# Patient Record
Sex: FEMALE | Race: WHITE | HISPANIC OR LATINO
[De-identification: names, ages, dates, MRNs, and addresses within clinical notes are randomized per-mention and may not be internally consistent; named-entity substitution may affect disease eponyms.]

---

## 2021-01-06 PROBLEM — Z00.00 ENCOUNTER FOR PREVENTIVE HEALTH EXAMINATION: Status: ACTIVE | Noted: 2021-01-06

## 2021-01-29 ENCOUNTER — APPOINTMENT (OUTPATIENT)
Dept: CARDIOLOGY | Facility: CLINIC | Age: 76
End: 2021-01-29
Payer: SELF-PAY

## 2021-01-29 VITALS
SYSTOLIC BLOOD PRESSURE: 169 MMHG | HEIGHT: 59 IN | DIASTOLIC BLOOD PRESSURE: 90 MMHG | HEART RATE: 73 BPM | WEIGHT: 140 LBS | OXYGEN SATURATION: 98 % | BODY MASS INDEX: 28.22 KG/M2

## 2021-01-29 DIAGNOSIS — Z86.39 PERSONAL HISTORY OF OTHER ENDOCRINE, NUTRITIONAL AND METABOLIC DISEASE: ICD-10-CM

## 2021-01-29 DIAGNOSIS — Z86.79 PERSONAL HISTORY OF OTHER DISEASES OF THE CIRCULATORY SYSTEM: ICD-10-CM

## 2021-01-29 DIAGNOSIS — Z87.891 PERSONAL HISTORY OF NICOTINE DEPENDENCE: ICD-10-CM

## 2021-01-29 DIAGNOSIS — R09.89 OTHER SPECIFIED SYMPTOMS AND SIGNS INVOLVING THE CIRCULATORY AND RESPIRATORY SYSTEMS: ICD-10-CM

## 2021-01-29 DIAGNOSIS — Z82.3 FAMILY HISTORY OF STROKE: ICD-10-CM

## 2021-01-29 PROCEDURE — 99204 OFFICE O/P NEW MOD 45 MIN: CPT

## 2021-01-29 RX ORDER — ASPIRIN ENTERIC COATED TABLETS 81 MG 81 MG/1
81 TABLET, DELAYED RELEASE ORAL
Refills: 0 | Status: ACTIVE | COMMUNITY
Start: 2021-01-29

## 2021-01-29 RX ORDER — ATORVASTATIN CALCIUM 40 MG/1
40 TABLET, FILM COATED ORAL
Qty: 90 | Refills: 3 | Status: ACTIVE | COMMUNITY
Start: 2021-01-29

## 2021-01-29 RX ORDER — LEVOTHYROXINE SODIUM 88 UG/1
88 CAPSULE ORAL
Refills: 0 | Status: ACTIVE | COMMUNITY
Start: 2021-01-29

## 2021-01-29 RX ORDER — RAMIPRIL 2.5 MG/1
2.5 CAPSULE ORAL DAILY
Refills: 0 | Status: ACTIVE | COMMUNITY
Start: 2021-01-29

## 2021-01-29 RX ORDER — TRIAMTERENE AND HYDROCHLOROTHIAZIDE 37.5; 25 MG/1; MG/1
37.5-25 CAPSULE ORAL DAILY
Refills: 0 | Status: ACTIVE | COMMUNITY
Start: 2021-01-29

## 2021-01-29 RX ORDER — CARVEDILOL 3.12 MG/1
3.12 TABLET, FILM COATED ORAL TWICE DAILY
Qty: 180 | Refills: 3 | Status: ACTIVE | COMMUNITY
Start: 2021-01-29

## 2021-01-29 NOTE — HISTORY OF PRESENT ILLNESS
[FreeTextEntry1] : Ms. Cameron is a 74 yo F with PMH of CAD s/p 2v CABG in 1990s, HTN, dyslipidemia, hypothyroidism who presented for second opinion of carotid artery stenosis.\par Patient lives in [place] normally, here visiting children for the holidays. Follows with Dr. Feliciano Carroll regularly in [place], had routine carotid dopplers and TTE done 9/2020. Carotid dopplers showed 70% stenosis of proximal right internal carotid artery, 50-60% stenosis of left carotid bulb and proximal left internal carotid artery. TTE with normal LV function and size, aortic valve 1.2cm2 and mild regurgitation. Patient denies history of presyncope, syncope or stroke. Does not right arm numbness not correlating with activity, denies pain or swelling.\par Patient otherwise compliant with cardiac medications.

## 2021-01-29 NOTE — ASSESSMENT
[FreeTextEntry1] : 76 yo F with PMH of CAD s/p 2v CABG in 1990s, HTN, dyslipidemia, hypothyroidism who presented for second opinion of carotid artery stenosis. Patient with asymptomatic carotid stenosis bilaterally.\par - No indication for intervention on asymptomatic carotid stenosis < 80%, no history of stroke\par - Recommend repeat carotid dopplers for surveillance every 6-9 months, ordered today\par - If not covered by insurance, would repeat in [place]\par - continue statin and aspirin therapy

## 2021-01-29 NOTE — PHYSICAL EXAM
[General Appearance - Well Developed] : well developed [General Appearance - Well Nourished] : well nourished [General Appearance - In No Acute Distress] : no acute distress [Normal Conjunctiva] : the conjunctiva exhibited no abnormalities [Normal Oral Mucosa] : normal oral mucosa [Heart Rate And Rhythm] : heart rate and rhythm were normal [Heart Sounds] : normal S1 and S2 [Arterial Pulses Normal] : the arterial pulses were normal [Edema] : no peripheral edema present [] : no respiratory distress [Auscultation Breath Sounds / Voice Sounds] : lungs were clear to auscultation bilaterally [Abdomen Soft] : soft [Abnormal Walk] : normal gait [Nail Clubbing] : no clubbing of the fingernails [Cyanosis, Localized] : no localized cyanosis [Oriented To Time, Place, And Person] : oriented to person, place, and time [Affect] : the affect was normal [Mood] : the mood was normal [FreeTextEntry1] : Maculopapular rash on arms bilaterally

## 2022-10-25 ENCOUNTER — INPATIENT (INPATIENT)
Facility: HOSPITAL | Age: 77
LOS: 2 days | Discharge: HOME | DRG: 281 | End: 2022-10-28
Attending: HOSPITALIST | Admitting: INTERNAL MEDICINE
Payer: MEDICARE

## 2022-10-25 VITALS
WEIGHT: 149.03 LBS | RESPIRATION RATE: 18 BRPM | HEIGHT: 58 IN | TEMPERATURE: 98 F | DIASTOLIC BLOOD PRESSURE: 68 MMHG | OXYGEN SATURATION: 97 % | SYSTOLIC BLOOD PRESSURE: 138 MMHG | HEART RATE: 71 BPM

## 2022-10-25 DIAGNOSIS — Z95.1 PRESENCE OF AORTOCORONARY BYPASS GRAFT: Chronic | ICD-10-CM

## 2022-10-25 LAB
ALBUMIN SERPL ELPH-MCNC: 4.2 G/DL — SIGNIFICANT CHANGE UP (ref 3.3–5.2)
ALP SERPL-CCNC: 64 U/L — SIGNIFICANT CHANGE UP (ref 40–120)
ALT FLD-CCNC: 31 U/L — SIGNIFICANT CHANGE UP
ANION GAP SERPL CALC-SCNC: 11 MMOL/L — SIGNIFICANT CHANGE UP (ref 5–17)
APTT BLD: 28.7 SEC — SIGNIFICANT CHANGE UP (ref 27.5–35.5)
AST SERPL-CCNC: 49 U/L — HIGH
BASOPHILS # BLD AUTO: 0.04 K/UL — SIGNIFICANT CHANGE UP (ref 0–0.2)
BASOPHILS NFR BLD AUTO: 0.5 % — SIGNIFICANT CHANGE UP (ref 0–2)
BILIRUB SERPL-MCNC: 0.2 MG/DL — LOW (ref 0.4–2)
BUN SERPL-MCNC: 46.3 MG/DL — HIGH (ref 8–20)
CALCIUM SERPL-MCNC: 10.2 MG/DL — SIGNIFICANT CHANGE UP (ref 8.4–10.5)
CHLORIDE SERPL-SCNC: 101 MMOL/L — SIGNIFICANT CHANGE UP (ref 96–108)
CO2 SERPL-SCNC: 28 MMOL/L — SIGNIFICANT CHANGE UP (ref 22–29)
CREAT SERPL-MCNC: 1.64 MG/DL — HIGH (ref 0.5–1.3)
EGFR: 32 ML/MIN/1.73M2 — LOW
EOSINOPHIL # BLD AUTO: 0.52 K/UL — HIGH (ref 0–0.5)
EOSINOPHIL NFR BLD AUTO: 6.4 % — HIGH (ref 0–6)
GLUCOSE SERPL-MCNC: 97 MG/DL — SIGNIFICANT CHANGE UP (ref 70–99)
HCT VFR BLD CALC: 32.8 % — LOW (ref 34.5–45)
HGB BLD-MCNC: 10.6 G/DL — LOW (ref 11.5–15.5)
IMM GRANULOCYTES NFR BLD AUTO: 0.4 % — SIGNIFICANT CHANGE UP (ref 0–0.9)
INR BLD: 0.96 RATIO — SIGNIFICANT CHANGE UP (ref 0.88–1.16)
LYMPHOCYTES # BLD AUTO: 1.77 K/UL — SIGNIFICANT CHANGE UP (ref 1–3.3)
LYMPHOCYTES # BLD AUTO: 21.7 % — SIGNIFICANT CHANGE UP (ref 13–44)
MCHC RBC-ENTMCNC: 30.2 PG — SIGNIFICANT CHANGE UP (ref 27–34)
MCHC RBC-ENTMCNC: 32.3 GM/DL — SIGNIFICANT CHANGE UP (ref 32–36)
MCV RBC AUTO: 93.4 FL — SIGNIFICANT CHANGE UP (ref 80–100)
MONOCYTES # BLD AUTO: 0.68 K/UL — SIGNIFICANT CHANGE UP (ref 0–0.9)
MONOCYTES NFR BLD AUTO: 8.3 % — SIGNIFICANT CHANGE UP (ref 2–14)
NEUTROPHILS # BLD AUTO: 5.13 K/UL — SIGNIFICANT CHANGE UP (ref 1.8–7.4)
NEUTROPHILS NFR BLD AUTO: 62.7 % — SIGNIFICANT CHANGE UP (ref 43–77)
NT-PROBNP SERPL-SCNC: 517 PG/ML — HIGH (ref 0–300)
PLATELET # BLD AUTO: 279 K/UL — SIGNIFICANT CHANGE UP (ref 150–400)
POTASSIUM SERPL-MCNC: 4.4 MMOL/L — SIGNIFICANT CHANGE UP (ref 3.5–5.3)
POTASSIUM SERPL-SCNC: 4.4 MMOL/L — SIGNIFICANT CHANGE UP (ref 3.5–5.3)
PROT SERPL-MCNC: 7.3 G/DL — SIGNIFICANT CHANGE UP (ref 6.6–8.7)
PROTHROM AB SERPL-ACNC: 11.1 SEC — SIGNIFICANT CHANGE UP (ref 10.5–13.4)
RAPID RVP RESULT: SIGNIFICANT CHANGE UP
RBC # BLD: 3.51 M/UL — LOW (ref 3.8–5.2)
RBC # FLD: 12.8 % — SIGNIFICANT CHANGE UP (ref 10.3–14.5)
SARS-COV-2 RNA SPEC QL NAA+PROBE: SIGNIFICANT CHANGE UP
SODIUM SERPL-SCNC: 140 MMOL/L — SIGNIFICANT CHANGE UP (ref 135–145)
TROPONIN T SERPL-MCNC: 0.1 NG/ML — HIGH (ref 0–0.06)
WBC # BLD: 8.17 K/UL — SIGNIFICANT CHANGE UP (ref 3.8–10.5)
WBC # FLD AUTO: 8.17 K/UL — SIGNIFICANT CHANGE UP (ref 3.8–10.5)

## 2022-10-25 PROCEDURE — 99291 CRITICAL CARE FIRST HOUR: CPT

## 2022-10-25 PROCEDURE — 74174 CTA ABD&PLVS W/CONTRAST: CPT | Mod: 26,MA

## 2022-10-25 PROCEDURE — 71275 CT ANGIOGRAPHY CHEST: CPT | Mod: 26,MA

## 2022-10-25 PROCEDURE — 93010 ELECTROCARDIOGRAM REPORT: CPT

## 2022-10-25 PROCEDURE — 71045 X-RAY EXAM CHEST 1 VIEW: CPT | Mod: 26

## 2022-10-25 NOTE — ED PROVIDER NOTE - PHYSICAL EXAMINATION
General: well appearing, NAD  Head: NC/AT  Cardiac: RRR, no m/r/g  Respiratory: CTABL, equal chest wall expansions, no conversational dyspnea  Abdomen: soft, ND, NT  Neuro: AAOx3, coordinated movement of all 4 extremities  Psych: calm, cooperative, normal affect  Skin: warm and dry

## 2022-10-25 NOTE — ED PROVIDER NOTE - CLINICAL SUMMARY MEDICAL DECISION MAKING FREE TEXT BOX
76yo F w/pmh HTN, HLD, DM, CAD s/p CABG x2 presents for back pain radiating to chest x2d. Priority CTA ch/abd/pelvis ordered. Labs pending.

## 2022-10-25 NOTE — ED PROVIDER NOTE - PROGRESS NOTE DETAILS
Resident Katia Monteiro: Given the significant and immediate threats to this patient based on initial presentation, the benefits of emergency contrast-enhanced CT imaging without obtaining GFR/creatinine serum level results greatly outweigh the potential risk of harm due to contrast-induced nephropathy.

## 2022-10-25 NOTE — ED ADULT NURSE NOTE - OBJECTIVE STATEMENT
pt is a 76 yo/female w/pmh HTN, HLD, DM, CAD s/p CABG presents c/o mid back pain radiating through to chest x 5 days. pt states it started in the lower back and then traveled up to the upper back. now radiating through to the chest. Denies f/chills, n/v, sob, abdominal pain, dizziness, lightheadedness. pt made priorty CT scan.

## 2022-10-25 NOTE — ED PROVIDER NOTE - OBJECTIVE STATEMENT
76yo F w/pmh HTN, HLD, DM, CAD s/p CABG x2 presents for back pain radiating to chest x2d. Reports started in lower back, went up to upper back, now shooting through to her chest. Denies f/ch, SOB, n/v, abdominal pain. Cardiologist is Dr. Linda. 76 yo F w/pmh HTN, HLD, DM, CAD s/p CABG x2 presents for back pain radiating to chest x2d. Reports started in lower back, went up to upper back, now shooting through to her chest. Denies f/ch, SOB, n/v, abdominal pain. Cardiologist is Dr. Linda.

## 2022-10-25 NOTE — ED PROVIDER NOTE - CARE PLAN
Principal Discharge DX:	Chest pain   1 Principal Discharge DX:	NSTEMI (non-ST elevated myocardial infarction)

## 2022-10-25 NOTE — ED ADULT NURSE NOTE - NS ED NURSE REPORT GIVEN TO FT
plan of care handed over to Tony PALENCIA. Rn in understanding of plan of care. no further questions

## 2022-10-25 NOTE — ED PROVIDER NOTE - NS ED ROS FT
Constitutional: no fever, no sweats, and no chills.  CV: +chest pain, no edema.  Resp: no cough, no dyspnea  GI: no abdominal pain, no nausea and no vomiting.  MSK: +back pain, no extremity pain, no weakness  Skin: no lesions, and no rashes.  Neuro: no LOC, no headache, no dizziness  ROS otherwise negative except as noted in HPI. Constitutional: no fever, no sweats, and no chills.  CV: +chest pain, no edema.  Resp: no cough, no dyspnea  GI: no abdominal pain, no nausea and no vomiting.  MSK: +back pain, no extremity pain, no weakness  Skin: no lesions, and no rashes.  Neuro: no LOC, no headache, no dizziness.  ROS otherwise negative except as noted in HPI.

## 2022-10-26 DIAGNOSIS — Z98.891 HISTORY OF UTERINE SCAR FROM PREVIOUS SURGERY: Chronic | ICD-10-CM

## 2022-10-26 DIAGNOSIS — R07.9 CHEST PAIN, UNSPECIFIED: ICD-10-CM

## 2022-10-26 DIAGNOSIS — I21.4 NON-ST ELEVATION (NSTEMI) MYOCARDIAL INFARCTION: ICD-10-CM

## 2022-10-26 LAB
ANION GAP SERPL CALC-SCNC: 11 MMOL/L — SIGNIFICANT CHANGE UP (ref 5–17)
APTT BLD: 128.5 SEC — CRITICAL HIGH (ref 27.5–35.5)
BASOPHILS # BLD AUTO: 0.03 K/UL — SIGNIFICANT CHANGE UP (ref 0–0.2)
BASOPHILS NFR BLD AUTO: 0.4 % — SIGNIFICANT CHANGE UP (ref 0–2)
BLD GP AB SCN SERPL QL: SIGNIFICANT CHANGE UP
BUN SERPL-MCNC: 42.4 MG/DL — HIGH (ref 8–20)
CALCIUM SERPL-MCNC: 9.9 MG/DL — SIGNIFICANT CHANGE UP (ref 8.4–10.5)
CHLORIDE SERPL-SCNC: 99 MMOL/L — SIGNIFICANT CHANGE UP (ref 96–108)
CK MB CFR SERPL CALC: 19.1 NG/ML — HIGH (ref 0–6.7)
CK SERPL-CCNC: 385 U/L — HIGH (ref 25–170)
CO2 SERPL-SCNC: 29 MMOL/L — SIGNIFICANT CHANGE UP (ref 22–29)
CREAT SERPL-MCNC: 1.71 MG/DL — HIGH (ref 0.5–1.3)
EGFR: 31 ML/MIN/1.73M2 — LOW
EOSINOPHIL # BLD AUTO: 0.47 K/UL — SIGNIFICANT CHANGE UP (ref 0–0.5)
EOSINOPHIL NFR BLD AUTO: 5.7 % — SIGNIFICANT CHANGE UP (ref 0–6)
GLUCOSE BLDC GLUCOMTR-MCNC: 106 MG/DL — HIGH (ref 70–99)
GLUCOSE BLDC GLUCOMTR-MCNC: 159 MG/DL — HIGH (ref 70–99)
GLUCOSE SERPL-MCNC: 113 MG/DL — HIGH (ref 70–99)
HCT VFR BLD CALC: 30.9 % — LOW (ref 34.5–45)
HCT VFR BLD CALC: 31.5 % — LOW (ref 34.5–45)
HGB BLD-MCNC: 10.4 G/DL — LOW (ref 11.5–15.5)
HGB BLD-MCNC: 9.9 G/DL — LOW (ref 11.5–15.5)
IMM GRANULOCYTES NFR BLD AUTO: 0.4 % — SIGNIFICANT CHANGE UP (ref 0–0.9)
LYMPHOCYTES # BLD AUTO: 2.41 K/UL — SIGNIFICANT CHANGE UP (ref 1–3.3)
LYMPHOCYTES # BLD AUTO: 29.3 % — SIGNIFICANT CHANGE UP (ref 13–44)
MAGNESIUM SERPL-MCNC: 1.8 MG/DL — SIGNIFICANT CHANGE UP (ref 1.6–2.6)
MCHC RBC-ENTMCNC: 30.1 PG — SIGNIFICANT CHANGE UP (ref 27–34)
MCHC RBC-ENTMCNC: 30.6 PG — SIGNIFICANT CHANGE UP (ref 27–34)
MCHC RBC-ENTMCNC: 32 GM/DL — SIGNIFICANT CHANGE UP (ref 32–36)
MCHC RBC-ENTMCNC: 33 GM/DL — SIGNIFICANT CHANGE UP (ref 32–36)
MCV RBC AUTO: 92.6 FL — SIGNIFICANT CHANGE UP (ref 80–100)
MCV RBC AUTO: 93.9 FL — SIGNIFICANT CHANGE UP (ref 80–100)
MONOCYTES # BLD AUTO: 0.71 K/UL — SIGNIFICANT CHANGE UP (ref 0–0.9)
MONOCYTES NFR BLD AUTO: 8.6 % — SIGNIFICANT CHANGE UP (ref 2–14)
NEUTROPHILS # BLD AUTO: 4.57 K/UL — SIGNIFICANT CHANGE UP (ref 1.8–7.4)
NEUTROPHILS NFR BLD AUTO: 55.6 % — SIGNIFICANT CHANGE UP (ref 43–77)
PHOSPHATE SERPL-MCNC: 2.7 MG/DL — SIGNIFICANT CHANGE UP (ref 2.4–4.7)
PLATELET # BLD AUTO: 271 K/UL — SIGNIFICANT CHANGE UP (ref 150–400)
PLATELET # BLD AUTO: 277 K/UL — SIGNIFICANT CHANGE UP (ref 150–400)
POTASSIUM SERPL-MCNC: 4.1 MMOL/L — SIGNIFICANT CHANGE UP (ref 3.5–5.3)
POTASSIUM SERPL-SCNC: 4.1 MMOL/L — SIGNIFICANT CHANGE UP (ref 3.5–5.3)
RBC # BLD: 3.29 M/UL — LOW (ref 3.8–5.2)
RBC # BLD: 3.4 M/UL — LOW (ref 3.8–5.2)
RBC # FLD: 12.7 % — SIGNIFICANT CHANGE UP (ref 10.3–14.5)
RBC # FLD: 12.8 % — SIGNIFICANT CHANGE UP (ref 10.3–14.5)
SODIUM SERPL-SCNC: 139 MMOL/L — SIGNIFICANT CHANGE UP (ref 135–145)
TROPONIN T SERPL-MCNC: 0.09 NG/ML — HIGH (ref 0–0.06)
TROPONIN T SERPL-MCNC: 0.11 NG/ML — HIGH (ref 0–0.06)
WBC # BLD: 6.38 K/UL — SIGNIFICANT CHANGE UP (ref 3.8–10.5)
WBC # BLD: 8.22 K/UL — SIGNIFICANT CHANGE UP (ref 3.8–10.5)
WBC # FLD AUTO: 6.38 K/UL — SIGNIFICANT CHANGE UP (ref 3.8–10.5)
WBC # FLD AUTO: 8.22 K/UL — SIGNIFICANT CHANGE UP (ref 3.8–10.5)

## 2022-10-26 PROCEDURE — 99223 1ST HOSP IP/OBS HIGH 75: CPT

## 2022-10-26 PROCEDURE — 72128 CT CHEST SPINE W/O DYE: CPT | Mod: 26,MD

## 2022-10-26 PROCEDURE — 12345: CPT | Mod: NC

## 2022-10-26 PROCEDURE — 93010 ELECTROCARDIOGRAM REPORT: CPT | Mod: 76

## 2022-10-26 PROCEDURE — 99152 MOD SED SAME PHYS/QHP 5/>YRS: CPT

## 2022-10-26 PROCEDURE — 99220: CPT

## 2022-10-26 PROCEDURE — 93459 L HRT ART/GRFT ANGIO: CPT | Mod: 26

## 2022-10-26 RX ORDER — ONDANSETRON 8 MG/1
4 TABLET, FILM COATED ORAL EVERY 8 HOURS
Refills: 0 | Status: DISCONTINUED | OUTPATIENT
Start: 2022-10-26 | End: 2022-10-28

## 2022-10-26 RX ORDER — HEPARIN SODIUM 5000 [USP'U]/ML
3800 INJECTION INTRAVENOUS; SUBCUTANEOUS EVERY 6 HOURS
Refills: 0 | Status: DISCONTINUED | OUTPATIENT
Start: 2022-10-26 | End: 2022-10-26

## 2022-10-26 RX ORDER — SODIUM CHLORIDE 9 MG/ML
1000 INJECTION, SOLUTION INTRAVENOUS
Refills: 0 | Status: DISCONTINUED | OUTPATIENT
Start: 2022-10-26 | End: 2022-10-28

## 2022-10-26 RX ORDER — DEXTROSE 50 % IN WATER 50 %
25 SYRINGE (ML) INTRAVENOUS ONCE
Refills: 0 | Status: DISCONTINUED | OUTPATIENT
Start: 2022-10-26 | End: 2022-10-28

## 2022-10-26 RX ORDER — HEPARIN SODIUM 5000 [USP'U]/ML
4100 INJECTION INTRAVENOUS; SUBCUTANEOUS EVERY 6 HOURS
Refills: 0 | Status: DISCONTINUED | OUTPATIENT
Start: 2022-10-26 | End: 2022-10-26

## 2022-10-26 RX ORDER — MORPHINE SULFATE 50 MG/1
4 CAPSULE, EXTENDED RELEASE ORAL EVERY 4 HOURS
Refills: 0 | Status: DISCONTINUED | OUTPATIENT
Start: 2022-10-26 | End: 2022-10-28

## 2022-10-26 RX ORDER — HEPARIN SODIUM 5000 [USP'U]/ML
INJECTION INTRAVENOUS; SUBCUTANEOUS
Qty: 25000 | Refills: 0 | Status: DISCONTINUED | OUTPATIENT
Start: 2022-10-26 | End: 2022-10-26

## 2022-10-26 RX ORDER — LIDOCAINE 4 G/100G
1 CREAM TOPICAL DAILY
Refills: 0 | Status: DISCONTINUED | OUTPATIENT
Start: 2022-10-26 | End: 2022-10-28

## 2022-10-26 RX ORDER — DEXTROSE 50 % IN WATER 50 %
15 SYRINGE (ML) INTRAVENOUS ONCE
Refills: 0 | Status: DISCONTINUED | OUTPATIENT
Start: 2022-10-26 | End: 2022-10-28

## 2022-10-26 RX ORDER — CLOPIDOGREL BISULFATE 75 MG/1
75 TABLET, FILM COATED ORAL DAILY
Refills: 0 | Status: DISCONTINUED | OUTPATIENT
Start: 2022-10-27 | End: 2022-10-28

## 2022-10-26 RX ORDER — ACETAMINOPHEN 500 MG
650 TABLET ORAL EVERY 6 HOURS
Refills: 0 | Status: DISCONTINUED | OUTPATIENT
Start: 2022-10-26 | End: 2022-10-28

## 2022-10-26 RX ORDER — IBUPROFEN 200 MG
600 TABLET ORAL EVERY 8 HOURS
Refills: 0 | Status: DISCONTINUED | OUTPATIENT
Start: 2022-10-26 | End: 2022-10-26

## 2022-10-26 RX ORDER — ASPIRIN/CALCIUM CARB/MAGNESIUM 324 MG
81 TABLET ORAL DAILY
Refills: 0 | Status: DISCONTINUED | OUTPATIENT
Start: 2022-10-26 | End: 2022-10-28

## 2022-10-26 RX ORDER — MORPHINE SULFATE 50 MG/1
2 CAPSULE, EXTENDED RELEASE ORAL EVERY 4 HOURS
Refills: 0 | Status: DISCONTINUED | OUTPATIENT
Start: 2022-10-26 | End: 2022-10-28

## 2022-10-26 RX ORDER — PANTOPRAZOLE SODIUM 20 MG/1
40 TABLET, DELAYED RELEASE ORAL
Refills: 0 | Status: DISCONTINUED | OUTPATIENT
Start: 2022-10-26 | End: 2022-10-28

## 2022-10-26 RX ORDER — GLUCAGON INJECTION, SOLUTION 0.5 MG/.1ML
1 INJECTION, SOLUTION SUBCUTANEOUS ONCE
Refills: 0 | Status: DISCONTINUED | OUTPATIENT
Start: 2022-10-26 | End: 2022-10-28

## 2022-10-26 RX ORDER — LEVOTHYROXINE SODIUM 125 MCG
100 TABLET ORAL DAILY
Refills: 0 | Status: DISCONTINUED | OUTPATIENT
Start: 2022-10-26 | End: 2022-10-28

## 2022-10-26 RX ORDER — ASPIRIN/CALCIUM CARB/MAGNESIUM 324 MG
324 TABLET ORAL ONCE
Refills: 0 | Status: COMPLETED | OUTPATIENT
Start: 2022-10-26 | End: 2022-10-26

## 2022-10-26 RX ORDER — SODIUM CHLORIDE 9 MG/ML
250 INJECTION INTRAMUSCULAR; INTRAVENOUS; SUBCUTANEOUS ONCE
Refills: 0 | Status: COMPLETED | OUTPATIENT
Start: 2022-10-26 | End: 2022-10-26

## 2022-10-26 RX ORDER — INSULIN LISPRO 100/ML
VIAL (ML) SUBCUTANEOUS
Refills: 0 | Status: DISCONTINUED | OUTPATIENT
Start: 2022-10-26 | End: 2022-10-28

## 2022-10-26 RX ORDER — ATORVASTATIN CALCIUM 80 MG/1
80 TABLET, FILM COATED ORAL AT BEDTIME
Refills: 0 | Status: DISCONTINUED | OUTPATIENT
Start: 2022-10-26 | End: 2022-10-28

## 2022-10-26 RX ORDER — HEPARIN SODIUM 5000 [USP'U]/ML
550 INJECTION INTRAVENOUS; SUBCUTANEOUS
Qty: 25000 | Refills: 0 | Status: DISCONTINUED | OUTPATIENT
Start: 2022-10-26 | End: 2022-10-26

## 2022-10-26 RX ORDER — DEXTROSE 50 % IN WATER 50 %
12.5 SYRINGE (ML) INTRAVENOUS ONCE
Refills: 0 | Status: DISCONTINUED | OUTPATIENT
Start: 2022-10-26 | End: 2022-10-28

## 2022-10-26 RX ORDER — INSULIN LISPRO 100/ML
VIAL (ML) SUBCUTANEOUS AT BEDTIME
Refills: 0 | Status: DISCONTINUED | OUTPATIENT
Start: 2022-10-26 | End: 2022-10-28

## 2022-10-26 RX ORDER — CARVEDILOL PHOSPHATE 80 MG/1
3.12 CAPSULE, EXTENDED RELEASE ORAL EVERY 12 HOURS
Refills: 0 | Status: DISCONTINUED | OUTPATIENT
Start: 2022-10-26 | End: 2022-10-28

## 2022-10-26 RX ORDER — CLOPIDOGREL BISULFATE 75 MG/1
600 TABLET, FILM COATED ORAL ONCE
Refills: 0 | Status: COMPLETED | OUTPATIENT
Start: 2022-10-26 | End: 2022-10-26

## 2022-10-26 RX ORDER — LANOLIN ALCOHOL/MO/W.PET/CERES
3 CREAM (GRAM) TOPICAL AT BEDTIME
Refills: 0 | Status: DISCONTINUED | OUTPATIENT
Start: 2022-10-26 | End: 2022-10-28

## 2022-10-26 RX ORDER — HEPARIN SODIUM 5000 [USP'U]/ML
3800 INJECTION INTRAVENOUS; SUBCUTANEOUS ONCE
Refills: 0 | Status: COMPLETED | OUTPATIENT
Start: 2022-10-26 | End: 2022-10-26

## 2022-10-26 RX ORDER — HEPARIN SODIUM 5000 [USP'U]/ML
4100 INJECTION INTRAVENOUS; SUBCUTANEOUS ONCE
Refills: 0 | Status: DISCONTINUED | OUTPATIENT
Start: 2022-10-26 | End: 2022-10-26

## 2022-10-26 RX ORDER — NALOXONE HYDROCHLORIDE 4 MG/.1ML
0.4 SPRAY NASAL ONCE
Refills: 0 | Status: DISCONTINUED | OUTPATIENT
Start: 2022-10-26 | End: 2022-10-28

## 2022-10-26 RX ADMIN — CARVEDILOL PHOSPHATE 3.12 MILLIGRAM(S): 80 CAPSULE, EXTENDED RELEASE ORAL at 05:55

## 2022-10-26 RX ADMIN — SODIUM CHLORIDE 250 MILLILITER(S): 9 INJECTION INTRAMUSCULAR; INTRAVENOUS; SUBCUTANEOUS at 15:29

## 2022-10-26 RX ADMIN — HEPARIN SODIUM 750 UNIT(S)/HR: 5000 INJECTION INTRAVENOUS; SUBCUTANEOUS at 02:38

## 2022-10-26 RX ADMIN — SODIUM CHLORIDE 250 MILLILITER(S): 9 INJECTION INTRAMUSCULAR; INTRAVENOUS; SUBCUTANEOUS at 17:10

## 2022-10-26 RX ADMIN — LIDOCAINE 1 PATCH: 4 CREAM TOPICAL at 22:08

## 2022-10-26 RX ADMIN — CARVEDILOL PHOSPHATE 3.12 MILLIGRAM(S): 80 CAPSULE, EXTENDED RELEASE ORAL at 18:36

## 2022-10-26 RX ADMIN — Medication 81 MILLIGRAM(S): at 12:55

## 2022-10-26 RX ADMIN — Medication 324 MILLIGRAM(S): at 01:55

## 2022-10-26 RX ADMIN — Medication 650 MILLIGRAM(S): at 23:07

## 2022-10-26 RX ADMIN — ATORVASTATIN CALCIUM 80 MILLIGRAM(S): 80 TABLET, FILM COATED ORAL at 22:07

## 2022-10-26 RX ADMIN — Medication 100 MICROGRAM(S): at 05:55

## 2022-10-26 RX ADMIN — HEPARIN SODIUM 3800 UNIT(S): 5000 INJECTION INTRAVENOUS; SUBCUTANEOUS at 02:37

## 2022-10-26 RX ADMIN — PANTOPRAZOLE SODIUM 40 MILLIGRAM(S): 20 TABLET, DELAYED RELEASE ORAL at 05:55

## 2022-10-26 RX ADMIN — HEPARIN SODIUM 0 UNIT(S)/HR: 5000 INJECTION INTRAVENOUS; SUBCUTANEOUS at 10:25

## 2022-10-26 RX ADMIN — Medication 600 MILLIGRAM(S): at 01:03

## 2022-10-26 RX ADMIN — HEPARIN SODIUM 750 UNIT(S)/HR: 5000 INJECTION INTRAVENOUS; SUBCUTANEOUS at 07:25

## 2022-10-26 RX ADMIN — Medication 650 MILLIGRAM(S): at 22:07

## 2022-10-26 RX ADMIN — CLOPIDOGREL BISULFATE 600 MILLIGRAM(S): 75 TABLET, FILM COATED ORAL at 10:22

## 2022-10-26 RX ADMIN — HEPARIN SODIUM 550 UNIT(S)/HR: 5000 INJECTION INTRAVENOUS; SUBCUTANEOUS at 11:29

## 2022-10-26 RX ADMIN — Medication 600 MILLIGRAM(S): at 02:08

## 2022-10-26 RX ADMIN — Medication 3 MILLIGRAM(S): at 22:08

## 2022-10-26 NOTE — H&P ADULT - HISTORY OF PRESENT ILLNESS
77F with PMHX CAD s/p CABG/PCI, HTN, HLD, Hypothyroidism presents to [facility] ER c/o lower/middle back pain radiating to her chest which began 5 days ago. Denies SOB/KEENE. Similar to prior episode of MI for which she had CABG/PCI in 1999. Labs significant for elevated troponin. EKG SR +septal q waves. Follows with [facility] Dr Linda. No other complants. Seen by Cardiology in ED.    ROS negative unless mentioned.

## 2022-10-26 NOTE — H&P ADULT - NSHPPHYSICALEXAM_GEN_ALL_CORE
Vital Signs Last 24 Hrs  T(C): 36.6 (25 Oct 2022 23:27), Max: 36.7 (25 Oct 2022 18:22)  T(F): 97.8 (25 Oct 2022 23:27), Max: 98 (25 Oct 2022 18:22)  HR: 68 (25 Oct 2022 23:27) (68 - 71)  BP: 145/65 (25 Oct 2022 23:27) (138/68 - 183/72)  BP(mean): --  RR: 18 (25 Oct 2022 23:27) (18 - 18)  SpO2: 97% (25 Oct 2022 23:27) (97% - 97%)    Parameters below as of 25 Oct 2022 23:27  Patient On (Oxygen Delivery Method): room air    Constitutional: Well-appearing, NAD, VSS  Head: NC/AT  Eyes: PERRL, EOMI, anicteric sclera, conjunctiva WNL  ENT: Normal Pharynx, MMM, No tonsillar exudate/erythema  Neck: Supple, Non-tender  Chest: Non-tender, no rashes  Cardio: RRR, s1/s2, +HEIDE  Resp: BS CTA bilaterally, no wheezing/rhonchi/rales  Abd: Soft, Non-tender, Non-distended, no rebound/guarding/rigidity  : not examined  Rectal: not examined  MSK: moving all extremities, no motor weakness, full ROM x4  Ext: palpable distal pulses, good capillary refill, no clubbing/cyanosis/edema  Psych: appropriate, cooperative  Neuro: CN II-XII grossly intact, no focal deficits  Skin: Warm/Dry. No rashes.

## 2022-10-26 NOTE — PATIENT PROFILE ADULT - NSFALLSECTIONLABEL_GEN_A_CORE
normal appearance , without tenderness upon palpation , no deformities , trachea midline , Thyroid normal size , no thyroid nodules , no masses , no JVD , thyroid nontender . Danny BANSAL

## 2022-10-26 NOTE — PROGRESS NOTE ADULT - SUBJECTIVE AND OBJECTIVE BOX
Department of Cardiology                                                                  [facility]/[place] [address] [address] [facility]                                                            Telephone: [phone]. Fax:[phone]                                                                             Pre- Cardiac Procedure H + P/Progress Note      HPI:  77F with PMHX HTN, HLD, CAD with prior MI, PCI ~1999 with subsequent MI treated with 2V CABG (?JENN/LIMA), Hypothyroidism presents to [facility] ER c/o lower/middle back pain radiating to her chest which began 5 days ago. denies SOB/KEENE. Similar to prior episode of MI for which she had CABG/PCI in 1999, ECG with flattened/STD I and AVL, QW V1-V2, +tnl peak 0.11, TTE with preserved LVEF 60-65%, basal infe akinesis, mod-sev AS with NAVA 0.82,       Symptoms:        Angina (Class):        Ischemic Symptoms:     Heart Failure:        Systolic/Diastolic/Combined:        NYHA Class (within 2 weeks):     Assessment of LVEF:       EF:        Assessed by:        Date:     Prior Cardiac Interventions:         Noninvasive Testing:   Stress Test: Date:        Protocol:        Duration of Exercise:        Symptoms:        EKG Changes:        DTS:        Myocardial Imaging:        Risk Assessment (Low, Medium, High):     Echo:       Risk Assessments:  ASA:  Mallampati:  Bleeding Risk:  Creatinine:  GFR:    Associated Risk Factors:        Frailty Screening: (N/A, mild, mod, severe)       Cerebrovascular Disease: N/A       Chronic Lung Disease: N/A       Peripheral Arterial Disease: N/A       Chronic Kidney Disease (if yes, what is GFR): N/A       Uncontrolled Diabetes (if yes, what is HgbA1C or FBS): N/A       Poorly Controlled Hypertension (if yes, what is SBP): N/A       Morbid Obesity (if yes, what is BMI): N/A       History of Recent Ventricular Arrhythmia: N/A       Inability to Ambulate Safely: N/A       Need for Therapeutic Anticoagulation: N/A       Antiplatelet or Contrast Allergy: N/A    Antianginal Therapies:        Beta Blockers:         Calcium Channel Blockers:        Long Acting Nitrates:        Ranexa:     	  MEDICATIONS:  carvedilol 3.125 milliGRAM(s) Oral every 12 hours        acetaminophen     Tablet .. 650 milliGRAM(s) Oral every 6 hours PRN  melatonin 3 milliGRAM(s) Oral at bedtime PRN  morphine  - Injectable 2 milliGRAM(s) IV Push every 4 hours PRN  morphine  - Injectable 4 milliGRAM(s) IV Push every 4 hours PRN  ondansetron Injectable 4 milliGRAM(s) IV Push every 8 hours PRN    aluminum hydroxide/magnesium hydroxide/simethicone Suspension 30 milliLiter(s) Oral every 4 hours PRN  pantoprazole    Tablet 40 milliGRAM(s) Oral before breakfast    atorvastatin 80 milliGRAM(s) Oral at bedtime  dextrose 50% Injectable 25 Gram(s) IV Push once  dextrose 50% Injectable 12.5 Gram(s) IV Push once  dextrose 50% Injectable 25 Gram(s) IV Push once  dextrose Oral Gel 15 Gram(s) Oral once PRN  glucagon  Injectable 1 milliGRAM(s) IntraMuscular once  insulin lispro (ADMELOG) corrective regimen sliding scale   SubCutaneous three times a day before meals  insulin lispro (ADMELOG) corrective regimen sliding scale   SubCutaneous at bedtime  levothyroxine 100 MICROGram(s) Oral daily    aspirin  chewable 81 milliGRAM(s) Oral daily  dextrose 5%. 1000 milliLiter(s) IV Continuous <Continuous>  dextrose 5%. 1000 milliLiter(s) IV Continuous <Continuous>  heparin   Injectable 3800 Unit(s) IV Push every 6 hours PRN  heparin  Infusion. 550 Unit(s)/Hr IV Continuous <Continuous>  lidocaine   4% Patch 1 Patch Transdermal daily        ROS:     PHYSICAL EXAM:      T(C): 36.8 (10-26-22 @ 14:13), Max: 36.8 (10-26-22 @ 14:13)  HR: 58 (10-26-22 @ 14:13) (58 - 71)  BP: 107/58 (10-26-22 @ 14:13) (107/58 - 183/72)  RR: 18 (10-26-22 @ 14:13) (18 - 18)  SpO2: 98% (10-26-22 @ 14:13) (94% - 98%)  Wt(kg): --      I&O's Summary    26 Oct 2022 07:01  -  26 Oct 2022 14:41  --------------------------------------------------------  IN: 42.5 mL / OUT: 0 mL / NET: 42.5 mL        Daily Height in cm: 147.32 (26 Oct 2022 06:11)    Daily     TELEMETRY: 	      ECG:  	    LABS:	 	                                    10.4   6.38  )-----------( 271      ( 26 Oct 2022 08:48 )             31.5     10-26    139  |  99  |  42.4<H>  ----------------------------<  113<H>  4.1   |  29.0  |  1.71<H>    Ca    9.9      26 Oct 2022 03:54  Phos  2.7     10-26  Mg     1.8     10-26    TPro  7.3  /  Alb  4.2  /  TBili  0.2<L>  /  DBili  x   /  AST  49<H>  /  ALT  31  /  AlkPhos  64  10-25      Tnl:    Lipid Profile:   TC  TG  LDL  HDL    HgA1c:     proBNP: Serum Pro-Brain Natriuretic Peptide: 517 pg/mL (10-25 @ 19:57)      TSH:     Impression/Plan:      ****-IVH with NS 250mL bolus pre-cath                                                                             Department of Cardiology                                                                  [facility]/[place] [address]  [place]                                                            Telephone: [phone]. Fax:[phone]                                                                             Pre- Cardiac Procedure H + P/Progress Note      HPI:  77F with PMHX HTN, HLD, CAD with prior MI, PCI ~1999 with subsequent MI treated with 2V CABG (?JENN/LIMA), Hypothyroidism presents to [facility] ER c/o lower/middle back pain radiating to her chest which began 5 days ago. denies SOB/KEENE. Similar to prior episode of MI for which she had CABG/PCI in 1999, ECG with flattened/STD lead I and AVL, QW V1-V2, +tnl peak 0.11, TTE with preserved LVEF 60-65%, basal infe akinesis, mod-sev AS with NAVA 0.82, P/MG 35/22mmHg, PV 2.9m/s, DI 0.32, , CTA R/O dissection, elevated Cr 1.7 and GFR 31, recent labs 9/16/22 outside office with Cr 1.23/eGFR 46, now plan for [facility] to eval CAD progression/ISR/graft occlusion.    Of note, due to elevated Cr will perform diagnostic cath only with plan to stage any indicated PCI. IVH with NS 250mL bolus given in the cath lab prior to contrast introduction.        Symptoms:        Angina (Class): IV       Ischemic Symptoms: resting chest pain, + NSTEMI    Heart Failure:        Systolic/Diastolic/Combined: HFpEF, grade I DD       NYHA Class (within 2 weeks): 1    Assessment of LVEF:       EF: 60-65%       Assessed by: TTE        Date: 10/26    Prior Cardiac Interventions:  2V CABG 1999 [facility] (likely JENN/LIMA grafting)  PCI prior to CABG         Noninvasive Testing: no report    Echo 10/26/22:  LV Wall Scoring:  The basal inferior segment is akinetic.    Right Ventricle: The right ventricular size is normal. RV systolic   function is normal.  Left Atrium: Normal left atrial size.  Right Atrium: Normal right atrial size.  Pericardium: There is no evidence of pericardial effusion.  Mitral Valve: The mitral valve is normal in structure. Moderate   thickening and calcification of the anterior and posterior mitral valve   leaflets. There is moderate mitral annular calcification. Trace mitral   valve regurgitation is seen.  Tricuspid Valve: The tricuspid valve is normal in structure. Mild   tricuspid regurgitation is visualized.  Aortic Valve: Moderate to severe aortic stenosis is present. Mild to   moderate aortic valve regurgitation is seen. The Dimesionless Index value   is 0.32.  Pulmonic Valve: Structurally normal pulmonic valve, with normal leaflet   excursion. Trace pulmonic valve regurgitation.  Aorta: The aortic root is normal in size and structure.  Pulmonary Artery: The main pulmonary artery is normal in size.  Venous: The inferior vena cava was normal sized, with respiratory size   variation greater than 50%.    Summary:   1. Left ventricular ejection fraction, by visual estimation, is 60 to   65%.   2. Normal global left ventricular systolic function.   3. Basal inferior segment is abnormal as described above.   4. Spectral Doppler shows impaired relaxation pattern of left   ventricular myocardial filling (Grade I diastolic dysfunction).   5. Normal left atrial size.   6. Normal right atrial size.   7. Moderate mitral annular calcification.   8. Moderate thickening and calcification of the anterior and posterior   mitral valve leaflets.   9. Trace mitral valve regurgitation.  10. Mild tricuspid regurgitation.  11. Mild to moderate aortic regurgitation.  12. Moderate to severe aortic valve stenosis.  13. The Dimesionless Index value is 0.32.       Risk Assessments:  ASA: 3  Mallampati: 2  Bleeding Risk: 8.4%  Creatinine: 1.7  GFR: 31    Associated Risk Factors:        Frailty Screening: (N/A, mild, mod, severe): mod       Cerebrovascular Disease: N/A       Chronic Lung Disease: N/A       Peripheral Arterial Disease: N/A       Chronic Kidney Disease (if yes, what is GFR): Cr 1.7/GFR 31       Uncontrolled Diabetes (if yes, what is HgbA1C or FBS): N/A       Poorly Controlled Hypertension (if yes, what is SBP): N/A       Morbid Obesity (if yes, what is BMI): N/A       History of Recent Ventricular Arrhythmia: N/A       Inability to Ambulate Safely: N/A       Need for Therapeutic Anticoagulation: N/A       Antiplatelet or Contrast Allergy: N/A    Antianginal Therapies:        Beta Blockers:  Coreg       Calcium Channel Blockers:        Long Acting Nitrates:        Ranexa:     	  MEDICATIONS:  carvedilol 3.125 milliGRAM(s) Oral every 12 hours  acetaminophen     Tablet .. 650 milliGRAM(s) Oral every 6 hours PRN  melatonin 3 milliGRAM(s) Oral at bedtime PRN  morphine  - Injectable 2 milliGRAM(s) IV Push every 4 hours PRN  morphine  - Injectable 4 milliGRAM(s) IV Push every 4 hours PRN  ondansetron Injectable 4 milliGRAM(s) IV Push every 8 hours PRN  aluminum hydroxide/magnesium hydroxide/simethicone Suspension 30 milliLiter(s) Oral every 4 hours PRN  pantoprazole    Tablet 40 milliGRAM(s) Oral before breakfast  atorvastatin 80 milliGRAM(s) Oral at bedtime  dextrose 50% Injectable 25 Gram(s) IV Push once  dextrose 50% Injectable 12.5 Gram(s) IV Push once  dextrose 50% Injectable 25 Gram(s) IV Push once  dextrose Oral Gel 15 Gram(s) Oral once PRN  glucagon  Injectable 1 milliGRAM(s) IntraMuscular once  insulin lispro (ADMELOG) corrective regimen sliding scale   SubCutaneous three times a day before meals  insulin lispro (ADMELOG) corrective regimen sliding scale   SubCutaneous at bedtime  levothyroxine 100 MICROGram(s) Oral daily  aspirin  chewable 81 milliGRAM(s) Oral daily  dextrose 5%. 1000 milliLiter(s) IV Continuous <Continuous>  dextrose 5%. 1000 milliLiter(s) IV Continuous <Continuous>  heparin   Injectable 3800 Unit(s) IV Push every 6 hours PRN  heparin  Infusion. 550 Unit(s)/Hr IV Continuous <Continuous>  lidocaine   4% Patch 1 Patch Transdermal daily        ROS:     PHYSICAL EXAM:      T(C): 36.8 (10-26-22 @ 14:13), Max: 36.8 (10-26-22 @ 14:13)  HR: 58 (10-26-22 @ 14:13) (58 - 71)  BP: 107/58 (10-26-22 @ 14:13) (107/58 - 183/72)  RR: 18 (10-26-22 @ 14:13) (18 - 18)  SpO2: 98% (10-26-22 @ 14:13) (94% - 98%)  Wt 136lbs      I&O's Summary    26 Oct 2022 07:01  -  26 Oct 2022 14:41  --------------------------------------------------------  IN: 42.5 mL / OUT: 0 mL / NET: 42.5 mL        Daily Height in cm: 147.32 (26 Oct 2022 06:11)      ECG:  SR with flattened/STD lead I and AVL, QW V1-V2	    LABS:	                             10.4   6.38  )-----------( 271      ( 26 Oct 2022 08:48 )             31.5     10-26    139  |  99  |  42.4<H>  ----------------------------<  113<H>  4.1   |  29.0  |  1.71<H>    Ca    9.9      26 Oct 2022 03:54  Phos  2.7     10-26  Mg     1.8     10-26    TPro  7.3  /  Alb  4.2  /  TBili  0.2<L>  /  DBili  x   /  AST  49<H>  /  ALT  31  /  AlkPhos  64  10-25      Tnl: peak 0.11        proBNP: Serum Pro-Brain Natriuretic Peptide: 517 pg/mL (10-25 @ 19:57)        Impression/Plan: 78yo female presenting with NSTEMI, now plan for [facility] to eval CAD progression/ISR/graft occlusion to be performed by Dr Davis.     -plan for LHC via FA  -patient seen and examined  -confirmed appropriate NPO duration  -ECG and Labs reviewed  -Aspirin 81mg po pre-cath  -Stop heparin gtt on call to procedure room  -NS 250mL IV bolus pre-cath  -procedure discussed with patient; risks and benefits explained, questions answered  -consent obtained by attending IC                                                                           Department of Cardiology                                                                  [facility]/[place] [address]  [place]                                                            Telephone: [phone]. Fax:[phone]                                                                             Pre- Cardiac Procedure H + P/Progress Note      HPI:  77F with PMHX HTN, HLD, CAD with prior MI, PCI ~1999 with subsequent MI treated with 2V CABG (?JENN/LIMA), Hypothyroidism presents to [facility] ER c/o lower/middle back pain radiating to her chest which began 5 days ago. denies SOB/KEENE. Similar to prior episode of MI for which she had CABG/PCI in 1999, ECG with flattened/STD lead I and AVL, QW V1-V2, +tnl peak 0.11, TTE with preserved LVEF 60-65%, basal infe akinesis, mod-sev AS with NAVA 0.82, P/MG 35/22mmHg, PV 2.9m/s, DI 0.32, , CTA R/O dissection, elevated Cr 1.7 and GFR 31, recent labs 9/16/22 outside office with Cr 1.23/eGFR 46, now plan for [facility] to eval CAD progression/ISR/graft occlusion.    Of note, due to elevated Cr will perform diagnostic cath only with plan to stage any indicated PCI. IVH with NS 250mL bolus given in the cath lab prior to contrast introduction.    PMD lab work June and Sept 2022 with Cr 1.2 with GFR 46       Symptoms:        Angina (Class): IV       Ischemic Symptoms: resting chest pain, + NSTEMI    Heart Failure:        Systolic/Diastolic/Combined: HFpEF, grade I DD       NYHA Class (within 2 weeks): 1    Assessment of LVEF:       EF: 60-65%       Assessed by: TTE        Date: 10/26    Prior Cardiac Interventions:  2V CABG 1999 [facility] (likely JENN/LIMA grafting)  PCI prior to CABG         Noninvasive Testing: no report    Echo 10/26/22:  LV Wall Scoring:  The basal inferior segment is akinetic.    Right Ventricle: The right ventricular size is normal. RV systolic   function is normal.  Left Atrium: Normal left atrial size.  Right Atrium: Normal right atrial size.  Pericardium: There is no evidence of pericardial effusion.  Mitral Valve: The mitral valve is normal in structure. Moderate   thickening and calcification of the anterior and posterior mitral valve   leaflets. There is moderate mitral annular calcification. Trace mitral   valve regurgitation is seen.  Tricuspid Valve: The tricuspid valve is normal in structure. Mild   tricuspid regurgitation is visualized.  Aortic Valve: Moderate to severe aortic stenosis is present. Mild to   moderate aortic valve regurgitation is seen. The Dimesionless Index value   is 0.32.  Pulmonic Valve: Structurally normal pulmonic valve, with normal leaflet   excursion. Trace pulmonic valve regurgitation.  Aorta: The aortic root is normal in size and structure.  Pulmonary Artery: The main pulmonary artery is normal in size.  Venous: The inferior vena cava was normal sized, with respiratory size   variation greater than 50%.    Summary:   1. Left ventricular ejection fraction, by visual estimation, is 60 to   65%.   2. Normal global left ventricular systolic function.   3. Basal inferior segment is abnormal as described above.   4. Spectral Doppler shows impaired relaxation pattern of left   ventricular myocardial filling (Grade I diastolic dysfunction).   5. Normal left atrial size.   6. Normal right atrial size.   7. Moderate mitral annular calcification.   8. Moderate thickening and calcification of the anterior and posterior   mitral valve leaflets.   9. Trace mitral valve regurgitation.  10. Mild tricuspid regurgitation.  11. Mild to moderate aortic regurgitation.  12. Moderate to severe aortic valve stenosis.  13. The Dimesionless Index value is 0.32.       Risk Assessments:  ASA: 3  Mallampati: 2  Bleeding Risk: 8.4%  Creatinine: 1.7  GFR: 31    Associated Risk Factors:        Frailty Screening: (N/A, mild, mod, severe): mod       Cerebrovascular Disease: N/A       Chronic Lung Disease: N/A       Peripheral Arterial Disease: N/A       Chronic Kidney Disease (if yes, what is GFR): Cr 1.7/GFR 31       Uncontrolled Diabetes (if yes, what is HgbA1C or FBS): N/A       Poorly Controlled Hypertension (if yes, what is SBP): N/A       Morbid Obesity (if yes, what is BMI): N/A       History of Recent Ventricular Arrhythmia: N/A       Inability to Ambulate Safely: N/A       Need for Therapeutic Anticoagulation: N/A       Antiplatelet or Contrast Allergy: N/A    Antianginal Therapies:        Beta Blockers:  Coreg       Calcium Channel Blockers:        Long Acting Nitrates:        Ranexa:     	  MEDICATIONS:  carvedilol 3.125 milliGRAM(s) Oral every 12 hours  acetaminophen     Tablet .. 650 milliGRAM(s) Oral every 6 hours PRN  melatonin 3 milliGRAM(s) Oral at bedtime PRN  morphine  - Injectable 2 milliGRAM(s) IV Push every 4 hours PRN  morphine  - Injectable 4 milliGRAM(s) IV Push every 4 hours PRN  ondansetron Injectable 4 milliGRAM(s) IV Push every 8 hours PRN  aluminum hydroxide/magnesium hydroxide/simethicone Suspension 30 milliLiter(s) Oral every 4 hours PRN  pantoprazole    Tablet 40 milliGRAM(s) Oral before breakfast  atorvastatin 80 milliGRAM(s) Oral at bedtime  dextrose 50% Injectable 25 Gram(s) IV Push once  dextrose 50% Injectable 12.5 Gram(s) IV Push once  dextrose 50% Injectable 25 Gram(s) IV Push once  dextrose Oral Gel 15 Gram(s) Oral once PRN  glucagon  Injectable 1 milliGRAM(s) IntraMuscular once  insulin lispro (ADMELOG) corrective regimen sliding scale   SubCutaneous three times a day before meals  insulin lispro (ADMELOG) corrective regimen sliding scale   SubCutaneous at bedtime  levothyroxine 100 MICROGram(s) Oral daily  aspirin  chewable 81 milliGRAM(s) Oral daily  dextrose 5%. 1000 milliLiter(s) IV Continuous <Continuous>  dextrose 5%. 1000 milliLiter(s) IV Continuous <Continuous>  heparin   Injectable 3800 Unit(s) IV Push every 6 hours PRN  heparin  Infusion. 550 Unit(s)/Hr IV Continuous <Continuous>  lidocaine   4% Patch 1 Patch Transdermal daily        ROS:     PHYSICAL EXAM:      T(C): 36.8 (10-26-22 @ 14:13), Max: 36.8 (10-26-22 @ 14:13)  HR: 58 (10-26-22 @ 14:13) (58 - 71)  BP: 107/58 (10-26-22 @ 14:13) (107/58 - 183/72)  RR: 18 (10-26-22 @ 14:13) (18 - 18)  SpO2: 98% (10-26-22 @ 14:13) (94% - 98%)  Wt 136lbs      I&O's Summary    26 Oct 2022 07:01  -  26 Oct 2022 14:41  --------------------------------------------------------  IN: 42.5 mL / OUT: 0 mL / NET: 42.5 mL        Daily Height in cm: 147.32 (26 Oct 2022 06:11)      ECG:  SR with flattened/STD lead I and AVL, QW V1-V2	    LABS:	                             10.4   6.38  )-----------( 271      ( 26 Oct 2022 08:48 )             31.5     10-26    139  |  99  |  42.4<H>  ----------------------------<  113<H>  4.1   |  29.0  |  1.71<H>    Ca    9.9      26 Oct 2022 03:54  Phos  2.7     10-26  Mg     1.8     10-26    TPro  7.3  /  Alb  4.2  /  TBili  0.2<L>  /  DBili  x   /  AST  49<H>  /  ALT  31  /  AlkPhos  64  10-25      Tnl: peak 0.11        proBNP: Serum Pro-Brain Natriuretic Peptide: 517 pg/mL (10-25 @ 19:57)        Impression/Plan: 78yo female presenting with NSTEMI, now plan for LHC to eval CAD progression/ISR/graft occlusion to be performed by Dr Davis.     -plan for LHC via FA  -patient seen and examined  -confirmed appropriate NPO duration  -ECG and Labs reviewed  -Aspirin 81mg po pre-cath  -Stop heparin gtt on call to procedure room  -NS 250mL IV bolus pre-cath  -procedure discussed with patient; risks and benefits explained, questions answered  -consent obtained by attending IC                                                                           Department of Cardiology                                                                  [facility]/[place] [address]  [place]                                                            Telephone: [phone]. Fax:[phone]                                                                             Pre- Cardiac Procedure H + P/Progress Note      HPI:  77F with PMHX HTN, HLD, CAD with prior MI, PCI ~1999 with subsequent MI treated with 2V CABG (?JENN/LIMA), Hypothyroidism presents to [facility] ER c/o lower/middle back pain radiating to her chest which began 5 days ago. denies SOB/KEENE. Similar to prior episode of MI for which she had CABG/PCI in 1999, ECG with flattened/STD lead I and AVL, QW V1-V2, +tnl peak 0.11, TTE with preserved LVEF 60-65%, basal infe akinesis, mod-sev AS with NAVA 0.82, P/MG 35/22mmHg, PV 2.9m/s, DI 0.32, , CTA R/O dissection, elevated Cr 1.7 and GFR 31, recent labs 9/16/22 outside office with Cr 1.23/eGFR 46, now plan for [facility] to eval CAD progression/ISR/graft occlusion.    Of note, due to elevated Cr will perform diagnostic cath only with plan to stage any indicated PCI. IVH with NS 250mL bolus given in the cath lab prior to contrast introduction.        Symptoms:        Angina (Class): IV       Ischemic Symptoms: resting chest pain, + NSTEMI    Heart Failure:        Systolic/Diastolic/Combined: HFpEF, grade I DD       NYHA Class (within 2 weeks): 1    Assessment of LVEF:       EF: 60-65%       Assessed by: TTE        Date: 10/26    Prior Cardiac Interventions:  2V CABG 1999 [facility] (likely JENN/LIMA grafting)  PCI prior to CABG         Noninvasive Testing: no report    Echo 10/26/22:  LV Wall Scoring:  The basal inferior segment is akinetic.    Right Ventricle: The right ventricular size is normal. RV systolic   function is normal.  Left Atrium: Normal left atrial size.  Right Atrium: Normal right atrial size.  Pericardium: There is no evidence of pericardial effusion.  Mitral Valve: The mitral valve is normal in structure. Moderate   thickening and calcification of the anterior and posterior mitral valve   leaflets. There is moderate mitral annular calcification. Trace mitral   valve regurgitation is seen.  Tricuspid Valve: The tricuspid valve is normal in structure. Mild   tricuspid regurgitation is visualized.  Aortic Valve: Moderate to severe aortic stenosis is present. Mild to   moderate aortic valve regurgitation is seen. The Dimesionless Index value   is 0.32.  Pulmonic Valve: Structurally normal pulmonic valve, with normal leaflet   excursion. Trace pulmonic valve regurgitation.  Aorta: The aortic root is normal in size and structure.  Pulmonary Artery: The main pulmonary artery is normal in size.  Venous: The inferior vena cava was normal sized, with respiratory size   variation greater than 50%.    Summary:   1. Left ventricular ejection fraction, by visual estimation, is 60 to   65%.   2. Normal global left ventricular systolic function.   3. Basal inferior segment is abnormal as described above.   4. Spectral Doppler shows impaired relaxation pattern of left   ventricular myocardial filling (Grade I diastolic dysfunction).   5. Normal left atrial size.   6. Normal right atrial size.   7. Moderate mitral annular calcification.   8. Moderate thickening and calcification of the anterior and posterior   mitral valve leaflets.   9. Trace mitral valve regurgitation.  10. Mild tricuspid regurgitation.  11. Mild to moderate aortic regurgitation.  12. Moderate to severe aortic valve stenosis.  13. The Dimesionless Index value is 0.32.       Risk Assessments:  ASA: 3  Mallampati: 2  Bleeding Risk: 8.4%  Creatinine: 1.7  GFR: 31    Associated Risk Factors:        Frailty Screening: (N/A, mild, mod, severe): mod       Cerebrovascular Disease: N/A       Chronic Lung Disease: N/A       Peripheral Arterial Disease: N/A       Chronic Kidney Disease (if yes, what is GFR): Cr 1.7/GFR 31       Uncontrolled Diabetes (if yes, what is HgbA1C or FBS): N/A       Poorly Controlled Hypertension (if yes, what is SBP): N/A       Morbid Obesity (if yes, what is BMI): N/A       History of Recent Ventricular Arrhythmia: N/A       Inability to Ambulate Safely: N/A       Need for Therapeutic Anticoagulation: N/A       Antiplatelet or Contrast Allergy: N/A    Antianginal Therapies:        Beta Blockers:  Coreg       Calcium Channel Blockers:        Long Acting Nitrates:        Ranexa:     	  MEDICATIONS:  carvedilol 3.125 milliGRAM(s) Oral every 12 hours  acetaminophen     Tablet .. 650 milliGRAM(s) Oral every 6 hours PRN  melatonin 3 milliGRAM(s) Oral at bedtime PRN  morphine  - Injectable 2 milliGRAM(s) IV Push every 4 hours PRN  morphine  - Injectable 4 milliGRAM(s) IV Push every 4 hours PRN  ondansetron Injectable 4 milliGRAM(s) IV Push every 8 hours PRN  aluminum hydroxide/magnesium hydroxide/simethicone Suspension 30 milliLiter(s) Oral every 4 hours PRN  pantoprazole    Tablet 40 milliGRAM(s) Oral before breakfast  atorvastatin 80 milliGRAM(s) Oral at bedtime  dextrose 50% Injectable 25 Gram(s) IV Push once  dextrose 50% Injectable 12.5 Gram(s) IV Push once  dextrose 50% Injectable 25 Gram(s) IV Push once  dextrose Oral Gel 15 Gram(s) Oral once PRN  glucagon  Injectable 1 milliGRAM(s) IntraMuscular once  insulin lispro (ADMELOG) corrective regimen sliding scale   SubCutaneous three times a day before meals  insulin lispro (ADMELOG) corrective regimen sliding scale   SubCutaneous at bedtime  levothyroxine 100 MICROGram(s) Oral daily  aspirin  chewable 81 milliGRAM(s) Oral daily  dextrose 5%. 1000 milliLiter(s) IV Continuous <Continuous>  dextrose 5%. 1000 milliLiter(s) IV Continuous <Continuous>  heparin   Injectable 3800 Unit(s) IV Push every 6 hours PRN  heparin  Infusion. 550 Unit(s)/Hr IV Continuous <Continuous>  lidocaine   4% Patch 1 Patch Transdermal daily        ROS:     PHYSICAL EXAM:      T(C): 36.8 (10-26-22 @ 14:13), Max: 36.8 (10-26-22 @ 14:13)  HR: 58 (10-26-22 @ 14:13) (58 - 71)  BP: 107/58 (10-26-22 @ 14:13) (107/58 - 183/72)  RR: 18 (10-26-22 @ 14:13) (18 - 18)  SpO2: 98% (10-26-22 @ 14:13) (94% - 98%)  Wt 136lbs      I&O's Summary    26 Oct 2022 07:01  -  26 Oct 2022 14:41  --------------------------------------------------------  IN: 42.5 mL / OUT: 0 mL / NET: 42.5 mL        Daily Height in cm: 147.32 (26 Oct 2022 06:11)      ECG:  SR with flattened/STD lead I and AVL, QW V1-V2	    LABS:	                             10.4   6.38  )-----------( 271      ( 26 Oct 2022 08:48 )             31.5     10-26    139  |  99  |  42.4<H>  ----------------------------<  113<H>  4.1   |  29.0  |  1.71<H>    Ca    9.9      26 Oct 2022 03:54  Phos  2.7     10-26  Mg     1.8     10-26    TPro  7.3  /  Alb  4.2  /  TBili  0.2<L>  /  DBili  x   /  AST  49<H>  /  ALT  31  /  AlkPhos  64  10-25      Tnl: peak 0.11        proBNP: Serum Pro-Brain Natriuretic Peptide: 517 pg/mL (10-25 @ 19:57)        Impression/Plan: 76yo female presenting with NSTEMI, now plan for LHC to eval CAD progression/ISR/graft occlusion to be performed by Dr Davis.     -plan for LHC via FA  -patient seen and examined  -confirmed appropriate NPO duration  -ECG and Labs reviewed  -Aspirin 81mg po pre-cath  -Stop heparin gtt on call to procedure room  -NS 250mL IV bolus pre-cath  -procedure discussed with patient; risks and benefits explained, questions answered  -consent obtained by attending IC

## 2022-10-26 NOTE — ED ADULT NURSE REASSESSMENT NOTE - NSFALLRSKASSESASSIST_ED_ALL_ED
You received Versed and Fentanyl for sedation. Dr Tovar performed was your radiologist. Call [phone] with any questions or problems.    Ok to resume all of your home medications      Recovery After Procedural Sedation (Adult)  You have been given medicine by vein to make you sleep during your surgery. This may have included both a pain medicine and sleeping medicine. Most of the effects have worn off. But you may still have some drowsiness for the next 6 to 8 hours.   Home care  Follow these guidelines when you get home:   · For the next 8 hours, you should be watched by a responsible adult. This person should make sure your condition is not getting worse.  · Don't drink any alcohol for the next 24 hours.  · Don't drive, operate dangerous machinery, or make important business or personal decisions during the next 24 hours.  Note: Your healthcare provider may tell you not to take any medicine by mouth for pain or sleep in the next 4 hours. These medicines may react with the medicines you were given in the hospital. This could cause a much stronger response than usual.   Follow-up care  Follow up with your healthcare provider if you are not alert and back to your usual level of activity within 12 hours.   When to seek medical advice  Call your healthcare provider right away if any of these occur:   · Drowsiness gets worse  · Weakness or dizziness gets worse  · Repeated vomiting  · You can't be awakened  · Fever  · New rash    Step-by-Step  Washing Your Hands        Symptoms of a Stroke     A sudden feeling of weakness on one side of your body may be a sign that you are having a stroke.     During a stroke, blood stops flowing to part of the brain. This can damage areas in the brain that control the rest of the body. A stroke can happen to anyone at any age. Call 911 and get help right away if any of these symptoms come on suddenly, even if the symptoms don’t last.  Know the symptoms of a stroke  · Weakness.  You may feel a sudden weakness, tingling, or a loss of feeling on one side of your face or body including your arm or leg.   · Vision problems. You may have sudden double vision or trouble seeing in one or both eyes.  · Speech problems. You may have sudden trouble talking, slurred speech, or problems understanding others.  · Headache. You may have a sudden, severe headache.  · Movement problems. You may have sudden trouble walking, dizziness, a feeling of spinning, a loss of balance, a feeling of falling, or blackouts.  · Seizure. You may also have a seizure as the first symptom of a stroke.     When to call 911  Remember: If you have any of these symptoms, or if someone you are with has these symptoms, call 911 as soon as possible.  Never drive yourself or the victim. The ambulance can alert the hospital and start treatment.   F.A.S.T. is an easy way to remember the signs of a stroke. When you see these signs, you will know that you need to call 911 fast.   F.A.S.T. stands for:  · F is for face drooping. One side of the face is drooping or numb. When the person smiles, the smile is uneven.  · A is for arm weakness. One arm is weak or numb. When the person lifts both arms at the same time, one arm may drift downward.  · S is for speech difficulty. You may notice slurred speech or difficulty speaking. The person can't repeat a simple sentence correctly when asked.  T is for time to dial 911. If someone shows any of these symptoms, even if they go away, call 911 right   .         no

## 2022-10-26 NOTE — ED CDU PROVIDER DISPOSITION NOTE - CLINICAL COURSE
78 yo female initially placed in observation for cardiac monitoring and cards consults after exhibiting back pain radiating to chest. trop down trending at this time. cards to to cath

## 2022-10-26 NOTE — CONSULT NOTE ADULT - SUBJECTIVE AND OBJECTIVE BOX
[facility]                                              CARDIOLOGY AT [address], [place] [address]                                             Telephone: [phone]. Fax:[phone]                                                       CARDIOLOGY CONSULTATION NOTE                                                                                             History obtained by: Patient and medical record  Community Cardiologist: Dr. Linda   obtained: Yes [  ] No [ x ]  Reason for Consultation: chest pain  Available out pt records reviewed: Yes [  ] No [ x ]    Chief complaint:  "My back hurts"    HPI:  This is 76 y/o female with hx of CAD s/p stents and CABG (Praveena Skinner, 1999), HTN, HLD, hypothyroid who presents with back pain radiating to the chest. Pt states that pain started 5 days ago and she feels it most in the upper back between her shoulder blades. Pt states that she also felt mostly back pain when she had an MI. Chest CTA negative for dissection. Troponin 0.10 and 0.09 with elevated CKMB, proBNP 517. EKG shows SR with septal Q-waves and no acute ST/T changes, no previous EKG available. Systolic murmur appreciated on exam, pt states she was told that she has a valve problem and her heart is "not pumping properly". Pt follows with Dr. Linda from [place] and states she had an echocardiogram and NST within the last year.     CARDIAC TESTING   ECHO: n/a    STRESS: n/a    CATH: n/a    ELECTROPHYSIOLOGY: n/a    PAST MEDICAL HISTORY  Hypertension    Diabetes mellitus    CAD    PAST SURGICAL HISTORY  S/P CABG (coronary artery bypass graft)        SOCIAL HISTORY:  lives with   CIGARETTES:   former smoker, quit in 1998  ALCOHOL: denies  DRUGS: denies    FAMILY HISTORY:  No pertinent family history in first degree relatives      Family History of Cardiovascular Disease:  Yes [  ] No [  ]  Coronary Artery Disease in first degree relative: Yes [  ] No [  ]  Sudden Cardiac Death in First degree relative: Yes [  ] No [  ]    HOME MEDICATIONS:  Levothyroxine  Coreg  ASA 81 mg  Triamterene 37.5 mg  Lipitor 80 mg  Omeprazole 20 mg        CURRENT OTHER MEDICATIONS:  acetaminophen     Tablet .. 650 milliGRAM(s) Oral every 6 hours PRN Moderate Pain (4 - 6)  ibuprofen  Tablet. 600 milliGRAM(s) Oral every 8 hours PRN Mild Pain (1 - 3)  lidocaine   4% Patch 1 Patch Transdermal daily      ALLERGIES:   No Known Allergies      REVIEW OF SYMPTOMS:   CONSTITUTIONAL: No fever, no chills, no weight loss, no weight gain, no fatigue   ENMT:  No vertigo; No sinus or throat pain  NECK: No pain or stiffness  CARDIOVASCULAR: as per HPI  RESPIRATORY: no Shortness of breath, no cough, no wheezing  : No dysuria, no hematuria   GI: No dark color stool, no nausea, no diarrhea, no constipation, no abdominal pain   NEURO: No headache, no slurred speech   MUSCULOSKELETAL: No joint pain or swelling; No muscle, back, or extremity pain  PSYCH: No agitation, no anxiety.    ALL OTHER REVIEW OF SYSTEMS ARE NEGATIVE.    VITAL SIGNS:  T(C): 36.6 (10-25-22 @ 23:27), Max: 36.7 (10-25-22 @ 18:22)  T(F): 97.8 (10-25-22 @ 23:27), Max: 98 (10-25-22 @ 18:22)  HR: 68 (10-25-22 @ 23:27) (68 - 71)  BP: 145/65 (10-25-22 @ 23:27) (138/68 - 183/72)  RR: 18 (10-25-22 @ 23:27) (18 - 18)  SpO2: 97% (10-25-22 @ 23:27) (97% - 97%)    INTAKE AND OUTPUT:       PHYSICAL EXAM:  Constitutional: Comfortable . No acute distress.   HEENT: Atraumatic and normocephalic , neck is supple . no JVD. No carotid bruit.  CNS: A&Ox3. No focal deficits.   Respiratory: CTAB, unlabored   Cardiovascular: RRR normal s1 s2, 3/6 systolic murmur  Gastrointestinal: Soft, non-tender. +Bowel sounds.   Extremities: 2+ Peripheral Pulses, No clubbing, cyanosis, or edema  Psychiatric: Calm . no agitation.   Skin: Warm and dry, no ulcers on extremities     LABS:  ( 25 Oct 2022 23:13 )  Troponin T  0.09<H>,  CPK  434<H>, CKMB  X    , BNP X        , ( 25 Oct 2022 19:57 )  Troponin T  0.10<H>,  CPK  X    , CKMB  X    , <H>                              10.6   8.17  )-----------( 279      ( 25 Oct 2022 19:57 )             32.8     10-25    140  |  101  |  46.3<H>  ----------------------------<  97  4.4   |  28.0  |  1.64<H>    Ca    10.2      25 Oct 2022 19:57    TPro  7.3  /  Alb  4.2  /  TBili  0.2<L>  /  DBili  x   /  AST  49<H>  /  ALT  31  /  AlkPhos  64  10-25    PT/INR - ( 25 Oct 2022 20:10 )   PT: 11.1 sec;   INR: 0.96 ratio         PTT - ( 25 Oct 2022 20:10 )  PTT:28.7 sec        INTERPRETATION OF TELEMETRY: SR 60's, no events    ECG: SR 70 bpm, septal Q-waves, no acute ST/T changes  Prior ECG: Yes [  ] No [ x ]    RADIOLOGY & ADDITIONAL STUDIES:    X-ray:    CT scan:   < from: CT Angio Abdomen and Pelvis w/ IV Cont (10.25.22 @ 20:43) >  IMPRESSION:  *  No thoracoabdominal aortic dissection.  VERTEBRAL BODY ANALYSIS: No Vertebral fracture or low bone density   identified.      --- End of Report ---      CAITLIN TANNER MD; Attending Radiologist  This document has been electronically signed. Oct 25 2022  9:13PM    < end of copied text >    MRI:   US:

## 2022-10-26 NOTE — ED CDU PROVIDER DISPOSITION NOTE - ATTENDING CONTRIBUTION TO CARE
I agree with the PA's note and was available for any issues/concerns. I was directly involved in patient care. My brief overall assessment is as follows:    cardiology recommend admission

## 2022-10-26 NOTE — CONSULT NOTE ADULT - ASSESSMENT
This is 76 y/o female with hx of CAD s/p stents and CABG (Praveena Skinner, 1999), HTN, HLD, hypothyroid who presents with back pain radiating to the chest. Pt states that pain started 5 days ago and she feels it most in the upper back between her shoulder blades. Pt states that she also felt mostly back pain when she had an MI. Chest CTA negative for dissection. Troponin 0.10 and 0.09 with elevated CKMB, proBNP 517. EKG shows SR with septal Q-waves and no acute ST/T changes, no previous EKG available. Systolic murmur appreciated on exam, pt states she was told that she has a valve problem and her heart is "not pumping properly". Pt follows with Dr. Linda from [place] and states she had an echocardiogram and NST within the last year.

## 2022-10-26 NOTE — CONSULT NOTE ADULT - PROBLEM SELECTOR RECOMMENDATION 9
-telemetry monitoring  -trend cardiac markers  - mg  -start Heparin drip with ACS protocol  -TTE in am to assess EF, valves and WMA  -keep NPO for cardiac cath  -continue ASA, Coreg and Lipitor

## 2022-10-26 NOTE — ED CDU PROVIDER INITIAL DAY NOTE - OBJECTIVE STATEMENT
76 yo F w/pmh HTN, HLD, DM, CAD s/p CABG x2 presents for back pain radiating to chest x2d. Reports started in lower back, went up to upper back, now shooting through to her chest. Denies f/ch, SOB, n/v, abdominal pain. reports hx of sciatica in the past. denies accidents or injuries or falls. rpeorts pain to the back was/is worse with movement  Cardiologist is Dr. Linda. last visit recently with Ekg and tte reported   unsure any home medications

## 2022-10-26 NOTE — ED CDU PROVIDER INITIAL DAY NOTE - PHYSICAL EXAMINATION
General: well appearing, NAD  Head: NC/AT  Cardiac: RRR, no m/r/g  Respiratory: CTABL, equal chest wall expansions, no conversational dyspnea  Abdomen: soft, ND, NT  SPINE + thoracic midline tenderness no pt vertebral + paraspinal muscle tenderness . FROM upper and lower extremities   Neuro: AAOx3, coordinated movement of all 4 extremities  Psych: calm, cooperative, normal affect  Skin: warm and dry

## 2022-10-26 NOTE — H&P ADULT - NSICDXPASTMEDICALHX_GEN_ALL_CORE_FT
PAST MEDICAL HISTORY:  Diabetes mellitus     HLD (hyperlipidemia)     Hypertension     Hypothyroidism

## 2022-10-26 NOTE — PROGRESS NOTE ADULT - SUBJECTIVE AND OBJECTIVE BOX
Patient is a 77y old  Female who presents with a chief complaint of Chest Pain (26 Oct 2022 14:40)    Patient seen and examined at bedside.      ALLERGIES:  No Known Allergies    MEDICATIONS  (STANDING):  aspirin  chewable 81 milliGRAM(s) Oral daily  atorvastatin 80 milliGRAM(s) Oral at bedtime  carvedilol 3.125 milliGRAM(s) Oral every 12 hours  dextrose 5%. 1000 milliLiter(s) (50 mL/Hr) IV Continuous <Continuous>  dextrose 5%. 1000 milliLiter(s) (100 mL/Hr) IV Continuous <Continuous>  dextrose 50% Injectable 25 Gram(s) IV Push once  dextrose 50% Injectable 12.5 Gram(s) IV Push once  dextrose 50% Injectable 25 Gram(s) IV Push once  glucagon  Injectable 1 milliGRAM(s) IntraMuscular once  insulin lispro (ADMELOG) corrective regimen sliding scale   SubCutaneous three times a day before meals  insulin lispro (ADMELOG) corrective regimen sliding scale   SubCutaneous at bedtime  lactated ringers. 1000 milliLiter(s) (75 mL/Hr) IV Continuous <Continuous>  levothyroxine 100 MICROGram(s) Oral daily  lidocaine   4% Patch 1 Patch Transdermal daily  naloxone Injectable 0.4 milliGRAM(s) IV Push once  pantoprazole    Tablet 40 milliGRAM(s) Oral before breakfast    MEDICATIONS  (PRN):  acetaminophen     Tablet .. 650 milliGRAM(s) Oral every 6 hours PRN Moderate Pain (4 - 6)  aluminum hydroxide/magnesium hydroxide/simethicone Suspension 30 milliLiter(s) Oral every 4 hours PRN Dyspepsia  dextrose Oral Gel 15 Gram(s) Oral once PRN Blood Glucose LESS THAN 70 milliGRAM(s)/deciliter  melatonin 3 milliGRAM(s) Oral at bedtime PRN Insomnia  morphine  - Injectable 2 milliGRAM(s) IV Push every 4 hours PRN Moderate Pain (4 - 6)  morphine  - Injectable 4 milliGRAM(s) IV Push every 4 hours PRN Severe Pain (7 - 10)  ondansetron Injectable 4 milliGRAM(s) IV Push every 8 hours PRN Nausea and/or Vomiting    Vital Signs Last 24 Hrs  T(F): 98.3 (26 Oct 2022 14:13), Max: 98.3 (26 Oct 2022 14:13)  HR: 58 (26 Oct 2022 14:13) (58 - 71)  BP: 150/56 (26 Oct 2022 15:54) (107/58 - 183/72)  RR: 17 (26 Oct 2022 15:54) (17 - 18)  SpO2: 98% (26 Oct 2022 15:54) (94% - 98%)  I&O's Summary    26 Oct 2022 07:01  -  26 Oct 2022 16:54  --------------------------------------------------------  IN: 42.5 mL / OUT: 0 mL / NET: 42.5 mL      PHYSICAL EXAM:  General: NAD, A/O x 3  ENT: MMM, no thrush  Neck: Supple, No JVD  Lungs: Clear to auscultation bilaterally, good air entry, non-labored breathing  Cardio: +s1/s2  Abdomen: Soft, Nontender, Nondistended; Bowel sounds present  Extremities: No calf tenderness, No pitting edema    LABS:                        10.4   6.38  )-----------( 271      ( 26 Oct 2022 08:48 )             31.5     10-26    139  |  99  |  42.4  ----------------------------<  113  4.1   |  29.0  |  1.71    Ca    9.9      26 Oct 2022 03:54  Phos  2.7     10-26  Mg     1.8     10-26    TPro  7.3  /  Alb  4.2  /  TBili  0.2  /  DBili  x   /  AST  49  /  ALT  31  /  AlkPhos  64  10-25    PT/INR - ( 25 Oct 2022 20:10 )   PT: 11.1 sec;   INR: 0.96 ratio    PTT - ( 26 Oct 2022 08:48 )  PTT:128.5 sec    CARDIAC MARKERS ( 26 Oct 2022 03:54 )  x     / x     / 385 U/L / x     / 19.1 ng/mL  CARDIAC MARKERS ( 25 Oct 2022 23:13 )  x     / x     / 434 U/L / x     / 23.3 ng/mL    Glucose  POCT Blood Glucose.: 87 mg/dL (25 Oct 2022 22:01)    RADIOLOGY & ADDITIONAL TESTS:  - no new tests    Care Discussed with Consultants/Other Providers:   Cardiology

## 2022-10-26 NOTE — PROGRESS NOTE ADULT - ASSESSMENT
Now s/p LHC via RFA with patent LIMA to LAD, 2nd graft not identified, Diag with 70% ISR, LCx without obstructive lesion, mod disease RCA, PDA patent, retrograde collaterals from LAD possibly feeding an OM/high lateral or 2nd diag, procedure performed by Dr. Davis, received IV sedation intraprocedurally, arrived to recovery in NAD and HDS, RFA sheath removed with manual compression and achievement of hemostasis, site stable, no bleed/hematoma, distal pulse +, transfer back to in-pt unit after recovery and continue aggressive management for NSTEMI/CAD.     Plan:  -Formal cath report pending  -Post procedure management/monitoring per protocol  -Access site precautions  -Bedrest x 4 hours post procedure  -routine Labs in am, monitor Cr  -consider nephrology consult if further increase in Cr   -Repeat ECG if any clinical indication or change on tele  -NS 250mL bolus post cath   -Continue GDMT for NSTEMI  -Dual anti platelet therapy with aspirin/plavix   -Cont BB with Coreg 3.25mg po q12hr  -Cont high dose statin therapy with Lipitor 80mg po qHS   -D/C heparin gtt, consider restarting if recurrent anginal symptoms  -Educated regarding strict adherence with DAPT   -Educated regarding post procedure management and care  -Discussed the importance of RF modification  -Cardiac rehab info provided/referral and communication to cardiac rehab completed  -F/U outpt in 1 week with Cardiologist Dr. Simmons  -DISPO: Plan for possible D/C in am if remains HDS, ECG and labs in am stable and without complications       Now s/p LHC via RFA with patent LIMA to LAD, 2nd graft not identified, Diag with 70% ISR, LCx without obstructive lesion, mod disease RCA, PDA patent, retrograde collaterals from LAD possibly feeding an OM/high lateral or 2nd diag, LV/AO gradient 7mmHg, EDP 21mmHg, procedure performed by Dr. Davis, received IV sedation intraprocedurally, arrived to recovery in NAD and HDS, RFA sheath removed with manual compression and achievement of hemostasis, site stable, no bleed/hematoma, distal pulse +, transfer back to in-pt unit after recovery and continue aggressive management for NSTEMI/CAD.     Plan:  -Formal cath report pending  -Post procedure management/monitoring per protocol  -Access site precautions  -Bedrest x 4 hours post procedure  -routine Labs in am, monitor Cr  -consider nephrology consult if further increase in Cr   -Repeat ECG if any clinical indication or change on tele  -NS 250mL bolus post cath   -Continue GDMT for NSTEMI  -Dual anti platelet therapy with aspirin/plavix   -Cont BB with Coreg 3.25mg po q12hr  -Cont high dose statin therapy with Lipitor 80mg po qHS   -D/C heparin gtt, consider restarting if recurrent anginal symptoms  -Educated regarding strict adherence with DAPT   -Educated regarding post procedure management and care  -Discussed the importance of RF modification  -Cardiac rehab info provided/referral and communication to cardiac rehab completed  -F/U outpt in 1 week with Cardiologist Dr. Simmons  -DISPO: Plan for possible D/C in am if remains HDS, ECG and labs in am stable and without complications

## 2022-10-26 NOTE — ED CDU PROVIDER INITIAL DAY NOTE - ATTENDING APP SHARED VISIT CONTRIBUTION OF CARE
I agree with the PA's note and was available for any issues/concerns. I was directly involved in patient care. My brief overall assessment is as follows:    77 year old female PMHx CAD c/o atypical chest pain; initial work up reviewed; admit to obs for cardiac work up

## 2022-10-26 NOTE — ED CDU PROVIDER INITIAL DAY NOTE - NS ED ATTENDING STATEMENT MOD
This was a shared visit with the SKYLA. I reviewed and verified the documentation and independently performed the documented:

## 2022-10-26 NOTE — ED CDU PROVIDER INITIAL DAY NOTE - MEDICAL DECISION MAKING DETAILS
78 yo female extensive cardiac hx presenting with 2 days of upperback pain radiating chest negative discection study, down trending trops, no active chest pain sob. + vertebral tenderness on examination.   [][facility] cardiology   []pain control   []tele   [] trop trending

## 2022-10-26 NOTE — CONSULT NOTE ADULT - NS ATTEND AMEND GEN_ALL_CORE FT
Patient with CABG ~20 years prior. Possible LIMA/JENN (performed at [facility])  Murmur- AS most likely.   Plan for TTE.   Renal function of concern- patient received contrast but still with some back pain symptoms.   Needs angiogram today. Based on disease- may need to stage intervention.   Aspirin, plavix, statin.

## 2022-10-26 NOTE — ED CDU PROVIDER INITIAL DAY NOTE - NS ED ROS FT
Constitutional: no fever, no sweats, and no chills.  CV: +chest pain, no edema.  Resp: no cough, no dyspnea  GI: no abdominal pain, no nausea and no vomiting.  MSK: +back pain, no extremity pain, no weakness  Skin: no lesions, and no rashes.  Neuro: no LOC, no headache, no dizziness.  ROS otherwise negative except as noted in HPI.

## 2022-10-26 NOTE — ED ADULT NURSE REASSESSMENT NOTE - NS ED NURSE REASSESS COMMENT FT1
SINDI Wilson of cardiology made aware patient was weighed with a scale (previous weight entered was stated) and >3kg difference. measured weight entered. awaiting updated orders if needed.
patient awaiting update from MD's. Mds aware, will update patient and daughter
pt received from Javi RN at 0115. pt aox4, nad. pt denies chest, but c/o back pain that feels like "bruising" as per pt. pt stated their back pain is partially relieved due to prior medications administer by prior RN, see EMR. nsr at 88 on cardiac monitor and  96% o2 on room air. pt aware on change in plan of care to be admitted to [facility].

## 2022-10-26 NOTE — H&P ADULT - ASSESSMENT
ASSESSMENT:  77F with PMHX CAD s/p CABG/PCI, HTN, HLD, Hypothyroidism presents to [facility] ER c/o lower/middle back pain radiating to her chest admitted for chest pain and NSTEMI r/o ACS.    PLAN:  Chest Pain, NSTEMI r/o ACS  -Admit to Tele  -CT Angio negative for dissection  -EKG SR +Septal Q Waves no acute changes no prior for comparison  -Check TTE  -Trend Cardiac Enzymes  -ASA 325mg PO x1  -Continue ASA 81mg q24  -Heparin bolus/gtt  -VTE PPX  -NPO  -Cardiology Consulted ([place])  -Plan for [facility] in AM per Cardio    Low Back Pain  -CT Angio negative for dissection  -CT LSPINE Reformat negative for fracture  -Lidocaine Patch  -Add Morphine IV PRN if necessary    HTN, HLD  -Atorvastatin 80mg q24  -Coreg 3.125mg BID  -Hold Triamterene/HCTZ 37.5/25mg q24    Hypothyroidism  -Levothyroxine 100mcg q24    GERD  -Omeprazole 20mg q24

## 2022-10-26 NOTE — PROGRESS NOTE ADULT - ASSESSMENT
77F with PMHX CAD s/p CABG/PCI, HTN, HLD, Hypothyroidism presents to [facility] ER c/o lower/middle back pain radiating to her chest admitted for chest pain and NSTEMI r/o ACS.    #Chest Pain  - 2/2 nstemi  - s/p cath- no intervention however medical mgmt  - cardio cosnult appreciated  - aspirin coreg plavix  - s/p heparin drip  - echo 10/2022 lvef 60% aortic stenosis    #HTN-Essential  - monitor blood pressure  - coreg     #HLD  - statin    #Hypothyroidism  - Levothyroxine    #GERD  - Omeprazole     plan of care d/w patient daughter bedside

## 2022-10-27 DIAGNOSIS — E78.5 HYPERLIPIDEMIA, UNSPECIFIED: ICD-10-CM

## 2022-10-27 DIAGNOSIS — I10 ESSENTIAL (PRIMARY) HYPERTENSION: ICD-10-CM

## 2022-10-27 DIAGNOSIS — N28.9 DISORDER OF KIDNEY AND URETER, UNSPECIFIED: ICD-10-CM

## 2022-10-27 DIAGNOSIS — I35.0 NONRHEUMATIC AORTIC (VALVE) STENOSIS: ICD-10-CM

## 2022-10-27 LAB
A1C WITH ESTIMATED AVERAGE GLUCOSE RESULT: 6.6 % — HIGH (ref 4–5.6)
ALBUMIN SERPL ELPH-MCNC: 3.6 G/DL — SIGNIFICANT CHANGE UP (ref 3.3–5.2)
ALP SERPL-CCNC: 65 U/L — SIGNIFICANT CHANGE UP (ref 40–120)
ALT FLD-CCNC: 23 U/L — SIGNIFICANT CHANGE UP
ANION GAP SERPL CALC-SCNC: 10 MMOL/L — SIGNIFICANT CHANGE UP (ref 5–17)
AST SERPL-CCNC: 33 U/L — HIGH
BILIRUB SERPL-MCNC: 0.3 MG/DL — LOW (ref 0.4–2)
BUN SERPL-MCNC: 32.1 MG/DL — HIGH (ref 8–20)
CALCIUM SERPL-MCNC: 10 MG/DL — SIGNIFICANT CHANGE UP (ref 8.4–10.5)
CHLORIDE SERPL-SCNC: 103 MMOL/L — SIGNIFICANT CHANGE UP (ref 96–108)
CHOLEST SERPL-MCNC: 155 MG/DL — SIGNIFICANT CHANGE UP
CO2 SERPL-SCNC: 28 MMOL/L — SIGNIFICANT CHANGE UP (ref 22–29)
CREAT SERPL-MCNC: 1.49 MG/DL — HIGH (ref 0.5–1.3)
EGFR: 36 ML/MIN/1.73M2 — LOW
ESTIMATED AVERAGE GLUCOSE: 143 MG/DL — HIGH (ref 68–114)
GLUCOSE BLDC GLUCOMTR-MCNC: 112 MG/DL — HIGH (ref 70–99)
GLUCOSE BLDC GLUCOMTR-MCNC: 113 MG/DL — HIGH (ref 70–99)
GLUCOSE BLDC GLUCOMTR-MCNC: 124 MG/DL — HIGH (ref 70–99)
GLUCOSE BLDC GLUCOMTR-MCNC: 90 MG/DL — SIGNIFICANT CHANGE UP (ref 70–99)
GLUCOSE SERPL-MCNC: 108 MG/DL — HIGH (ref 70–99)
HCT VFR BLD CALC: 32.4 % — LOW (ref 34.5–45)
HCV AB S/CO SERPL IA: 0.14 S/CO — SIGNIFICANT CHANGE UP (ref 0–0.99)
HCV AB SERPL-IMP: SIGNIFICANT CHANGE UP
HDLC SERPL-MCNC: 43 MG/DL — LOW
HGB BLD-MCNC: 10.4 G/DL — LOW (ref 11.5–15.5)
LIPID PNL WITH DIRECT LDL SERPL: 72 MG/DL — SIGNIFICANT CHANGE UP
MAGNESIUM SERPL-MCNC: 1.8 MG/DL — SIGNIFICANT CHANGE UP (ref 1.8–2.6)
MCHC RBC-ENTMCNC: 30.1 PG — SIGNIFICANT CHANGE UP (ref 27–34)
MCHC RBC-ENTMCNC: 32.1 GM/DL — SIGNIFICANT CHANGE UP (ref 32–36)
MCV RBC AUTO: 93.6 FL — SIGNIFICANT CHANGE UP (ref 80–100)
NON HDL CHOLESTEROL: 112 MG/DL — SIGNIFICANT CHANGE UP
PLATELET # BLD AUTO: 264 K/UL — SIGNIFICANT CHANGE UP (ref 150–400)
POTASSIUM SERPL-MCNC: 4.4 MMOL/L — SIGNIFICANT CHANGE UP (ref 3.5–5.3)
POTASSIUM SERPL-SCNC: 4.4 MMOL/L — SIGNIFICANT CHANGE UP (ref 3.5–5.3)
PROT SERPL-MCNC: 6.4 G/DL — LOW (ref 6.6–8.7)
RBC # BLD: 3.46 M/UL — LOW (ref 3.8–5.2)
RBC # FLD: 12.9 % — SIGNIFICANT CHANGE UP (ref 10.3–14.5)
SODIUM SERPL-SCNC: 141 MMOL/L — SIGNIFICANT CHANGE UP (ref 135–145)
TRIGL SERPL-MCNC: 202 MG/DL — HIGH
WBC # BLD: 5.94 K/UL — SIGNIFICANT CHANGE UP (ref 3.8–10.5)
WBC # FLD AUTO: 5.94 K/UL — SIGNIFICANT CHANGE UP (ref 3.8–10.5)

## 2022-10-27 PROCEDURE — 99233 SBSQ HOSP IP/OBS HIGH 50: CPT

## 2022-10-27 RX ADMIN — Medication 100 MICROGRAM(S): at 06:01

## 2022-10-27 RX ADMIN — CARVEDILOL PHOSPHATE 3.12 MILLIGRAM(S): 80 CAPSULE, EXTENDED RELEASE ORAL at 06:01

## 2022-10-27 RX ADMIN — LIDOCAINE 1 PATCH: 4 CREAM TOPICAL at 11:40

## 2022-10-27 RX ADMIN — Medication 81 MILLIGRAM(S): at 12:04

## 2022-10-27 RX ADMIN — PANTOPRAZOLE SODIUM 40 MILLIGRAM(S): 20 TABLET, DELAYED RELEASE ORAL at 06:01

## 2022-10-27 RX ADMIN — ATORVASTATIN CALCIUM 80 MILLIGRAM(S): 80 TABLET, FILM COATED ORAL at 21:08

## 2022-10-27 RX ADMIN — CLOPIDOGREL BISULFATE 75 MILLIGRAM(S): 75 TABLET, FILM COATED ORAL at 12:04

## 2022-10-27 RX ADMIN — CARVEDILOL PHOSPHATE 3.12 MILLIGRAM(S): 80 CAPSULE, EXTENDED RELEASE ORAL at 17:32

## 2022-10-27 NOTE — PROGRESS NOTE ADULT - ASSESSMENT
76 y/o female with hx of CAD s/p stents and CABG (Praveena Skinner, 1999), HTN, HLD, hypothyroid who presents with back pain radiating to the chest. Pt states that pain started 5 days ago and she feels it most in the upper back between her shoulder blades. Pt states that she also felt mostly back pain when she had an MI. Chest CTA negative for dissection. Troponin 0.10 and 0.09 with elevated CKMB, proBNP 517. EKG shows SR with septal Q-waves and no acute ST/T changes, no previous EKG available. Systolic murmur appreciated on exam, pt states she was told that she has a valve problem and her heart is "not pumping properly". Pt follows with Dr. Linda from [place] and states she had an echocardiogram and NST within the last year.

## 2022-10-27 NOTE — PROGRESS NOTE ADULT - ASSESSMENT
77F with PMHX CAD s/p CABG/PCI, HTN, HLD, Hypothyroidism presents to [facility] ER c/o lower/middle back pain radiating to her chest admitted for chest pain and NSTEMI r/o ACS.    #Chest Pain  - 2/2 nstemi  - s/p cath- no intervention however medical mgmt  - cardio consult appreciated  - aspirin coreg plavix  - s/p heparin drip  - echo 10/2022 lvef 60% aortic stenosis    #HTN-Essential  - monitor blood pressure  - coreg     #HLD  - statin    #Hypothyroidism  - Levothyroxine    #GERD  - Omeprazole     plan of care d/w patient daughter's bedside

## 2022-10-27 NOTE — PROGRESS NOTE ADULT - SUBJECTIVE AND OBJECTIVE BOX
[facility]                                                         CARDIOLOGY AT [facility]                                                                  [address], [place] [address]                                                         Telephone: [phone]. Fax:[phone]                                                                             PROGRESS NOTE    Reason for follow up: NSTEMI  CATH SITE: Right groin benign s/p cath.  No bleeding, no ecchymosis, no hematoma. Extremity Warm to touch, with palpable distal pulses, and brisk capillary refill.      Review of symptoms:   Cardiac:  No chest pain. No dyspnea. No palpitations.  Respiratory: no cough. No dyspnea  Gastrointestinal: No diarrhea. No abdominal pain. No bleeding.   Neuro: No focal neuro complaints.    Vitals:  T(C): 36.7 (10-27-22 @ 05:58), Max: 36.8 (10-26-22 @ 14:13)  HR: 76 (10-27-22 @ 05:58) (55 - 76)  BP: 114/63 (10-27-22 @ 05:58) (107/58 - 183/72)  RR: 16 (10-27-22 @ 05:58) (16 - 18)  SpO2: 92% (10-27-22 @ 05:58) (92% - 99%)  Wt(kg): --  I&O's Summary    26 Oct 2022 07:01  -  27 Oct 2022 07:00  --------------------------------------------------------  IN: 42.5 mL / OUT: 0 mL / NET: 42.5 mL      Weight (kg): 62.1 (10-26 @ 06:11)    PHYSICAL EXAM:  Appearance: Comfortable. No acute distress  HEENT:  Atraumatic. Normocephalic.  Normal oral mucosa  Neurologic: A & O x 3, no gross focal deficits.  Cardiovascular: RRR S1 S2, No murmur, no rubs/gallops. No JVD  Respiratory: Lungs clear to auscultation, unlabored   Gastrointestinal:  Soft, Non-tender, + BS  Lower Extremities: 2+ Peripheral Pulses, No clubbing, cyanosis, or edema  Psychiatry: Patient is calm. No agitation.   Skin: warm and dry.    CURRENT CARDIAC MEDICATIONS:  carvedilol 3.125 milliGRAM(s) Oral every 12 hours      CURRENT OTHER MEDICATIONS:  acetaminophen     Tablet .. 650 milliGRAM(s) Oral every 6 hours PRN Moderate Pain (4 - 6)  melatonin 3 milliGRAM(s) Oral at bedtime PRN Insomnia  morphine  - Injectable 2 milliGRAM(s) IV Push every 4 hours PRN Moderate Pain (4 - 6)  morphine  - Injectable 4 milliGRAM(s) IV Push every 4 hours PRN Severe Pain (7 - 10)  ondansetron Injectable 4 milliGRAM(s) IV Push every 8 hours PRN Nausea and/or Vomiting  aluminum hydroxide/magnesium hydroxide/simethicone Suspension 30 milliLiter(s) Oral every 4 hours PRN Dyspepsia  pantoprazole    Tablet 40 milliGRAM(s) Oral before breakfast  atorvastatin 80 milliGRAM(s) Oral at bedtime  dextrose 50% Injectable 25 Gram(s) IV Push once, Stop order after: 1 Doses  dextrose 50% Injectable 12.5 Gram(s) IV Push once, Stop order after: 1 Doses  dextrose 50% Injectable 25 Gram(s) IV Push once, Stop order after: 1 Doses  dextrose Oral Gel 15 Gram(s) Oral once, Stop order after: 1 Doses PRN Blood Glucose LESS THAN 70 milliGRAM(s)/deciliter  glucagon  Injectable 1 milliGRAM(s) IntraMuscular once, Stop order after: 1 Doses  insulin lispro (ADMELOG) corrective regimen sliding scale   SubCutaneous three times a day before meals  insulin lispro (ADMELOG) corrective regimen sliding scale   SubCutaneous at bedtime  levothyroxine 100 MICROGram(s) Oral daily  aspirin  chewable 81 milliGRAM(s) Oral daily  clopidogrel Tablet 75 milliGRAM(s) Oral daily  dextrose 5%. 1000 milliLiter(s) (50 mL/Hr) IV Continuous <Continuous>  dextrose 5%. 1000 milliLiter(s) (100 mL/Hr) IV Continuous <Continuous>  lactated ringers. 1000 milliLiter(s) (75 mL/Hr) IV Continuous <Continuous>  lidocaine   4% Patch 1 Patch Transdermal daily      LABS:	 	  ( 26 Oct 2022 03:54 )  Troponin T  0.11<H>,  CPK  385<H>, CKMB  X    , BNP X        , ( 25 Oct 2022 23:13 )  Troponin T  0.09<H>,  CPK  434<H>, CKMB  X    , BNP X        , ( 25 Oct 2022 19:57 )  Troponin T  0.10<H>,  CPK  X    , CKMB  X    , <H>                              10.4   5.94  )-----------( 264      ( 27 Oct 2022 05:23 )             32.4     10-27    141  |  103  |  32.1<H>  ----------------------------<  108<H>  4.4   |  28.0  |  1.49<H>    Ca    10.0      27 Oct 2022 05:23  Phos  2.7     10-26  Mg     1.8     10-27    TPro  6.4<L>  /  Alb  3.6  /  TBili  0.3<L>  /  DBili  x   /  AST  33<H>  /  ALT  23  /  AlkPhos  65  10-27    PT/INR/PTT ( 26 Oct 2022 08:48 )                       :                       :      X            :       128.5                 .        .                   .              .           .       X           .                                       Lipid Profile: Date: 10-27 @ 05:23  Total cholesterol 155; Direct LDL: --; HDL: 43; Triglycerides:202    HgA1c:   TSH:     TELEMETRY: NSR  ECG:    DIAGNOSTIC TESTING:  [ ] Echocardiogram:   < from: TTE Echo Complete w/ Contrast w/ Doppler (10.26.22 @ 09:24) >    PHYSICIAN INTERPRETATION:  Left Ventricle: The left ventricular internal cavity size is normal.  Global LV systolic function was normal. Left ventricular ejection   fraction, by visual estimation, is 60 to 65%. Spectral Doppler shows   impaired relaxation pattern of left ventricular myocardial filling (Grade   I diastolic dysfunction).      LV Wall Scoring:  The basal inferior segment is akinetic.    Right Ventricle: The right ventricular size is normal. RV systolic   function is normal.  Left Atrium: Normal left atrial size.  Right Atrium: Normal right atrial size.  Pericardium: There is no evidence of pericardial effusion.  Mitral Valve: The mitral valve is normal in structure. Moderate   thickening and calcification of the anterior and posterior mitral valve   leaflets. There is moderate mitral annular calcification. Trace mitral   valve regurgitation is seen.  Tricuspid Valve: The tricuspid valve is normal in structure. Mild   tricuspid regurgitation is visualized.  Aortic Valve: Moderate to severe aortic stenosis is present. Mild to   moderate aortic valve regurgitation is seen. The Dimesionless Index value   is 0.32.  Pulmonic Valve: Structurally normal pulmonic valve, with normal leaflet   excursion. Trace pulmonic valve regurgitation.  Aorta: The aortic root is normal in size and structure.  Pulmonary Artery: The main pulmonary artery is normal in size.  Venous: The inferior vena cava was normal sized, with respiratory size   variation greater than 50%.      Summary:   1. Left ventricular ejection fraction, by visual estimation, is 60 to   65%.   2. Normal global left ventricular systolic function.   3. Basal inferior segment is abnormal as described above.   4. Spectral Doppler shows impaired relaxation pattern of left   ventricular myocardial filling (Grade I diastolic dysfunction).   5. Normal left atrial size.   6. Normal right atrial size.   7. Moderate mitral annular calcification.   8. Moderate thickening and calcification of the anterior and posterior   mitral valve leaflets.   9. Trace mitral valve regurgitation.  10. Mild tricuspid regurgitation.  11. Mild to moderate aortic regurgitation.  12. Moderate to severe aortic valve stenosis.  13. The Dimesionless Index value is 0.32.    MD Waldemar Electronically signed on 10/26/2022 at 10:16:15 AM    < end of copied text >  [ ]  Catheterization:  < from: Cardiac Catheterization (10.26.22 @ 14:51) >  Chronic total occlusion of mid LADwith patent LIMA supplying the  distal vessel.  No significant disease in RCA. Assume that gastroepiploic graft has  closed off, although not selectively engaged.  Patent graft to high lateral vessel seen via collaterals from distal  LAD.  No culprit lesion to account for NSTEMI.    Elevated left heart filling pressures.    Peak to peak gradient of 10 mmHg, not indicative of severe aortic  stenosis.  Recommendations:     Maximize medical management of NSTEMI and diastolic heart failure.   < end of copied text >  [ ] Stress Test:    OTHER:

## 2022-10-27 NOTE — PROGRESS NOTE ADULT - PROBLEM SELECTOR PLAN 1
.  - s/p LHC via RFA, No culprit lesion to account for NSTEMI.   - continue GDMT  - GDMT:        DAPT: ASA/Plavix       Statin: atovastatin 80mg po HS       BetaBlocker: coreg 3.125mg PO BID       Other Antianginals: n/a       Aggressive cardiovascular risk reduction and lifestyle modification.  - unclear cause of NSTEMI, no events on tele overnight  - if patient with continued anginal symptoms check EKG, Labs and start heparin GTT  - monitor patient on tele one more day  - cardiac rehab info provided/referral and communication to cardiac rehab completed .  - s/p LHC via RFA, No culprit lesion to account for NSTEMI.   - continue GDMT  - GDMT:        DAPT: ASA/Plavix       Statin: atovastatin 80mg po HS       BetaBlocker: coreg 3.125mg PO BID       Other Antianginals: n/a       Aggressive cardiovascular risk reduction and lifestyle modification.  - unclear cause of NSTEMI, no events on tele overnight  - if patient with continued anginal symptoms check EKG, Labs and start heparin GTT  - trend troponin  - monitor patient on tele one more day  - cardiac rehab info provided/referral and communication to cardiac rehab completed

## 2022-10-27 NOTE — PROGRESS NOTE ADULT - NS ATTEND AMEND GEN_ALL_CORE FT
no culprit for nstemi. patient feels well ambulated around unit without recurrence of symptoms. family at bedside. monitor overnight and potential discharge tomorrow. patient reports she will follow up with Dr. Simmons.

## 2022-10-27 NOTE — PROGRESS NOTE ADULT - SUBJECTIVE AND OBJECTIVE BOX
Patient is a 77y old  Female who presents with a chief complaint of Chest Pain (27 Oct 2022 09:28)    Patient seen and examined at bedside.     ALLERGIES:  No Known Allergies    MEDICATIONS  (STANDING):  aspirin  chewable 81 milliGRAM(s) Oral daily  atorvastatin 80 milliGRAM(s) Oral at bedtime  carvedilol 3.125 milliGRAM(s) Oral every 12 hours  clopidogrel Tablet 75 milliGRAM(s) Oral daily  dextrose 5%. 1000 milliLiter(s) (50 mL/Hr) IV Continuous <Continuous>  dextrose 5%. 1000 milliLiter(s) (100 mL/Hr) IV Continuous <Continuous>  dextrose 50% Injectable 25 Gram(s) IV Push once  dextrose 50% Injectable 12.5 Gram(s) IV Push once  dextrose 50% Injectable 25 Gram(s) IV Push once  glucagon  Injectable 1 milliGRAM(s) IntraMuscular once  insulin lispro (ADMELOG) corrective regimen sliding scale   SubCutaneous three times a day before meals  insulin lispro (ADMELOG) corrective regimen sliding scale   SubCutaneous at bedtime  lactated ringers. 1000 milliLiter(s) (75 mL/Hr) IV Continuous <Continuous>  levothyroxine 100 MICROGram(s) Oral daily  lidocaine   4% Patch 1 Patch Transdermal daily  naloxone Injectable 0.4 milliGRAM(s) IV Push once  pantoprazole    Tablet 40 milliGRAM(s) Oral before breakfast    MEDICATIONS  (PRN):  acetaminophen     Tablet .. 650 milliGRAM(s) Oral every 6 hours PRN Moderate Pain (4 - 6)  aluminum hydroxide/magnesium hydroxide/simethicone Suspension 30 milliLiter(s) Oral every 4 hours PRN Dyspepsia  dextrose Oral Gel 15 Gram(s) Oral once PRN Blood Glucose LESS THAN 70 milliGRAM(s)/deciliter  melatonin 3 milliGRAM(s) Oral at bedtime PRN Insomnia  morphine  - Injectable 2 milliGRAM(s) IV Push every 4 hours PRN Moderate Pain (4 - 6)  morphine  - Injectable 4 milliGRAM(s) IV Push every 4 hours PRN Severe Pain (7 - 10)  ondansetron Injectable 4 milliGRAM(s) IV Push every 8 hours PRN Nausea and/or Vomiting    Vital Signs Last 24 Hrs  T(F): 97.7 (27 Oct 2022 10:30), Max: 98.3 (26 Oct 2022 14:13)  HR: 64 (27 Oct 2022 10:30) (55 - 76)  BP: 122/64 (27 Oct 2022 10:30) (107/58 - 183/72)  RR: 18 (27 Oct 2022 10:30) (16 - 18)  SpO2: 94% (27 Oct 2022 10:30) (92% - 99%)  I&O's Summary    26 Oct 2022 07:01  -  27 Oct 2022 07:00  --------------------------------------------------------  IN: 42.5 mL / OUT: 0 mL / NET: 42.5 mL      PHYSICAL EXAM:  General: NAD, A/O x 3  ENT: MMM, no thrush  Neck: Supple, No JVD  Lungs: Clear to auscultation bilaterally, good air entry, non-labored breathing  Cardio: +s1/s2  Abdomen: Soft, Nontender, Nondistended; Bowel sounds present  Extremities: No calf tenderness, No pitting edema      LABS:                        10.4   5.94  )-----------( 264      ( 27 Oct 2022 05:23 )             32.4     10-27    141  |  103  |  32.1  ----------------------------<  108  4.4   |  28.0  |  1.49    Ca    10.0      27 Oct 2022 05:23  Phos  2.7     10-26  Mg     1.8     10-27    TPro  6.4  /  Alb  3.6  /  TBili  0.3  /  DBili  x   /  AST  33  /  ALT  23  /  AlkPhos  65  10-27    PT/INR - ( 25 Oct 2022 20:10 )   PT: 11.1 sec;   INR: 0.96 ratio    PTT - ( 26 Oct 2022 08:48 )  PTT:128.5 sec    CARDIAC MARKERS ( 26 Oct 2022 03:54 )  x     / x     / 385 U/L / x     / 19.1 ng/mL  CARDIAC MARKERS ( 25 Oct 2022 23:13 )  x     / x     / 434 U/L / x     / 23.3 ng/mL      10-27 Chol 155 mg/dL LDL -- HDL 43 mg/dL Trig 202 mg/dL    Glucose  POCT Blood Glucose.: 112 mg/dL (27 Oct 2022 08:00)  POCT Blood Glucose.: 159 mg/dL (26 Oct 2022 20:24)  POCT Blood Glucose.: 106 mg/dL (26 Oct 2022 17:33)    RADIOLOGY & ADDITIONAL TESTS:  - no new tests    Care Discussed with Consultants/Other Providers:   Cardiology

## 2022-10-28 ENCOUNTER — TRANSCRIPTION ENCOUNTER (OUTPATIENT)
Age: 77
End: 2022-10-28

## 2022-10-28 VITALS
DIASTOLIC BLOOD PRESSURE: 64 MMHG | SYSTOLIC BLOOD PRESSURE: 132 MMHG | HEART RATE: 66 BPM | OXYGEN SATURATION: 99 % | TEMPERATURE: 98 F | RESPIRATION RATE: 18 BRPM

## 2022-10-28 PROBLEM — Z00.00 ENCOUNTER FOR PREVENTIVE HEALTH EXAMINATION: Status: ACTIVE | Noted: 2022-10-28

## 2022-10-28 LAB
GLUCOSE BLDC GLUCOMTR-MCNC: 133 MG/DL — HIGH (ref 70–99)
HCT VFR BLD CALC: 31.5 % — LOW (ref 34.5–45)
HGB BLD-MCNC: 10.4 G/DL — LOW (ref 11.5–15.5)
MCHC RBC-ENTMCNC: 30.5 PG — SIGNIFICANT CHANGE UP (ref 27–34)
MCHC RBC-ENTMCNC: 33 GM/DL — SIGNIFICANT CHANGE UP (ref 32–36)
MCV RBC AUTO: 92.4 FL — SIGNIFICANT CHANGE UP (ref 80–100)
PLATELET # BLD AUTO: 256 K/UL — SIGNIFICANT CHANGE UP (ref 150–400)
RBC # BLD: 3.41 M/UL — LOW (ref 3.8–5.2)
RBC # FLD: 12.5 % — SIGNIFICANT CHANGE UP (ref 10.3–14.5)
WBC # BLD: 6.57 K/UL — SIGNIFICANT CHANGE UP (ref 3.8–10.5)
WBC # FLD AUTO: 6.57 K/UL — SIGNIFICANT CHANGE UP (ref 3.8–10.5)

## 2022-10-28 PROCEDURE — 86803 HEPATITIS C AB TEST: CPT

## 2022-10-28 PROCEDURE — 0225U NFCT DS DNA&RNA 21 SARSCOV2: CPT

## 2022-10-28 PROCEDURE — 99291 CRITICAL CARE FIRST HOUR: CPT

## 2022-10-28 PROCEDURE — 83880 ASSAY OF NATRIURETIC PEPTIDE: CPT

## 2022-10-28 PROCEDURE — 74174 CTA ABD&PLVS W/CONTRAST: CPT | Mod: MA

## 2022-10-28 PROCEDURE — 83036 HEMOGLOBIN GLYCOSYLATED A1C: CPT

## 2022-10-28 PROCEDURE — 82553 CREATINE MB FRACTION: CPT

## 2022-10-28 PROCEDURE — 80048 BASIC METABOLIC PNL TOTAL CA: CPT

## 2022-10-28 PROCEDURE — 36415 COLL VENOUS BLD VENIPUNCTURE: CPT

## 2022-10-28 PROCEDURE — 82550 ASSAY OF CK (CPK): CPT

## 2022-10-28 PROCEDURE — 83735 ASSAY OF MAGNESIUM: CPT

## 2022-10-28 PROCEDURE — 86850 RBC ANTIBODY SCREEN: CPT

## 2022-10-28 PROCEDURE — 71275 CT ANGIOGRAPHY CHEST: CPT | Mod: MA

## 2022-10-28 PROCEDURE — 80061 LIPID PANEL: CPT

## 2022-10-28 PROCEDURE — 84100 ASSAY OF PHOSPHORUS: CPT

## 2022-10-28 PROCEDURE — 85730 THROMBOPLASTIN TIME PARTIAL: CPT

## 2022-10-28 PROCEDURE — C8929: CPT

## 2022-10-28 PROCEDURE — 93459 L HRT ART/GRFT ANGIO: CPT

## 2022-10-28 PROCEDURE — 86901 BLOOD TYPING SEROLOGIC RH(D): CPT

## 2022-10-28 PROCEDURE — C1887: CPT

## 2022-10-28 PROCEDURE — 85025 COMPLETE CBC W/AUTO DIFF WBC: CPT

## 2022-10-28 PROCEDURE — 71045 X-RAY EXAM CHEST 1 VIEW: CPT

## 2022-10-28 PROCEDURE — 85027 COMPLETE CBC AUTOMATED: CPT

## 2022-10-28 PROCEDURE — C1769: CPT

## 2022-10-28 PROCEDURE — C1894: CPT

## 2022-10-28 PROCEDURE — 82962 GLUCOSE BLOOD TEST: CPT

## 2022-10-28 PROCEDURE — 93005 ELECTROCARDIOGRAM TRACING: CPT

## 2022-10-28 PROCEDURE — 99239 HOSP IP/OBS DSCHRG MGMT >30: CPT

## 2022-10-28 PROCEDURE — 86900 BLOOD TYPING SEROLOGIC ABO: CPT

## 2022-10-28 PROCEDURE — 99232 SBSQ HOSP IP/OBS MODERATE 35: CPT

## 2022-10-28 PROCEDURE — G0378: CPT

## 2022-10-28 PROCEDURE — 84484 ASSAY OF TROPONIN QUANT: CPT

## 2022-10-28 PROCEDURE — 80053 COMPREHEN METABOLIC PANEL: CPT

## 2022-10-28 PROCEDURE — 85610 PROTHROMBIN TIME: CPT

## 2022-10-28 RX ORDER — LIDOCAINE 4 G/100G
1 CREAM TOPICAL
Qty: 30 | Refills: 0
Start: 2022-10-28 | End: 2022-11-26

## 2022-10-28 RX ORDER — CLOPIDOGREL BISULFATE 75 MG/1
1 TABLET, FILM COATED ORAL
Qty: 30 | Refills: 0
Start: 2022-10-28 | End: 2022-11-26

## 2022-10-28 RX ADMIN — CLOPIDOGREL BISULFATE 75 MILLIGRAM(S): 75 TABLET, FILM COATED ORAL at 07:49

## 2022-10-28 RX ADMIN — Medication 100 MICROGRAM(S): at 05:38

## 2022-10-28 RX ADMIN — Medication 81 MILLIGRAM(S): at 07:49

## 2022-10-28 RX ADMIN — PANTOPRAZOLE SODIUM 40 MILLIGRAM(S): 20 TABLET, DELAYED RELEASE ORAL at 05:38

## 2022-10-28 RX ADMIN — CARVEDILOL PHOSPHATE 3.12 MILLIGRAM(S): 80 CAPSULE, EXTENDED RELEASE ORAL at 05:38

## 2022-10-28 NOTE — DISCHARGE NOTE NURSING/CASE MANAGEMENT/SOCIAL WORK - PATIENT PORTAL LINK FT
You can access the FollowMyHealth Patient Portal offered by [facility] by registering at the following website: [email or web address]. By joining [employer]’s FollowMyHealth portal, you will also be able to view your health information using other applications (apps) compatible with our system.

## 2022-10-28 NOTE — DISCHARGE NOTE PROVIDER - HOSPITAL COURSE
77F with PMHX CAD s/p CABG/PCI, HTN, HLD, Hypothyroidism presents to [facility] ER c/o lower/middle back pain radiating to her chest admitted for chest pain and NSTEMI r/o ACS. patient s/p cath with no intervention- medical mgmt. patient chest pain resolved. found to be diabetic type 2 - diet control. now being d/c in stable condition `

## 2022-10-28 NOTE — PROGRESS NOTE ADULT - ASSESSMENT
78 y/o female with hx of CAD s/p stents and CABG (Praveena Skinner, 1999), HTN, HLD, hypothyroid who presents with back pain radiating to the chest. Pt states that pain started 5 days ago and she feels it most in the upper back between her shoulder blades. Pt states that she also felt mostly back pain when she had an MI. Chest CTA negative for dissection. Troponin 0.10 and 0.09 with elevated CKMB, proBNP 517. EKG shows SR with septal Q-waves and no acute ST/T changes, no previous EKG available. Systolic murmur appreciated on exam, pt states she was told that she has a valve problem and her heart is "not pumping properly". Pt follows with Dr. Linda from [place] and states she had an echocardiogram and NST within the last year.

## 2022-10-28 NOTE — PROGRESS NOTE ADULT - PROBLEM SELECTOR PLAN 1
.  - s/p LHC via RFA, No culprit lesion to account for NSTEMI.   - continue GDMT  - GDMT:        DAPT: ASA/Plavix       Statin: Atorvastatin 80mg po HS       BetaBlocker: coreg 3.125mg PO BID       Other Antianginals: n/a       Aggressive cardiovascular risk reduction and lifestyle modification.  - unclear cause of NSTEMI, no events on tele overnight  - patient is chest pain free  - cardiac rehab info provided/referral and communication to cardiac rehab completed.

## 2022-10-28 NOTE — PROGRESS NOTE ADULT - PROBLEM SELECTOR PLAN 2
.  - AS not severe per [facility] report.
.  - AS not severe per [facility] report

## 2022-10-28 NOTE — DISCHARGE NOTE PROVIDER - CARE PROVIDER_API CALL
Eliazar Simmons)  Cardiovascular Disease; Interventional Cardiology  [address], [address] [place] [address]  Phone: [phone]  Fax: [phone]  Follow Up Time:

## 2022-10-28 NOTE — PROGRESS NOTE ADULT - SUBJECTIVE AND OBJECTIVE BOX
Patient is a 77y old  Female who presents with a chief complaint of Chest Pain (28 Oct 2022 09:31)    Patient seen and examined at bedside.      ALLERGIES:  No Known Allergies    MEDICATIONS  (STANDING):  aspirin  chewable 81 milliGRAM(s) Oral daily  atorvastatin 80 milliGRAM(s) Oral at bedtime  carvedilol 3.125 milliGRAM(s) Oral every 12 hours  clopidogrel Tablet 75 milliGRAM(s) Oral daily  dextrose 5%. 1000 milliLiter(s) (50 mL/Hr) IV Continuous <Continuous>  dextrose 5%. 1000 milliLiter(s) (100 mL/Hr) IV Continuous <Continuous>  dextrose 50% Injectable 25 Gram(s) IV Push once  dextrose 50% Injectable 12.5 Gram(s) IV Push once  dextrose 50% Injectable 25 Gram(s) IV Push once  glucagon  Injectable 1 milliGRAM(s) IntraMuscular once  insulin lispro (ADMELOG) corrective regimen sliding scale   SubCutaneous three times a day before meals  insulin lispro (ADMELOG) corrective regimen sliding scale   SubCutaneous at bedtime  lactated ringers. 1000 milliLiter(s) (75 mL/Hr) IV Continuous <Continuous>  levothyroxine 100 MICROGram(s) Oral daily  lidocaine   4% Patch 1 Patch Transdermal daily  naloxone Injectable 0.4 milliGRAM(s) IV Push once  pantoprazole    Tablet 40 milliGRAM(s) Oral before breakfast    MEDICATIONS  (PRN):  acetaminophen     Tablet .. 650 milliGRAM(s) Oral every 6 hours PRN Moderate Pain (4 - 6)  aluminum hydroxide/magnesium hydroxide/simethicone Suspension 30 milliLiter(s) Oral every 4 hours PRN Dyspepsia  dextrose Oral Gel 15 Gram(s) Oral once PRN Blood Glucose LESS THAN 70 milliGRAM(s)/deciliter  melatonin 3 milliGRAM(s) Oral at bedtime PRN Insomnia  morphine  - Injectable 2 milliGRAM(s) IV Push every 4 hours PRN Moderate Pain (4 - 6)  morphine  - Injectable 4 milliGRAM(s) IV Push every 4 hours PRN Severe Pain (7 - 10)  ondansetron Injectable 4 milliGRAM(s) IV Push every 8 hours PRN Nausea and/or Vomiting    Vital Signs Last 24 Hrs  T(F): 98 (28 Oct 2022 07:48), Max: 98.1 (27 Oct 2022 15:46)  HR: 66 (28 Oct 2022 07:48) (60 - 76)  BP: 117/66 (28 Oct 2022 07:48) (117/66 - 148/65)  RR: 18 (28 Oct 2022 07:48) (17 - 18)  SpO2: 98% (28 Oct 2022 07:48) (95% - 98%)  I&O's Summary    27 Oct 2022 07:01  -  28 Oct 2022 07:00  --------------------------------------------------------  IN: 300 mL / OUT: 100 mL / NET: 200 mL      PHYSICAL EXAM:  General: NAD, A/O x 3  ENT: MMM, no thrush  Neck: Supple, No JVD  Lungs: Clear to auscultation bilaterally, good air entry, non-labored breathing  Cardio: +s1/s2  Abdomen: Soft, Nontender, Nondistended; Bowel sounds present  Extremities: No calf tenderness, No pitting edema    LABS:                        10.4   6.57  )-----------( 256      ( 28 Oct 2022 05:03 )             31.5     10-27    141  |  103  |  32.1  ----------------------------<  108  4.4   |  28.0  |  1.49    Ca    10.0      27 Oct 2022 05:23  Phos  2.7     10-26  Mg     1.8     10-27    TPro  6.4  /  Alb  3.6  /  TBili  0.3  /  DBili  x   /  AST  33  /  ALT  23  /  AlkPhos  65  10-27    PT/INR - ( 25 Oct 2022 20:10 )   PT: 11.1 sec;   INR: 0.96 ratio    PTT - ( 26 Oct 2022 08:48 )  PTT:128.5 sec    CARDIAC MARKERS ( 26 Oct 2022 03:54 )  x     / x     / 385 U/L / x     / 19.1 ng/mL  CARDIAC MARKERS ( 25 Oct 2022 23:13 )  x     / x     / 434 U/L / x     / 23.3 ng/mL      10-27 Chol 155 mg/dL LDL -- HDL 43 mg/dL Trig 202 mg/dL    Glucose  POCT Blood Glucose.: 133 mg/dL (28 Oct 2022 08:09)  POCT Blood Glucose.: 113 mg/dL (27 Oct 2022 20:44)  POCT Blood Glucose.: 90 mg/dL (27 Oct 2022 17:23)  POCT Blood Glucose.: 124 mg/dL (27 Oct 2022 12:24)    RADIOLOGY & ADDITIONAL TESTS:  - no new tests    Care Discussed with Consultants/Other Providers:   Cardiology

## 2022-10-28 NOTE — PROGRESS NOTE ADULT - NS ATTEND AMEND GEN_ALL_CORE FT
Patient with no further symptoms. No further chest pain.   Ambulating.     Can be discharged. Plan for outpatient follow up.

## 2022-10-28 NOTE — PROGRESS NOTE ADULT - PROBLEM SELECTOR PLAN 5
.  - continue statin.        No further inpatient cardiac work up at this time.  F/U with Dr. Simmons as OP  Will sign off.  Please reconsult if needed.
.  - continue statin

## 2022-10-28 NOTE — PROGRESS NOTE ADULT - SUBJECTIVE AND OBJECTIVE BOX
[facility]                                                         CARDIOLOGY AT [facility]                                                                  [address], [place] [address]                                                         Telephone: [phone]. Fax:[phone]                                                                             PROGRESS NOTE    Reason for follow up: NSTEMI  Update: right groin benign      Review of symptoms:   Cardiac:  No chest pain. No dyspnea. No palpitations.  Respiratory: no cough. No dyspnea  Gastrointestinal: No diarrhea. No abdominal pain. No bleeding.   Neuro: No focal neuro complaints.    Vitals:  T(C): 36.7 (10-28-22 @ 07:48), Max: 36.7 (10-27-22 @ 15:46)  HR: 66 (10-28-22 @ 07:48) (60 - 76)  BP: 117/66 (10-28-22 @ 07:48) (117/66 - 148/65)  RR: 18 (10-28-22 @ 07:48) (17 - 18)  SpO2: 98% (10-28-22 @ 07:48) (94% - 98%)  Wt(kg): --  I&O's Summary    27 Oct 2022 07:01  -  28 Oct 2022 07:00  --------------------------------------------------------  IN: 300 mL / OUT: 100 mL / NET: 200 mL      Weight (kg): 62.1 (10-26 @ 06:11)    PHYSICAL EXAM:  Appearance: Comfortable. No acute distress  HEENT:  Atraumatic. Normocephalic.  Normal oral mucosa  Neurologic: A & O x 3, no gross focal deficits.  Cardiovascular: RRR S1 S2, No murmur, no rubs/gallops. No JVD  Respiratory: Lungs clear to auscultation, unlabored   Gastrointestinal:  Soft, Non-tender, + BS  Lower Extremities: 2+ Peripheral Pulses, No clubbing, cyanosis, or edema  Psychiatry: Patient is calm. No agitation.   Skin: warm and dry.    CURRENT CARDIAC MEDICATIONS:  carvedilol 3.125 milliGRAM(s) Oral every 12 hours      CURRENT OTHER MEDICATIONS:  acetaminophen     Tablet .. 650 milliGRAM(s) Oral every 6 hours PRN Moderate Pain (4 - 6)  melatonin 3 milliGRAM(s) Oral at bedtime PRN Insomnia  morphine  - Injectable 2 milliGRAM(s) IV Push every 4 hours PRN Moderate Pain (4 - 6)  morphine  - Injectable 4 milliGRAM(s) IV Push every 4 hours PRN Severe Pain (7 - 10)  ondansetron Injectable 4 milliGRAM(s) IV Push every 8 hours PRN Nausea and/or Vomiting  aluminum hydroxide/magnesium hydroxide/simethicone Suspension 30 milliLiter(s) Oral every 4 hours PRN Dyspepsia  pantoprazole    Tablet 40 milliGRAM(s) Oral before breakfast  atorvastatin 80 milliGRAM(s) Oral at bedtime  dextrose 50% Injectable 25 Gram(s) IV Push once, Stop order after: 1 Doses  dextrose 50% Injectable 12.5 Gram(s) IV Push once, Stop order after: 1 Doses  dextrose 50% Injectable 25 Gram(s) IV Push once, Stop order after: 1 Doses  dextrose Oral Gel 15 Gram(s) Oral once, Stop order after: 1 Doses PRN Blood Glucose LESS THAN 70 milliGRAM(s)/deciliter  glucagon  Injectable 1 milliGRAM(s) IntraMuscular once, Stop order after: 1 Doses  insulin lispro (ADMELOG) corrective regimen sliding scale   SubCutaneous three times a day before meals  insulin lispro (ADMELOG) corrective regimen sliding scale   SubCutaneous at bedtime  levothyroxine 100 MICROGram(s) Oral daily  aspirin  chewable 81 milliGRAM(s) Oral daily  clopidogrel Tablet 75 milliGRAM(s) Oral daily  dextrose 5%. 1000 milliLiter(s) (50 mL/Hr) IV Continuous <Continuous>  dextrose 5%. 1000 milliLiter(s) (100 mL/Hr) IV Continuous <Continuous>  lactated ringers. 1000 milliLiter(s) (75 mL/Hr) IV Continuous <Continuous>  lidocaine   4% Patch 1 Patch Transdermal daily      LABS:	 	  ( 26 Oct 2022 03:54 )  Troponin T  0.11<H>,  CPK  385<H>, CKMB  X    , BNP X        , ( 25 Oct 2022 23:13 )  Troponin T  0.09<H>,  CPK  434<H>, CKMB  X    , BNP X        , ( 25 Oct 2022 19:57 )  Troponin T  0.10<H>,  CPK  X    , CKMB  X    , <H>                              10.4   6.57  )-----------( 256      ( 28 Oct 2022 05:03 )             31.5     10-27    141  |  103  |  32.1<H>  ----------------------------<  108<H>  4.4   |  28.0  |  1.49<H>    Ca    10.0      27 Oct 2022 05:23  Mg     1.8     10-27    TPro  6.4<L>  /  Alb  3.6  /  TBili  0.3<L>  /  DBili  x   /  AST  33<H>  /  ALT  23  /  AlkPhos  65  10-27    PT/INR/PTT ( 26 Oct 2022 08:48 )                       :                       :      X            :       128.5                 .        .                   .              .           .       X           .                                       Lipid Profile: Date: 10-27 @ 05:23  Total cholesterol 155; Direct LDL: --; HDL: 43; Triglycerides:202    HgA1c:   TSH:     TELEMETRY: NSR no events  ECG:    [ ] Echocardiogram:   < from: TTE Echo Complete w/ Contrast w/ Doppler (10.26.22 @ 09:24) >    PHYSICIAN INTERPRETATION:  Left Ventricle: The left ventricular internal cavity size is normal.  Global LV systolic function was normal. Left ventricular ejection   fraction, by visual estimation, is 60 to 65%. Spectral Doppler shows   impaired relaxation pattern of left ventricular myocardial filling (Grade   I diastolic dysfunction).      LV Wall Scoring:  The basal inferior segment is akinetic.    Right Ventricle: The right ventricular size is normal. RV systolic   function is normal.  Left Atrium: Normal left atrial size.  Right Atrium: Normal right atrial size.  Pericardium: There is no evidence of pericardial effusion.  Mitral Valve: The mitral valve is normal in structure. Moderate   thickening and calcification of the anterior and posterior mitral valve   leaflets. There is moderate mitral annular calcification. Trace mitral   valve regurgitation is seen.  Tricuspid Valve: The tricuspid valve is normal in structure. Mild   tricuspid regurgitation is visualized.  Aortic Valve: Moderate to severe aortic stenosis is present. Mild to   moderate aortic valve regurgitation is seen. The Dimesionless Index value   is 0.32.  Pulmonic Valve: Structurally normal pulmonic valve, with normal leaflet   excursion. Trace pulmonic valve regurgitation.  Aorta: The aortic root is normal in size and structure.  Pulmonary Artery: The main pulmonary artery is normal in size.  Venous: The inferior vena cava was normal sized, with respiratory size   variation greater than 50%.      Summary:   1. Left ventricular ejection fraction, by visual estimation, is 60 to   65%.   2. Normal global left ventricular systolic function.   3. Basal inferior segment is abnormal as described above.   4. Spectral Doppler shows impaired relaxation pattern of left   ventricular myocardial filling (Grade I diastolic dysfunction).   5. Normal left atrial size.   6. Normal right atrial size.   7. Moderate mitral annular calcification.   8. Moderate thickening and calcification of the anterior and posterior   mitral valve leaflets.   9. Trace mitral valve regurgitation.  10. Mild tricuspid regurgitation.  11. Mild to moderate aortic regurgitation.  12. Moderate to severe aortic valve stenosis.  13. The Dimesionless Index value is 0.32.    MD Waldemar Electronically signed on 10/26/2022 at 10:16:15 AM    < end of copied text >  [ ]  Catheterization:  < from: Cardiac Catheterization (10.26.22 @ 14:51) >  Chronic total occlusion of mid LADwith patent LIMA supplying the  distal vessel.  No significant disease in RCA. Assume that gastroepiploic graft has  closed off, although not selectively engaged.  Patent graft to high lateral vessel seen via collaterals from distal  LAD.  No culprit lesion to account for NSTEMI.    Elevated left heart filling pressures.    Peak to peak gradient of 10 mmHg, not indicative of severe aortic  stenosis.  Recommendations:     Maximize medical management of NSTEMI and diastolic heart failure.   < end of copied text >  [ ] Stress Test:    OTHER:

## 2022-10-28 NOTE — PROGRESS NOTE ADULT - PROBLEM SELECTOR PLAN 3
.  - Cr downtrending today 1.71->1.49  - continue to trend  - consider nephrology consult if remain elevated
.  - Cr downtrending 1.71->1.49  - continue to trend  - consider nephrology consult if remain elevated.

## 2022-10-28 NOTE — DISCHARGE NOTE PROVIDER - NSDCMRMEDTOKEN_GEN_ALL_CORE_FT
aspirin 81 mg oral tablet, chewable: 1 tab(s) orally once a day  atorvastatin 80 mg oral tablet: 1 tab(s) orally once a day (at bedtime)  clopidogrel 75 mg oral tablet: 1 tab(s) orally once a day  Coreg 3.125 mg oral tablet: 1 tab(s) orally 2 times a day  levothyroxine 100 mcg (0.1 mg) oral tablet: 1 tab(s) orally once a day  lidocaine 4% topical film: Apply topically to affected area once a day, As Needed back pain  omeprazole 20 mg oral delayed release capsule: 1 cap(s) orally once a day

## 2022-10-28 NOTE — DISCHARGE NOTE PROVIDER - NSDCCPCAREPLAN_GEN_ALL_CORE_FT
PRINCIPAL DISCHARGE DIAGNOSIS  Diagnosis: Chest pain  Assessment and Plan of Treatment: - 2/2 nstemi  - chest pain now resolved  - take medications as rx  - follow up with cardiology   - low fat low cholesterol low sodium diet

## 2022-10-31 NOTE — PATIENT PROFILE ADULT - MONEY FOR FOOD
Patient called to request Miralax-gatorade prep instructions instead of Nulytely. She states that she was not able to tolerate the Nulytely and prefers the gatorade miralax prep that she did with Dr. Michelle. Per his notes the prep was good but not excellent. She is aware the Nulytely is Dr. An's preference and prefers to proceed with the gatorade miralax prep. Instructions sent via portal.   
no

## 2022-11-10 ENCOUNTER — NON-APPOINTMENT (OUTPATIENT)
Age: 77
End: 2022-11-10

## 2022-11-10 ENCOUNTER — APPOINTMENT (OUTPATIENT)
Dept: CARDIOLOGY | Facility: CLINIC | Age: 77
End: 2022-11-10

## 2022-11-10 VITALS
TEMPERATURE: 98.1 F | WEIGHT: 132.25 LBS | OXYGEN SATURATION: 96 % | BODY MASS INDEX: 27.76 KG/M2 | HEART RATE: 74 BPM | SYSTOLIC BLOOD PRESSURE: 112 MMHG | HEIGHT: 58 IN | DIASTOLIC BLOOD PRESSURE: 58 MMHG

## 2022-11-10 DIAGNOSIS — E03.9 HYPOTHYROIDISM, UNSPECIFIED: ICD-10-CM

## 2022-11-10 DIAGNOSIS — Z98.890 OTHER SPECIFIED POSTPROCEDURAL STATES: ICD-10-CM

## 2022-11-10 DIAGNOSIS — R07.9 CHEST PAIN, UNSPECIFIED: ICD-10-CM

## 2022-11-10 DIAGNOSIS — I21.4 NON-ST ELEVATION (NSTEMI) MYOCARDIAL INFARCTION: ICD-10-CM

## 2022-11-10 DIAGNOSIS — R09.89 OTHER SPECIFIED SYMPTOMS AND SIGNS INVOLVING THE CIRCULATORY AND RESPIRATORY SYSTEMS: ICD-10-CM

## 2022-11-10 PROCEDURE — 93000 ELECTROCARDIOGRAM COMPLETE: CPT

## 2022-11-10 PROCEDURE — 99215 OFFICE O/P EST HI 40 MIN: CPT

## 2022-11-10 RX ORDER — TRIAMTERENE AND HYDROCHLOROTHIAZIDE 25; 37.5 MG/1; MG/1
37.5-25 TABLET ORAL DAILY
Refills: 0 | Status: ACTIVE | COMMUNITY
Start: 2022-09-19

## 2022-11-10 RX ORDER — ATORVASTATIN CALCIUM 80 MG/1
80 TABLET, FILM COATED ORAL
Qty: 90 | Refills: 3 | Status: ACTIVE | COMMUNITY
Start: 2022-10-09

## 2022-11-10 RX ORDER — OMEPRAZOLE 20 MG/1
20 CAPSULE, DELAYED RELEASE ORAL DAILY
Refills: 0 | Status: ACTIVE | COMMUNITY
Start: 2022-09-19

## 2022-11-10 RX ORDER — ERGOCALCIFEROL 1.25 MG/1
1.25 MG CAPSULE, LIQUID FILLED ORAL
Qty: 90 | Refills: 3 | Status: ACTIVE | COMMUNITY
Start: 2022-09-19

## 2022-11-29 ENCOUNTER — APPOINTMENT (OUTPATIENT)
Dept: CARDIOLOGY | Facility: CLINIC | Age: 77
End: 2022-11-29

## 2022-12-01 ENCOUNTER — APPOINTMENT (OUTPATIENT)
Dept: CARDIOLOGY | Facility: CLINIC | Age: 77
End: 2022-12-01

## 2022-12-01 PROCEDURE — 93880 EXTRACRANIAL BILAT STUDY: CPT

## 2022-12-01 RX ORDER — CLOPIDOGREL BISULFATE 75 MG/1
75 TABLET, FILM COATED ORAL
Qty: 90 | Refills: 2 | Status: ACTIVE | COMMUNITY
Start: 2022-10-28 | End: 1900-01-01

## 2022-12-02 ENCOUNTER — NON-APPOINTMENT (OUTPATIENT)
Age: 77
End: 2022-12-02

## 2022-12-27 ENCOUNTER — APPOINTMENT (OUTPATIENT)
Dept: CARDIOLOGY | Facility: CLINIC | Age: 77
End: 2022-12-27

## 2022-12-27 ENCOUNTER — NON-APPOINTMENT (OUTPATIENT)
Age: 77
End: 2022-12-27

## 2022-12-27 VITALS
SYSTOLIC BLOOD PRESSURE: 140 MMHG | TEMPERATURE: 99 F | HEART RATE: 74 BPM | BODY MASS INDEX: 26.24 KG/M2 | DIASTOLIC BLOOD PRESSURE: 52 MMHG | OXYGEN SATURATION: 100 % | WEIGHT: 125 LBS | HEIGHT: 58 IN

## 2022-12-27 DIAGNOSIS — Z01.810 ENCOUNTER FOR PREPROCEDURAL CARDIOVASCULAR EXAMINATION: ICD-10-CM

## 2022-12-27 PROCEDURE — 93000 ELECTROCARDIOGRAM COMPLETE: CPT | Mod: NC

## 2022-12-27 PROCEDURE — 99214 OFFICE O/P EST MOD 30 MIN: CPT

## 2022-12-27 RX ORDER — CLOTRIMAZOLE AND BETAMETHASONE DIPROPIONATE 10; .5 MG/G; MG/G
1-0.05 CREAM TOPICAL
Qty: 30 | Refills: 0 | Status: DISCONTINUED | COMMUNITY
Start: 2022-09-16 | End: 2022-12-27

## 2023-03-09 ENCOUNTER — APPOINTMENT (OUTPATIENT)
Dept: CARDIOLOGY | Facility: CLINIC | Age: 78
End: 2023-03-09
Payer: MEDICARE

## 2023-03-09 ENCOUNTER — NON-APPOINTMENT (OUTPATIENT)
Age: 78
End: 2023-03-09

## 2023-03-09 VITALS
BODY MASS INDEX: 28.7 KG/M2 | HEIGHT: 55 IN | TEMPERATURE: 97.8 F | OXYGEN SATURATION: 97 % | WEIGHT: 124 LBS | SYSTOLIC BLOOD PRESSURE: 115 MMHG | DIASTOLIC BLOOD PRESSURE: 58 MMHG | HEART RATE: 64 BPM

## 2023-03-09 PROCEDURE — 99214 OFFICE O/P EST MOD 30 MIN: CPT

## 2023-03-09 RX ORDER — EMPAGLIFLOZIN 10 MG/1
10 TABLET, FILM COATED ORAL DAILY
Refills: 0 | Status: ACTIVE | COMMUNITY

## 2023-03-09 NOTE — DISCUSSION/SUMMARY
[FreeTextEntry1] : 1. CAD/NSTEM- hx CABG and PCI, [facility] last 10/26/2022 did not show culprit lesion to account for NSTEMI- medical management, normal LVEF on TTE, pt on DAPT, lipitor, and coreg, denies CP/SOB\par 2. HLD- LDL 72, continue lipitor 80 mg QD\par 3. AS- moderate to severe on TTE, gradient on cath 10 mmHg (not indicative of severe AS)\par 4. mild to moderate AI on TTE\par 5. Mildly elevated BP today - has toothache\par 6. Carotid atherosclerosis - on statin\par 7. Stable from cardiology point of view for upcoming Dental Extraction. Preferably, she needs to stay on both aspirin and Plavix during the procedure; however if necessary, may hold Plavix for 4-5 days prior the procedure and resume asap. No antibiotics needed for sole purpose of SBE prophylaxis\par 8. Follow up in 6-8 months. \par \par \par Tien Croft, NP

## 2023-03-09 NOTE — HISTORY OF PRESENT ILLNESS
[FreeTextEntry1] : 77 year old female with PMH CAD s/p CABG/PCI in 1999 ([facility] Skinner), HTN, HLD, former smoker (quit in 1998) and Hypothyroidism here for hospital follow up. She presented to [facility] ED 10/26/22 with c/o chest pain radiating to back. She was admitted with NSTEMI. She underwent LHC which showed  of mLAD with patent LIMA, no significant RCA dz, patent graft to high lateral vessel seen via collateral from dLAD, no culprit lesion to account for NSTEMI- no PCI. Also showed peak to peak AV gradient of 10 mmHg, no severe AS indicated. TTE showed normal LV function 60-65%, basal inferior akinesis, trace MR, mild to moderate AI, moderate to severe AS DI=0.32. She was also found to be diabetic type 2 - diet control. Hgb A1C 6.6. \par \par Today she state she is feeling good. Denies any further chest pain. No dyspnea, palpitations, edema, near syncope or syncope. She is active, walks daily for 30-60 mins without chest pain or SOB. Reports compliance with her medications. Denies bleeding episodes. \par \par 12/27/2022\par Now returns for preprocedural cardiac risk assessment for upcoming Dental Extraction on 1/2/2023 due to infection. Has swollen right side of the face. Just started on antibiotics. Denies chest pain, shortness of breath, palpitations, syncope or near syncope, orthopnea and PND. Compliant with medications.  No leg edema.\par \par 3/2023- Doing well. No chest pain. No shortness of breath.

## 2023-03-09 NOTE — CARDIOLOGY SUMMARY
[de-identified] : 12/27/2022: Sinus  Rhythm \par - Voltage criteria for LVH  \par - Nonspecific st/t abnormality

## 2023-03-09 NOTE — PHYSICAL EXAM
[Well Developed] : well developed [No Acute Distress] : no acute distress [Normal Conjunctiva] : normal conjunctiva [Carotid Bruit] : carotid bruit [Normal S1, S2] : normal S1, S2 [Murmur] : murmur [Clear Lung Fields] : clear lung fields [Good Air Entry] : good air entry [Soft] : abdomen soft [Normal Gait] : normal gait [No Edema] : no edema [No Rash] : no rash [Moves all extremities] : moves all extremities [No Focal Deficits] : no focal deficits [Normal] : alert and oriented, normal memory [Alert and Oriented] : alert and oriented [de-identified] : R [de-identified] : 3/6

## 2023-10-12 ENCOUNTER — APPOINTMENT (OUTPATIENT)
Dept: CARDIOLOGY | Facility: CLINIC | Age: 78
End: 2023-10-12
Payer: MEDICARE

## 2023-10-12 ENCOUNTER — NON-APPOINTMENT (OUTPATIENT)
Age: 78
End: 2023-10-12

## 2023-10-12 VITALS
OXYGEN SATURATION: 97 % | WEIGHT: 114 LBS | SYSTOLIC BLOOD PRESSURE: 118 MMHG | BODY MASS INDEX: 26.38 KG/M2 | HEART RATE: 67 BPM | TEMPERATURE: 97.7 F | HEIGHT: 55 IN | DIASTOLIC BLOOD PRESSURE: 76 MMHG

## 2023-10-12 PROCEDURE — 93000 ELECTROCARDIOGRAM COMPLETE: CPT

## 2023-10-12 PROCEDURE — 99214 OFFICE O/P EST MOD 30 MIN: CPT

## 2023-10-12 RX ORDER — ALLOPURINOL 100 MG/1
100 TABLET ORAL DAILY
Refills: 0 | Status: ACTIVE | COMMUNITY

## 2023-10-12 RX ORDER — EZETIMIBE 10 MG/1
10 TABLET ORAL DAILY
Refills: 0 | Status: ACTIVE | COMMUNITY

## 2023-10-24 ENCOUNTER — NON-APPOINTMENT (OUTPATIENT)
Age: 78
End: 2023-10-24

## 2023-11-15 ENCOUNTER — APPOINTMENT (OUTPATIENT)
Dept: CARDIOLOGY | Facility: CLINIC | Age: 78
End: 2023-11-15
Payer: MEDICARE

## 2023-11-15 PROCEDURE — 93306 TTE W/DOPPLER COMPLETE: CPT

## 2024-01-12 ENCOUNTER — INPATIENT (INPATIENT)
Facility: HOSPITAL | Age: 79
LOS: 6 days | Discharge: HOME | DRG: 287 | End: 2024-01-19
Attending: HOSPITALIST | Admitting: STUDENT IN AN ORGANIZED HEALTH CARE EDUCATION/TRAINING PROGRAM
Payer: MEDICARE

## 2024-01-12 VITALS
RESPIRATION RATE: 20 BRPM | TEMPERATURE: 98 F | DIASTOLIC BLOOD PRESSURE: 65 MMHG | WEIGHT: 119.71 LBS | SYSTOLIC BLOOD PRESSURE: 185 MMHG | HEIGHT: 58 IN | OXYGEN SATURATION: 98 % | HEART RATE: 55 BPM

## 2024-01-12 DIAGNOSIS — I20.89 OTHER FORMS OF ANGINA PECTORIS: ICD-10-CM

## 2024-01-12 DIAGNOSIS — I25.10 ATHEROSCLEROTIC HEART DISEASE OF NATIVE CORONARY ARTERY WITHOUT ANGINA PECTORIS: ICD-10-CM

## 2024-01-12 DIAGNOSIS — Z98.891 HISTORY OF UTERINE SCAR FROM PREVIOUS SURGERY: Chronic | ICD-10-CM

## 2024-01-12 DIAGNOSIS — I10 ESSENTIAL (PRIMARY) HYPERTENSION: ICD-10-CM

## 2024-01-12 DIAGNOSIS — E78.5 HYPERLIPIDEMIA, UNSPECIFIED: ICD-10-CM

## 2024-01-12 DIAGNOSIS — I24.9 ACUTE ISCHEMIC HEART DISEASE, UNSPECIFIED: ICD-10-CM

## 2024-01-12 DIAGNOSIS — Z95.1 PRESENCE OF AORTOCORONARY BYPASS GRAFT: Chronic | ICD-10-CM

## 2024-01-12 LAB
ALBUMIN SERPL ELPH-MCNC: 3.5 G/DL — SIGNIFICANT CHANGE UP (ref 3.3–5.2)
ALBUMIN SERPL ELPH-MCNC: 3.5 G/DL — SIGNIFICANT CHANGE UP (ref 3.3–5.2)
ALP SERPL-CCNC: 87 U/L — SIGNIFICANT CHANGE UP (ref 40–120)
ALP SERPL-CCNC: 87 U/L — SIGNIFICANT CHANGE UP (ref 40–120)
ALT FLD-CCNC: 20 U/L — SIGNIFICANT CHANGE UP
ALT FLD-CCNC: 20 U/L — SIGNIFICANT CHANGE UP
ANION GAP SERPL CALC-SCNC: 13 MMOL/L — SIGNIFICANT CHANGE UP (ref 5–17)
ANION GAP SERPL CALC-SCNC: 13 MMOL/L — SIGNIFICANT CHANGE UP (ref 5–17)
AST SERPL-CCNC: 27 U/L — SIGNIFICANT CHANGE UP
AST SERPL-CCNC: 27 U/L — SIGNIFICANT CHANGE UP
BASOPHILS # BLD AUTO: 0.04 K/UL — SIGNIFICANT CHANGE UP (ref 0–0.2)
BASOPHILS # BLD AUTO: 0.04 K/UL — SIGNIFICANT CHANGE UP (ref 0–0.2)
BASOPHILS NFR BLD AUTO: 0.6 % — SIGNIFICANT CHANGE UP (ref 0–2)
BASOPHILS NFR BLD AUTO: 0.6 % — SIGNIFICANT CHANGE UP (ref 0–2)
BILIRUB SERPL-MCNC: <0.2 MG/DL — LOW (ref 0.4–2)
BILIRUB SERPL-MCNC: <0.2 MG/DL — LOW (ref 0.4–2)
BUN SERPL-MCNC: 45.7 MG/DL — HIGH (ref 8–20)
BUN SERPL-MCNC: 45.7 MG/DL — HIGH (ref 8–20)
CALCIUM SERPL-MCNC: 9.3 MG/DL — SIGNIFICANT CHANGE UP (ref 8.4–10.5)
CALCIUM SERPL-MCNC: 9.3 MG/DL — SIGNIFICANT CHANGE UP (ref 8.4–10.5)
CHLORIDE SERPL-SCNC: 104 MMOL/L — SIGNIFICANT CHANGE UP (ref 96–108)
CHLORIDE SERPL-SCNC: 104 MMOL/L — SIGNIFICANT CHANGE UP (ref 96–108)
CO2 SERPL-SCNC: 23 MMOL/L — SIGNIFICANT CHANGE UP (ref 22–29)
CO2 SERPL-SCNC: 23 MMOL/L — SIGNIFICANT CHANGE UP (ref 22–29)
CREAT SERPL-MCNC: 1.84 MG/DL — HIGH (ref 0.5–1.3)
CREAT SERPL-MCNC: 1.84 MG/DL — HIGH (ref 0.5–1.3)
EGFR: 28 ML/MIN/1.73M2 — LOW
EGFR: 28 ML/MIN/1.73M2 — LOW
EOSINOPHIL # BLD AUTO: 0.72 K/UL — HIGH (ref 0–0.5)
EOSINOPHIL # BLD AUTO: 0.72 K/UL — HIGH (ref 0–0.5)
EOSINOPHIL NFR BLD AUTO: 10 % — HIGH (ref 0–6)
EOSINOPHIL NFR BLD AUTO: 10 % — HIGH (ref 0–6)
GLUCOSE SERPL-MCNC: 131 MG/DL — HIGH (ref 70–99)
GLUCOSE SERPL-MCNC: 131 MG/DL — HIGH (ref 70–99)
HCT VFR BLD CALC: 29.4 % — LOW (ref 34.5–45)
HCT VFR BLD CALC: 29.4 % — LOW (ref 34.5–45)
HGB BLD-MCNC: 9.6 G/DL — LOW (ref 11.5–15.5)
HGB BLD-MCNC: 9.6 G/DL — LOW (ref 11.5–15.5)
IMM GRANULOCYTES NFR BLD AUTO: 0.1 % — SIGNIFICANT CHANGE UP (ref 0–0.9)
IMM GRANULOCYTES NFR BLD AUTO: 0.1 % — SIGNIFICANT CHANGE UP (ref 0–0.9)
LYMPHOCYTES # BLD AUTO: 1.42 K/UL — SIGNIFICANT CHANGE UP (ref 1–3.3)
LYMPHOCYTES # BLD AUTO: 1.42 K/UL — SIGNIFICANT CHANGE UP (ref 1–3.3)
LYMPHOCYTES # BLD AUTO: 19.7 % — SIGNIFICANT CHANGE UP (ref 13–44)
LYMPHOCYTES # BLD AUTO: 19.7 % — SIGNIFICANT CHANGE UP (ref 13–44)
MAGNESIUM SERPL-MCNC: 1.9 MG/DL — SIGNIFICANT CHANGE UP (ref 1.6–2.6)
MAGNESIUM SERPL-MCNC: 1.9 MG/DL — SIGNIFICANT CHANGE UP (ref 1.6–2.6)
MCHC RBC-ENTMCNC: 30.5 PG — SIGNIFICANT CHANGE UP (ref 27–34)
MCHC RBC-ENTMCNC: 30.5 PG — SIGNIFICANT CHANGE UP (ref 27–34)
MCHC RBC-ENTMCNC: 32.7 GM/DL — SIGNIFICANT CHANGE UP (ref 32–36)
MCHC RBC-ENTMCNC: 32.7 GM/DL — SIGNIFICANT CHANGE UP (ref 32–36)
MCV RBC AUTO: 93.3 FL — SIGNIFICANT CHANGE UP (ref 80–100)
MCV RBC AUTO: 93.3 FL — SIGNIFICANT CHANGE UP (ref 80–100)
MONOCYTES # BLD AUTO: 0.55 K/UL — SIGNIFICANT CHANGE UP (ref 0–0.9)
MONOCYTES # BLD AUTO: 0.55 K/UL — SIGNIFICANT CHANGE UP (ref 0–0.9)
MONOCYTES NFR BLD AUTO: 7.6 % — SIGNIFICANT CHANGE UP (ref 2–14)
MONOCYTES NFR BLD AUTO: 7.6 % — SIGNIFICANT CHANGE UP (ref 2–14)
NEUTROPHILS # BLD AUTO: 4.46 K/UL — SIGNIFICANT CHANGE UP (ref 1.8–7.4)
NEUTROPHILS # BLD AUTO: 4.46 K/UL — SIGNIFICANT CHANGE UP (ref 1.8–7.4)
NEUTROPHILS NFR BLD AUTO: 62 % — SIGNIFICANT CHANGE UP (ref 43–77)
NEUTROPHILS NFR BLD AUTO: 62 % — SIGNIFICANT CHANGE UP (ref 43–77)
NT-PROBNP SERPL-SCNC: 776 PG/ML — HIGH (ref 0–300)
NT-PROBNP SERPL-SCNC: 776 PG/ML — HIGH (ref 0–300)
PLATELET # BLD AUTO: 236 K/UL — SIGNIFICANT CHANGE UP (ref 150–400)
PLATELET # BLD AUTO: 236 K/UL — SIGNIFICANT CHANGE UP (ref 150–400)
POTASSIUM SERPL-MCNC: 4.5 MMOL/L — SIGNIFICANT CHANGE UP (ref 3.5–5.3)
POTASSIUM SERPL-MCNC: 4.5 MMOL/L — SIGNIFICANT CHANGE UP (ref 3.5–5.3)
POTASSIUM SERPL-SCNC: 4.5 MMOL/L — SIGNIFICANT CHANGE UP (ref 3.5–5.3)
POTASSIUM SERPL-SCNC: 4.5 MMOL/L — SIGNIFICANT CHANGE UP (ref 3.5–5.3)
PROT SERPL-MCNC: 5.9 G/DL — LOW (ref 6.6–8.7)
PROT SERPL-MCNC: 5.9 G/DL — LOW (ref 6.6–8.7)
RBC # BLD: 3.15 M/UL — LOW (ref 3.8–5.2)
RBC # BLD: 3.15 M/UL — LOW (ref 3.8–5.2)
RBC # FLD: 14.1 % — SIGNIFICANT CHANGE UP (ref 10.3–14.5)
RBC # FLD: 14.1 % — SIGNIFICANT CHANGE UP (ref 10.3–14.5)
SODIUM SERPL-SCNC: 140 MMOL/L — SIGNIFICANT CHANGE UP (ref 135–145)
SODIUM SERPL-SCNC: 140 MMOL/L — SIGNIFICANT CHANGE UP (ref 135–145)
TROPONIN T, HIGH SENSITIVITY RESULT: 52 NG/L — HIGH (ref 0–51)
TROPONIN T, HIGH SENSITIVITY RESULT: 52 NG/L — HIGH (ref 0–51)
TROPONIN T, HIGH SENSITIVITY RESULT: 55 NG/L — HIGH (ref 0–51)
TROPONIN T, HIGH SENSITIVITY RESULT: 55 NG/L — HIGH (ref 0–51)
WBC # BLD: 7.2 K/UL — SIGNIFICANT CHANGE UP (ref 3.8–10.5)
WBC # BLD: 7.2 K/UL — SIGNIFICANT CHANGE UP (ref 3.8–10.5)
WBC # FLD AUTO: 7.2 K/UL — SIGNIFICANT CHANGE UP (ref 3.8–10.5)
WBC # FLD AUTO: 7.2 K/UL — SIGNIFICANT CHANGE UP (ref 3.8–10.5)

## 2024-01-12 PROCEDURE — 99285 EMERGENCY DEPT VISIT HI MDM: CPT

## 2024-01-12 PROCEDURE — 99497 ADVNCD CARE PLAN 30 MIN: CPT | Mod: 25

## 2024-01-12 PROCEDURE — 99223 1ST HOSP IP/OBS HIGH 75: CPT

## 2024-01-12 PROCEDURE — 71045 X-RAY EXAM CHEST 1 VIEW: CPT | Mod: 26

## 2024-01-12 RX ORDER — CARVEDILOL PHOSPHATE 80 MG/1
6.25 CAPSULE, EXTENDED RELEASE ORAL EVERY 12 HOURS
Refills: 0 | Status: DISCONTINUED | OUTPATIENT
Start: 2024-01-13 | End: 2024-01-19

## 2024-01-12 RX ORDER — HYDRALAZINE HCL 50 MG
10 TABLET ORAL ONCE
Refills: 0 | Status: COMPLETED | OUTPATIENT
Start: 2024-01-12 | End: 2024-01-12

## 2024-01-12 RX ORDER — CARVEDILOL PHOSPHATE 80 MG/1
3.12 CAPSULE, EXTENDED RELEASE ORAL ONCE
Refills: 0 | Status: COMPLETED | OUTPATIENT
Start: 2024-01-12 | End: 2024-01-12

## 2024-01-12 RX ORDER — ASPIRIN/CALCIUM CARB/MAGNESIUM 324 MG
81 TABLET ORAL DAILY
Refills: 0 | Status: DISCONTINUED | OUTPATIENT
Start: 2024-01-13 | End: 2024-01-19

## 2024-01-12 RX ORDER — TRIAMTERENE/HYDROCHLOROTHIAZID 75 MG-50MG
1 TABLET ORAL DAILY
Refills: 0 | Status: DISCONTINUED | OUTPATIENT
Start: 2024-01-13 | End: 2024-01-19

## 2024-01-12 RX ORDER — ACETAMINOPHEN 500 MG
650 TABLET ORAL EVERY 6 HOURS
Refills: 0 | Status: DISCONTINUED | OUTPATIENT
Start: 2024-01-12 | End: 2024-01-19

## 2024-01-12 RX ORDER — HEPARIN SODIUM 5000 [USP'U]/ML
5000 INJECTION INTRAVENOUS; SUBCUTANEOUS EVERY 12 HOURS
Refills: 0 | Status: DISCONTINUED | OUTPATIENT
Start: 2024-01-12 | End: 2024-01-19

## 2024-01-12 RX ORDER — ONDANSETRON 8 MG/1
4 TABLET, FILM COATED ORAL EVERY 6 HOURS
Refills: 0 | Status: DISCONTINUED | OUTPATIENT
Start: 2024-01-12 | End: 2024-01-19

## 2024-01-12 RX ORDER — LEVOTHYROXINE SODIUM 125 MCG
88 TABLET ORAL DAILY
Refills: 0 | Status: DISCONTINUED | OUTPATIENT
Start: 2024-01-13 | End: 2024-01-19

## 2024-01-12 RX ORDER — ATORVASTATIN CALCIUM 80 MG/1
80 TABLET, FILM COATED ORAL AT BEDTIME
Refills: 0 | Status: DISCONTINUED | OUTPATIENT
Start: 2024-01-12 | End: 2024-01-19

## 2024-01-12 RX ORDER — LANOLIN ALCOHOL/MO/W.PET/CERES
3 CREAM (GRAM) TOPICAL AT BEDTIME
Refills: 0 | Status: DISCONTINUED | OUTPATIENT
Start: 2024-01-12 | End: 2024-01-19

## 2024-01-12 RX ORDER — PANTOPRAZOLE SODIUM 20 MG/1
40 TABLET, DELAYED RELEASE ORAL
Refills: 0 | Status: DISCONTINUED | OUTPATIENT
Start: 2024-01-13 | End: 2024-01-19

## 2024-01-12 RX ADMIN — Medication 10 MILLIGRAM(S): at 20:35

## 2024-01-12 RX ADMIN — CARVEDILOL PHOSPHATE 3.12 MILLIGRAM(S): 80 CAPSULE, EXTENDED RELEASE ORAL at 21:30

## 2024-01-12 RX ADMIN — ATORVASTATIN CALCIUM 80 MILLIGRAM(S): 80 TABLET, FILM COATED ORAL at 21:30

## 2024-01-12 NOTE — ED ADULT TRIAGE NOTE - CHIEF COMPLAINT QUOTE
Pt A&Ox4, NAD. Pt sent in from  for abnormal EKG. Pt with complaints of chest pain radiating to left arm.

## 2024-01-12 NOTE — H&P ADULT - NSHPPHYSICALEXAM_GEN_ALL_CORE
Vital Signs Last 24 Hrs  T(C): 36.6 (13 Jan 2024 00:22), Max: 36.7 (12 Jan 2024 19:22)  T(F): 97.9 (13 Jan 2024 00:22), Max: 98 (12 Jan 2024 19:22)  HR: 61 (13 Jan 2024 00:22) (55 - 66)  BP: 127/71 (13 Jan 2024 00:22) (127/71 - 207/84)  BP(mean): --  RR: 18 (13 Jan 2024 00:22) (18 - 20)  SpO2: 97% (13 Jan 2024 00:22) (97% - 98%)    Parameters below as of 13 Jan 2024 00:22  Patient On (Oxygen Delivery Method): room air    GENERAL:  Well-appearing elderly female, not in acute distress  EYES:  Clear conjunctiva, extraocular movement intact  ENT: Moist mucous membranes  RESP:  Non-labored breathing pattern, lungs clear to ausculation  CV: Regular rate and rhythm, no murmurs appreciated, no lower extremity edema  GI: Soft, non-tender, non-distended  NEURO: Awake, alert, conversant, upper and lower extremity strength 5/5, light touch sensation grossly intact  PSYCH: Calm, cooperative  SKIN: No rash or lesions, warm and dry

## 2024-01-12 NOTE — H&P ADULT - HISTORY OF PRESENT ILLNESS
78yoF hx AS, CAD s/p PCI, CABG (1999), HTN, DM, CKD, NSTEMI in 10/2022 s/p [facility] showing chronic total occlusion of mLAD with patent FILIPPO graft and no culprit lesion, presenting with few days of intermittent chest pain.  Pt reports sharp, mid-sternal chest pain radiating to her left shoulder and left arm.  Episodes not associated with exertion and can last from a few seconds to up to 5 minutes before it self-resolves.  Pt took ASA during episodes w/out any relief. Denies any associated symptoms of nausea, vomiting, diaphoresis.  Admission labs notable for elevated troponin. 78yoF hx AS, CAD s/p PCI, CABG (1999), HTN, DM, CKD, NSTEMI in 10/2022 s/p [facility] showing chronic total occlusion of mLAD with patent FILIPPO graft and no culprit lesion, presenting with few days of intermittent chest pain.  Pt reports sharp, mid-sternal chest pain radiating to her left shoulder and left arm.  Episodes not associated with exertion and can last from a few seconds to up to 5 minutes before it self-resolves.  Pt took ASA during episodes w/out any relief. Denies any associated symptoms of nausea, vomiting, diaphoresis.  Admission labs notable for elevated troponin.

## 2024-01-12 NOTE — ED PROVIDER NOTE - PROGRESS NOTE DETAILS
Kyaw: received sign out; patient well appearing, no chest discomfort; elevated BP, addressing with hydralazine and coreg; trop noted; cards consult appreciated; will admit, d/w hospitalist for ACS; patient pending cath vs nuclear stress

## 2024-01-12 NOTE — ED ADULT NURSE REASSESSMENT NOTE - NS ED NURSE REASSESS COMMENT FT1
assumed care of this patient at 1920 hours. discussed all medical information with the off going nurse. This patient  is resting  in bed no apparent distress noted at this time. states she has discomfort in her left arm Air breathing circulation WNL. aox4 Pt remains on bedside monitor.  This nurse will continue to monitor.

## 2024-01-12 NOTE — H&P ADULT - ASSESSMENT
78yoF hx AS, CAD s/p PCI, CABG (1999), HTN, DM, CKD, NSTEMI in 10/2022 s/p [facility] showing chronic total occlusion of mLAD with patent FILIPPO graft and no culprit lesion, presenting with few days of intermittent, radiating midsternal chest pain    Chest pain in setting of CAD  -Concern for anginal chest pain  -Elevated troponin x 2, no significant delta change on repeat, may be related to CKD  -Seen by cardiology, tentative plan for NST next week pending clinical status  -Spoke with katiana COLLADO, repeat troponin in AM, if >20% increase, start heparin gtt  -Coags ordered for AM  -Resume ASA, Plavix, B-blocker, statin, zetia  -Obtain TTE  -Telemetry     Hx HTN/HLD  -Carvedilol 6.25mg BID  -Triamterene-HCTZ 37.5-25mg daily    Hx DM  -Hold PO agents, start ISS    Hx CKD  -Admission Cr around baseline of 1.6-1.8. monitor    Hx chronic anemia  Due to CKD, Hgb around baseline of 9-10, monitor    Hx gout  -Allopurinol 100mg daily    Hx hypothyroidism  -Levothyroxine    Prophylactic measure  -Heparin 5000 units BID 78yoF hx AS, CAD s/p PCI, CABG (1999), HTN, DM, CKD, NSTEMI in 10/2022 s/p [facility] showing chronic total occlusion of mLAD with patent FILIPPO graft and no culprit lesion, presenting with few days of intermittent, radiating midsternal chest pain    Chest pain in setting of CAD  -Concern for anginal chest pain  -Elevated troponin x 2, no significant delta change on repeat, may be related to CKD  -Seen by cardiology, tentative plan for NST next week pending clinical status  -Spoke with katiana COLLADO, repeat troponin in AM, if >20% increase, start heparin gtt  -Coags ordered for AM  -Resume ASA, Plavix, B-blocker, statin, zetia  -Obtain TTE  -Telemetry     Hx HTN/HLD  -Carvedilol 6.25mg BID  -Triamterene-HCTZ 37.5-25mg daily    Hx DM  -Hold PO agents, start ISS    Hx CKD  -Admission Cr around baseline of 1.6-1.8. monitor    Hx chronic anemia  Due to CKD, Hgb around baseline of 9-10, monitor    Hx gout  -Allopurinol 100mg daily    Hx hypothyroidism  -Levothyroxine    Prophylactic measure  -Heparin 5000 units BID

## 2024-01-12 NOTE — ED PROVIDER NOTE - PHYSICAL EXAMINATION
Gen: NAD, AOx3  Head: NCAT  HEENT: EOMI, oral mucosa moist, normal conjunctiva, neck supple  Lung: CTAB, no respiratory distress  CV: rrr, no murmur, Normal perfusion  Abd: soft, NTND  MSK: No edema, no visible deformities, +ttp left medial scapula   Neuro: No focal neurologic deficits  Skin: No rash   Psych: normal affect Gen: NAD, AOx3  Head: NCAT  HEENT: EOMI, oral mucosa moist, normal conjunctiva, neck supple  Lung: CTAB, no respiratory distress  CV: rrr, +murmur, Normal perfusion  Abd: soft, NTND  MSK: No edema, no visible deformities, +ttp left medial scapula   Neuro: No focal neurologic deficits  Skin: No rash   Psych: normal affect

## 2024-01-12 NOTE — H&P ADULT - NSICDXFAMILYHX_GEN_ALL_CORE_FT
FAMILY HISTORY:  Father  Still living? Unknown  FHx: prostate cancer, Age at diagnosis: Age Unknown    Mother  Still living? Unknown  FH: stroke, Age at diagnosis: Age Unknown    Sibling  Still living? Unknown  FHx: coronary artery disease, Age at diagnosis: Age Unknown

## 2024-01-12 NOTE — H&P ADULT - NSHPLABSRESULTS_GEN_ALL_CORE
01-12    140  |  104  |  45.7<H>  ----------------------------<  131<H>  4.5   |  23.0  |  1.84<H>    Ca    9.3      12 Jan 2024 17:34  Mg     1.9     01-12    TPro  5.9<L>  /  Alb  3.5  /  TBili  <0.2<L>  /  DBili  x   /  AST  27  /  ALT  20  /  AlkPhos  87  01-12                            9.6    7.20  )-----------( 236      ( 12 Jan 2024 17:34 )             29.4    EKG:  NSR, HR 60, TWI in aVR, Q waves aVR, V1-V2    CXR: personally reviewed, clear lungs, no infiltrate, pleural effusions seen

## 2024-01-12 NOTE — ED PROVIDER NOTE - DATE/TIME 1
[facility] who [facility] on [facility]. Item number [id number]. Security at bedside writing report.    12-Jan-2024 20:39

## 2024-01-12 NOTE — ED PROVIDER NOTE - OBJECTIVE STATEMENT
78-year-old female with extensive history of CAD including multiple stents as well as a bypass.  For the last week has had pain radiating down her left arm starts in the left back or goes into the arm comes and goes happens at rest not exertional.  No nausea no vomiting no diaphoresis no sweating.  No recent trauma.  State  1999 when she had her first heart attack she had a similar pain.  Pain is reproducible with some movements and palpation.  No leg swelling no fevers no chills

## 2024-01-12 NOTE — ED ADULT NURSE NOTE - OBJECTIVE STATEMENT
Pt care acquired at 1700. Pt is A&OX4 and is resting in bed. Pt arrives c/o chest pain. Pt states the sharp pain starts in back, radiates through chest down left arm. Pt states she had two bypass surgeries. Pt denies sob. pt is nsr on the cardiac monitor. Pt breathing unlabored on room air. Pt denies abdominal pain. VSS.

## 2024-01-12 NOTE — CONSULT NOTE ADULT - SUBJECTIVE AND OBJECTIVE BOX
[facility]                                              CARDIOLOGY AT [place] [address]                                             Telephone: [phone]. Fax:[phone]                                                       CARDIOLOGY CONSULTATION NOTE                                                                                             History obtained by: Patient and medical record  Community Cardiologist: [facility]   obtained: Yes [  ] No [  ]  Reason for Consultation: chest pain  Available out pt records reviewed: Yes [x] No [  ]    Chief complaint:  I had chest pain and feel weak      HPI: Patient is a 77yo F w/ PMHx of CAD s/p stents and CABG (Praveena Skinner, ), HTN, HLD, hypothyroid     CARDIAC TESTING   ECHO:    STRESS:    CATH:     ELECTROPHYSIOLOGY:     PAST MEDICAL HISTORY  Hypertension    Diabetes mellitus    Hypothyroidism    HLD (hyperlipidemia)        PAST SURGICAL HISTORY  S/P CABG (coronary artery bypass graft)    H/O  section        SOCIAL HISTORY:  Denies smoking/alcohol/drugs  CIGARETTES:     ALCOHOL:  DRUGS:    FAMILY HISTORY:  No pertinent family history in first degree relatives      Family History of Cardiovascular Disease:  Yes [  ] No [  ]  Coronary Artery Disease in first degree relative: Yes [  ] No [  ]  Sudden Cardiac Death in First degree relative: Yes [  ] No [  ]    HOME MEDICATIONS:  aspirin 81 mg oral tablet, chewable: 1 tab(s) orally once a day (26 Oct 2022 04:13)  atorvastatin 80 mg oral tablet: 1 tab(s) orally once a day (at bedtime) (26 Oct 2022 04:13)  Coreg 3.125 mg oral tablet: 1 tab(s) orally 2 times a day (26 Oct 2022 04:13)  levothyroxine 100 mcg (0.1 mg) oral tablet: 1 tab(s) orally once a day (26 Oct 2022 04:13)  omeprazole 20 mg oral delayed release capsule: 1 cap(s) orally once a day (26 Oct 2022 04:13)      CURRENT CARDIAC MEDICATIONS:      CURRENT OTHER MEDICATIONS:      ALLERGIES:   No Known Allergies      REVIEW OF SYMPTOMS:   CONSTITUTIONAL: No fever, no chills, no weight loss, no weight gain, no fatigue   ENMT:  No vertigo; No sinus or throat pain  NECK: No pain or stiffness  CARDIOVASCULAR: No chest pain, no dyspnea, no syncope/presyncope, no palpitations, no dizziness, no Orthopnea, no Paroxsymal nocturnal dyspnea  RESPIRATORY: no Shortness of breath, no cough, no wheezing  : No dysuria, no hematuria   GI: No dark color stool, no nausea, no diarrhea, no constipation, no abdominal pain   NEURO: No headache, no slurred speech   MUSCULOSKELETAL: No joint pain or swelling; No muscle, back, or extremity pain  PSYCH: No agitation, no anxiety.    ALL OTHER REVIEW OF SYSTEMS ARE NEGATIVE.    VITAL SIGNS:  T(C): 36.7 (24 @ 19:22), Max: 36.7 (24 @ 19:22)  T(F): 98 (24 @ 19:22), Max: 98 (24 @ 19:22)  HR: 65 (24 @ 19:22) (55 - 65)  BP: 191/64 (24 @ 19:22) (185/65 - 191/64)  RR: 19 (24 @ 19:22) (19 - 20)  SpO2: 98% (24 @ 19:22) (98% - 98%)    INTAKE AND OUTPUT:       PHYSICAL EXAM:  Constitutional: Comfortable . No acute distress.   HEENT: Atraumatic and normocephalic , neck is supple . no JVD. No carotid bruit.  CNS: A&Ox3. No focal deficits.   Respiratory: CTAB, unlabored   Cardiovascular: RRR normal s1 s2. No murmur. No rubs or gallop.  Gastrointestinal: Soft, non-tender. +Bowel sounds.   Extremities: 2+ Peripheral Pulses, No clubbing, cyanosis, or edema  Psychiatric: Calm . no agitation.   Skin: Warm and dry, no ulcers on extremities     LABS:                            9.6    7.20  )-----------( 236      ( 2024 17:34 )             29.4         140  |  104  |  45.7<H>  ----------------------------<  131<H>  4.5   |  23.0  |  1.84<H>    Ca    9.3      2024 17:34  Mg     1.9         TPro  5.9<L>  /  Alb  3.5  /  TBili  <0.2<L>  /  DBili  x   /  AST  27  /  ALT  20  /  AlkPhos  87        Urinalysis Basic - ( 2024 17:34 )    Color: x / Appearance: x / SG: x / pH: x  Gluc: 131 mg/dL / Ketone: x  / Bili: x / Urobili: x   Blood: x / Protein: x / Nitrite: x   Leuk Esterase: x / RBC: x / WBC x   Sq Epi: x / Non Sq Epi: x / Bacteria: x              INTERPRETATION OF TELEMETRY:     ECG:   Prior ECG: Yes [  ] No [  ]    RADIOLOGY & ADDITIONAL STUDIES:    X-ray:    CT scan:   MRI:   US:                                                [facility]                                              CARDIOLOGY AT [place] [address]                                             Telephone: [phone]. Fax:[phone]                                                       CARDIOLOGY CONSULTATION NOTE                                                                                             History obtained by: Patient and medical record  Community Cardiologist: [facility]   obtained: Yes [  ] No [  ]  Reason for Consultation: chest pain  Available out pt records reviewed: Yes [x] No [  ]    Chief complaint:  I had chest pain and feel weak      HPI: Patient is a 79yo F w/ PMHx of CAD s/p stents and CABG (Praveena Skinner, ), HTN, HLD, hypothyroid     CARDIAC TESTING   ECHO:    STRESS:    CATH:     ELECTROPHYSIOLOGY:     PAST MEDICAL HISTORY  Hypertension    Diabetes mellitus    Hypothyroidism    HLD (hyperlipidemia)        PAST SURGICAL HISTORY  S/P CABG (coronary artery bypass graft)    H/O  section        SOCIAL HISTORY:  Denies smoking/alcohol/drugs  CIGARETTES:     ALCOHOL:  DRUGS:    FAMILY HISTORY:  No pertinent family history in first degree relatives      Family History of Cardiovascular Disease:  Yes [  ] No [  ]  Coronary Artery Disease in first degree relative: Yes [  ] No [  ]  Sudden Cardiac Death in First degree relative: Yes [  ] No [  ]    HOME MEDICATIONS:  aspirin 81 mg oral tablet, chewable: 1 tab(s) orally once a day (26 Oct 2022 04:13)  atorvastatin 80 mg oral tablet: 1 tab(s) orally once a day (at bedtime) (26 Oct 2022 04:13)  Coreg 3.125 mg oral tablet: 1 tab(s) orally 2 times a day (26 Oct 2022 04:13)  levothyroxine 100 mcg (0.1 mg) oral tablet: 1 tab(s) orally once a day (26 Oct 2022 04:13)  omeprazole 20 mg oral delayed release capsule: 1 cap(s) orally once a day (26 Oct 2022 04:13)      CURRENT CARDIAC MEDICATIONS:      CURRENT OTHER MEDICATIONS:      ALLERGIES:   No Known Allergies      REVIEW OF SYMPTOMS:   CONSTITUTIONAL: No fever, no chills, no weight loss, no weight gain, no fatigue   ENMT:  No vertigo; No sinus or throat pain  NECK: No pain or stiffness  CARDIOVASCULAR: No chest pain, no dyspnea, no syncope/presyncope, no palpitations, no dizziness, no Orthopnea, no Paroxsymal nocturnal dyspnea  RESPIRATORY: no Shortness of breath, no cough, no wheezing  : No dysuria, no hematuria   GI: No dark color stool, no nausea, no diarrhea, no constipation, no abdominal pain   NEURO: No headache, no slurred speech   MUSCULOSKELETAL: No joint pain or swelling; No muscle, back, or extremity pain  PSYCH: No agitation, no anxiety.    ALL OTHER REVIEW OF SYSTEMS ARE NEGATIVE.    VITAL SIGNS:  T(C): 36.7 (24 @ 19:22), Max: 36.7 (24 @ 19:22)  T(F): 98 (24 @ 19:22), Max: 98 (24 @ 19:22)  HR: 65 (24 @ 19:22) (55 - 65)  BP: 191/64 (24 @ 19:22) (185/65 - 191/64)  RR: 19 (24 @ 19:22) (19 - 20)  SpO2: 98% (24 @ 19:22) (98% - 98%)    INTAKE AND OUTPUT:       PHYSICAL EXAM:  Constitutional: Comfortable . No acute distress.   HEENT: Atraumatic and normocephalic , neck is supple . no JVD. No carotid bruit.  CNS: A&Ox3. No focal deficits.   Respiratory: CTAB, unlabored   Cardiovascular: RRR normal s1 s2. No murmur. No rubs or gallop.  Gastrointestinal: Soft, non-tender. +Bowel sounds.   Extremities: 2+ Peripheral Pulses, No clubbing, cyanosis, or edema  Psychiatric: Calm . no agitation.   Skin: Warm and dry, no ulcers on extremities     LABS:                            9.6    7.20  )-----------( 236      ( 2024 17:34 )             29.4         140  |  104  |  45.7<H>  ----------------------------<  131<H>  4.5   |  23.0  |  1.84<H>    Ca    9.3      2024 17:34  Mg     1.9         TPro  5.9<L>  /  Alb  3.5  /  TBili  <0.2<L>  /  DBili  x   /  AST  27  /  ALT  20  /  AlkPhos  87        Urinalysis Basic - ( 2024 17:34 )    Color: x / Appearance: x / SG: x / pH: x  Gluc: 131 mg/dL / Ketone: x  / Bili: x / Urobili: x   Blood: x / Protein: x / Nitrite: x   Leuk Esterase: x / RBC: x / WBC x   Sq Epi: x / Non Sq Epi: x / Bacteria: x              INTERPRETATION OF TELEMETRY:     ECG:   Prior ECG: Yes [  ] No [  ]    RADIOLOGY & ADDITIONAL STUDIES:    X-ray:    CT scan:   MRI:   US:                                                [facility]                                              CARDIOLOGY AT [address], [place] [address]                                             Telephone: [phone]. Fax:[phone]                                                       CARDIOLOGY CONSULTATION NOTE                                                                                             History obtained by: Patient and medical record  Community Cardiologist: [facility]   obtained: Yes [  ] No [  ]  Reason for Consultation: chest pain  Available out pt records reviewed: Yes [x] No [  ]    Chief complaint:  I had chest pain and feel weak      HPI: Patient is a 77yo F w/ PMHx of CAD s/p stents and CABG (Praveena Skinner, ), HTN, HLD, hypothyroid  CAD including multiple stents as well as a bypass.  For the last week has had pain radiating down her left arm starts in the left back or goes into the arm comes and goes happens at rest not exertional.  No nausea no vomiting no diaphoresis no sweating.  No recent trauma.  State   when she had her first heart attack she had a similar pain.  Pain is reproducible with some movements and palpation.  No leg swelling no fevers no chills    CARDIAC TESTING   ECHO: Summary:   1. Left ventricular ejection fraction, by visual estimation, is 60 to 65%.   2. Normal global left ventricular systolic function.   3. Basal inferior segment is abnormal as described above.   4. Spectral Doppler shows impaired relaxation pattern of left ventricular myocardial filling (Grade I diastolic dysfunction).   5. Normal left atrial size.   6. Normal right atrial size.   7. Moderate mitral annular calcification.   8. Moderate thickening and calcification of the anterior and posterior mitral valve leaflets.   9. Trace mitral valve regurgitation.  10. Mild tricuspid regurgitation.  11. Mild to moderate aortic regurgitation.  12. Moderate to severe aortic valve stenosis.  13. The Dimesionless Index value is 0.32.    MD Waldemar Electronically signed on 10/26/2022     STRESS: Pen    CATH: Cath Lab Report    Diagnostic Cardiologist:       Pito Davis MD   Procedures Performed   Procedures:    1.    Ultrasound Guided Access   2.    Arterial Access - Right Femoral   3.    Diagnostic Coronary Angiography   4.    Diagnostic Graft Angiography   5.    Left Heart Cath   Indications:   ACS Less than 24 hours Myocardial infarction without ST elevation (NSTEMI)   Primary ICD-10: I21.4 - Non-ST elevation (NSTEMI) myocardial infarction  Diagnostic Conclusions:   Chronic total occlusion of mid LAD with patent LIMA supplying the distal vessel.  No significant disease in RCA. Assume that gastroepiploic graft has closed off, although not selectively engaged.  Patent graft to high lateral vessel seen via collaterals from distal LAD.  No culprit lesion to account for NSTEMI.    Elevated left heart filling pressures.    Peak to peak gradient of 10 mmHg, not indicative of severe aortic stenosis.  Recommendations:   Maximize medical management of NSTEMI and diastolic heart failure.   Acute complication:    No complications     ELECTROPHYSIOLOGY:     PAST MEDICAL HISTORY  Hypertension  Diabetes mellitus  Hypothyroidism  HLD (hyperlipidemia)    PAST SURGICAL HISTORY  S/P CABG (coronary artery bypass graft)  H/O  section    SOCIAL HISTORY:  Denies smoking/alcohol/drugs    FAMILY HISTORY: No pertinent family history in first degree relatives    Family History of Cardiovascular Disease:  Yes [  ] No [  ]  Coronary Artery Disease in first degree relative: Yes [  ] No [  ]  Sudden Cardiac Death in First degree relative: Yes [  ] No [  ]    HOME MEDICATIONS:  aspirin 81 mg oral tablet, chewable: 1 tab(s) orally once a day (26 Oct 2022 04:13)  atorvastatin 80 mg oral tablet: 1 tab(s) orally once a day (at bedtime) (26 Oct 2022 04:13)  Coreg 3.125 mg oral tablet: 1 tab(s) orally 2 times a day (26 Oct 2022 04:13)  levothyroxine 100 mcg (0.1 mg) oral tablet: 1 tab(s) orally once a day (26 Oct 2022 04:13)  omeprazole 20 mg oral delayed release capsule: 1 cap(s) orally once a day (26 Oct 2022 04:13)    CURRENT CARDIAC MEDICATIONS:  Ezetimibe 10mg oral nightly  Triamterene/HCTZ 37.5/25mg oral daily  Allopurinol 150mg oral daily  Vit D  Jardiance 10mg daily     ALLERGIES: No Known Allergies    REVIEW OF SYMPTOMS:   CONSTITUTIONAL: No fever, no chills, no weight loss, no weight gain, no fatigue   ENMT:  No vertigo; No sinus or throat pain  NECK: No pain or stiffness  CARDIOVASCULAR: + chest pain, + dyspnea, no syncope/presyncope, no palpitations, no dizziness, no Orthopnea, no Paroxsymal nocturnal dyspnea  RESPIRATORY: no Shortness of breath, no cough, no wheezing  : No dysuria, no hematuria   GI: No dark color stool, no nausea, no diarrhea, no constipation, no abdominal pain   NEURO: No headache, no slurred speech   MUSCULOSKELETAL: No joint pain or swelling; No muscle, back, or extremity pain  PSYCH: No agitation, no anxiety.    ALL OTHER REVIEW OF SYSTEMS ARE NEGATIVE.    VITAL SIGNS:  T(C): 36.7 (24 @ 19:22), Max: 36.7 (24 @ 19:22)  T(F): 98 (24 @ 19:22), Max: 98 (24 @ 19:22)  HR: 65 (24 @ 19:22) (55 - 65)  BP: 191/64 (24 @ 19:22) (185/65 - 191/64)  RR: 19 (24 @ 19:22) (19 - 20)  SpO2: 98% (24 @ 19:22) (98% - 98%)    INTAKE AND OUTPUT:     PHYSICAL EXAM:  Constitutional: Comfortable, no acute distress   HEENT: Atraumatic, neck is supple, no JVD  CNS: A&Ox3. No focal deficits.   Respiratory: CTAB, unlabored   Cardiovascular: RRR, normal S1S2, no murmur, no rubs  Gastrointestinal: Soft, non-tender, +Bowel sounds   Extremities: 2+ Peripheral Pulses, no cyanosis, or edema  Psychiatric: Calm, no agitation   Skin: Warm and dry, no ulcers on extremities     LABS:                       9.6    7.20  )-----------( 236      ( 2024 17:34 )             29.4         140  |  104  |  45.7<H>  ----------------------------<  131<H>  4.5   |  23.0  |  1.84<H>    Ca    9.3      2024 17:34  Mg     1.9         TPro  5.9<L>  /  Alb  3.5  /  TBili  <0.2<L>  /  DBili  x   /  AST  27  /  ALT  20  /  AlkPhos  87      Urinalysis Basic - ( 2024 17:34 )  Color: x / Appearance: x / SG: x / pH: x  Gluc: 131 mg/dL / Ketone: x  / Bili: x / Urobili: x   Blood: x / Protein: x / Nitrite: x   Leuk Esterase: x / RBC: x / WBC x   Sq Epi: x / Non Sq Epi: x / Bacteria: x    INTERPRETATION OF TELEMETRY: SR no ectopy  ECG: NSR@ 76bpm, LVH, no acute T or ST changes   Prior ECG: Yes [x] No [  ]    RADIOLOGY & ADDITIONAL STUDIES:    X-ray: Pen   CT scan:   MRI:   US:                                                [facility]                                              CARDIOLOGY AT [address], [place] [address]                                             Telephone: [phone]. Fax:[phone]                                                       CARDIOLOGY CONSULTATION NOTE                                                                                             History obtained by: Patient and medical record  Community Cardiologist: [facility]   obtained: Yes [  ] No [  ]  Reason for Consultation: chest pain  Available out pt records reviewed: Yes [x] No [  ]    Chief complaint:  I had chest pain and feel weak      HPI: Patient is a 77yo F w/ PMHx of CAD s/p stents and CABG (Praveena Skinner, ), HTN, HLD, hypothyroid  CAD including multiple stents as well as a bypass.  For the last week has had pain radiating down her left arm starts in the left back or goes into the arm comes and goes happens at rest not exertional.  No nausea no vomiting no diaphoresis no sweating.  No recent trauma.  State   when she had her first heart attack she had a similar pain.  Pain is reproducible with some movements and palpation.  No leg swelling no fevers no chills    CARDIAC TESTING   ECHO: Summary:   1. Left ventricular ejection fraction, by visual estimation, is 60 to 65%.   2. Normal global left ventricular systolic function.   3. Basal inferior segment is abnormal as described above.   4. Spectral Doppler shows impaired relaxation pattern of left ventricular myocardial filling (Grade I diastolic dysfunction).   5. Normal left atrial size.   6. Normal right atrial size.   7. Moderate mitral annular calcification.   8. Moderate thickening and calcification of the anterior and posterior mitral valve leaflets.   9. Trace mitral valve regurgitation.  10. Mild tricuspid regurgitation.  11. Mild to moderate aortic regurgitation.  12. Moderate to severe aortic valve stenosis.  13. The Dimesionless Index value is 0.32.    MD Waldemar Electronically signed on 10/26/2022     STRESS: Pen    CATH: Cath Lab Report    Diagnostic Cardiologist:       Pito Davis MD   Procedures Performed   Procedures:    1.    Ultrasound Guided Access   2.    Arterial Access - Right Femoral   3.    Diagnostic Coronary Angiography   4.    Diagnostic Graft Angiography   5.    Left Heart Cath   Indications:   ACS Less than 24 hours Myocardial infarction without ST elevation (NSTEMI)   Primary ICD-10: I21.4 - Non-ST elevation (NSTEMI) myocardial infarction  Diagnostic Conclusions:   Chronic total occlusion of mid LAD with patent LIMA supplying the distal vessel.  No significant disease in RCA. Assume that gastroepiploic graft has closed off, although not selectively engaged.  Patent graft to high lateral vessel seen via collaterals from distal LAD.  No culprit lesion to account for NSTEMI.    Elevated left heart filling pressures.    Peak to peak gradient of 10 mmHg, not indicative of severe aortic stenosis.  Recommendations:   Maximize medical management of NSTEMI and diastolic heart failure.   Acute complication:    No complications     ELECTROPHYSIOLOGY:     PAST MEDICAL HISTORY  Hypertension  Diabetes mellitus  Hypothyroidism  HLD (hyperlipidemia)    PAST SURGICAL HISTORY  S/P CABG (coronary artery bypass graft)  H/O  section    SOCIAL HISTORY:  Denies smoking/alcohol/drugs    FAMILY HISTORY: No pertinent family history in first degree relatives    Family History of Cardiovascular Disease:  Yes [  ] No [  ]  Coronary Artery Disease in first degree relative: Yes [  ] No [  ]  Sudden Cardiac Death in First degree relative: Yes [  ] No [  ]    HOME MEDICATIONS:  aspirin 81 mg oral tablet, chewable: 1 tab(s) orally once a day (26 Oct 2022 04:13)  atorvastatin 80 mg oral tablet: 1 tab(s) orally once a day (at bedtime) (26 Oct 2022 04:13)  Coreg 3.125 mg oral tablet: 1 tab(s) orally 2 times a day (26 Oct 2022 04:13)  levothyroxine 100 mcg (0.1 mg) oral tablet: 1 tab(s) orally once a day (26 Oct 2022 04:13)  omeprazole 20 mg oral delayed release capsule: 1 cap(s) orally once a day (26 Oct 2022 04:13)    CURRENT CARDIAC MEDICATIONS:  Ezetimibe 10mg oral nightly  Triamterene/HCTZ 37.5/25mg oral daily  Allopurinol 150mg oral daily  Vit D  Jardiance 10mg daily     ALLERGIES: No Known Allergies    REVIEW OF SYMPTOMS:   CONSTITUTIONAL: No fever, no chills, no weight loss, no weight gain, no fatigue   ENMT:  No vertigo; No sinus or throat pain  NECK: No pain or stiffness  CARDIOVASCULAR: + chest pain, + dyspnea, no syncope/presyncope, no palpitations, no dizziness, no Orthopnea, no Paroxsymal nocturnal dyspnea  RESPIRATORY: no Shortness of breath, no cough, no wheezing  : No dysuria, no hematuria   GI: No dark color stool, no nausea, no diarrhea, no constipation, no abdominal pain   NEURO: No headache, no slurred speech   MUSCULOSKELETAL: No joint pain or swelling; No muscle, back, or extremity pain  PSYCH: No agitation, no anxiety.    ALL OTHER REVIEW OF SYSTEMS ARE NEGATIVE.    VITAL SIGNS:  T(C): 36.7 (24 @ 19:22), Max: 36.7 (24 @ 19:22)  T(F): 98 (24 @ 19:22), Max: 98 (24 @ 19:22)  HR: 65 (24 @ 19:22) (55 - 65)  BP: 191/64 (24 @ 19:22) (185/65 - 191/64)  RR: 19 (24 @ 19:22) (19 - 20)  SpO2: 98% (24 @ 19:22) (98% - 98%)    INTAKE AND OUTPUT:     PHYSICAL EXAM:  Constitutional: Comfortable, no acute distress   HEENT: Atraumatic, neck is supple, no JVD  CNS: A&Ox3. No focal deficits.   Respiratory: CTAB, unlabored   Cardiovascular: RRR, normal S1S2, no murmur, no rubs  Gastrointestinal: Soft, non-tender, +Bowel sounds   Extremities: 2+ Peripheral Pulses, no cyanosis, or edema  Psychiatric: Calm, no agitation   Skin: Warm and dry, no ulcers on extremities     LABS:                       9.6    7.20  )-----------( 236      ( 2024 17:34 )             29.4         140  |  104  |  45.7<H>  ----------------------------<  131<H>  4.5   |  23.0  |  1.84<H>    Ca    9.3      2024 17:34  Mg     1.9         TPro  5.9<L>  /  Alb  3.5  /  TBili  <0.2<L>  /  DBili  x   /  AST  27  /  ALT  20  /  AlkPhos  87      Urinalysis Basic - ( 2024 17:34 )  Color: x / Appearance: x / SG: x / pH: x  Gluc: 131 mg/dL / Ketone: x  / Bili: x / Urobili: x   Blood: x / Protein: x / Nitrite: x   Leuk Esterase: x / RBC: x / WBC x   Sq Epi: x / Non Sq Epi: x / Bacteria: x    INTERPRETATION OF TELEMETRY: SR no ectopy  ECG: NSR@ 76bpm, LVH, no acute T or ST changes   Prior ECG: Yes [x] No [  ]    RADIOLOGY & ADDITIONAL STUDIES:    X-ray: Pen   CT scan:   MRI:   US:                                                [facility]                                              CARDIOLOGY AT [facility]                                                   [address] [facility], [place] [address]                                             Telephone: [phone]. Fax:[phone]                                                       CARDIOLOGY CONSULTATION NOTE                                                                                             History obtained by: Patient and medical record  Community Cardiologist: [facility]   obtained: Yes [  ] No [  ]  Reason for Consultation: chest pain  Available out pt records reviewed: Yes [x] No [  ]    Chief complaint:  I had chest pain and feel weak      HPI: Patient is a 77yo F w/ PMHx of CAD s/p stents and CABGx2  ([place], ), HTN, HLD, hypothyroid, arrived to [facility] EDU after she developed sub-sternal chest pain.  States pain is sharp at times, starts while at rest, 8/10 in severity with radiation to left arm.  Pain last from a few seconds up to 5 minutes.  Present for past few days.  No pain experienced w/ exertion.  Relieved w/ Aleve.  Patient denies other associated symptoms of nausea, vomiting, diaphoresis, HA, fever, chills or abdominal pain.  States during her first heart attack she had similar pain like now.  Currently symptom free and resting comfortable.  ECG: NSR. LVH, no acute T or ST changes.  Trop: 53   +JOSE LUIS on CKD   pro-BNP: 776    CARDIAC TESTING   ECHO: Summary:   1. Left ventricular ejection fraction, by visual estimation, is 60 to 65%.   2. Normal global left ventricular systolic function.   3. Basal inferior segment is abnormal as described above.   4. Spectral Doppler shows impaired relaxation pattern of left ventricular myocardial filling (Grade I diastolic dysfunction).   5. Normal left atrial size.   6. Normal right atrial size.   7. Moderate mitral annular calcification.   8. Moderate thickening and calcification of the anterior and posterior mitral valve leaflets.   9. Trace mitral valve regurgitation.  10. Mild tricuspid regurgitation.  11. Mild to moderate aortic regurgitation.  12. Moderate to severe aortic valve stenosis.  13. The Dimesionless Index value is 0.32.    Abilio Copeland MD Electronically signed on 10/26/2022     STRESS: Pen    CATH: Cath Lab Report    Diagnostic Cardiologist:       Pito Davis MD   Procedures Performed   Procedures:    1.    Ultrasound Guided Access   2.    Arterial Access - Right Femoral   3.    Diagnostic Coronary Angiography   4.    Diagnostic Graft Angiography   5.    Left Heart Cath   Indications:   ACS Less than 24 hours Myocardial infarction without ST elevation (NSTEMI)   Primary ICD-10: I21.4 - Non-ST elevation (NSTEMI) myocardial infarction  Diagnostic Conclusions:   Chronic total occlusion of mid LAD with patent LIMA supplying the distal vessel.  No significant disease in RCA. Assume that gastroepiploic graft has closed off, although not selectively engaged.  Patent graft to high lateral vessel seen via collaterals from distal LAD.  No culprit lesion to account for NSTEMI.    Elevated left heart filling pressures.    Peak to peak gradient of 10 mmHg, not indicative of severe aortic stenosis.  Recommendations:   Maximize medical management of NSTEMI and diastolic heart failure.   Acute complication:    No complications     ELECTROPHYSIOLOGY:     PAST MEDICAL HISTORY  Hypertension  Diabetes mellitus  Hypothyroidism  HLD (hyperlipidemia)    PAST SURGICAL HISTORY  S/P CABG (coronary artery bypass graft)  H/O  section    SOCIAL HISTORY:  Denies smoking/alcohol/drugs    FAMILY HISTORY: No pertinent family history in first degree relatives    Family History of Cardiovascular Disease:  Yes [  ] No [  ]  Coronary Artery Disease in first degree relative: Yes [  ] No [  ]  Sudden Cardiac Death in First degree relative: Yes [  ] No [  ]    HOME MEDICATIONS:  aspirin 81 mg oral tablet, chewable: 1 tab(s) orally once a day (26 Oct 2022 04:13)  atorvastatin 80 mg oral tablet: 1 tab(s) orally once a day (at bedtime) (26 Oct 2022 04:13)  Coreg 3.125 mg oral tablet: 1 tab(s) orally 2 times a day (26 Oct 2022 04:13)  levothyroxine 100 mcg (0.1 mg) oral tablet: 1 tab(s) orally once a day (26 Oct 2022 04:13)  omeprazole 20 mg oral delayed release capsule: 1 cap(s) orally once a day (26 Oct 2022 04:13)    CURRENT CARDIAC MEDICATIONS:  Ezetimibe 10mg oral nightly  Triamterene/HCTZ 37.5/25mg oral daily  Allopurinol 150mg oral daily  Vit D  Jardiance 10mg daily     ALLERGIES: No Known Allergies    REVIEW OF SYMPTOMS:   CONSTITUTIONAL: No fever, no chills, no weight loss, no weight gain, no fatigue   ENMT:  No vertigo; No sinus or throat pain  NECK: No pain or stiffness  CARDIOVASCULAR: + chest pain, + dyspnea, no syncope/presyncope, no palpitations, no dizziness, no Orthopnea, no Paroxsymal nocturnal dyspnea  RESPIRATORY: no Shortness of breath, no cough, no wheezing  : No dysuria, no hematuria   GI: No dark color stool, no nausea, no diarrhea, no constipation, no abdominal pain   NEURO: No headache, no slurred speech   MUSCULOSKELETAL: No joint pain or swelling; No muscle, back, or extremity pain  PSYCH: No agitation, no anxiety.    ALL OTHER REVIEW OF SYSTEMS ARE NEGATIVE.    VITAL SIGNS:  T(C): 36.7 (24 @ 19:22), Max: 36.7 (24 @ 19:22)  T(F): 98 (24 @ 19:22), Max: 98 (24 @ 19:22)  HR: 65 (24 @ 19:22) (55 - 65)  BP: 191/64 (24 @ 19:22) (185/65 - 191/64)  RR: 19 (24 @ 19:22) (19 - 20)  SpO2: 98% (24 @ 19:22) (98% - 98%)    INTAKE AND OUTPUT:     PHYSICAL EXAM:  Constitutional: Comfortable, no acute distress   HEENT: Atraumatic, neck is supple, no JVD  CNS: A&Ox3. No focal deficits.   Respiratory: CTAB, unlabored   Cardiovascular: RRR, normal S1S2, no murmur, no rubs  Gastrointestinal: Soft, non-tender, +Bowel sounds   Extremities: 2+ Peripheral Pulses, no cyanosis, or edema  Psychiatric: Calm, no agitation   Skin: Warm and dry, no ulcers on extremities     LABS:                       9.6    7.20  )-----------( 236      ( 2024 17:34 )             29.4         140  |  104  |  45.7<H>  ----------------------------<  131<H>  4.5   |  23.0  |  1.84<H>    Ca    9.3      2024 17:34  Mg     1.9         TPro  5.9<L>  /  Alb  3.5  /  TBili  <0.2<L>  /  DBili  x   /  AST  27  /  ALT  20  /  AlkPhos  87  01-12    Urinalysis Basic - ( 2024 17:34 )  Color: x / Appearance: x / SG: x / pH: x  Gluc: 131 mg/dL / Ketone: x  / Bili: x / Urobili: x   Blood: x / Protein: x / Nitrite: x   Leuk Esterase: x / RBC: x / WBC x   Sq Epi: x / Non Sq Epi: x / Bacteria: x    INTERPRETATION OF TELEMETRY: SR no ectopy  ECG: NSR@ 76bpm, LVH, no acute T or ST changes   Prior ECG: Yes [x] No [  ]    RADIOLOGY & ADDITIONAL STUDIES:   X-ray: IMPRESSION: No acute finding or change.                                                [facility]                                              CARDIOLOGY AT [facility]                                                   [address], [place] [address]                                             Telephone: [phone]. Fax:[phone]                                                       CARDIOLOGY CONSULTATION NOTE                                                                                             History obtained by: Patient and medical record  Community Cardiologist: [facility]   obtained: Yes [  ] No [  ]  Reason for Consultation: chest pain  Available out pt records reviewed: Yes [x] No [  ]    Chief complaint:  I had chest pain and feel weak      HPI: Patient is a 79yo F w/ PMHx of CAD s/p stents and CABGx2  ([place], ), HTN, HLD, hypothyroid, arrived to [facility] EDU after she developed sub-sternal chest pain.  States pain is sharp at times, starts while at rest, 8/10 in severity with radiation to left arm.  Pain last from a few seconds up to 5 minutes.  Present for past few days.  No pain experienced w/ exertion.  Relieved w/ Aleve.  Patient denies other associated symptoms of nausea, vomiting, diaphoresis, HA, fever, chills or abdominal pain.  States during her first heart attack she had similar pain like now.  Currently symptom free and resting comfortable.  ECG: NSR. LVH, no acute T or ST changes.  Trop: 53   +JOSE LUIS on CKD   pro-BNP: 776    CARDIAC TESTING   ECHO: Summary:   1. Left ventricular ejection fraction, by visual estimation, is 60 to 65%.   2. Normal global left ventricular systolic function.   3. Basal inferior segment is abnormal as described above.   4. Spectral Doppler shows impaired relaxation pattern of left ventricular myocardial filling (Grade I diastolic dysfunction).   5. Normal left atrial size.   6. Normal right atrial size.   7. Moderate mitral annular calcification.   8. Moderate thickening and calcification of the anterior and posterior mitral valve leaflets.   9. Trace mitral valve regurgitation.  10. Mild tricuspid regurgitation.  11. Mild to moderate aortic regurgitation.  12. Moderate to severe aortic valve stenosis.  13. The Dimesionless Index value is 0.32.    Abilio Copeland MD Electronically signed on 10/26/2022     STRESS: Pen    CATH: Cath Lab Report    Diagnostic Cardiologist:       Pito Davis MD   Procedures Performed   Procedures:    1.    Ultrasound Guided Access   2.    Arterial Access - Right Femoral   3.    Diagnostic Coronary Angiography   4.    Diagnostic Graft Angiography   5.    Left Heart Cath   Indications:   ACS Less than 24 hours Myocardial infarction without ST elevation (NSTEMI)   Primary ICD-10: I21.4 - Non-ST elevation (NSTEMI) myocardial infarction  Diagnostic Conclusions:   Chronic total occlusion of mid LAD with patent LIMA supplying the distal vessel.  No significant disease in RCA. Assume that gastroepiploic graft has closed off, although not selectively engaged.  Patent graft to high lateral vessel seen via collaterals from distal LAD.  No culprit lesion to account for NSTEMI.    Elevated left heart filling pressures.    Peak to peak gradient of 10 mmHg, not indicative of severe aortic stenosis.  Recommendations:   Maximize medical management of NSTEMI and diastolic heart failure.   Acute complication:    No complications     ELECTROPHYSIOLOGY:     PAST MEDICAL HISTORY  Hypertension  Diabetes mellitus  Hypothyroidism  HLD (hyperlipidemia)    PAST SURGICAL HISTORY  S/P CABG (coronary artery bypass graft)  H/O  section    SOCIAL HISTORY:  Denies smoking/alcohol/drugs    FAMILY HISTORY: No pertinent family history in first degree relatives    Family History of Cardiovascular Disease:  Yes [  ] No [  ]  Coronary Artery Disease in first degree relative: Yes [  ] No [  ]  Sudden Cardiac Death in First degree relative: Yes [  ] No [  ]    HOME MEDICATIONS:  aspirin 81 mg oral tablet, chewable: 1 tab(s) orally once a day (26 Oct 2022 04:13)  atorvastatin 80 mg oral tablet: 1 tab(s) orally once a day (at bedtime) (26 Oct 2022 04:13)  Coreg 3.125 mg oral tablet: 1 tab(s) orally 2 times a day (26 Oct 2022 04:13)  levothyroxine 100 mcg (0.1 mg) oral tablet: 1 tab(s) orally once a day (26 Oct 2022 04:13)  omeprazole 20 mg oral delayed release capsule: 1 cap(s) orally once a day (26 Oct 2022 04:13)    CURRENT CARDIAC MEDICATIONS:  Ezetimibe 10mg oral nightly  Triamterene/HCTZ 37.5/25mg oral daily  Allopurinol 150mg oral daily  Vit D  Jardiance 10mg daily     ALLERGIES: No Known Allergies    REVIEW OF SYMPTOMS:   CONSTITUTIONAL: No fever, no chills, no weight loss, no weight gain, no fatigue   ENMT:  No vertigo; No sinus or throat pain  NECK: No pain or stiffness  CARDIOVASCULAR: + chest pain, + dyspnea, no syncope/presyncope, no palpitations, no dizziness, no Orthopnea, no Paroxsymal nocturnal dyspnea  RESPIRATORY: no Shortness of breath, no cough, no wheezing  : No dysuria, no hematuria   GI: No dark color stool, no nausea, no diarrhea, no constipation, no abdominal pain   NEURO: No headache, no slurred speech   MUSCULOSKELETAL: No joint pain or swelling; No muscle, back, or extremity pain  PSYCH: No agitation, no anxiety.    ALL OTHER REVIEW OF SYSTEMS ARE NEGATIVE.    VITAL SIGNS:  T(C): 36.7 (24 @ 19:22), Max: 36.7 (24 @ 19:22)  T(F): 98 (24 @ 19:22), Max: 98 (24 @ 19:22)  HR: 65 (24 @ 19:22) (55 - 65)  BP: 191/64 (24 @ 19:22) (185/65 - 191/64)  RR: 19 (24 @ 19:22) (19 - 20)  SpO2: 98% (24 @ 19:22) (98% - 98%)    INTAKE AND OUTPUT:     PHYSICAL EXAM:  Constitutional: Comfortable, no acute distress   HEENT: Atraumatic, neck is supple, no JVD  CNS: A&Ox3. No focal deficits.   Respiratory: CTAB, unlabored   Cardiovascular: RRR, normal S1S2, no murmur, no rubs  Gastrointestinal: Soft, non-tender, +Bowel sounds   Extremities: 2+ Peripheral Pulses, no cyanosis, or edema  Psychiatric: Calm, no agitation   Skin: Warm and dry, no ulcers on extremities     LABS:                       9.6    7.20  )-----------( 236      ( 2024 17:34 )             29.4         140  |  104  |  45.7<H>  ----------------------------<  131<H>  4.5   |  23.0  |  1.84<H>    Ca    9.3      2024 17:34  Mg     1.9         TPro  5.9<L>  /  Alb  3.5  /  TBili  <0.2<L>  /  DBili  x   /  AST  27  /  ALT  20  /  AlkPhos  87  01-12    Urinalysis Basic - ( 2024 17:34 )  Color: x / Appearance: x / SG: x / pH: x  Gluc: 131 mg/dL / Ketone: x  / Bili: x / Urobili: x   Blood: x / Protein: x / Nitrite: x   Leuk Esterase: x / RBC: x / WBC x   Sq Epi: x / Non Sq Epi: x / Bacteria: x    INTERPRETATION OF TELEMETRY: SR no ectopy  ECG: NSR@ 76bpm, LVH, no acute T or ST changes   Prior ECG: Yes [x] No [  ]    RADIOLOGY & ADDITIONAL STUDIES:   X-ray: IMPRESSION: No acute finding or change.

## 2024-01-12 NOTE — ED PROVIDER NOTE - DISPOSITION TYPE
Have Your Skin Lesions Been Treated?: not been treated
Is This A New Presentation, Or A Follow-Up?: Skin Lesion
How Severe Is Your Skin Lesion?: mild
ADMIT

## 2024-01-12 NOTE — CONSULT NOTE ADULT - NS ATTEND AMEND GEN_ALL_CORE FT
-      78 F hx AS moderate to severe/mild to moderate AI on TTE 10/2022, (AV gradient on cath 10/2022 was 10 mmHg not indicative of severe AS).    CAD, CABG x2, HTN, DM, HTN and HLD with intermittent mid-sternal chest pain with left shoulder radiation down her arm, non exertional, no other associated concerning risk factors for ACS.  Here for nonexertional  left shoulder pain, L arm pain and chest pain over the last 4-5 da days. .   s/p LHC last subsequent to NSTEMI 10/26/2022 did not show culprit lesion to account for NSTEMI- medical management, normal LVEF on TTE, pt on DAPT, lipitor, zetia, and coreg, denies CP/SOB.  EKG: NSR, LVH no acute ischemia changes.  Trop: 53  pro-BNP: 776        Chest Pain  -Admit to Tele, check CBC, CMP, trend CEx3, ECG tonight w/o acute T or ST changes  -- no acute complaints at this time, or changes in symptoms since admission  - the cause of low level cardiac Trops in patients with CKD is likely chronic myocardial injury  - trend CE x3 overnight and may repeat ECG in the morning  - Tele monitoring  - Eventual ischemia LEIVA  - continue home ASA, Coreg and high dose Statin therapies  - Serial CE, EKG, PBNP, TTE

## 2024-01-12 NOTE — ED ADULT NURSE NOTE - NSFALLUNIVINTERV_ED_ALL_ED
Bed/Stretcher in lowest position, wheels locked, appropriate side rails in place/Call bell, personal items and telephone in reach/Instruct patient to call for assistance before getting out of bed/chair/stretcher/Non-slip footwear applied when patient is off stretcher/[place] to call system/Physically safe environment - no spills, clutter or unnecessary equipment/Purposeful proactive rounding/Room/bathroom lighting operational, light cord in reach Bed/Stretcher in lowest position, wheels locked, appropriate side rails in place/Call bell, personal items and telephone in reach/Instruct patient to call for assistance before getting out of bed/chair/stretcher/Non-slip footwear applied when patient is off stretcher/[place] to call system/Physically safe environment - no spills, clutter or unnecessary equipment/Purposeful proactive rounding/Room/bathroom lighting operational, light cord in reach

## 2024-01-12 NOTE — ED PROVIDER NOTE - CLINICAL SUMMARY MEDICAL DECISION MAKING FREE TEXT BOX
patient with left shoulder pain radiating down her arm not exertional no other associated concerning risk factors for ACS.  But does have an extensive cardiac history including with similar presentation with an MI.  EKG at this time with LVH no acute ischemia pain is reproducible on exam possibly musculoskeletal versus pinched nerve.  Labs with troponin pending [facility] consult -Slowey DO     Patient signed out to incoming physician pending results of testing and reassessment.  All decisions regarding the progression of care will be made at their discretion. patient with left shoulder pain radiating down her arm not exertional no other associated concerning risk factors for ACS.  But does have an extensive cardiac history including with similar presentation with an MI.  EKG at this time with LVH no acute ischemia pain is reproducible on exam possibly musculoskeletal versus pinched nerve.  Labs with troponin pending [facility] consult -Slowey DO     Patient signed out to incoming physician pending results of testing and reassessment.  All decisions regarding the progression of care will be made at their discretion.

## 2024-01-12 NOTE — H&P ADULT - CONVERSATION DETAILS
Pt is AOx3, able to make her own medical decisions, states that she would want trial of resuscitation with CPR and/or mechanical ventilation in event of cardiac arrest but does not think she would want to be on a ventilator for a prolonged period of time.  Pt is FULL CODE.

## 2024-01-12 NOTE — CONSULT NOTE ADULT - ASSESSMENT
77yo F w/ CAD, CABG x3, HTN, DM, HTN and HLD with intermittent mid-sternal chest pain with left shoulder radiation down her arm not exertional, no other associated concerning risk factors for ACS.  Here for stable angina.  EKG: NSR, LVH no acute ischemia changes.  Trop: 53      CAD/NSTEM- hx CABG and PCI, [facility] last 10/26/2022 did not show culprit lesion to account for NSTEMI- medical management, normal LVEF on TTE, pt on DAPT, lipitor, zetia, and coreg, denies CP/SOB.  2. HLD- continue lipitor 80 mg QD and zetia 10 mg QD, obtain recent lipid panel from PCP.   3. AS- moderate to severe/mild to moderate AI on TTE 10/2022, AV gradient on cath 10 mmHg (not indicative of severe AS). Repeat TTE  4. BP stable  5. Carotid atherosclerosis - moderate b/l on US in 12/2022. On ASA and statin.   6. Follow up with Dr. Simmons in 1 year or sooner if needed 79yo F w/ CAD, CABG x3, HTN, DM, HTN and HLD with intermittent mid-sternal chest pain with left shoulder radiation down her arm not exertional, no other associated concerning risk factors for ACS.  Here for stable angina.  EKG: NSR, LVH no acute ischemia changes.  Trop: 53      CAD/NSTEM- hx CABG and PCI, [facility] last 10/26/2022 did not show culprit lesion to account for NSTEMI- medical management, normal LVEF on TTE, pt on DAPT, lipitor, zetia, and coreg, denies CP/SOB.  2. HLD- continue lipitor 80 mg QD and zetia 10 mg QD, obtain recent lipid panel from PCP.   3. AS- moderate to severe/mild to moderate AI on TTE 10/2022, AV gradient on cath 10 mmHg (not indicative of severe AS). Repeat TTE  4. BP stable  5. Carotid atherosclerosis - moderate b/l on US in 12/2022. On ASA and statin.   6. Follow up with Dr. Simmons in 1 year or sooner if needed 79yo F w/ CAD, CABG x2, HTN, DM, HTN and HLD with intermittent mid-sternal chest pain with left shoulder radiation down her arm, non exertional, no other associated concerning risk factors for ACS.  Here for stable angina.  EKG: NSR, LVH no acute ischemia changes.  Trop: 53  pro-BNP: 776      CAD/NSTEM- hx CABG and PCI, C last 10/26/2022 did not show culprit lesion to account for NSTEMI- medical management, normal LVEF on TTE, pt on DAPT, lipitor, zetia, and coreg, denies CP/SOB.  2. HLD- continue lipitor 80 mg QD and zetia 10 mg QD, obtain recent lipid panel from PCP.   3. AS- moderate to severe/mild to moderate AI on TTE 10/2022, AV gradient on cath 10 mmHg (not indicative of severe AS). Repeat TTE  4. BP stable  5. Carotid atherosclerosis - moderate b/l on US in 12/2022. On ASA and statin.   6. Follow up with Dr. Simmons in 1 year or sooner if needed

## 2024-01-12 NOTE — H&P ADULT - NSICDXPASTMEDICALHX_GEN_ALL_CORE_FT
PAST MEDICAL HISTORY:  CAD (coronary artery disease)     CKD (chronic kidney disease)     Diabetes mellitus     HLD (hyperlipidemia)     Hypertension     Hypothyroidism

## 2024-01-13 LAB
A1C WITH ESTIMATED AVERAGE GLUCOSE RESULT: 6.6 % — HIGH (ref 4–5.6)
A1C WITH ESTIMATED AVERAGE GLUCOSE RESULT: 6.6 % — HIGH (ref 4–5.6)
ALBUMIN SERPL ELPH-MCNC: 3.7 G/DL — SIGNIFICANT CHANGE UP (ref 3.3–5.2)
ALBUMIN SERPL ELPH-MCNC: 3.7 G/DL — SIGNIFICANT CHANGE UP (ref 3.3–5.2)
ALP SERPL-CCNC: 92 U/L — SIGNIFICANT CHANGE UP (ref 40–120)
ALP SERPL-CCNC: 92 U/L — SIGNIFICANT CHANGE UP (ref 40–120)
ALT FLD-CCNC: 18 U/L — SIGNIFICANT CHANGE UP
ALT FLD-CCNC: 18 U/L — SIGNIFICANT CHANGE UP
ANION GAP SERPL CALC-SCNC: 13 MMOL/L — SIGNIFICANT CHANGE UP (ref 5–17)
APTT BLD: 29.2 SEC — SIGNIFICANT CHANGE UP (ref 24.5–35.6)
APTT BLD: 29.2 SEC — SIGNIFICANT CHANGE UP (ref 24.5–35.6)
AST SERPL-CCNC: 27 U/L — SIGNIFICANT CHANGE UP
AST SERPL-CCNC: 27 U/L — SIGNIFICANT CHANGE UP
BILIRUB SERPL-MCNC: 0.3 MG/DL — LOW (ref 0.4–2)
BILIRUB SERPL-MCNC: 0.3 MG/DL — LOW (ref 0.4–2)
BUN SERPL-MCNC: 42.3 MG/DL — HIGH (ref 8–20)
BUN SERPL-MCNC: 42.3 MG/DL — HIGH (ref 8–20)
BUN SERPL-MCNC: 44.4 MG/DL — HIGH (ref 8–20)
BUN SERPL-MCNC: 44.4 MG/DL — HIGH (ref 8–20)
CALCIUM SERPL-MCNC: 10.1 MG/DL — SIGNIFICANT CHANGE UP (ref 8.4–10.5)
CALCIUM SERPL-MCNC: 10.1 MG/DL — SIGNIFICANT CHANGE UP (ref 8.4–10.5)
CALCIUM SERPL-MCNC: 9.6 MG/DL — SIGNIFICANT CHANGE UP (ref 8.4–10.5)
CALCIUM SERPL-MCNC: 9.6 MG/DL — SIGNIFICANT CHANGE UP (ref 8.4–10.5)
CHLORIDE SERPL-SCNC: 103 MMOL/L — SIGNIFICANT CHANGE UP (ref 96–108)
CHLORIDE SERPL-SCNC: 103 MMOL/L — SIGNIFICANT CHANGE UP (ref 96–108)
CHLORIDE SERPL-SCNC: 98 MMOL/L — SIGNIFICANT CHANGE UP (ref 96–108)
CHLORIDE SERPL-SCNC: 98 MMOL/L — SIGNIFICANT CHANGE UP (ref 96–108)
CHOLEST SERPL-MCNC: 131 MG/DL — SIGNIFICANT CHANGE UP
CHOLEST SERPL-MCNC: 131 MG/DL — SIGNIFICANT CHANGE UP
CK MB CFR SERPL CALC: 7 NG/ML — HIGH (ref 0–6.7)
CK MB CFR SERPL CALC: 7 NG/ML — HIGH (ref 0–6.7)
CK SERPL-CCNC: 158 U/L — SIGNIFICANT CHANGE UP (ref 25–170)
CK SERPL-CCNC: 158 U/L — SIGNIFICANT CHANGE UP (ref 25–170)
CO2 SERPL-SCNC: 21 MMOL/L — LOW (ref 22–29)
CO2 SERPL-SCNC: 21 MMOL/L — LOW (ref 22–29)
CO2 SERPL-SCNC: 23 MMOL/L — SIGNIFICANT CHANGE UP (ref 22–29)
CO2 SERPL-SCNC: 23 MMOL/L — SIGNIFICANT CHANGE UP (ref 22–29)
CREAT SERPL-MCNC: 1.58 MG/DL — HIGH (ref 0.5–1.3)
CREAT SERPL-MCNC: 1.58 MG/DL — HIGH (ref 0.5–1.3)
CREAT SERPL-MCNC: 1.68 MG/DL — HIGH (ref 0.5–1.3)
CREAT SERPL-MCNC: 1.68 MG/DL — HIGH (ref 0.5–1.3)
EGFR: 31 ML/MIN/1.73M2 — LOW
EGFR: 31 ML/MIN/1.73M2 — LOW
EGFR: 34 ML/MIN/1.73M2 — LOW
EGFR: 34 ML/MIN/1.73M2 — LOW
ESTIMATED AVERAGE GLUCOSE: 143 MG/DL — HIGH (ref 68–114)
ESTIMATED AVERAGE GLUCOSE: 143 MG/DL — HIGH (ref 68–114)
FOLATE SERPL-MCNC: >20 NG/ML — SIGNIFICANT CHANGE UP
FOLATE SERPL-MCNC: >20 NG/ML — SIGNIFICANT CHANGE UP
GLUCOSE BLDC GLUCOMTR-MCNC: 122 MG/DL — HIGH (ref 70–99)
GLUCOSE BLDC GLUCOMTR-MCNC: 122 MG/DL — HIGH (ref 70–99)
GLUCOSE BLDC GLUCOMTR-MCNC: 126 MG/DL — HIGH (ref 70–99)
GLUCOSE BLDC GLUCOMTR-MCNC: 126 MG/DL — HIGH (ref 70–99)
GLUCOSE BLDC GLUCOMTR-MCNC: 135 MG/DL — HIGH (ref 70–99)
GLUCOSE BLDC GLUCOMTR-MCNC: 135 MG/DL — HIGH (ref 70–99)
GLUCOSE BLDC GLUCOMTR-MCNC: 146 MG/DL — HIGH (ref 70–99)
GLUCOSE BLDC GLUCOMTR-MCNC: 146 MG/DL — HIGH (ref 70–99)
GLUCOSE SERPL-MCNC: 111 MG/DL — HIGH (ref 70–99)
GLUCOSE SERPL-MCNC: 111 MG/DL — HIGH (ref 70–99)
GLUCOSE SERPL-MCNC: 178 MG/DL — HIGH (ref 70–99)
GLUCOSE SERPL-MCNC: 178 MG/DL — HIGH (ref 70–99)
HDLC SERPL-MCNC: 62 MG/DL — SIGNIFICANT CHANGE UP
HDLC SERPL-MCNC: 62 MG/DL — SIGNIFICANT CHANGE UP
INR BLD: 0.93 RATIO — SIGNIFICANT CHANGE UP (ref 0.85–1.18)
INR BLD: 0.93 RATIO — SIGNIFICANT CHANGE UP (ref 0.85–1.18)
IRON SATN MFR SERPL: 22 % — SIGNIFICANT CHANGE UP (ref 14–50)
IRON SATN MFR SERPL: 22 % — SIGNIFICANT CHANGE UP (ref 14–50)
IRON SATN MFR SERPL: 71 UG/DL — SIGNIFICANT CHANGE UP (ref 37–145)
IRON SATN MFR SERPL: 71 UG/DL — SIGNIFICANT CHANGE UP (ref 37–145)
LIPID PNL WITH DIRECT LDL SERPL: 49 MG/DL — SIGNIFICANT CHANGE UP
LIPID PNL WITH DIRECT LDL SERPL: 49 MG/DL — SIGNIFICANT CHANGE UP
NON HDL CHOLESTEROL: 69 MG/DL — SIGNIFICANT CHANGE UP
NON HDL CHOLESTEROL: 69 MG/DL — SIGNIFICANT CHANGE UP
POTASSIUM SERPL-MCNC: 4.3 MMOL/L — SIGNIFICANT CHANGE UP (ref 3.5–5.3)
POTASSIUM SERPL-MCNC: 4.3 MMOL/L — SIGNIFICANT CHANGE UP (ref 3.5–5.3)
POTASSIUM SERPL-MCNC: 4.7 MMOL/L — SIGNIFICANT CHANGE UP (ref 3.5–5.3)
POTASSIUM SERPL-MCNC: 4.7 MMOL/L — SIGNIFICANT CHANGE UP (ref 3.5–5.3)
POTASSIUM SERPL-SCNC: 4.3 MMOL/L — SIGNIFICANT CHANGE UP (ref 3.5–5.3)
POTASSIUM SERPL-SCNC: 4.3 MMOL/L — SIGNIFICANT CHANGE UP (ref 3.5–5.3)
POTASSIUM SERPL-SCNC: 4.7 MMOL/L — SIGNIFICANT CHANGE UP (ref 3.5–5.3)
POTASSIUM SERPL-SCNC: 4.7 MMOL/L — SIGNIFICANT CHANGE UP (ref 3.5–5.3)
PROT SERPL-MCNC: 6.2 G/DL — LOW (ref 6.6–8.7)
PROT SERPL-MCNC: 6.2 G/DL — LOW (ref 6.6–8.7)
PROTHROM AB SERPL-ACNC: 10.3 SEC — SIGNIFICANT CHANGE UP (ref 9.5–13)
PROTHROM AB SERPL-ACNC: 10.3 SEC — SIGNIFICANT CHANGE UP (ref 9.5–13)
SODIUM SERPL-SCNC: 132 MMOL/L — LOW (ref 135–145)
SODIUM SERPL-SCNC: 132 MMOL/L — LOW (ref 135–145)
SODIUM SERPL-SCNC: 139 MMOL/L — SIGNIFICANT CHANGE UP (ref 135–145)
SODIUM SERPL-SCNC: 139 MMOL/L — SIGNIFICANT CHANGE UP (ref 135–145)
TIBC SERPL-MCNC: 316 UG/DL — SIGNIFICANT CHANGE UP (ref 220–430)
TIBC SERPL-MCNC: 316 UG/DL — SIGNIFICANT CHANGE UP (ref 220–430)
TRANSFERRIN SERPL-MCNC: 221 MG/DL — SIGNIFICANT CHANGE UP (ref 192–382)
TRANSFERRIN SERPL-MCNC: 221 MG/DL — SIGNIFICANT CHANGE UP (ref 192–382)
TRIGL SERPL-MCNC: 102 MG/DL — SIGNIFICANT CHANGE UP
TRIGL SERPL-MCNC: 102 MG/DL — SIGNIFICANT CHANGE UP
TROPONIN T, HIGH SENSITIVITY RESULT: 64 NG/L — HIGH (ref 0–51)
TROPONIN T, HIGH SENSITIVITY RESULT: 64 NG/L — HIGH (ref 0–51)
TSH SERPL-MCNC: 0.36 UIU/ML — SIGNIFICANT CHANGE UP (ref 0.27–4.2)
TSH SERPL-MCNC: 0.36 UIU/ML — SIGNIFICANT CHANGE UP (ref 0.27–4.2)
VIT B12 SERPL-MCNC: 458 PG/ML — SIGNIFICANT CHANGE UP (ref 232–1245)
VIT B12 SERPL-MCNC: 458 PG/ML — SIGNIFICANT CHANGE UP (ref 232–1245)

## 2024-01-13 PROCEDURE — 93010 ELECTROCARDIOGRAM REPORT: CPT

## 2024-01-13 PROCEDURE — 93306 TTE W/DOPPLER COMPLETE: CPT | Mod: 26

## 2024-01-13 PROCEDURE — 99233 SBSQ HOSP IP/OBS HIGH 50: CPT

## 2024-01-13 PROCEDURE — 99232 SBSQ HOSP IP/OBS MODERATE 35: CPT

## 2024-01-13 RX ORDER — NITROGLYCERIN 6.5 MG
0.4 CAPSULE, EXTENDED RELEASE ORAL
Refills: 0 | Status: DISCONTINUED | OUTPATIENT
Start: 2024-01-13 | End: 2024-01-19

## 2024-01-13 RX ORDER — ACETAMINOPHEN 500 MG
1000 TABLET ORAL ONCE
Refills: 0 | Status: COMPLETED | OUTPATIENT
Start: 2024-01-13 | End: 2024-01-13

## 2024-01-13 RX ORDER — DEXTROSE 50 % IN WATER 50 %
25 SYRINGE (ML) INTRAVENOUS ONCE
Refills: 0 | Status: DISCONTINUED | OUTPATIENT
Start: 2024-01-13 | End: 2024-01-19

## 2024-01-13 RX ORDER — INSULIN LISPRO 100/ML
VIAL (ML) SUBCUTANEOUS
Refills: 0 | Status: DISCONTINUED | OUTPATIENT
Start: 2024-01-13 | End: 2024-01-19

## 2024-01-13 RX ORDER — HYDRALAZINE HCL 50 MG
25 TABLET ORAL
Refills: 0 | Status: DISCONTINUED | OUTPATIENT
Start: 2024-01-13 | End: 2024-01-19

## 2024-01-13 RX ORDER — GLUCAGON INJECTION, SOLUTION 0.5 MG/.1ML
1 INJECTION, SOLUTION SUBCUTANEOUS ONCE
Refills: 0 | Status: DISCONTINUED | OUTPATIENT
Start: 2024-01-13 | End: 2024-01-19

## 2024-01-13 RX ORDER — INSULIN LISPRO 100/ML
VIAL (ML) SUBCUTANEOUS AT BEDTIME
Refills: 0 | Status: DISCONTINUED | OUTPATIENT
Start: 2024-01-13 | End: 2024-01-19

## 2024-01-13 RX ORDER — ERGOCALCIFEROL 1.25 MG/1
50000 CAPSULE ORAL
Refills: 0 | Status: DISCONTINUED | OUTPATIENT
Start: 2024-01-13 | End: 2024-01-19

## 2024-01-13 RX ORDER — CLOPIDOGREL BISULFATE 75 MG/1
75 TABLET, FILM COATED ORAL DAILY
Refills: 0 | Status: DISCONTINUED | OUTPATIENT
Start: 2024-01-13 | End: 2024-01-19

## 2024-01-13 RX ORDER — ALLOPURINOL 300 MG
100 TABLET ORAL DAILY
Refills: 0 | Status: DISCONTINUED | OUTPATIENT
Start: 2024-01-13 | End: 2024-01-19

## 2024-01-13 RX ORDER — EZETIMIBE 10 MG/1
10 TABLET ORAL AT BEDTIME
Refills: 0 | Status: DISCONTINUED | OUTPATIENT
Start: 2024-01-13 | End: 2024-01-19

## 2024-01-13 RX ORDER — ISOSORBIDE MONONITRATE 60 MG/1
30 TABLET, EXTENDED RELEASE ORAL DAILY
Refills: 0 | Status: DISCONTINUED | OUTPATIENT
Start: 2024-01-13 | End: 2024-01-19

## 2024-01-13 RX ORDER — DEXTROSE 50 % IN WATER 50 %
15 SYRINGE (ML) INTRAVENOUS ONCE
Refills: 0 | Status: DISCONTINUED | OUTPATIENT
Start: 2024-01-13 | End: 2024-01-19

## 2024-01-13 RX ORDER — SODIUM CHLORIDE 9 MG/ML
1000 INJECTION, SOLUTION INTRAVENOUS
Refills: 0 | Status: DISCONTINUED | OUTPATIENT
Start: 2024-01-13 | End: 2024-01-19

## 2024-01-13 RX ORDER — HYDRALAZINE HCL 50 MG
25 TABLET ORAL EVERY 8 HOURS
Refills: 0 | Status: DISCONTINUED | OUTPATIENT
Start: 2024-01-13 | End: 2024-01-13

## 2024-01-13 RX ORDER — DEXTROSE 50 % IN WATER 50 %
12.5 SYRINGE (ML) INTRAVENOUS ONCE
Refills: 0 | Status: DISCONTINUED | OUTPATIENT
Start: 2024-01-13 | End: 2024-01-19

## 2024-01-13 RX ORDER — HYDRALAZINE HCL 50 MG
5 TABLET ORAL ONCE
Refills: 0 | Status: COMPLETED | OUTPATIENT
Start: 2024-01-13 | End: 2024-01-13

## 2024-01-13 RX ADMIN — Medication 1000 MILLIGRAM(S): at 16:15

## 2024-01-13 RX ADMIN — CARVEDILOL PHOSPHATE 6.25 MILLIGRAM(S): 80 CAPSULE, EXTENDED RELEASE ORAL at 05:40

## 2024-01-13 RX ADMIN — HEPARIN SODIUM 5000 UNIT(S): 5000 INJECTION INTRAVENOUS; SUBCUTANEOUS at 17:47

## 2024-01-13 RX ADMIN — Medication 5 MILLIGRAM(S): at 08:57

## 2024-01-13 RX ADMIN — EZETIMIBE 10 MILLIGRAM(S): 10 TABLET ORAL at 21:24

## 2024-01-13 RX ADMIN — Medication 400 MILLIGRAM(S): at 15:15

## 2024-01-13 RX ADMIN — Medication 1 TABLET(S): at 06:12

## 2024-01-13 RX ADMIN — Medication 88 MICROGRAM(S): at 05:40

## 2024-01-13 RX ADMIN — ATORVASTATIN CALCIUM 80 MILLIGRAM(S): 80 TABLET, FILM COATED ORAL at 21:23

## 2024-01-13 RX ADMIN — Medication 3 MILLIGRAM(S): at 21:24

## 2024-01-13 RX ADMIN — Medication 25 MILLIGRAM(S): at 17:47

## 2024-01-13 RX ADMIN — CARVEDILOL PHOSPHATE 6.25 MILLIGRAM(S): 80 CAPSULE, EXTENDED RELEASE ORAL at 17:47

## 2024-01-13 RX ADMIN — PANTOPRAZOLE SODIUM 40 MILLIGRAM(S): 20 TABLET, DELAYED RELEASE ORAL at 05:40

## 2024-01-13 RX ADMIN — HEPARIN SODIUM 5000 UNIT(S): 5000 INJECTION INTRAVENOUS; SUBCUTANEOUS at 05:42

## 2024-01-13 RX ADMIN — Medication 81 MILLIGRAM(S): at 08:22

## 2024-01-13 RX ADMIN — ISOSORBIDE MONONITRATE 30 MILLIGRAM(S): 60 TABLET, EXTENDED RELEASE ORAL at 08:57

## 2024-01-13 RX ADMIN — CLOPIDOGREL BISULFATE 75 MILLIGRAM(S): 75 TABLET, FILM COATED ORAL at 08:22

## 2024-01-13 RX ADMIN — Medication 100 MILLIGRAM(S): at 08:22

## 2024-01-13 NOTE — PROGRESS NOTE ADULT - ASSESSMENT
A/P: 77yo F w/ CAD, CABG x2, HTN, DM, HTN and HLD with intermittent mid-sternal chest pain with left shoulder radiation down her arm, non exertional, no other associated concerning risk factors for ACS.  Here for stable angina.  EKG: NSR, LVH no acute ischemia changes.  Trop: 53  pro-BNP: 776

## 2024-01-13 NOTE — PATIENT PROFILE ADULT - FUNCTIONAL ASSESSMENT - BASIC MOBILITY 6.
4-calculated by average/Not able to assess (calculate score using [facility] averaging method)  4-calculated by average/Not able to assess (calculate score using [facility] averaging method)

## 2024-01-13 NOTE — PROGRESS NOTE ADULT - NS ATTEND AMEND GEN_ALL_CORE FT
-      78 F hx AS moderate to severe/mild to moderate AI on TTE 10/2022, (AV gradient on cath 10/2022 was 10 mmHg not indicative of severe AS).    CAD, CABG x2, HTN, DM, HTN and HLD with intermittent mid-sternal chest pain with left shoulder radiation down her arm, non exertional, no other associated concerning risk factors for ACS.  Here for nonexertional  left shoulder pain, L arm pain and chest pain over the last 4-5 da days. .   s/p LHC last subsequent to NSTEMI 10/26/2022 did not show culprit lesion to account for NSTEMI- medical management, normal LVEF on TTE, pt on DAPT, lipitor, zetia, and coreg, denies CP/SOB.  EKG: NSR, LVH no acute ischemia changes.  Trop: 53  pro-BNP: 776      Chest Pain  CAD  - Patient with h/o NSTEMI 10/22 s/p LHC that did not reveal a culprit lesion, medically managed.   - Chest pain resolved at this time.   - Troponin stable 52, 55.   - pro-BNP: 776  - the cause of low level cardiac Trops in patients with CKD is likely chronic myocardial injury  - Repeat echocardiogram pending.   - Continue aspirin, plavix, lipitor, and coreg.   - Strict BP control.   - Added Imdur for antianginal therapy.  - Plan for likely NST or LHC on Tuesday 01/16/23 pending echo and troponin trend.

## 2024-01-13 NOTE — PROGRESS NOTE ADULT - PROBLEM SELECTOR PLAN 1
- Chest pain resolved at this time.   - Troponins stable 52 -> 55.   - Repeat echocardiogram pending.   - Continue aspirin, plavix, lipitor, and coreg.   - Strict BP control.   - Plan for NST vs. C on Tuesday 01/16/23 pending echo and troponin trend.   - Added Imdur for antianginal therapy.

## 2024-01-13 NOTE — PATIENT PROFILE ADULT - FALL HARM RISK - UNIVERSAL INTERVENTIONS
Bed in lowest position, wheels locked, appropriate side rails in place/Call bell, personal items and telephone in reach/Instruct patient to call for assistance before getting out of bed or chair/Non-slip footwear when patient is out of bed/[place] to call system/Physically safe environment - no spills, clutter or unnecessary equipment/Purposeful Proactive Rounding/Room/bathroom lighting operational, light cord in reach Bed in lowest position, wheels locked, appropriate side rails in place/Call bell, personal items and telephone in reach/Instruct patient to call for assistance before getting out of bed or chair/Non-slip footwear when patient is out of bed/[place] to call system/Physically safe environment - no spills, clutter or unnecessary equipment/Purposeful Proactive Rounding/Room/bathroom lighting operational, light cord in reach

## 2024-01-13 NOTE — PROGRESS NOTE ADULT - SUBJECTIVE AND OBJECTIVE BOX
Patient is a 78y old  Female who presents with a chief complaint of Chest pain in setting of CAD (13 Jan 2024 07:55)    Chart reviewed   Pt is seen       ALLERGIES:  No Known Allergies    MEDICATIONS  (STANDING):  allopurinol 100 milliGRAM(s) Oral daily  aspirin enteric coated 81 milliGRAM(s) Oral daily  atorvastatin 80 milliGRAM(s) Oral at bedtime  carvedilol 6.25 milliGRAM(s) Oral every 12 hours  clopidogrel Tablet 75 milliGRAM(s) Oral daily  dextrose 5%. 1000 milliLiter(s) (100 mL/Hr) IV Continuous <Continuous>  dextrose 5%. 1000 milliLiter(s) (50 mL/Hr) IV Continuous <Continuous>  dextrose 50% Injectable 12.5 Gram(s) IV Push once  dextrose 50% Injectable 25 Gram(s) IV Push once  dextrose 50% Injectable 25 Gram(s) IV Push once  ergocalciferol 76334 Unit(s) Oral <User Schedule>  ezetimibe 10 milliGRAM(s) Oral at bedtime  glucagon  Injectable 1 milliGRAM(s) IntraMuscular once  heparin   Injectable 5000 Unit(s) SubCutaneous every 12 hours  hydrALAZINE 25 milliGRAM(s) Oral every 8 hours  hydrALAZINE Injectable 5 milliGRAM(s) IV Push once  insulin lispro (ADMELOG) corrective regimen sliding scale   SubCutaneous three times a day before meals  insulin lispro (ADMELOG) corrective regimen sliding scale   SubCutaneous at bedtime  isosorbide   mononitrate ER Tablet (IMDUR) 30 milliGRAM(s) Oral daily  levothyroxine 88 MICROGram(s) Oral daily  pantoprazole    Tablet 40 milliGRAM(s) Oral before breakfast  triamterene 37.5 mG/hydrochlorothiazide 25 mG Tablet 1 Tablet(s) Oral daily    MEDICATIONS  (PRN):  acetaminophen     Tablet .. 650 milliGRAM(s) Oral every 6 hours PRN Temp greater or equal to 38C (100.4F), Mild Pain (1 - 3), Moderate Pain (4 - 6)  dextrose Oral Gel 15 Gram(s) Oral once PRN Blood Glucose LESS THAN 70 milliGRAM(s)/deciliter  melatonin 3 milliGRAM(s) Oral at bedtime PRN Insomnia  nitroglycerin     SubLingual 0.4 milliGRAM(s) SubLingual every 5 minutes PRN Chest Pain  ondansetron Injectable 4 milliGRAM(s) IV Push every 6 hours PRN Nausea and/or Vomiting    Vital Signs Last 24 Hrs  T(F): 97.7 (13 Jan 2024 07:52), Max: 98 (12 Jan 2024 19:22)  HR: 61 (13 Jan 2024 07:52) (55 - 67)  BP: 175/70 (13 Jan 2024 07:52) (127/71 - 207/84)  RR: 18 (13 Jan 2024 07:52) (18 - 20)  SpO2: 97% (13 Jan 2024 07:52) (97% - 98%)  I&O's Summary      PHYSICAL EXAM:  General:   ENT:   Neck:   Lungs:   Cardio: RRR, S1/S2, No murmur  Abdomen: Soft, Nontender, Nondistended; Bowel sounds present  Extremities: No calf tenderness, No pitting edema    LABS:                        9.6    7.20  )-----------( 236      ( 12 Jan 2024 17:34 )             29.4     01-12    140  |  104  |  45.7  ----------------------------<  131  4.5   |  23.0  |  1.84    Ca    9.3      12 Jan 2024 17:34  Mg     1.9     01-12    TPro  5.9  /  Alb  3.5  /  TBili  <0.2  /  DBili  x   /  AST  27  /  ALT  20  /  AlkPhos  87  01-12          PT/INR - ( 13 Jan 2024 07:03 )   PT: 10.3 sec;   INR: 0.93 ratio         PTT - ( 13 Jan 2024 07:03 )  PTT:29.2 sec          01-13 Chol 131 mg/dL LDL -- HDL 62 mg/dL Trig 102 mg/dL              POCT Blood Glucose.: 122 mg/dL (13 Jan 2024 08:18)      Urinalysis Basic - ( 12 Jan 2024 17:34 )    Color: x / Appearance: x / SG: x / pH: x  Gluc: 131 mg/dL / Ketone: x  / Bili: x / Urobili: x   Blood: x / Protein: x / Nitrite: x   Leuk Esterase: x / RBC: x / WBC x   Sq Epi: x / Non Sq Epi: x / Bacteria: x          RADIOLOGY & ADDITIONAL TESTS:       Patient is a 78y old  Female who presents with a chief complaint of Chest pain in setting of CAD (13 Jan 2024 07:55)    Chart reviewed   Pt is seen       ALLERGIES:  No Known Allergies    MEDICATIONS  (STANDING):  allopurinol 100 milliGRAM(s) Oral daily  aspirin enteric coated 81 milliGRAM(s) Oral daily  atorvastatin 80 milliGRAM(s) Oral at bedtime  carvedilol 6.25 milliGRAM(s) Oral every 12 hours  clopidogrel Tablet 75 milliGRAM(s) Oral daily  dextrose 5%. 1000 milliLiter(s) (100 mL/Hr) IV Continuous <Continuous>  dextrose 5%. 1000 milliLiter(s) (50 mL/Hr) IV Continuous <Continuous>  dextrose 50% Injectable 12.5 Gram(s) IV Push once  dextrose 50% Injectable 25 Gram(s) IV Push once  dextrose 50% Injectable 25 Gram(s) IV Push once  ergocalciferol 16658 Unit(s) Oral <User Schedule>  ezetimibe 10 milliGRAM(s) Oral at bedtime  glucagon  Injectable 1 milliGRAM(s) IntraMuscular once  heparin   Injectable 5000 Unit(s) SubCutaneous every 12 hours  hydrALAZINE 25 milliGRAM(s) Oral every 8 hours  hydrALAZINE Injectable 5 milliGRAM(s) IV Push once  insulin lispro (ADMELOG) corrective regimen sliding scale   SubCutaneous three times a day before meals  insulin lispro (ADMELOG) corrective regimen sliding scale   SubCutaneous at bedtime  isosorbide   mononitrate ER Tablet (IMDUR) 30 milliGRAM(s) Oral daily  levothyroxine 88 MICROGram(s) Oral daily  pantoprazole    Tablet 40 milliGRAM(s) Oral before breakfast  triamterene 37.5 mG/hydrochlorothiazide 25 mG Tablet 1 Tablet(s) Oral daily    MEDICATIONS  (PRN):  acetaminophen     Tablet .. 650 milliGRAM(s) Oral every 6 hours PRN Temp greater or equal to 38C (100.4F), Mild Pain (1 - 3), Moderate Pain (4 - 6)  dextrose Oral Gel 15 Gram(s) Oral once PRN Blood Glucose LESS THAN 70 milliGRAM(s)/deciliter  melatonin 3 milliGRAM(s) Oral at bedtime PRN Insomnia  nitroglycerin     SubLingual 0.4 milliGRAM(s) SubLingual every 5 minutes PRN Chest Pain  ondansetron Injectable 4 milliGRAM(s) IV Push every 6 hours PRN Nausea and/or Vomiting    Vital Signs Last 24 Hrs  T(F): 97.7 (13 Jan 2024 07:52), Max: 98 (12 Jan 2024 19:22)  HR: 61 (13 Jan 2024 07:52) (55 - 67)  BP: 175/70 (13 Jan 2024 07:52) (127/71 - 207/84)  RR: 18 (13 Jan 2024 07:52) (18 - 20)  SpO2: 97% (13 Jan 2024 07:52) (97% - 98%)  I&O's Summary      PHYSICAL EXAM:  General:   ENT:   Neck:   Lungs:   Cardio: RRR, S1/S2, No murmur  Abdomen: Soft, Nontender, Nondistended; Bowel sounds present  Extremities: No calf tenderness, No pitting edema    LABS:                        9.6    7.20  )-----------( 236      ( 12 Jan 2024 17:34 )             29.4     01-12    140  |  104  |  45.7  ----------------------------<  131  4.5   |  23.0  |  1.84    Ca    9.3      12 Jan 2024 17:34  Mg     1.9     01-12    TPro  5.9  /  Alb  3.5  /  TBili  <0.2  /  DBili  x   /  AST  27  /  ALT  20  /  AlkPhos  87  01-12          PT/INR - ( 13 Jan 2024 07:03 )   PT: 10.3 sec;   INR: 0.93 ratio         PTT - ( 13 Jan 2024 07:03 )  PTT:29.2 sec          01-13 Chol 131 mg/dL LDL -- HDL 62 mg/dL Trig 102 mg/dL              POCT Blood Glucose.: 122 mg/dL (13 Jan 2024 08:18)      Urinalysis Basic - ( 12 Jan 2024 17:34 )    Color: x / Appearance: x / SG: x / pH: x  Gluc: 131 mg/dL / Ketone: x  / Bili: x / Urobili: x   Blood: x / Protein: x / Nitrite: x   Leuk Esterase: x / RBC: x / WBC x   Sq Epi: x / Non Sq Epi: x / Bacteria: x          RADIOLOGY & ADDITIONAL TESTS:       Patient is a 78y old  Female who presents with a chief complaint of Chest pain in setting of CAD (13 Jan 2024 07:55)    Chart reviewed   Pt is seen in am , has no CP   was c/o pain in the left shoulder scapula earlier in am     she told nurse later today that she was told has occlusion in carotid neck vessel       ALLERGIES:  No Known Allergies    MEDICATIONS  (STANDING):  allopurinol 100 milliGRAM(s) Oral daily  aspirin enteric coated 81 milliGRAM(s) Oral daily  atorvastatin 80 milliGRAM(s) Oral at bedtime  carvedilol 6.25 milliGRAM(s) Oral every 12 hours  clopidogrel Tablet 75 milliGRAM(s) Oral daily  dextrose 5%. 1000 milliLiter(s) (100 mL/Hr) IV Continuous <Continuous>  dextrose 5%. 1000 milliLiter(s) (50 mL/Hr) IV Continuous <Continuous>  dextrose 50% Injectable 12.5 Gram(s) IV Push once  dextrose 50% Injectable 25 Gram(s) IV Push once  dextrose 50% Injectable 25 Gram(s) IV Push once  ergocalciferol 17622 Unit(s) Oral <User Schedule>  ezetimibe 10 milliGRAM(s) Oral at bedtime  glucagon  Injectable 1 milliGRAM(s) IntraMuscular once  heparin   Injectable 5000 Unit(s) SubCutaneous every 12 hours  hydrALAZINE 25 milliGRAM(s) Oral every 8 hours  hydrALAZINE Injectable 5 milliGRAM(s) IV Push once  insulin lispro (ADMELOG) corrective regimen sliding scale   SubCutaneous three times a day before meals  insulin lispro (ADMELOG) corrective regimen sliding scale   SubCutaneous at bedtime  isosorbide   mononitrate ER Tablet (IMDUR) 30 milliGRAM(s) Oral daily  levothyroxine 88 MICROGram(s) Oral daily  pantoprazole    Tablet 40 milliGRAM(s) Oral before breakfast  triamterene 37.5 mG/hydrochlorothiazide 25 mG Tablet 1 Tablet(s) Oral daily    MEDICATIONS  (PRN):  acetaminophen     Tablet .. 650 milliGRAM(s) Oral every 6 hours PRN Temp greater or equal to 38C (100.4F), Mild Pain (1 - 3), Moderate Pain (4 - 6)  dextrose Oral Gel 15 Gram(s) Oral once PRN Blood Glucose LESS THAN 70 milliGRAM(s)/deciliter  melatonin 3 milliGRAM(s) Oral at bedtime PRN Insomnia  nitroglycerin     SubLingual 0.4 milliGRAM(s) SubLingual every 5 minutes PRN Chest Pain  ondansetron Injectable 4 milliGRAM(s) IV Push every 6 hours PRN Nausea and/or Vomiting    Vital Signs Last 24 Hrs  T(F): 97.7 (13 Jan 2024 07:52), Max: 98 (12 Jan 2024 19:22)  HR: 61 (13 Jan 2024 07:52) (55 - 67)  BP: 175/70 (13 Jan 2024 07:52) (127/71 - 207/84)  RR: 18 (13 Jan 2024 07:52) (18 - 20)  SpO2: 97% (13 Jan 2024 07:52) (97% - 98%)  I&O's Summary      PHYSICAL EXAM:  General: awake alert   Neck: supple ,.no JVD   Lungs: CTA bilateral   Cardio: RRR, S1/S2, No murmur  Abdomen: Soft, Nontender, Nondistended; Bowel sounds present  Extremities: No calf tenderness, No pitting edema  Skin : awake alert       LABS:                        9.6    7.20  )-----------( 236      ( 12 Jan 2024 17:34 )             29.4     01-12    140  |  104  |  45.7  ----------------------------<  131  4.5   |  23.0  |  1.84    Ca    9.3      12 Jan 2024 17:34  Mg     1.9     01-12    TPro  5.9  /  Alb  3.5  /  TBili  <0.2  /  DBili  x   /  AST  27  /  ALT  20  /  AlkPhos  87  01-12          PT/INR - ( 13 Jan 2024 07:03 )   PT: 10.3 sec;   INR: 0.93 ratio         PTT - ( 13 Jan 2024 07:03 )  PTT:29.2 sec          01-13 Chol 131 mg/dL LDL -- HDL 62 mg/dL Trig 102 mg/dL              POCT Blood Glucose.: 122 mg/dL (13 Jan 2024 08:18)      Urinalysis Basic - ( 12 Jan 2024 17:34 )    Color: x / Appearance: x / SG: x / pH: x  Gluc: 131 mg/dL / Ketone: x  / Bili: x / Urobili: x   Blood: x / Protein: x / Nitrite: x   Leuk Esterase: x / RBC: x / WBC x   Sq Epi: x / Non Sq Epi: x / Bacteria: x          RADIOLOGY & ADDITIONAL TESTS:       Patient is a 78y old  Female who presents with a chief complaint of Chest pain in setting of CAD (13 Jan 2024 07:55)    Chart reviewed   Pt is seen in am , has no CP   was c/o pain in the left shoulder scapula earlier in am     she told nurse later today that she was told has occlusion in carotid neck vessel       ALLERGIES:  No Known Allergies    MEDICATIONS  (STANDING):  allopurinol 100 milliGRAM(s) Oral daily  aspirin enteric coated 81 milliGRAM(s) Oral daily  atorvastatin 80 milliGRAM(s) Oral at bedtime  carvedilol 6.25 milliGRAM(s) Oral every 12 hours  clopidogrel Tablet 75 milliGRAM(s) Oral daily  dextrose 5%. 1000 milliLiter(s) (100 mL/Hr) IV Continuous <Continuous>  dextrose 5%. 1000 milliLiter(s) (50 mL/Hr) IV Continuous <Continuous>  dextrose 50% Injectable 12.5 Gram(s) IV Push once  dextrose 50% Injectable 25 Gram(s) IV Push once  dextrose 50% Injectable 25 Gram(s) IV Push once  ergocalciferol 24035 Unit(s) Oral <User Schedule>  ezetimibe 10 milliGRAM(s) Oral at bedtime  glucagon  Injectable 1 milliGRAM(s) IntraMuscular once  heparin   Injectable 5000 Unit(s) SubCutaneous every 12 hours  hydrALAZINE 25 milliGRAM(s) Oral every 8 hours  hydrALAZINE Injectable 5 milliGRAM(s) IV Push once  insulin lispro (ADMELOG) corrective regimen sliding scale   SubCutaneous three times a day before meals  insulin lispro (ADMELOG) corrective regimen sliding scale   SubCutaneous at bedtime  isosorbide   mononitrate ER Tablet (IMDUR) 30 milliGRAM(s) Oral daily  levothyroxine 88 MICROGram(s) Oral daily  pantoprazole    Tablet 40 milliGRAM(s) Oral before breakfast  triamterene 37.5 mG/hydrochlorothiazide 25 mG Tablet 1 Tablet(s) Oral daily    MEDICATIONS  (PRN):  acetaminophen     Tablet .. 650 milliGRAM(s) Oral every 6 hours PRN Temp greater or equal to 38C (100.4F), Mild Pain (1 - 3), Moderate Pain (4 - 6)  dextrose Oral Gel 15 Gram(s) Oral once PRN Blood Glucose LESS THAN 70 milliGRAM(s)/deciliter  melatonin 3 milliGRAM(s) Oral at bedtime PRN Insomnia  nitroglycerin     SubLingual 0.4 milliGRAM(s) SubLingual every 5 minutes PRN Chest Pain  ondansetron Injectable 4 milliGRAM(s) IV Push every 6 hours PRN Nausea and/or Vomiting    Vital Signs Last 24 Hrs  T(F): 97.7 (13 Jan 2024 07:52), Max: 98 (12 Jan 2024 19:22)  HR: 61 (13 Jan 2024 07:52) (55 - 67)  BP: 175/70 (13 Jan 2024 07:52) (127/71 - 207/84)  RR: 18 (13 Jan 2024 07:52) (18 - 20)  SpO2: 97% (13 Jan 2024 07:52) (97% - 98%)  I&O's Summary      PHYSICAL EXAM:  General: awake alert   Neck: supple ,.no JVD   Lungs: CTA bilateral   Cardio: RRR, S1/S2, No murmur  Abdomen: Soft, Nontender, Nondistended; Bowel sounds present  Extremities: No calf tenderness, No pitting edema  Skin : awake alert       LABS:                        9.6    7.20  )-----------( 236      ( 12 Jan 2024 17:34 )             29.4     01-12    140  |  104  |  45.7  ----------------------------<  131  4.5   |  23.0  |  1.84    Ca    9.3      12 Jan 2024 17:34  Mg     1.9     01-12    TPro  5.9  /  Alb  3.5  /  TBili  <0.2  /  DBili  x   /  AST  27  /  ALT  20  /  AlkPhos  87  01-12          PT/INR - ( 13 Jan 2024 07:03 )   PT: 10.3 sec;   INR: 0.93 ratio         PTT - ( 13 Jan 2024 07:03 )  PTT:29.2 sec          01-13 Chol 131 mg/dL LDL -- HDL 62 mg/dL Trig 102 mg/dL              POCT Blood Glucose.: 122 mg/dL (13 Jan 2024 08:18)      Urinalysis Basic - ( 12 Jan 2024 17:34 )    Color: x / Appearance: x / SG: x / pH: x  Gluc: 131 mg/dL / Ketone: x  / Bili: x / Urobili: x   Blood: x / Protein: x / Nitrite: x   Leuk Esterase: x / RBC: x / WBC x   Sq Epi: x / Non Sq Epi: x / Bacteria: x          RADIOLOGY & ADDITIONAL TESTS:

## 2024-01-13 NOTE — PROGRESS NOTE ADULT - ASSESSMENT
78yoF hx AS, CAD s/p PCI, CABG (1999), HTN, DM, CKD, NSTEMI in 10/2022 s/p C showing chronic total occlusion of mLAD with patent FILIPPO graft and no culprit lesion, presenting with few days of intermittent, radiating midsternal chest pain      1- Left arm shoulder pain   r/o chest pain in setting of CAD  -Elevated troponin x 2, no significant delta change on repeat, may be related to CKD  -Seen by cardiology, tentative plan for NST on Tuesday '  cont  ASA, Plavix, B-blocker, statin, zetia  tte   cont tele   bp control   us of carotid r/o stenosis ( reported history )     2- HTN   uncontrolled   -Carvedilol 6.25mg BID  -Triamterene-HCTZ 37.5-25mg daily  added imdur and hydralzine by cardio  monitor avoid sudden BP drops  monitor     3-Hx DM  -Hold PO agents, start ISS    4-Hx CKD stage 3b likely   -Admission Cr around baseline of 1.6-1.8. monitor    5- chronic anemia  Due to CKD, Hgb around baseline of 9-10, monitor  check iron studies , vit b12 , folate     6-Hx gout  -Allopurinol 100mg daily    7-Hx hypothyroidism  -Levothyroxine  check TSH     Prophylactic measure  -Heparin 5000 units BID

## 2024-01-13 NOTE — PROGRESS NOTE ADULT - SUBJECTIVE AND OBJECTIVE BOX
[facility]                                                         CARDIOLOGY AT [facility]                                                                  [address] [facility], [facility] [place] [address]                                                         Telephone: [phone]. Fax:[phone]                                                                             PROGRESS NOTE    Reason for follow up: CAD, AS  Update: Patient states her chest pain has resolved, but still with some left arm pain.       Review of symptoms:   Cardiac:  No chest pain. No dyspnea. No palpitations.  Respiratory: no cough. No dyspnea  Gastrointestinal: No diarrhea. No abdominal pain. No bleeding.   Neuro: No focal neuro complaints.    Vitals:  T(C): 36.5 (01-13-24 @ 07:52), Max: 36.7 (01-12-24 @ 19:22)  HR: 61 (01-13-24 @ 07:52) (55 - 67)  BP: 175/70 (01-13-24 @ 07:52) (127/71 - 207/84)  RR: 18 (01-13-24 @ 07:52) (18 - 20)  SpO2: 97% (01-13-24 @ 07:52) (97% - 98%)  Wt(kg): --  I&O's Summary    Weight (kg): 54.3 (01-12 @ 14:31)    PHYSICAL EXAM:  Appearance: Comfortable. No acute distress  HEENT:  Atraumatic. Normocephalic.  Normal oral mucosa  Neurologic: A & O x 3, no gross focal deficits.  Cardiovascular: RRR S1 S2, No murmur, no rubs/gallops. No JVD  Respiratory: Lungs clear to auscultation, unlabored   Gastrointestinal:  Soft, Non-tender, + BS  Lower Extremities: 2+ Peripheral Pulses, No clubbing, cyanosis, or edema  Psychiatry: Patient is calm. No agitation.   Skin: warm and dry.    CURRENT CARDIAC MEDICATIONS:  carvedilol 6.25 milliGRAM(s) Oral every 12 hours  nitroglycerin     SubLingual 0.4 milliGRAM(s) SubLingual every 5 minutes PRN  triamterene 37.5 mG/hydrochlorothiazide 25 mG Tablet 1 Tablet(s) Oral daily      CURRENT OTHER MEDICATIONS:  acetaminophen     Tablet .. 650 milliGRAM(s) Oral every 6 hours PRN Temp greater or equal to 38C (100.4F), Mild Pain (1 - 3), Moderate Pain (4 - 6)  melatonin 3 milliGRAM(s) Oral at bedtime PRN Insomnia  ondansetron Injectable 4 milliGRAM(s) IV Push every 6 hours PRN Nausea and/or Vomiting  pantoprazole    Tablet 40 milliGRAM(s) Oral before breakfast  allopurinol 100 milliGRAM(s) Oral daily  atorvastatin 80 milliGRAM(s) Oral at bedtime  dextrose 50% Injectable 12.5 Gram(s) IV Push once, Stop order after: 1 Doses  dextrose 50% Injectable 25 Gram(s) IV Push once, Stop order after: 1 Doses  dextrose 50% Injectable 25 Gram(s) IV Push once, Stop order after: 1 Doses  dextrose Oral Gel 15 Gram(s) Oral once, Stop order after: 1 Doses PRN Blood Glucose LESS THAN 70 milliGRAM(s)/deciliter  ezetimibe 10 milliGRAM(s) Oral at bedtime  glucagon  Injectable 1 milliGRAM(s) IntraMuscular once, Stop order after: 1 Doses  insulin lispro (ADMELOG) corrective regimen sliding scale   SubCutaneous three times a day before meals  insulin lispro (ADMELOG) corrective regimen sliding scale   SubCutaneous at bedtime  levothyroxine 88 MICROGram(s) Oral daily  aspirin enteric coated 81 milliGRAM(s) Oral daily  clopidogrel Tablet 75 milliGRAM(s) Oral daily  dextrose 5%. 1000 milliLiter(s) (100 mL/Hr) IV Continuous <Continuous>  dextrose 5%. 1000 milliLiter(s) (50 mL/Hr) IV Continuous <Continuous>  ergocalciferol 60179 Unit(s) Oral <User Schedule>  heparin   Injectable 5000 Unit(s) SubCutaneous every 12 hours      LABS:	 	                            9.6    7.20  )-----------( 236      ( 12 Jan 2024 17:34 )             29.4     01-12    140  |  104  |  45.7<H>  ----------------------------<  131<H>  4.5   |  23.0  |  1.84<H>    Ca    9.3      12 Jan 2024 17:34  Mg     1.9     01-12    TPro  5.9<L>  /  Alb  3.5  /  TBili  <0.2<L>  /  DBili  x   /  AST  27  /  ALT  20  /  AlkPhos  87  01-12      Lipid Profile:   HgA1c:   TSH:     TELEMETRY: SR, SB  ECG:    DIAGNOSTIC TESTING:  [ ] Echocardiogram:     < from: TTE Echo Complete w/ Contrast w/ Doppler (10.26.22 @ 09:24) >  PHYSICIAN INTERPRETATION:  Left Ventricle: The left ventricular internal cavity size is normal.  Global LV systolic function was normal. Left ventricular ejection   fraction, by visual estimation, is 60 to 65%. Spectral Doppler shows   impaired relaxation pattern of left ventricular myocardial filling (Grade   I diastolic dysfunction).      LV Wall Scoring:  The basal inferior segment is akinetic.    Right Ventricle: The right ventricular size is normal. RV systolic   function is normal.  Left Atrium: Normal left atrial size.  Right Atrium: Normal right atrial size.  Pericardium: There is no evidence of pericardial effusion.  Mitral Valve: The mitral valve is normal in structure. Moderate   thickening and calcification of the anterior and posterior mitral valve   leaflets. There is moderate mitral annular calcification. Trace mitral   valve regurgitation is seen.  Tricuspid Valve: The tricuspid valve is normal in structure. Mild   tricuspid regurgitation is visualized.  Aortic Valve: Moderate to severe aortic stenosis is present. Mild to   moderate aortic valve regurgitation is seen. The Dimesionless Index value   is 0.32.  Pulmonic Valve: Structurally normal pulmonic valve, with normal leaflet   excursion. Trace pulmonic valve regurgitation.  Aorta: The aortic root is normal in size and structure.  Pulmonary Artery: The main pulmonary artery is normal in size.  Venous: The inferior vena cava was normal sized, with respiratory size   variation greater than 50%.      Summary:   1. Left ventricular ejection fraction, by visual estimation, is 60 to   65%.   2. Normal global left ventricular systolic function.   3. Basal inferior segment is abnormal as described above.   4. Spectral Doppler shows impaired relaxation pattern of left   ventricular myocardial filling (Grade I diastolic dysfunction).   5. Normal left atrial size.   6. Normal right atrial size.   7. Moderate mitral annular calcification.   8. Moderate thickening and calcification of the anterior and posterior   mitral valve leaflets.   9. Trace mitral valve regurgitation.  10. Mild tricuspid regurgitation.  11. Mild to moderate aortic regurgitation.  12. Moderate to severe aortic valve stenosis.  13. The Dimesionless Index value is 0.32.    MD Waldemar Electronically signed on 10/26/2022 at 10:16:15 AM    < end of copied text >    [ ]  Catheterization:    < from: Cardiac Catheterization (10.26.22 @ 14:51) >  Diagnostic Findings:     Coronary Angiography   The coronary circulation is right dominant.      LM   Left main artery: Angiography shows no disease. very short left main.  FABIAN Flow 3.    LAD   Proximal left anterior descending: There is in-stent restenosis. 70%.      CX   Circumflex: Angiography shows no disease. FABIAN Flow 3.      RCA   Proximal right coronary artery: There is a 50 % De Mallory stenosis. FABIAN  Flow 3.    Graft Angiography   LIMA graft to Mid left anterior descending: Angiography shows no  disease. Patent, backfills into diagonal.  Gastroepiploic graft to Right posterior descending artery: The vessel  was not visualized. Angiography shows complete  occlusion. There is assumed to be a graft from thegastroepiploic to  the rPDA given direction of surgical clips, however rPDA  remains open. .    Free radial graft to Mid ramus intermedius: Angiography shows no  disease. This graft was seen back filling from the distal LAD  through collaterals of the native high lateral vessel. Did not search  from graft origin due to prohibitive creatinine. Assume it to be  a free radial as the patient did not have any veins harvested..      Left Heart Cath   The left ventricular end diastolic pressure was 21 mmHg.      < end of copied text >  [ ] Stress Test:    OTHER: 	                                                                [facility]                                                         CARDIOLOGY AT [facility]                                                                  [address] [facility], [id number] [place] [address]                                                         Telephone: [phone]. Fax:[phone]                                                                             PROGRESS NOTE    Reason for follow up: CAD, AS  Update: Patient states her chest pain has resolved, but still with some left arm pain.       Review of symptoms:   Cardiac:  No chest pain. No dyspnea. No palpitations.  Respiratory: no cough. No dyspnea  Gastrointestinal: No diarrhea. No abdominal pain. No bleeding.   Neuro: No focal neuro complaints.    Vitals:  T(C): 36.5 (01-13-24 @ 07:52), Max: 36.7 (01-12-24 @ 19:22)  HR: 61 (01-13-24 @ 07:52) (55 - 67)  BP: 175/70 (01-13-24 @ 07:52) (127/71 - 207/84)  RR: 18 (01-13-24 @ 07:52) (18 - 20)  SpO2: 97% (01-13-24 @ 07:52) (97% - 98%)  Wt(kg): --  I&O's Summary    Weight (kg): 54.3 (01-12 @ 14:31)    PHYSICAL EXAM:  Appearance: Comfortable. No acute distress  HEENT:  Atraumatic. Normocephalic.  Normal oral mucosa  Neurologic: A & O x 3, no gross focal deficits.  Cardiovascular: RRR S1 S2, No murmur, no rubs/gallops. No JVD  Respiratory: Lungs clear to auscultation, unlabored   Gastrointestinal:  Soft, Non-tender, + BS  Lower Extremities: 2+ Peripheral Pulses, No clubbing, cyanosis, or edema  Psychiatry: Patient is calm. No agitation.   Skin: warm and dry.    CURRENT CARDIAC MEDICATIONS:  carvedilol 6.25 milliGRAM(s) Oral every 12 hours  nitroglycerin     SubLingual 0.4 milliGRAM(s) SubLingual every 5 minutes PRN  triamterene 37.5 mG/hydrochlorothiazide 25 mG Tablet 1 Tablet(s) Oral daily      CURRENT OTHER MEDICATIONS:  acetaminophen     Tablet .. 650 milliGRAM(s) Oral every 6 hours PRN Temp greater or equal to 38C (100.4F), Mild Pain (1 - 3), Moderate Pain (4 - 6)  melatonin 3 milliGRAM(s) Oral at bedtime PRN Insomnia  ondansetron Injectable 4 milliGRAM(s) IV Push every 6 hours PRN Nausea and/or Vomiting  pantoprazole    Tablet 40 milliGRAM(s) Oral before breakfast  allopurinol 100 milliGRAM(s) Oral daily  atorvastatin 80 milliGRAM(s) Oral at bedtime  dextrose 50% Injectable 12.5 Gram(s) IV Push once, Stop order after: 1 Doses  dextrose 50% Injectable 25 Gram(s) IV Push once, Stop order after: 1 Doses  dextrose 50% Injectable 25 Gram(s) IV Push once, Stop order after: 1 Doses  dextrose Oral Gel 15 Gram(s) Oral once, Stop order after: 1 Doses PRN Blood Glucose LESS THAN 70 milliGRAM(s)/deciliter  ezetimibe 10 milliGRAM(s) Oral at bedtime  glucagon  Injectable 1 milliGRAM(s) IntraMuscular once, Stop order after: 1 Doses  insulin lispro (ADMELOG) corrective regimen sliding scale   SubCutaneous three times a day before meals  insulin lispro (ADMELOG) corrective regimen sliding scale   SubCutaneous at bedtime  levothyroxine 88 MICROGram(s) Oral daily  aspirin enteric coated 81 milliGRAM(s) Oral daily  clopidogrel Tablet 75 milliGRAM(s) Oral daily  dextrose 5%. 1000 milliLiter(s) (100 mL/Hr) IV Continuous <Continuous>  dextrose 5%. 1000 milliLiter(s) (50 mL/Hr) IV Continuous <Continuous>  ergocalciferol 79209 Unit(s) Oral <User Schedule>  heparin   Injectable 5000 Unit(s) SubCutaneous every 12 hours      LABS:	 	                            9.6    7.20  )-----------( 236      ( 12 Jan 2024 17:34 )             29.4     01-12    140  |  104  |  45.7<H>  ----------------------------<  131<H>  4.5   |  23.0  |  1.84<H>    Ca    9.3      12 Jan 2024 17:34  Mg     1.9     01-12    TPro  5.9<L>  /  Alb  3.5  /  TBili  <0.2<L>  /  DBili  x   /  AST  27  /  ALT  20  /  AlkPhos  87  01-12      Lipid Profile:   HgA1c:   TSH:     TELEMETRY: SR, SB  ECG:    DIAGNOSTIC TESTING:  [ ] Echocardiogram:     < from: TTE Echo Complete w/ Contrast w/ Doppler (10.26.22 @ 09:24) >  PHYSICIAN INTERPRETATION:  Left Ventricle: The left ventricular internal cavity size is normal.  Global LV systolic function was normal. Left ventricular ejection   fraction, by visual estimation, is 60 to 65%. Spectral Doppler shows   impaired relaxation pattern of left ventricular myocardial filling (Grade   I diastolic dysfunction).      LV Wall Scoring:  The basal inferior segment is akinetic.    Right Ventricle: The right ventricular size is normal. RV systolic   function is normal.  Left Atrium: Normal left atrial size.  Right Atrium: Normal right atrial size.  Pericardium: There is no evidence of pericardial effusion.  Mitral Valve: The mitral valve is normal in structure. Moderate   thickening and calcification of the anterior and posterior mitral valve   leaflets. There is moderate mitral annular calcification. Trace mitral   valve regurgitation is seen.  Tricuspid Valve: The tricuspid valve is normal in structure. Mild   tricuspid regurgitation is visualized.  Aortic Valve: Moderate to severe aortic stenosis is present. Mild to   moderate aortic valve regurgitation is seen. The Dimesionless Index value   is 0.32.  Pulmonic Valve: Structurally normal pulmonic valve, with normal leaflet   excursion. Trace pulmonic valve regurgitation.  Aorta: The aortic root is normal in size and structure.  Pulmonary Artery: The main pulmonary artery is normal in size.  Venous: The inferior vena cava was normal sized, with respiratory size   variation greater than 50%.      Summary:   1. Left ventricular ejection fraction, by visual estimation, is 60 to   65%.   2. Normal global left ventricular systolic function.   3. Basal inferior segment is abnormal as described above.   4. Spectral Doppler shows impaired relaxation pattern of left   ventricular myocardial filling (Grade I diastolic dysfunction).   5. Normal left atrial size.   6. Normal right atrial size.   7. Moderate mitral annular calcification.   8. Moderate thickening and calcification of the anterior and posterior   mitral valve leaflets.   9. Trace mitral valve regurgitation.  10. Mild tricuspid regurgitation.  11. Mild to moderate aortic regurgitation.  12. Moderate to severe aortic valve stenosis.  13. The Dimesionless Index value is 0.32.    MD Waldemar Electronically signed on 10/26/2022 at 10:16:15 AM    < end of copied text >    [ ]  Catheterization:    < from: Cardiac Catheterization (10.26.22 @ 14:51) >  Diagnostic Findings:     Coronary Angiography   The coronary circulation is right dominant.      LM   Left main artery: Angiography shows no disease. very short left main.  FABIAN Flow 3.    LAD   Proximal left anterior descending: There is in-stent restenosis. 70%.      CX   Circumflex: Angiography shows no disease. FABIAN Flow 3.      RCA   Proximal right coronary artery: There is a 50 % De Mallory stenosis. FABIAN  Flow 3.    Graft Angiography   LIMA graft to Mid left anterior descending: Angiography shows no  disease. Patent, backfills into diagonal.  Gastroepiploic graft to Right posterior descending artery: The vessel  was not visualized. Angiography shows complete  occlusion. There is assumed to be a graft from thegastroepiploic to  the rPDA given direction of surgical clips, however rPDA  remains open. .    Free radial graft to Mid ramus intermedius: Angiography shows no  disease. This graft was seen back filling from the distal LAD  through collaterals of the native high lateral vessel. Did not search  from graft origin due to prohibitive creatinine. Assume it to be  a free radial as the patient did not have any veins harvested..      Left Heart Cath   The left ventricular end diastolic pressure was 21 mmHg.      < end of copied text >  [ ] Stress Test:    OTHER:

## 2024-01-14 DIAGNOSIS — I35.0 NONRHEUMATIC AORTIC (VALVE) STENOSIS: ICD-10-CM

## 2024-01-14 DIAGNOSIS — I25.10 ATHEROSCLEROTIC HEART DISEASE OF NATIVE CORONARY ARTERY WITHOUT ANGINA PECTORIS: ICD-10-CM

## 2024-01-14 DIAGNOSIS — N28.9 DISORDER OF KIDNEY AND URETER, UNSPECIFIED: ICD-10-CM

## 2024-01-14 LAB
ANION GAP SERPL CALC-SCNC: 14 MMOL/L — SIGNIFICANT CHANGE UP (ref 5–17)
ANION GAP SERPL CALC-SCNC: 14 MMOL/L — SIGNIFICANT CHANGE UP (ref 5–17)
BUN SERPL-MCNC: 41.7 MG/DL — HIGH (ref 8–20)
BUN SERPL-MCNC: 41.7 MG/DL — HIGH (ref 8–20)
CALCIUM SERPL-MCNC: 9.9 MG/DL — SIGNIFICANT CHANGE UP (ref 8.4–10.5)
CALCIUM SERPL-MCNC: 9.9 MG/DL — SIGNIFICANT CHANGE UP (ref 8.4–10.5)
CHLORIDE SERPL-SCNC: 101 MMOL/L — SIGNIFICANT CHANGE UP (ref 96–108)
CHLORIDE SERPL-SCNC: 101 MMOL/L — SIGNIFICANT CHANGE UP (ref 96–108)
CO2 SERPL-SCNC: 21 MMOL/L — LOW (ref 22–29)
CO2 SERPL-SCNC: 21 MMOL/L — LOW (ref 22–29)
CREAT SERPL-MCNC: 1.76 MG/DL — HIGH (ref 0.5–1.3)
CREAT SERPL-MCNC: 1.76 MG/DL — HIGH (ref 0.5–1.3)
EGFR: 29 ML/MIN/1.73M2 — LOW
EGFR: 29 ML/MIN/1.73M2 — LOW
GLUCOSE BLDC GLUCOMTR-MCNC: 105 MG/DL — HIGH (ref 70–99)
GLUCOSE BLDC GLUCOMTR-MCNC: 105 MG/DL — HIGH (ref 70–99)
GLUCOSE BLDC GLUCOMTR-MCNC: 119 MG/DL — HIGH (ref 70–99)
GLUCOSE BLDC GLUCOMTR-MCNC: 119 MG/DL — HIGH (ref 70–99)
GLUCOSE BLDC GLUCOMTR-MCNC: 128 MG/DL — HIGH (ref 70–99)
GLUCOSE SERPL-MCNC: 129 MG/DL — HIGH (ref 70–99)
GLUCOSE SERPL-MCNC: 129 MG/DL — HIGH (ref 70–99)
POTASSIUM SERPL-MCNC: 4.5 MMOL/L — SIGNIFICANT CHANGE UP (ref 3.5–5.3)
POTASSIUM SERPL-MCNC: 4.5 MMOL/L — SIGNIFICANT CHANGE UP (ref 3.5–5.3)
POTASSIUM SERPL-SCNC: 4.5 MMOL/L — SIGNIFICANT CHANGE UP (ref 3.5–5.3)
POTASSIUM SERPL-SCNC: 4.5 MMOL/L — SIGNIFICANT CHANGE UP (ref 3.5–5.3)
SODIUM SERPL-SCNC: 136 MMOL/L — SIGNIFICANT CHANGE UP (ref 135–145)
SODIUM SERPL-SCNC: 136 MMOL/L — SIGNIFICANT CHANGE UP (ref 135–145)
TROPONIN T, HIGH SENSITIVITY RESULT: 69 NG/L — HIGH (ref 0–51)
TROPONIN T, HIGH SENSITIVITY RESULT: 69 NG/L — HIGH (ref 0–51)

## 2024-01-14 PROCEDURE — 93010 ELECTROCARDIOGRAM REPORT: CPT

## 2024-01-14 PROCEDURE — 99232 SBSQ HOSP IP/OBS MODERATE 35: CPT

## 2024-01-14 PROCEDURE — 93880 EXTRACRANIAL BILAT STUDY: CPT | Mod: 26

## 2024-01-14 PROCEDURE — 99233 SBSQ HOSP IP/OBS HIGH 50: CPT

## 2024-01-14 RX ORDER — SODIUM CHLORIDE 9 MG/ML
1000 INJECTION, SOLUTION INTRAVENOUS
Refills: 0 | Status: DISCONTINUED | OUTPATIENT
Start: 2024-01-14 | End: 2024-01-15

## 2024-01-14 RX ORDER — TRAMADOL HYDROCHLORIDE 50 MG/1
25 TABLET ORAL EVERY 6 HOURS
Refills: 0 | Status: DISCONTINUED | OUTPATIENT
Start: 2024-01-14 | End: 2024-01-19

## 2024-01-14 RX ORDER — TRAMADOL HYDROCHLORIDE 50 MG/1
50 TABLET ORAL EVERY 6 HOURS
Refills: 0 | Status: DISCONTINUED | OUTPATIENT
Start: 2024-01-14 | End: 2024-01-17

## 2024-01-14 RX ADMIN — TRAMADOL HYDROCHLORIDE 25 MILLIGRAM(S): 50 TABLET ORAL at 11:28

## 2024-01-14 RX ADMIN — Medication 25 MILLIGRAM(S): at 17:24

## 2024-01-14 RX ADMIN — ISOSORBIDE MONONITRATE 30 MILLIGRAM(S): 60 TABLET, EXTENDED RELEASE ORAL at 10:57

## 2024-01-14 RX ADMIN — CLOPIDOGREL BISULFATE 75 MILLIGRAM(S): 75 TABLET, FILM COATED ORAL at 11:28

## 2024-01-14 RX ADMIN — SODIUM CHLORIDE 75 MILLILITER(S): 9 INJECTION, SOLUTION INTRAVENOUS at 21:40

## 2024-01-14 RX ADMIN — HEPARIN SODIUM 5000 UNIT(S): 5000 INJECTION INTRAVENOUS; SUBCUTANEOUS at 17:24

## 2024-01-14 RX ADMIN — SODIUM CHLORIDE 75 MILLILITER(S): 9 INJECTION, SOLUTION INTRAVENOUS at 11:28

## 2024-01-14 RX ADMIN — CARVEDILOL PHOSPHATE 6.25 MILLIGRAM(S): 80 CAPSULE, EXTENDED RELEASE ORAL at 17:24

## 2024-01-14 RX ADMIN — Medication 100 MILLIGRAM(S): at 11:28

## 2024-01-14 RX ADMIN — Medication 88 MICROGRAM(S): at 05:26

## 2024-01-14 RX ADMIN — Medication 0.4 MILLIGRAM(S): at 08:26

## 2024-01-14 RX ADMIN — Medication 1 TABLET(S): at 05:27

## 2024-01-14 RX ADMIN — CARVEDILOL PHOSPHATE 6.25 MILLIGRAM(S): 80 CAPSULE, EXTENDED RELEASE ORAL at 05:27

## 2024-01-14 RX ADMIN — Medication 25 MILLIGRAM(S): at 05:26

## 2024-01-14 RX ADMIN — Medication 81 MILLIGRAM(S): at 11:28

## 2024-01-14 RX ADMIN — EZETIMIBE 10 MILLIGRAM(S): 10 TABLET ORAL at 21:39

## 2024-01-14 RX ADMIN — HEPARIN SODIUM 5000 UNIT(S): 5000 INJECTION INTRAVENOUS; SUBCUTANEOUS at 05:27

## 2024-01-14 RX ADMIN — ATORVASTATIN CALCIUM 80 MILLIGRAM(S): 80 TABLET, FILM COATED ORAL at 21:39

## 2024-01-14 RX ADMIN — PANTOPRAZOLE SODIUM 40 MILLIGRAM(S): 20 TABLET, DELAYED RELEASE ORAL at 05:27

## 2024-01-14 RX ADMIN — TRAMADOL HYDROCHLORIDE 25 MILLIGRAM(S): 50 TABLET ORAL at 12:28

## 2024-01-14 NOTE — PROGRESS NOTE ADULT - ASSESSMENT
78yoF hx AS, CAD s/p PCI, CABG (1999), HTN, DM, CKD, NSTEMI in 10/2022 s/p C showing chronic total occlusion of mLAD with patent FILIPPO graft and no culprit lesion, presenting with few days of intermittent, radiating midsternal chest pain      1- Left arm shoulder pain   r/o chest pain in setting of CAD  described as sharp pain that shoots down arm, accompanied by numbness, ?nerve impingement  -Elevated troponin x 2, no significant delta change on repeat, may be related to CKD  -Seen by cardiology, tentative plan for NST on Tuesday '  cont  ASA, Plavix, B-blocker, statin, zetia  tte - reviewed, EF WNL  cont tele   bp control   us of carotid r/o stenosis ( reported history ) - performed, awaiting results  Low dose tramadol for pain along w/ imdur      2- HTN   improving  -Carvedilol 6.25mg BID  -Triamterene-HCTZ 37.5-25mg daily  c/w imdur and hydralzine  monitor avoid sudden BP drops  monitor     3-Hx DM  -Hold PO agents  -ISS and FSG QAC and HS    4-Hx CKD stage 3b likely   -Admission Cr around baseline of 1.6-1.8  -Gentle IVF for 16 hours, monitor Cr    5- chronic anemia  Due to CKD, Hgb around baseline of 9-10, monitor  iron studies , vit b12 , folate reviewed - wnl    6-Hx gout  -Allopurinol 100mg daily    7-Hx hypothyroidism  -Levothyroxine  check TSH     Prophylactic measure  -Heparin 5000 units BID

## 2024-01-14 NOTE — PROGRESS NOTE ADULT - ASSESSMENT
77yo F w/ CAD, CABG x2,  10/26/22  s/p Non stemii (cp rad back)She underwent LHC which showed  of mLAD with patent LIMA, no significant RCA dz, patent graft to high lateral vessel seen via collateral from dLAD, no culprit lesion to account for NSTEMI, Moderate to severe AS,  HTN, DM, HTN and HLD with intermittent mid-sternal chest pain with left shoulder radiation down her arm, non exertional, no other associated concerning risk factors for ACS.  Here for stable angina.  EKG: NSR, LVH no acute ischemia changes.  Trop: 53  pro-BNP: 776

## 2024-01-14 NOTE — PROGRESS NOTE ADULT - SUBJECTIVE AND OBJECTIVE BOX
Hospitalist Progress Note    Chief Complaint:  Chest Pain in setting of CAD    SUBJECTIVE / OVERNIGHT EVENTS:  Complaining of intermittent back pain and L arm sharp pain/numbness overnight worse when laying down on arm. Patient denies SOB, abd pain, N/V, fever, chills, dysuria or any other complaints. All remainder ROS negative.     MEDICATIONS  (STANDING):  allopurinol 100 milliGRAM(s) Oral daily  aspirin enteric coated 81 milliGRAM(s) Oral daily  atorvastatin 80 milliGRAM(s) Oral at bedtime  carvedilol 6.25 milliGRAM(s) Oral every 12 hours  clopidogrel Tablet 75 milliGRAM(s) Oral daily  dextrose 5%. 1000 milliLiter(s) (100 mL/Hr) IV Continuous <Continuous>  dextrose 5%. 1000 milliLiter(s) (50 mL/Hr) IV Continuous <Continuous>  dextrose 50% Injectable 12.5 Gram(s) IV Push once  dextrose 50% Injectable 25 Gram(s) IV Push once  dextrose 50% Injectable 25 Gram(s) IV Push once  ergocalciferol 14073 Unit(s) Oral <User Schedule>  ezetimibe 10 milliGRAM(s) Oral at bedtime  glucagon  Injectable 1 milliGRAM(s) IntraMuscular once  heparin   Injectable 5000 Unit(s) SubCutaneous every 12 hours  hydrALAZINE 25 milliGRAM(s) Oral two times a day  insulin lispro (ADMELOG) corrective regimen sliding scale   SubCutaneous at bedtime  insulin lispro (ADMELOG) corrective regimen sliding scale   SubCutaneous three times a day before meals  isosorbide   mononitrate ER Tablet (IMDUR) 30 milliGRAM(s) Oral daily  lactated ringers. 1000 milliLiter(s) (75 mL/Hr) IV Continuous <Continuous>  levothyroxine 88 MICROGram(s) Oral daily  pantoprazole    Tablet 40 milliGRAM(s) Oral before breakfast  triamterene 37.5 mG/hydrochlorothiazide 25 mG Tablet 1 Tablet(s) Oral daily    MEDICATIONS  (PRN):  acetaminophen     Tablet .. 650 milliGRAM(s) Oral every 6 hours PRN Temp greater or equal to 38C (100.4F), Mild Pain (1 - 3), Moderate Pain (4 - 6)  dextrose Oral Gel 15 Gram(s) Oral once PRN Blood Glucose LESS THAN 70 milliGRAM(s)/deciliter  melatonin 3 milliGRAM(s) Oral at bedtime PRN Insomnia  nitroglycerin     SubLingual 0.4 milliGRAM(s) SubLingual every 5 minutes PRN Chest Pain  ondansetron Injectable 4 milliGRAM(s) IV Push every 6 hours PRN Nausea and/or Vomiting  traMADol 50 milliGRAM(s) Oral every 6 hours PRN Severe Pain (7 - 10)  traMADol 25 milliGRAM(s) Oral every 6 hours PRN Moderate Pain (4 - 6)        I&O's Summary    13 Jan 2024 07:01  -  14 Jan 2024 07:00  --------------------------------------------------------  IN: 320 mL / OUT: 0 mL / NET: 320 mL        PHYSICAL EXAM:  Vital Signs Last 24 Hrs  T(C): 36.7 (14 Jan 2024 07:35), Max: 36.8 (13 Jan 2024 19:08)  T(F): 98 (14 Jan 2024 07:35), Max: 98.3 (13 Jan 2024 19:08)  HR: 66 (14 Jan 2024 08:59) (60 - 77)  BP: 130/52 (14 Jan 2024 08:59) (99/46 - 144/76)  BP(mean): 76 (14 Jan 2024 08:59) (60 - 76)  RR: 20 (14 Jan 2024 08:59) (16 - 20)  SpO2: 99% (14 Jan 2024 08:59) (96% - 99%)    Parameters below as of 14 Jan 2024 08:59  Patient On (Oxygen Delivery Method): room air          General: awake alert   Neck: supple ,.no JVD   Lungs: CTA bilateral   Cardio: RRR, S1/S2, No murmur  Abdomen: Soft, Nontender, Nondistended; Bowel sounds present  Extremities: No calf tenderness, No pitting edema  Skin : awake alert     LABS:                        9.6    7.20  )-----------( 236      ( 12 Jan 2024 17:34 )             29.4     01-14    136  |  101  |  41.7<H>  ----------------------------<  129<H>  4.5   |  21.0<L>  |  1.76<H>    Ca    9.9      14 Jan 2024 08:45  Mg     1.9     01-12    TPro  6.2<L>  /  Alb  3.7  /  TBili  0.3<L>  /  DBili  x   /  AST  27  /  ALT  18  /  AlkPhos  92  01-13    PT/INR - ( 13 Jan 2024 07:03 )   PT: 10.3 sec;   INR: 0.93 ratio         PTT - ( 13 Jan 2024 07:03 )  PTT:29.2 sec  CARDIAC MARKERS ( 13 Jan 2024 07:03 )  x     / x     / 158 U/L / x     / 7.0 ng/mL      Urinalysis Basic - ( 14 Jan 2024 08:45 )    Color: x / Appearance: x / SG: x / pH: x  Gluc: 129 mg/dL / Ketone: x  / Bili: x / Urobili: x   Blood: x / Protein: x / Nitrite: x   Leuk Esterase: x / RBC: x / WBC x   Sq Epi: x / Non Sq Epi: x / Bacteria: x        CAPILLARY BLOOD GLUCOSE      POCT Blood Glucose.: 128 mg/dL (14 Jan 2024 08:28)  POCT Blood Glucose.: 135 mg/dL (13 Jan 2024 20:43)  POCT Blood Glucose.: 146 mg/dL (13 Jan 2024 17:07)  POCT Blood Glucose.: 126 mg/dL (13 Jan 2024 11:58)        RADIOLOGY & ADDITIONAL TESTS:  Results Reviewed: Y  Imaging Personally Reviewed: N  Electrocardiogram Personally Reviewed: N                                           Hospitalist Progress Note    Chief Complaint:  Chest Pain in setting of CAD    SUBJECTIVE / OVERNIGHT EVENTS:  Complaining of intermittent back pain and L arm sharp pain/numbness overnight worse when laying down on arm. Patient denies SOB, abd pain, N/V, fever, chills, dysuria or any other complaints. All remainder ROS negative.     MEDICATIONS  (STANDING):  allopurinol 100 milliGRAM(s) Oral daily  aspirin enteric coated 81 milliGRAM(s) Oral daily  atorvastatin 80 milliGRAM(s) Oral at bedtime  carvedilol 6.25 milliGRAM(s) Oral every 12 hours  clopidogrel Tablet 75 milliGRAM(s) Oral daily  dextrose 5%. 1000 milliLiter(s) (100 mL/Hr) IV Continuous <Continuous>  dextrose 5%. 1000 milliLiter(s) (50 mL/Hr) IV Continuous <Continuous>  dextrose 50% Injectable 12.5 Gram(s) IV Push once  dextrose 50% Injectable 25 Gram(s) IV Push once  dextrose 50% Injectable 25 Gram(s) IV Push once  ergocalciferol 56459 Unit(s) Oral <User Schedule>  ezetimibe 10 milliGRAM(s) Oral at bedtime  glucagon  Injectable 1 milliGRAM(s) IntraMuscular once  heparin   Injectable 5000 Unit(s) SubCutaneous every 12 hours  hydrALAZINE 25 milliGRAM(s) Oral two times a day  insulin lispro (ADMELOG) corrective regimen sliding scale   SubCutaneous at bedtime  insulin lispro (ADMELOG) corrective regimen sliding scale   SubCutaneous three times a day before meals  isosorbide   mononitrate ER Tablet (IMDUR) 30 milliGRAM(s) Oral daily  lactated ringers. 1000 milliLiter(s) (75 mL/Hr) IV Continuous <Continuous>  levothyroxine 88 MICROGram(s) Oral daily  pantoprazole    Tablet 40 milliGRAM(s) Oral before breakfast  triamterene 37.5 mG/hydrochlorothiazide 25 mG Tablet 1 Tablet(s) Oral daily    MEDICATIONS  (PRN):  acetaminophen     Tablet .. 650 milliGRAM(s) Oral every 6 hours PRN Temp greater or equal to 38C (100.4F), Mild Pain (1 - 3), Moderate Pain (4 - 6)  dextrose Oral Gel 15 Gram(s) Oral once PRN Blood Glucose LESS THAN 70 milliGRAM(s)/deciliter  melatonin 3 milliGRAM(s) Oral at bedtime PRN Insomnia  nitroglycerin     SubLingual 0.4 milliGRAM(s) SubLingual every 5 minutes PRN Chest Pain  ondansetron Injectable 4 milliGRAM(s) IV Push every 6 hours PRN Nausea and/or Vomiting  traMADol 50 milliGRAM(s) Oral every 6 hours PRN Severe Pain (7 - 10)  traMADol 25 milliGRAM(s) Oral every 6 hours PRN Moderate Pain (4 - 6)        I&O's Summary    13 Jan 2024 07:01  -  14 Jan 2024 07:00  --------------------------------------------------------  IN: 320 mL / OUT: 0 mL / NET: 320 mL        PHYSICAL EXAM:  Vital Signs Last 24 Hrs  T(C): 36.7 (14 Jan 2024 07:35), Max: 36.8 (13 Jan 2024 19:08)  T(F): 98 (14 Jan 2024 07:35), Max: 98.3 (13 Jan 2024 19:08)  HR: 66 (14 Jan 2024 08:59) (60 - 77)  BP: 130/52 (14 Jan 2024 08:59) (99/46 - 144/76)  BP(mean): 76 (14 Jan 2024 08:59) (60 - 76)  RR: 20 (14 Jan 2024 08:59) (16 - 20)  SpO2: 99% (14 Jan 2024 08:59) (96% - 99%)    Parameters below as of 14 Jan 2024 08:59  Patient On (Oxygen Delivery Method): room air          General: awake alert   Neck: supple ,.no JVD   Lungs: CTA bilateral   Cardio: RRR, S1/S2, No murmur  Abdomen: Soft, Nontender, Nondistended; Bowel sounds present  Extremities: No calf tenderness, No pitting edema  Skin : awake alert     LABS:                        9.6    7.20  )-----------( 236      ( 12 Jan 2024 17:34 )             29.4     01-14    136  |  101  |  41.7<H>  ----------------------------<  129<H>  4.5   |  21.0<L>  |  1.76<H>    Ca    9.9      14 Jan 2024 08:45  Mg     1.9     01-12    TPro  6.2<L>  /  Alb  3.7  /  TBili  0.3<L>  /  DBili  x   /  AST  27  /  ALT  18  /  AlkPhos  92  01-13    PT/INR - ( 13 Jan 2024 07:03 )   PT: 10.3 sec;   INR: 0.93 ratio         PTT - ( 13 Jan 2024 07:03 )  PTT:29.2 sec  CARDIAC MARKERS ( 13 Jan 2024 07:03 )  x     / x     / 158 U/L / x     / 7.0 ng/mL      Urinalysis Basic - ( 14 Jan 2024 08:45 )    Color: x / Appearance: x / SG: x / pH: x  Gluc: 129 mg/dL / Ketone: x  / Bili: x / Urobili: x   Blood: x / Protein: x / Nitrite: x   Leuk Esterase: x / RBC: x / WBC x   Sq Epi: x / Non Sq Epi: x / Bacteria: x        CAPILLARY BLOOD GLUCOSE      POCT Blood Glucose.: 128 mg/dL (14 Jan 2024 08:28)  POCT Blood Glucose.: 135 mg/dL (13 Jan 2024 20:43)  POCT Blood Glucose.: 146 mg/dL (13 Jan 2024 17:07)  POCT Blood Glucose.: 126 mg/dL (13 Jan 2024 11:58)        RADIOLOGY & ADDITIONAL TESTS:  Results Reviewed: Y  Imaging Personally Reviewed: N  Electrocardiogram Personally Reviewed: N

## 2024-01-14 NOTE — PROGRESS NOTE ADULT - SUBJECTIVE AND OBJECTIVE BOX
[facility]                                                         CARDIOLOGY AT [facility]                                                                  [address], [place] [address]                                                         Telephone: [phone]. Fax:[phone]                                                                             PROGRESS NOTE    Reason for follow up:  Chest pain  Update: No chest pain but having left arm pain  give ntg with relief of pain  Ekg Nsr LVH with repolarization abnormality    Review of symptoms:   Cardiac:  No chest pain. No dyspnea. No palpitations.  Respiratory: no cough. No dyspnea  Gastrointestinal: No diarrhea. No abdominal pain. No bleeding.   Neuro: No focal neuro complaints.    Vitals:  T(C): 36.7 (01-14-24 @ 07:35), Max: 36.8 (01-13-24 @ 19:08)  HR: 66 (01-14-24 @ 08:59) (60 - 77)  BP: 130/52 (01-14-24 @ 08:59) (99/46 - 144/76)  RR: 20 (01-14-24 @ 08:59) (16 - 20)  SpO2: 99% (01-14-24 @ 08:59) (96% - 99%)  Wt(kg): --  I&O's Summary    13 Jan 2024 07:01  -  14 Jan 2024 07:00  --------------------------------------------------------  IN: 320 mL / OUT: 0 mL / NET: 320 mL  Weight (kg): 54.3 (01-12 @ 14:31)    PHYSICAL EXAM:  Appearance: Comfortable. No acute distress  HEENT:  Atraumatic. Normocephalic.  Normal oral mucosa  Neurologic: A & O x 3, no gross focal deficits.  Cardiovascular: RRR S1 S2, 2/6 rashad lsb  Respiratory: Lungs clear to auscultation, unlabored   Gastrointestinal:  Soft, Non-tender, + BS  Lower Extremities: No edema  Psychiatry: Patient is calm. No agitation.   Skin: warm and dry.      CURRENT MEDICATIONS:  MEDICATIONS  (STANDING):  allopurinol 100 milliGRAM(s) Oral daily  aspirin enteric coated 81 milliGRAM(s) Oral daily  atorvastatin 80 milliGRAM(s) Oral at bedtime  carvedilol 6.25 milliGRAM(s) Oral every 12 hours  clopidogrel Tablet 75 milliGRAM(s) Oral daily  ergocalciferol 94477 Unit(s) Oral <User Schedule>  ezetimibe 10 milliGRAM(s) Oral at bedtime  glucagon  Injectable 1 milliGRAM(s) IntraMuscular once  heparin   Injectable 5000 Unit(s) SubCutaneous every 12 hours  hydrALAZINE 25 milliGRAM(s) Oral two times a day  insulin lispro (ADMELOG) corrective regimen sliding scale   SubCutaneous three times a day before meals  insulin lispro (ADMELOG) corrective regimen sliding scale   SubCutaneous at bedtime  isosorbide   mononitrate ER Tablet (IMDUR) 30 milliGRAM(s) Oral daily  levothyroxine 88 MICROGram(s) Oral daily  pantoprazole    Tablet 40 milliGRAM(s) Oral before breakfast  triamterene 37.5 mG/hydrochlorothiazide 25 mG Tablet 1 Tablet(s) Oral daily      LABS:	 	  CARDIAC MARKERS ( 13 Jan 2024 07:03 )  x     / x     / 158 U/L / x     / 7.0 ng/mL  p-BNP 13 Jan 2024 07:03: x                              9.6    7.20  )-----------( 236      ( 12 Jan 2024 17:34 )             29.4     01-13    132<L>  |  98  |  42.3<H>  ----------------------------<  178<H>  4.3   |  21.0<L>  |  1.68<H>    Ca    9.6      13 Jan 2024 09:54  Mg     1.9     01-12    TPro  6.2<L>  /  Alb  3.7  /  TBili  0.3<L>  /  DBili  x   /  AST  27  /  ALT  18  /  AlkPhos  92  01-13    proBNP:   Lipid Profile: Date: 01-13 @ 07:03  Total cholesterol 131; Direct LDL: --; HDL: 62; Triglycerides:102    HgA1c:   TSH: Thyroid Stimulating Hormone, Serum: 0.36 uIU/mL    TELEMETRY: NSR    1. Left ventricular cavity is normal. Left ventricular wall thickness is normal. Left ventricular systolic function is normal with an ejection fraction visually estimated at 60 to 65 %.   2. There is mild (grade 1) left ventricular diastolic dysfunction.   3. Normal right ventricular cavity size and normal systolic function.  4. The left atrium is mild-moderately dilated.   5. The right atrium is normal in size.   6. There is moderate calcification of the mitral valve annulus.   7. Thickened mitral valve leaflets. Mild mitral valve leaflet calcification.   8. Trace mitralregurgitation.   9. Calcified mobile structure on tip of anterior mitral valve leaflet may represent ahealed vegetation.  10. Mild to moderate aortic regurgitation.  11. Mild tricuspid regurgitation.  12. Trileaflet aortic valve. moderate to severe aortic stenosis.  13. Estimated pulmonary artery systolic pressure is 41 mmHg, consistent with mild pulmonary hypertension.    < from: Cardiac Catheterization (10.26.22 @ 14:51) >  Coronary Angiography   Left Coronary System:     Patient: DIANA ROGERS                  MRN: [id number]  Study Date: 10/26/2022   02:51 PM      Page 1 of 4  A catheter was positioned into the vessel ostia under fluoroscopic  guidance. Angiograms were obtained in multiple views.  Right Coronary System:   A catheter was positioned into the vessel ostia under fluoroscopic  guidance. Angiograms were obtained in multiple views.  Diagnostic Findings:   Coronary Angiography   The coronary circulation is right dominant.      LM   Left main artery: Angiography shows no disease. very short left main.  FABIAN Flow 3.    LAD   Proximal left anterior descending: There is in-stent restenosis. 70%.  CX   Circumflex: Angiography shows no disease. FABIAN Flow 3.    RCA   Proximal right coronary artery: There is a 50 % De Mallory stenosis. FABIAN  Flow 3.    Graft Angiography   LIMA graft to Mid left anterior descending: Angiography shows no  disease. Patent, backfills into diagonal.  Gastroepiploic graft to Right posterior descending artery: The vessel  was not visualized. Angiography shows complete  occlusion. There is assumed to be a graft from thegastroepiploic to  the rPDA given direction of surgical clips, however rPDA  remains open. .    Free radial graft to Mid ramus intermedius: Angiography shows no  disease. This graft was seen back filling from the distal LAD  through collaterals of the native high lateral vessel. Did not search  from graft origin due to prohibitive creatinine. Assume it to be  a free radial as the patient did not have any veins harvested..                                                                [facility]                                                         CARDIOLOGY AT [facility]                                                                  [address], [place] [address]                                                         Telephone: [phone]. Fax:[phone]                                                                             PROGRESS NOTE    Reason for follow up:  Chest pain  Update: No chest pain but having left arm pain  give ntg with relief of pain  Ekg Nsr LVH with repolarization abnormality    Review of symptoms:   Cardiac:  No chest pain. No dyspnea. No palpitations.  Respiratory: no cough. No dyspnea  Gastrointestinal: No diarrhea. No abdominal pain. No bleeding.   Neuro: No focal neuro complaints.    Vitals:  T(C): 36.7 (01-14-24 @ 07:35), Max: 36.8 (01-13-24 @ 19:08)  HR: 66 (01-14-24 @ 08:59) (60 - 77)  BP: 130/52 (01-14-24 @ 08:59) (99/46 - 144/76)  RR: 20 (01-14-24 @ 08:59) (16 - 20)  SpO2: 99% (01-14-24 @ 08:59) (96% - 99%)  Wt(kg): --  I&O's Summary    13 Jan 2024 07:01  -  14 Jan 2024 07:00  --------------------------------------------------------  IN: 320 mL / OUT: 0 mL / NET: 320 mL  Weight (kg): 54.3 (01-12 @ 14:31)    PHYSICAL EXAM:  Appearance: Comfortable. No acute distress  HEENT:  Atraumatic. Normocephalic.  Normal oral mucosa  Neurologic: A & O x 3, no gross focal deficits.  Cardiovascular: RRR S1 S2, 2/6 rashad lsb  Respiratory: Lungs clear to auscultation, unlabored   Gastrointestinal:  Soft, Non-tender, + BS  Lower Extremities: No edema  Psychiatry: Patient is calm. No agitation.   Skin: warm and dry.      CURRENT MEDICATIONS:  MEDICATIONS  (STANDING):  allopurinol 100 milliGRAM(s) Oral daily  aspirin enteric coated 81 milliGRAM(s) Oral daily  atorvastatin 80 milliGRAM(s) Oral at bedtime  carvedilol 6.25 milliGRAM(s) Oral every 12 hours  clopidogrel Tablet 75 milliGRAM(s) Oral daily  ergocalciferol 06132 Unit(s) Oral <User Schedule>  ezetimibe 10 milliGRAM(s) Oral at bedtime  glucagon  Injectable 1 milliGRAM(s) IntraMuscular once  heparin   Injectable 5000 Unit(s) SubCutaneous every 12 hours  hydrALAZINE 25 milliGRAM(s) Oral two times a day  insulin lispro (ADMELOG) corrective regimen sliding scale   SubCutaneous three times a day before meals  insulin lispro (ADMELOG) corrective regimen sliding scale   SubCutaneous at bedtime  isosorbide   mononitrate ER Tablet (IMDUR) 30 milliGRAM(s) Oral daily  levothyroxine 88 MICROGram(s) Oral daily  pantoprazole    Tablet 40 milliGRAM(s) Oral before breakfast  triamterene 37.5 mG/hydrochlorothiazide 25 mG Tablet 1 Tablet(s) Oral daily      LABS:	 	  CARDIAC MARKERS ( 13 Jan 2024 07:03 )  x     / x     / 158 U/L / x     / 7.0 ng/mL  p-BNP 13 Jan 2024 07:03: x                              9.6    7.20  )-----------( 236      ( 12 Jan 2024 17:34 )             29.4     01-13    132<L>  |  98  |  42.3<H>  ----------------------------<  178<H>  4.3   |  21.0<L>  |  1.68<H>    Ca    9.6      13 Jan 2024 09:54  Mg     1.9     01-12    TPro  6.2<L>  /  Alb  3.7  /  TBili  0.3<L>  /  DBili  x   /  AST  27  /  ALT  18  /  AlkPhos  92  01-13    proBNP:   Lipid Profile: Date: 01-13 @ 07:03  Total cholesterol 131; Direct LDL: --; HDL: 62; Triglycerides:102    HgA1c:   TSH: Thyroid Stimulating Hormone, Serum: 0.36 uIU/mL    TELEMETRY: NSR    1. Left ventricular cavity is normal. Left ventricular wall thickness is normal. Left ventricular systolic function is normal with an ejection fraction visually estimated at 60 to 65 %.   2. There is mild (grade 1) left ventricular diastolic dysfunction.   3. Normal right ventricular cavity size and normal systolic function.  4. The left atrium is mild-moderately dilated.   5. The right atrium is normal in size.   6. There is moderate calcification of the mitral valve annulus.   7. Thickened mitral valve leaflets. Mild mitral valve leaflet calcification.   8. Trace mitralregurgitation.   9. Calcified mobile structure on tip of anterior mitral valve leaflet may represent ahealed vegetation.  10. Mild to moderate aortic regurgitation.  11. Mild tricuspid regurgitation.  12. Trileaflet aortic valve. moderate to severe aortic stenosis.  13. Estimated pulmonary artery systolic pressure is 41 mmHg, consistent with mild pulmonary hypertension.    < from: Cardiac Catheterization (10.26.22 @ 14:51) >  Coronary Angiography   Left Coronary System:     Patient: DIANA ROGERS                  MRN: [id number]  Study Date: 10/26/2022   02:51 PM      Page 1 of 4  A catheter was positioned into the vessel ostia under fluoroscopic  guidance. Angiograms were obtained in multiple views.  Right Coronary System:   A catheter was positioned into the vessel ostia under fluoroscopic  guidance. Angiograms were obtained in multiple views.  Diagnostic Findings:   Coronary Angiography   The coronary circulation is right dominant.      LM   Left main artery: Angiography shows no disease. very short left main.  FABIAN Flow 3.    LAD   Proximal left anterior descending: There is in-stent restenosis. 70%.  CX   Circumflex: Angiography shows no disease. FABIAN Flow 3.    RCA   Proximal right coronary artery: There is a 50 % De Mallory stenosis. FABIAN  Flow 3.    Graft Angiography   LIMA graft to Mid left anterior descending: Angiography shows no  disease. Patent, backfills into diagonal.  Gastroepiploic graft to Right posterior descending artery: The vessel  was not visualized. Angiography shows complete  occlusion. There is assumed to be a graft from thegastroepiploic to  the rPDA given direction of surgical clips, however rPDA  remains open. .    Free radial graft to Mid ramus intermedius: Angiography shows no  disease. This graft was seen back filling from the distal LAD  through collaterals of the native high lateral vessel. Did not search  from graft origin due to prohibitive creatinine. Assume it to be  a free radial as the patient did not have any veins harvested..                                                                [facility]                                                         CARDIOLOGY AT [facility]                                                                  [address], [place] [address]                                                         Telephone: [phone]. Fax:[phone]                                                                             PROGRESS NOTE    Reason for follow up:  Chest pain  Update: No chest pain but having left arm pain  give ntg with relief of pain  Ekg Nsr LVH with repolarization abnormality    Review of symptoms:   Cardiac:  No chest pain. No dyspnea. No palpitations.  Respiratory: no cough. No dyspnea  Gastrointestinal: No diarrhea. No abdominal pain. No bleeding.   Neuro: No focal neuro complaints.    Vitals:  T(C): 36.7 (01-14-24 @ 07:35), Max: 36.8 (01-13-24 @ 19:08)  HR: 66 (01-14-24 @ 08:59) (60 - 77)  BP: 130/52 (01-14-24 @ 08:59) (99/46 - 144/76)  RR: 20 (01-14-24 @ 08:59) (16 - 20)  SpO2: 99% (01-14-24 @ 08:59) (96% - 99%)  Wt(kg): --  I&O's Summary    13 Jan 2024 07:01  -  14 Jan 2024 07:00  --------------------------------------------------------  IN: 320 mL / OUT: 0 mL / NET: 320 mL  Weight (kg): 54.3 (01-12 @ 14:31)    PHYSICAL EXAM:  Appearance: Comfortable. No acute distress  HEENT:  Atraumatic. Normocephalic.  Normal oral mucosa  Neurologic: A & O x 3, no gross focal deficits.  Cardiovascular: RRR S1 S2, 2/6 rashad lsb  Respiratory: Lungs clear to auscultation, unlabored   Gastrointestinal:  Soft, Non-tender, + BS  Lower Extremities: No edema  Psychiatry: Patient is calm. No agitation.   Skin: warm and dry.      CURRENT MEDICATIONS:  MEDICATIONS  (STANDING):  allopurinol 100 milliGRAM(s) Oral daily  aspirin enteric coated 81 milliGRAM(s) Oral daily  atorvastatin 80 milliGRAM(s) Oral at bedtime  carvedilol 6.25 milliGRAM(s) Oral every 12 hours  clopidogrel Tablet 75 milliGRAM(s) Oral daily  ergocalciferol 39664 Unit(s) Oral <User Schedule>  ezetimibe 10 milliGRAM(s) Oral at bedtime  glucagon  Injectable 1 milliGRAM(s) IntraMuscular once  heparin   Injectable 5000 Unit(s) SubCutaneous every 12 hours  hydrALAZINE 25 milliGRAM(s) Oral two times a day  insulin lispro (ADMELOG) corrective regimen sliding scale   SubCutaneous three times a day before meals  insulin lispro (ADMELOG) corrective regimen sliding scale   SubCutaneous at bedtime  isosorbide   mononitrate ER Tablet (IMDUR) 30 milliGRAM(s) Oral daily  levothyroxine 88 MICROGram(s) Oral daily  pantoprazole    Tablet 40 milliGRAM(s) Oral before breakfast  triamterene 37.5 mG/hydrochlorothiazide 25 mG Tablet 1 Tablet(s) Oral daily      LABS:	 	  CARDIAC MARKERS ( 13 Jan 2024 07:03 )  x     / x     / 158 U/L / x     / 7.0 ng/mL  p-BNP 13 Jan 2024 07:03: x                              9.6    7.20  )-----------( 236      ( 12 Jan 2024 17:34 )             29.4     01-13    132<L>  |  98  |  42.3<H>  ----------------------------<  178<H>  4.3   |  21.0<L>  |  1.68<H>    Ca    9.6      13 Jan 2024 09:54  Mg     1.9     01-12    TPro  6.2<L>  /  Alb  3.7  /  TBili  0.3<L>  /  DBili  x   /  AST  27  /  ALT  18  /  AlkPhos  92  01-13    proBNP:   Lipid Profile: Date: 01-13 @ 07:03  Total cholesterol 131; Direct LDL: --; HDL: 62; Triglycerides:102    HgA1c:   TSH: Thyroid Stimulating Hormone, Serum: 0.36 uIU/mL    TELEMETRY: NSR    1. Left ventricular cavity is normal. Left ventricular wall thickness is normal. Left ventricular systolic function is normal with an ejection fraction visually estimated at 60 to 65 %.   2. There is mild (grade 1) left ventricular diastolic dysfunction.   3. Normal right ventricular cavity size and normal systolic function.  4. The left atrium is mild-moderately dilated.   5. The right atrium is normal in size.   6. There is moderate calcification of the mitral valve annulus.   7. Thickened mitral valve leaflets. Mild mitral valve leaflet calcification.   8. Trace mitral regurgitation.   9. Calcified mobile structure on tip of anterior mitral valve leaflet may represent ahealed vegetation.  10. Mild to moderate aortic regurgitation.  11. Mild tricuspid regurgitation.  12. Trileaflet aortic valve. moderate to severe aortic stenosis.  13. Estimated pulmonary artery systolic pressure is 41 mmHg, consistent with mild pulmonary hypertension.    < from: Cardiac Catheterization (10.26.22 @ 14:51) >  Coronary Angiography   Left Coronary System:     Patient: DIANA ROGERS                  MRN: [id number]  Study Date: 10/26/2022   02:51 PM      Page 1 of 4  A catheter was positioned into the vessel ostia under fluoroscopic  guidance. Angiograms were obtained in multiple views.  Right Coronary System:   A catheter was positioned into the vessel ostia under fluoroscopic  guidance. Angiograms were obtained in multiple views.  Diagnostic Findings:   Coronary Angiography   The coronary circulation is right dominant.      Aortic Valve:  The aortic valve appears trileaflet. There is moderate to severe aortic stenosis. The highest velocity was obtained from the apical window. The peak transaortic velocity is 3.81 m/s, peak transaortic gradient is 58.1 mmHg and mean transaortic gradient is 31.0 mmHg with an LVOT/aortic valve VTI ratio of 0.35. The aortic valve acceleration time is 88 msec. The aortic valve area is estimated at 0.89 cm² by the continuity equation. There is mild to moderate aortic regurgitation.      LM   Left main artery: Angiography shows no disease. very short left main.  FABIAN Flow 3.    LAD   Proximal left anterior descending: There is in-stent restenosis. 70%.  CX   Circumflex: Angiography shows no disease. FABIAN Flow 3.    RCA   Proximal right coronary artery: There is a 50 % De Mallory stenosis. FABIAN  Flow 3.    Graft Angiography   LIMA graft to Mid left anterior descending: Angiography shows no  disease. Patent, backfills into diagonal.  Gastroepiploic graft to Right posterior descending artery: The vessel  was not visualized. Angiography shows complete  occlusion. There is assumed to be a graft from thegastroepiploic to  the rPDA given direction of surgical clips, however rPDA  remains open. .    Free radial graft to Mid ramus intermedius: Angiography shows no  disease. This graft was seen back filling from the distal LAD  through collaterals of the native high lateral vessel. Did not search  from graft origin due to prohibitive creatinine. Assume it to be  a free radial as the patient did not have any veins harvested..                                                                [facility]                                                         CARDIOLOGY AT [facility]                                                                  [address], [place] [address]                                                         Telephone: [phone]. Fax:[phone]                                                                             PROGRESS NOTE    Reason for follow up:  Chest pain  Update: No chest pain but having left arm pain  give ntg with relief of pain  Ekg Nsr LVH with repolarization abnormality    Review of symptoms:   Cardiac:  No chest pain. No dyspnea. No palpitations.  Respiratory: no cough. No dyspnea  Gastrointestinal: No diarrhea. No abdominal pain. No bleeding.   Neuro: No focal neuro complaints.    Vitals:  T(C): 36.7 (01-14-24 @ 07:35), Max: 36.8 (01-13-24 @ 19:08)  HR: 66 (01-14-24 @ 08:59) (60 - 77)  BP: 130/52 (01-14-24 @ 08:59) (99/46 - 144/76)  RR: 20 (01-14-24 @ 08:59) (16 - 20)  SpO2: 99% (01-14-24 @ 08:59) (96% - 99%)  Wt(kg): --  I&O's Summary    13 Jan 2024 07:01  -  14 Jan 2024 07:00  --------------------------------------------------------  IN: 320 mL / OUT: 0 mL / NET: 320 mL  Weight (kg): 54.3 (01-12 @ 14:31)    PHYSICAL EXAM:  Appearance: Comfortable. No acute distress  HEENT:  Atraumatic. Normocephalic.  Normal oral mucosa  Neurologic: A & O x 3, no gross focal deficits.  Cardiovascular: RRR S1 S2, 2/6 rashad lsb  Respiratory: Lungs clear to auscultation, unlabored   Gastrointestinal:  Soft, Non-tender, + BS  Lower Extremities: No edema  Psychiatry: Patient is calm. No agitation.   Skin: warm and dry.      CURRENT MEDICATIONS:  MEDICATIONS  (STANDING):  allopurinol 100 milliGRAM(s) Oral daily  aspirin enteric coated 81 milliGRAM(s) Oral daily  atorvastatin 80 milliGRAM(s) Oral at bedtime  carvedilol 6.25 milliGRAM(s) Oral every 12 hours  clopidogrel Tablet 75 milliGRAM(s) Oral daily  ergocalciferol 13792 Unit(s) Oral <User Schedule>  ezetimibe 10 milliGRAM(s) Oral at bedtime  glucagon  Injectable 1 milliGRAM(s) IntraMuscular once  heparin   Injectable 5000 Unit(s) SubCutaneous every 12 hours  hydrALAZINE 25 milliGRAM(s) Oral two times a day  insulin lispro (ADMELOG) corrective regimen sliding scale   SubCutaneous three times a day before meals  insulin lispro (ADMELOG) corrective regimen sliding scale   SubCutaneous at bedtime  isosorbide   mononitrate ER Tablet (IMDUR) 30 milliGRAM(s) Oral daily  levothyroxine 88 MICROGram(s) Oral daily  pantoprazole    Tablet 40 milliGRAM(s) Oral before breakfast  triamterene 37.5 mG/hydrochlorothiazide 25 mG Tablet 1 Tablet(s) Oral daily      LABS:	 	  CARDIAC MARKERS ( 13 Jan 2024 07:03 )  x     / x     / 158 U/L / x     / 7.0 ng/mL  p-BNP 13 Jan 2024 07:03: x                              9.6    7.20  )-----------( 236      ( 12 Jan 2024 17:34 )             29.4     01-13    132<L>  |  98  |  42.3<H>  ----------------------------<  178<H>  4.3   |  21.0<L>  |  1.68<H>    Ca    9.6      13 Jan 2024 09:54  Mg     1.9     01-12    TPro  6.2<L>  /  Alb  3.7  /  TBili  0.3<L>  /  DBili  x   /  AST  27  /  ALT  18  /  AlkPhos  92  01-13    proBNP:   Lipid Profile: Date: 01-13 @ 07:03  Total cholesterol 131; Direct LDL: --; HDL: 62; Triglycerides:102    HgA1c:   TSH: Thyroid Stimulating Hormone, Serum: 0.36 uIU/mL    TELEMETRY: NSR    1. Left ventricular cavity is normal. Left ventricular wall thickness is normal. Left ventricular systolic function is normal with an ejection fraction visually estimated at 60 to 65 %.   2. There is mild (grade 1) left ventricular diastolic dysfunction.   3. Normal right ventricular cavity size and normal systolic function.  4. The left atrium is mild-moderately dilated.   5. The right atrium is normal in size.   6. There is moderate calcification of the mitral valve annulus.   7. Thickened mitral valve leaflets. Mild mitral valve leaflet calcification.   8. Trace mitral regurgitation.   9. Calcified mobile structure on tip of anterior mitral valve leaflet may represent ahealed vegetation.  10. Mild to moderate aortic regurgitation.  11. Mild tricuspid regurgitation.  12. Trileaflet aortic valve. moderate to severe aortic stenosis.  13. Estimated pulmonary artery systolic pressure is 41 mmHg, consistent with mild pulmonary hypertension.    < from: Cardiac Catheterization (10.26.22 @ 14:51) >  Coronary Angiography   Left Coronary System:     Patient: DIANA ROGERS                  MRN: [id number]  Study Date: 10/26/2022   02:51 PM      Page 1 of 4  A catheter was positioned into the vessel ostia under fluoroscopic  guidance. Angiograms were obtained in multiple views.  Right Coronary System:   A catheter was positioned into the vessel ostia under fluoroscopic  guidance. Angiograms were obtained in multiple views.  Diagnostic Findings:   Coronary Angiography   The coronary circulation is right dominant.      Aortic Valve:  The aortic valve appears trileaflet. There is moderate to severe aortic stenosis. The highest velocity was obtained from the apical window. The peak transaortic velocity is 3.81 m/s, peak transaortic gradient is 58.1 mmHg and mean transaortic gradient is 31.0 mmHg with an LVOT/aortic valve VTI ratio of 0.35. The aortic valve acceleration time is 88 msec. The aortic valve area is estimated at 0.89 cm² by the continuity equation. There is mild to moderate aortic regurgitation.      LM   Left main artery: Angiography shows no disease. very short left main.  FABIAN Flow 3.    LAD   Proximal left anterior descending: There is in-stent restenosis. 70%.  CX   Circumflex: Angiography shows no disease. FABIAN Flow 3.    RCA   Proximal right coronary artery: There is a 50 % De Mallory stenosis. FABIAN  Flow 3.    Graft Angiography   LIMA graft to Mid left anterior descending: Angiography shows no  disease. Patent, backfills into diagonal.  Gastroepiploic graft to Right posterior descending artery: The vessel  was not visualized. Angiography shows complete  occlusion. There is assumed to be a graft from thegastroepiploic to  the rPDA given direction of surgical clips, however rPDA  remains open. .    Free radial graft to Mid ramus intermedius: Angiography shows no  disease. This graft was seen back filling from the distal LAD  through collaterals of the native high lateral vessel. Did not search  from graft origin due to prohibitive creatinine. Assume it to be  a free radial as the patient did not have any veins harvested..

## 2024-01-14 NOTE — PROVIDER CONTACT NOTE (CHANGE IN STATUS NOTIFICATION) - SITUATION
Pt w/ intermittent angina, returned to floor from ultrasound, ambulated to bed from stretcher and was c/o radiating left arm pain 9/10 "sharp and squeezing." 1 tab 0.4mg SL nitroglycerin admin, pain alleviate after ~ 2 min.

## 2024-01-14 NOTE — PROGRESS NOTE ADULT - PROBLEM SELECTOR PLAN 1
continue to have arm pain relieved with ntg sl  Ekg no ischemic changes  trop's positive c/w myocardial ischemia  will need ischemic work up this admission  will plan for LHC vs NST continue to have arm pain relieved with ntg sl  Ekg no ischemic changes  trop's positive c/w myocardial ischemia  will need ischemic work up this admission  will plan for NST

## 2024-01-14 NOTE — PROGRESS NOTE ADULT - NS ATTEND AMEND GEN_ALL_CORE FT
-      78 F hx AS moderate to severe/mild to moderate AI on TTE 10/2022, (AV gradient on cath 10/2022 was 10 mmHg not indicative of severe AS).    CAD, CABG x2, HTN, DM, HTN and HLD with intermittent mid-sternal chest pain with left shoulder radiation down her arm, non exertional, no other associated concerning risk factors for ACS.  Here for nonexertional  left shoulder pain, L arm pain and chest pain over the last 4-5 da days. .   s/p LHC last subsequent to NSTEMI 10/26/2022 did not show culprit lesion to account for NSTEMI- medical management, normal LVEF on TTE, pt on DAPT, lipitor, zetia, and coreg, denies CP/SOB.  EKG: NSR, LVH no acute ischemia changes.  Trop: 53  pro-BNP: 776      Chest Pain  CAD  ACS  - Patient with h/o NSTEMI 10/22 s/p LHC that did not reveal a culprit lesion, medically managed.   - Chest pain resolved at this time.   - Troponin stable 52, 55, 64  - pro-BNP: 776  - Continue aspirin, plavix, lipitor, and coreg.   - Plan DALILA and LHC on Tuesday 01/16/23, needs nephrology clearance for optimization  - d/w Hospitalist    Aortic Stenosis  -TTE 1/13: LVEF 90-65. Calcified mobile structure on tip of anterior mitral valve leaflet may represent a healed vegetation. Mild to moderate aortic regurgitation.  Mild tricuspid regurgitation. Trileaflet aortic valve. moderate to severe aortic stenosis.  The peak transaortic velocity is 3.81 m/s, peak transaortic gradient is 58.1 mmHg and mean transaortic gradient is 31.0 mmHg with an LVOT/aortic valve VTI ratio of 0.35. The aortic valve acceleration time is 88 msec. The aortic valve area is estimated at 0.89 cm² by the continuity equation.   There is mild to moderate aortic regurgitation. PASP 41 mmHg.    PAD  -Carotid Doppler 1/14: Proximal internal carotid arteries bilaterally consistent with at least 50-69% stenosis in the bilateral proximal internal carotid arteries.

## 2024-01-15 LAB
ANION GAP SERPL CALC-SCNC: 11 MMOL/L — SIGNIFICANT CHANGE UP (ref 5–17)
ANION GAP SERPL CALC-SCNC: 11 MMOL/L — SIGNIFICANT CHANGE UP (ref 5–17)
BUN SERPL-MCNC: 38.2 MG/DL — HIGH (ref 8–20)
BUN SERPL-MCNC: 38.2 MG/DL — HIGH (ref 8–20)
CALCIUM SERPL-MCNC: 9.8 MG/DL — SIGNIFICANT CHANGE UP (ref 8.4–10.5)
CALCIUM SERPL-MCNC: 9.8 MG/DL — SIGNIFICANT CHANGE UP (ref 8.4–10.5)
CHLORIDE SERPL-SCNC: 106 MMOL/L — SIGNIFICANT CHANGE UP (ref 96–108)
CHLORIDE SERPL-SCNC: 106 MMOL/L — SIGNIFICANT CHANGE UP (ref 96–108)
CO2 SERPL-SCNC: 22 MMOL/L — SIGNIFICANT CHANGE UP (ref 22–29)
CO2 SERPL-SCNC: 22 MMOL/L — SIGNIFICANT CHANGE UP (ref 22–29)
CREAT SERPL-MCNC: 1.62 MG/DL — HIGH (ref 0.5–1.3)
CREAT SERPL-MCNC: 1.62 MG/DL — HIGH (ref 0.5–1.3)
EGFR: 33 ML/MIN/1.73M2 — LOW
EGFR: 33 ML/MIN/1.73M2 — LOW
GLUCOSE BLDC GLUCOMTR-MCNC: 109 MG/DL — HIGH (ref 70–99)
GLUCOSE BLDC GLUCOMTR-MCNC: 111 MG/DL — HIGH (ref 70–99)
GLUCOSE BLDC GLUCOMTR-MCNC: 143 MG/DL — HIGH (ref 70–99)
GLUCOSE BLDC GLUCOMTR-MCNC: 143 MG/DL — HIGH (ref 70–99)
GLUCOSE BLDC GLUCOMTR-MCNC: 98 MG/DL — SIGNIFICANT CHANGE UP (ref 70–99)
GLUCOSE BLDC GLUCOMTR-MCNC: 98 MG/DL — SIGNIFICANT CHANGE UP (ref 70–99)
GLUCOSE SERPL-MCNC: 110 MG/DL — HIGH (ref 70–99)
GLUCOSE SERPL-MCNC: 110 MG/DL — HIGH (ref 70–99)
HCT VFR BLD CALC: 29.6 % — LOW (ref 34.5–45)
HCT VFR BLD CALC: 29.6 % — LOW (ref 34.5–45)
HGB BLD-MCNC: 9.7 G/DL — LOW (ref 11.5–15.5)
HGB BLD-MCNC: 9.7 G/DL — LOW (ref 11.5–15.5)
MCHC RBC-ENTMCNC: 30.5 PG — SIGNIFICANT CHANGE UP (ref 27–34)
MCHC RBC-ENTMCNC: 30.5 PG — SIGNIFICANT CHANGE UP (ref 27–34)
MCHC RBC-ENTMCNC: 32.8 GM/DL — SIGNIFICANT CHANGE UP (ref 32–36)
MCHC RBC-ENTMCNC: 32.8 GM/DL — SIGNIFICANT CHANGE UP (ref 32–36)
MCV RBC AUTO: 93.1 FL — SIGNIFICANT CHANGE UP (ref 80–100)
MCV RBC AUTO: 93.1 FL — SIGNIFICANT CHANGE UP (ref 80–100)
PLATELET # BLD AUTO: 214 K/UL — SIGNIFICANT CHANGE UP (ref 150–400)
PLATELET # BLD AUTO: 214 K/UL — SIGNIFICANT CHANGE UP (ref 150–400)
POTASSIUM SERPL-MCNC: 4.4 MMOL/L — SIGNIFICANT CHANGE UP (ref 3.5–5.3)
POTASSIUM SERPL-MCNC: 4.4 MMOL/L — SIGNIFICANT CHANGE UP (ref 3.5–5.3)
POTASSIUM SERPL-SCNC: 4.4 MMOL/L — SIGNIFICANT CHANGE UP (ref 3.5–5.3)
POTASSIUM SERPL-SCNC: 4.4 MMOL/L — SIGNIFICANT CHANGE UP (ref 3.5–5.3)
RBC # BLD: 3.18 M/UL — LOW (ref 3.8–5.2)
RBC # BLD: 3.18 M/UL — LOW (ref 3.8–5.2)
RBC # FLD: 13.9 % — SIGNIFICANT CHANGE UP (ref 10.3–14.5)
RBC # FLD: 13.9 % — SIGNIFICANT CHANGE UP (ref 10.3–14.5)
SODIUM SERPL-SCNC: 139 MMOL/L — SIGNIFICANT CHANGE UP (ref 135–145)
SODIUM SERPL-SCNC: 139 MMOL/L — SIGNIFICANT CHANGE UP (ref 135–145)
WBC # BLD: 6.5 K/UL — SIGNIFICANT CHANGE UP (ref 3.8–10.5)
WBC # BLD: 6.5 K/UL — SIGNIFICANT CHANGE UP (ref 3.8–10.5)
WBC # FLD AUTO: 6.5 K/UL — SIGNIFICANT CHANGE UP (ref 3.8–10.5)
WBC # FLD AUTO: 6.5 K/UL — SIGNIFICANT CHANGE UP (ref 3.8–10.5)

## 2024-01-15 PROCEDURE — 99232 SBSQ HOSP IP/OBS MODERATE 35: CPT

## 2024-01-15 RX ORDER — SODIUM CHLORIDE 9 MG/ML
1000 INJECTION, SOLUTION INTRAVENOUS
Refills: 0 | Status: COMPLETED | OUTPATIENT
Start: 2024-01-15 | End: 2024-01-16

## 2024-01-15 RX ORDER — FOLIC ACID 0.8 MG
1 TABLET ORAL DAILY
Refills: 0 | Status: DISCONTINUED | OUTPATIENT
Start: 2024-01-15 | End: 2024-01-19

## 2024-01-15 RX ORDER — ERYTHROPOIETIN 10000 [IU]/ML
20000 INJECTION, SOLUTION INTRAVENOUS; SUBCUTANEOUS
Refills: 0 | Status: DISCONTINUED | OUTPATIENT
Start: 2024-01-15 | End: 2024-01-19

## 2024-01-15 RX ORDER — ACETYLCYSTEINE 200 MG/ML
1200 VIAL (ML) MISCELLANEOUS EVERY 12 HOURS
Refills: 0 | Status: COMPLETED | OUTPATIENT
Start: 2024-01-15 | End: 2024-01-17

## 2024-01-15 RX ADMIN — SODIUM CHLORIDE 70 MILLILITER(S): 9 INJECTION, SOLUTION INTRAVENOUS at 21:19

## 2024-01-15 RX ADMIN — ERGOCALCIFEROL 50000 UNIT(S): 1.25 CAPSULE ORAL at 08:56

## 2024-01-15 RX ADMIN — Medication 1 MILLIGRAM(S): at 17:42

## 2024-01-15 RX ADMIN — Medication 25 MILLIGRAM(S): at 17:42

## 2024-01-15 RX ADMIN — EZETIMIBE 10 MILLIGRAM(S): 10 TABLET ORAL at 21:12

## 2024-01-15 RX ADMIN — CLOPIDOGREL BISULFATE 75 MILLIGRAM(S): 75 TABLET, FILM COATED ORAL at 08:55

## 2024-01-15 RX ADMIN — PANTOPRAZOLE SODIUM 40 MILLIGRAM(S): 20 TABLET, DELAYED RELEASE ORAL at 05:54

## 2024-01-15 RX ADMIN — Medication 81 MILLIGRAM(S): at 08:55

## 2024-01-15 RX ADMIN — Medication 1200 MILLIGRAM(S): at 17:42

## 2024-01-15 RX ADMIN — Medication 25 MILLIGRAM(S): at 05:55

## 2024-01-15 RX ADMIN — CARVEDILOL PHOSPHATE 6.25 MILLIGRAM(S): 80 CAPSULE, EXTENDED RELEASE ORAL at 05:54

## 2024-01-15 RX ADMIN — ISOSORBIDE MONONITRATE 30 MILLIGRAM(S): 60 TABLET, EXTENDED RELEASE ORAL at 08:55

## 2024-01-15 RX ADMIN — TRAMADOL HYDROCHLORIDE 25 MILLIGRAM(S): 50 TABLET ORAL at 12:27

## 2024-01-15 RX ADMIN — HEPARIN SODIUM 5000 UNIT(S): 5000 INJECTION INTRAVENOUS; SUBCUTANEOUS at 17:43

## 2024-01-15 RX ADMIN — Medication 100 MILLIGRAM(S): at 08:56

## 2024-01-15 RX ADMIN — CARVEDILOL PHOSPHATE 6.25 MILLIGRAM(S): 80 CAPSULE, EXTENDED RELEASE ORAL at 17:42

## 2024-01-15 RX ADMIN — Medication 88 MICROGRAM(S): at 05:55

## 2024-01-15 RX ADMIN — TRAMADOL HYDROCHLORIDE 25 MILLIGRAM(S): 50 TABLET ORAL at 13:27

## 2024-01-15 RX ADMIN — Medication 1 TABLET(S): at 05:54

## 2024-01-15 RX ADMIN — ATORVASTATIN CALCIUM 80 MILLIGRAM(S): 80 TABLET, FILM COATED ORAL at 21:11

## 2024-01-15 RX ADMIN — HEPARIN SODIUM 5000 UNIT(S): 5000 INJECTION INTRAVENOUS; SUBCUTANEOUS at 05:54

## 2024-01-15 NOTE — PROGRESS NOTE ADULT - SUBJECTIVE AND OBJECTIVE BOX
DIANA ROGERS    [id number]    78y      Female    CC: chest pain     INTERVAL HPI/OVERNIGHT EVENTS: Pt seen and examined. no acute events reported o/n     REVIEW OF SYSTEMS:    CONSTITUTIONAL: No fever, weight loss  RESPIRATORY: No cough, wheezing, hemoptysis; No shortness of breath  CARDIOVASCULAR: No palpitations  GASTROINTESTINAL: No abdominal or epigastric pain. No nausea, vomiting    Vital Signs Last 24 Hrs  T(C): 36.8 (15 Jaxson 2024 11:24), Max: 36.8 (15 Jaxson 2024 11:24)  T(F): 98.2 (15 Jaxson 2024 11:24), Max: 98.2 (15 Jaxson 2024 11:24)  HR: 61 (15 Jaxson 2024 11:24) (58 - 62)  BP: 132/57 (15 Jaxson 2024 11:24) (112/46 - 150/66)  BP(mean): --  RR: 18 (15 Jaxson 2024 11:24) (18 - 18)  SpO2: 98% (15 Jaxson 2024 11:24) (96% - 98%)    Parameters below as of 15 Jaxson 2024 11:24  Patient On (Oxygen Delivery Method): room air        PHYSICAL EXAM:    GENERAL: NAD  CHEST/LUNG: Clear to auscultation bilaterally  HEART: S1S2+, Regular rate and rhythm  ABDOMEN: Soft, Nontender, Nondistended; Bowel sounds present  SKIN: warm, dry   NEURO: Awake, alert   PSYCH: calm, cooperative     LABS:                        9.7    6.50  )-----------( 214      ( 15 Jaxson 2024 04:35 )             29.6     01-15    139  |  106  |  38.2<H>  ----------------------------<  110<H>  4.4   |  22.0  |  1.62<H>    Ca    9.8      15 Jaxson 2024 04:35        Urinalysis Basic - ( 15 Jaxson 2024 04:35 )    Color: x / Appearance: x / SG: x / pH: x  Gluc: 110 mg/dL / Ketone: x  / Bili: x / Urobili: x   Blood: x / Protein: x / Nitrite: x   Leuk Esterase: x / RBC: x / WBC x   Sq Epi: x / Non Sq Epi: x / Bacteria: x          MEDICATIONS  (STANDING):  allopurinol 100 milliGRAM(s) Oral daily  aspirin enteric coated 81 milliGRAM(s) Oral daily  atorvastatin 80 milliGRAM(s) Oral at bedtime  carvedilol 6.25 milliGRAM(s) Oral every 12 hours  clopidogrel Tablet 75 milliGRAM(s) Oral daily  dextrose 5%. 1000 milliLiter(s) (50 mL/Hr) IV Continuous <Continuous>  dextrose 5%. 1000 milliLiter(s) (100 mL/Hr) IV Continuous <Continuous>  dextrose 50% Injectable 25 Gram(s) IV Push once  dextrose 50% Injectable 12.5 Gram(s) IV Push once  dextrose 50% Injectable 25 Gram(s) IV Push once  ergocalciferol 32973 Unit(s) Oral <User Schedule>  ezetimibe 10 milliGRAM(s) Oral at bedtime  glucagon  Injectable 1 milliGRAM(s) IntraMuscular once  heparin   Injectable 5000 Unit(s) SubCutaneous every 12 hours  hydrALAZINE 25 milliGRAM(s) Oral two times a day  insulin lispro (ADMELOG) corrective regimen sliding scale   SubCutaneous at bedtime  insulin lispro (ADMELOG) corrective regimen sliding scale   SubCutaneous three times a day before meals  isosorbide   mononitrate ER Tablet (IMDUR) 30 milliGRAM(s) Oral daily  lactated ringers. 1000 milliLiter(s) (75 mL/Hr) IV Continuous <Continuous>  levothyroxine 88 MICROGram(s) Oral daily  pantoprazole    Tablet 40 milliGRAM(s) Oral before breakfast  triamterene 37.5 mG/hydrochlorothiazide 25 mG Tablet 1 Tablet(s) Oral daily    MEDICATIONS  (PRN):  acetaminophen     Tablet .. 650 milliGRAM(s) Oral every 6 hours PRN Temp greater or equal to 38C (100.4F), Mild Pain (1 - 3), Moderate Pain (4 - 6)  dextrose Oral Gel 15 Gram(s) Oral once PRN Blood Glucose LESS THAN 70 milliGRAM(s)/deciliter  melatonin 3 milliGRAM(s) Oral at bedtime PRN Insomnia  nitroglycerin     SubLingual 0.4 milliGRAM(s) SubLingual every 5 minutes PRN Chest Pain  ondansetron Injectable 4 milliGRAM(s) IV Push every 6 hours PRN Nausea and/or Vomiting  traMADol 50 milliGRAM(s) Oral every 6 hours PRN Severe Pain (7 - 10)  traMADol 25 milliGRAM(s) Oral every 6 hours PRN Moderate Pain (4 - 6)      RADIOLOGY & ADDITIONAL TESTS:   DIANA ROGERS    [id number]    78y      Female    CC: chest pain     INTERVAL HPI/OVERNIGHT EVENTS: Pt seen and examined. no acute events reported o/n     REVIEW OF SYSTEMS:    CONSTITUTIONAL: No fever, weight loss  RESPIRATORY: No cough, wheezing, hemoptysis; No shortness of breath  CARDIOVASCULAR: No palpitations  GASTROINTESTINAL: No abdominal or epigastric pain. No nausea, vomiting    Vital Signs Last 24 Hrs  T(C): 36.8 (15 Jaxson 2024 11:24), Max: 36.8 (15 Jaxson 2024 11:24)  T(F): 98.2 (15 Jaxson 2024 11:24), Max: 98.2 (15 Jaxson 2024 11:24)  HR: 61 (15 Jaxson 2024 11:24) (58 - 62)  BP: 132/57 (15 Jaxson 2024 11:24) (112/46 - 150/66)  BP(mean): --  RR: 18 (15 Jaxson 2024 11:24) (18 - 18)  SpO2: 98% (15 Jaxson 2024 11:24) (96% - 98%)    Parameters below as of 15 Jaxson 2024 11:24  Patient On (Oxygen Delivery Method): room air        PHYSICAL EXAM:    GENERAL: NAD  CHEST/LUNG: Clear to auscultation bilaterally  HEART: S1S2+, Regular rate and rhythm  ABDOMEN: Soft, Nontender, Nondistended; Bowel sounds present  SKIN: warm, dry   NEURO: Awake, alert   PSYCH: calm, cooperative     LABS:                        9.7    6.50  )-----------( 214      ( 15 Jaxson 2024 04:35 )             29.6     01-15    139  |  106  |  38.2<H>  ----------------------------<  110<H>  4.4   |  22.0  |  1.62<H>    Ca    9.8      15 Jaxson 2024 04:35        Urinalysis Basic - ( 15 Jaxson 2024 04:35 )    Color: x / Appearance: x / SG: x / pH: x  Gluc: 110 mg/dL / Ketone: x  / Bili: x / Urobili: x   Blood: x / Protein: x / Nitrite: x   Leuk Esterase: x / RBC: x / WBC x   Sq Epi: x / Non Sq Epi: x / Bacteria: x          MEDICATIONS  (STANDING):  allopurinol 100 milliGRAM(s) Oral daily  aspirin enteric coated 81 milliGRAM(s) Oral daily  atorvastatin 80 milliGRAM(s) Oral at bedtime  carvedilol 6.25 milliGRAM(s) Oral every 12 hours  clopidogrel Tablet 75 milliGRAM(s) Oral daily  dextrose 5%. 1000 milliLiter(s) (50 mL/Hr) IV Continuous <Continuous>  dextrose 5%. 1000 milliLiter(s) (100 mL/Hr) IV Continuous <Continuous>  dextrose 50% Injectable 25 Gram(s) IV Push once  dextrose 50% Injectable 12.5 Gram(s) IV Push once  dextrose 50% Injectable 25 Gram(s) IV Push once  ergocalciferol 47458 Unit(s) Oral <User Schedule>  ezetimibe 10 milliGRAM(s) Oral at bedtime  glucagon  Injectable 1 milliGRAM(s) IntraMuscular once  heparin   Injectable 5000 Unit(s) SubCutaneous every 12 hours  hydrALAZINE 25 milliGRAM(s) Oral two times a day  insulin lispro (ADMELOG) corrective regimen sliding scale   SubCutaneous at bedtime  insulin lispro (ADMELOG) corrective regimen sliding scale   SubCutaneous three times a day before meals  isosorbide   mononitrate ER Tablet (IMDUR) 30 milliGRAM(s) Oral daily  lactated ringers. 1000 milliLiter(s) (75 mL/Hr) IV Continuous <Continuous>  levothyroxine 88 MICROGram(s) Oral daily  pantoprazole    Tablet 40 milliGRAM(s) Oral before breakfast  triamterene 37.5 mG/hydrochlorothiazide 25 mG Tablet 1 Tablet(s) Oral daily    MEDICATIONS  (PRN):  acetaminophen     Tablet .. 650 milliGRAM(s) Oral every 6 hours PRN Temp greater or equal to 38C (100.4F), Mild Pain (1 - 3), Moderate Pain (4 - 6)  dextrose Oral Gel 15 Gram(s) Oral once PRN Blood Glucose LESS THAN 70 milliGRAM(s)/deciliter  melatonin 3 milliGRAM(s) Oral at bedtime PRN Insomnia  nitroglycerin     SubLingual 0.4 milliGRAM(s) SubLingual every 5 minutes PRN Chest Pain  ondansetron Injectable 4 milliGRAM(s) IV Push every 6 hours PRN Nausea and/or Vomiting  traMADol 50 milliGRAM(s) Oral every 6 hours PRN Severe Pain (7 - 10)  traMADol 25 milliGRAM(s) Oral every 6 hours PRN Moderate Pain (4 - 6)      RADIOLOGY & ADDITIONAL TESTS:   DIANA ROGERS    [id number]    78y      Female    CC: chest pain     INTERVAL HPI/OVERNIGHT EVENTS: Pt seen and examined. no acute events reported o/n     REVIEW OF SYSTEMS:    CONSTITUTIONAL: No fever, weight loss  RESPIRATORY: No cough, wheezing, hemoptysis; No shortness of breath  CARDIOVASCULAR: No palpitations  GASTROINTESTINAL: No abdominal or epigastric pain. No nausea, vomiting    Vital Signs Last 24 Hrs  T(C): 36.8 (15 Jaxson 2024 11:24), Max: 36.8 (15 Jaxson 2024 11:24)  T(F): 98.2 (15 Jaxson 2024 11:24), Max: 98.2 (15 Jaxson 2024 11:24)  HR: 61 (15 Jaxson 2024 11:24) (58 - 62)  BP: 132/57 (15 Jaxson 2024 11:24) (112/46 - 150/66)  BP(mean): --  RR: 18 (15 Jaxson 2024 11:24) (18 - 18)  SpO2: 98% (15 Jaxson 2024 11:24) (96% - 98%)    Parameters below as of 15 Jaxson 2024 11:24  Patient On (Oxygen Delivery Method): room air        PHYSICAL EXAM:    GENERAL: NAD  CHEST/LUNG: Clear to auscultation bilaterally  HEART: S1S2+, Regular rate and rhythm  ABDOMEN: Soft, Nontender, Nondistended; Bowel sounds present  SKIN: warm, dry   NEURO: Awake, alert   PSYCH: calm, cooperative     LABS:                        9.7    6.50  )-----------( 214      ( 15 Jaxson 2024 04:35 )             29.6     01-15    139  |  106  |  38.2<H>  ----------------------------<  110<H>  4.4   |  22.0  |  1.62<H>    Ca    9.8      15 Jaxson 2024 04:35        Urinalysis Basic - ( 15 Jaxson 2024 04:35 )    Color: x / Appearance: x / SG: x / pH: x  Gluc: 110 mg/dL / Ketone: x  / Bili: x / Urobili: x   Blood: x / Protein: x / Nitrite: x   Leuk Esterase: x / RBC: x / WBC x   Sq Epi: x / Non Sq Epi: x / Bacteria: x          MEDICATIONS  (STANDING):  allopurinol 100 milliGRAM(s) Oral daily  aspirin enteric coated 81 milliGRAM(s) Oral daily  atorvastatin 80 milliGRAM(s) Oral at bedtime  carvedilol 6.25 milliGRAM(s) Oral every 12 hours  clopidogrel Tablet 75 milliGRAM(s) Oral daily  dextrose 5%. 1000 milliLiter(s) (50 mL/Hr) IV Continuous <Continuous>  dextrose 5%. 1000 milliLiter(s) (100 mL/Hr) IV Continuous <Continuous>  dextrose 50% Injectable 25 Gram(s) IV Push once  dextrose 50% Injectable 12.5 Gram(s) IV Push once  dextrose 50% Injectable 25 Gram(s) IV Push once  ergocalciferol [id number] Unit(s) Oral <User Schedule>  ezetimibe 10 milliGRAM(s) Oral at bedtime  glucagon  Injectable 1 milliGRAM(s) IntraMuscular once  heparin   Injectable 5000 Unit(s) SubCutaneous every 12 hours  hydrALAZINE 25 milliGRAM(s) Oral two times a day  insulin lispro (ADMELOG) corrective regimen sliding scale   SubCutaneous at bedtime  insulin lispro (ADMELOG) corrective regimen sliding scale   SubCutaneous three times a day before meals  isosorbide   mononitrate ER Tablet (IMDUR) 30 milliGRAM(s) Oral daily  lactated ringers. 1000 milliLiter(s) (75 mL/Hr) IV Continuous <Continuous>  levothyroxine 88 MICROGram(s) Oral daily  pantoprazole    Tablet 40 milliGRAM(s) Oral before breakfast  triamterene 37.5 mG/hydrochlorothiazide 25 mG Tablet 1 Tablet(s) Oral daily    MEDICATIONS  (PRN):  acetaminophen     Tablet .. 650 milliGRAM(s) Oral every 6 hours PRN Temp greater or equal to 38C (100.4F), Mild Pain (1 - 3), Moderate Pain (4 - 6)  dextrose Oral Gel 15 Gram(s) Oral once PRN Blood Glucose LESS THAN 70 milliGRAM(s)/deciliter  melatonin 3 milliGRAM(s) Oral at bedtime PRN Insomnia  nitroglycerin     SubLingual 0.4 milliGRAM(s) SubLingual every 5 minutes PRN Chest Pain  ondansetron Injectable 4 milliGRAM(s) IV Push every 6 hours PRN Nausea and/or Vomiting  traMADol 50 milliGRAM(s) Oral every 6 hours PRN Severe Pain (7 - 10)  traMADol 25 milliGRAM(s) Oral every 6 hours PRN Moderate Pain (4 - 6)      RADIOLOGY & ADDITIONAL TESTS:

## 2024-01-15 NOTE — PROGRESS NOTE ADULT - ASSESSMENT
78y/oF PMH AS, CAD s/p PCI, CABG (1999), HTN, DM, NSTEMI 10/2022 s/p LHC with chronic total occlusion of mLAD with patent FILIPPO graft and no culprit lesion, presenting to ER with few days of intermittent radiating midsternal chest pain, admitted with stable angina     Stable angina   CAD   Hx NSTEMI 10/2022  AS   HTN   -TTE: LVEF 60-65%, grade 1ddx, mild-mod AR, mild TR, mod to severe AS , mild pHTN  -cont asa, plavix, lipitor, coreg, imdur   -plan for LHC and DALILA tomorrow 1/16    CKD 3b   -s/p ivf   -f/u am bmp   -f/u nephro recs, consulted 1/14     PAD   -carotid doppler: 50-69% stenosis in b/l proximal ICA     DM  -hgba1c 6.6   -ISS     anemia of chronic disease   -labs reviewed   -f/u am cbc     Hypothyroid   -synthroid   -tsh wnl     Gout   -allopurinol     vte ppx: heparin sq

## 2024-01-15 NOTE — CONSULT NOTE ADULT - ASSESSMENT
79yo F w/ CAD, CABG x2, HTN, DM, HTN and HLD with intermittent mid-sternal chest pain with left shoulder radiation down her arm, non exertional, no other associated concerning risk factors for ACS.  Here for stable angina.  EKG: NSR, LVH no acute ischemia changes.  Trop: 53  pro-BNP: 776      CKD III - DM / HTN   Atypical CP , responds to nitrates   Mod - sev AS , preserved EF on ECHO   Renal function at baseline ( c/w 2022 )   Normal kidneys on CT angiogram 2022     I had a long discussion with the patient and son re risk of radiocontrast induced renal injury   they are agreeable to proceed with cath     Will add Mucomyst x 4   gentle IV bicarb 12 h pre and post   As usual , attempt to minimize contrast volume as much as possible ( avoid LV gram )     Anemia - Check iron   Add Epogen and folate     Will follow       77yo F w/ CAD, CABG x2, HTN, DM, HTN and HLD with intermittent mid-sternal chest pain with left shoulder radiation down her arm, non exertional, no other associated concerning risk factors for ACS.  Here for stable angina.  EKG: NSR, LVH no acute ischemia changes.  Trop: 53  pro-BNP: 776      CKD III - DM / HTN   Atypical CP , responds to nitrates   Mod - sev AS , preserved EF on ECHO   Renal function at baseline ( c/w 2022 )   Normal kidneys on CT angiogram 2022     I had a long discussion with the patient and son re risk of radiocontrast induced renal injury   they are agreeable to proceed with cath     Will add Mucomyst x 4   gentle IV bicarb 12 h pre and post   As usual , attempt to minimize contrast volume as much as possible ( avoid LV gram )     Anemia - Check iron   Add Epogen and folate     Will follow

## 2024-01-15 NOTE — CONSULT NOTE ADULT - SUBJECTIVE AND OBJECTIVE BOX
Patient is a 78y old  Female who presents with a chief complaint of Chest pain in setting of CAD (15 Jaxson 2024 12:37)      HPI:  78yoF hx AS, CAD s/p PCI, CABG (), HTN, DM, CKD, NSTEMI in 10/2022 s/p C showing chronic total occlusion of mLAD with patent FILIPPO graft and no culprit lesion, presenting with few days of intermittent chest pain.  Pt reports sharp, mid-sternal chest pain radiating to her left shoulder and left arm.  Episodes not associated with exertion and can last from a few seconds to up to 5 minutes before it self-resolves.  Pt took ASA during episodes w/out any relief. Denies any associated symptoms of nausea, vomiting, diaphoresis.  Admission labs notable for elevated troponin. (2024 22:21)    Interim noted   Cardio planning DALILA and  cardiac cath tomorrow   She follows with Dr Cash lo CKD III  Her creat in 2o22 was 1.7   Mod to severe As , preserved EF noted on ECHO       PAST MEDICAL & SURGICAL HISTORY:  Hypertension      Diabetes mellitus      Hypothyroidism      HLD (hyperlipidemia)      CAD (coronary artery disease)      CKD (chronic kidney disease)      S/P CABG (coronary artery bypass graft)      H/O  section          FAMILY HISTORY:  FHx: coronary artery disease (Sibling)    FHx: prostate cancer (Father)    FH: stroke (Mother)        Social History:    MEDICATIONS  (STANDING):  allopurinol 100 milliGRAM(s) Oral daily  aspirin enteric coated 81 milliGRAM(s) Oral daily  atorvastatin 80 milliGRAM(s) Oral at bedtime  carvedilol 6.25 milliGRAM(s) Oral every 12 hours  clopidogrel Tablet 75 milliGRAM(s) Oral daily  dextrose 5%. 1000 milliLiter(s) (100 mL/Hr) IV Continuous <Continuous>  dextrose 5%. 1000 milliLiter(s) (50 mL/Hr) IV Continuous <Continuous>  dextrose 50% Injectable 12.5 Gram(s) IV Push once  dextrose 50% Injectable 25 Gram(s) IV Push once  dextrose 50% Injectable 25 Gram(s) IV Push once  ergocalciferol 50146 Unit(s) Oral <User Schedule>  ezetimibe 10 milliGRAM(s) Oral at bedtime  glucagon  Injectable 1 milliGRAM(s) IntraMuscular once  heparin   Injectable 5000 Unit(s) SubCutaneous every 12 hours  hydrALAZINE 25 milliGRAM(s) Oral two times a day  insulin lispro (ADMELOG) corrective regimen sliding scale   SubCutaneous at bedtime  insulin lispro (ADMELOG) corrective regimen sliding scale   SubCutaneous three times a day before meals  isosorbide   mononitrate ER Tablet (IMDUR) 30 milliGRAM(s) Oral daily  lactated ringers. 1000 milliLiter(s) (75 mL/Hr) IV Continuous <Continuous>  levothyroxine 88 MICROGram(s) Oral daily  pantoprazole    Tablet 40 milliGRAM(s) Oral before breakfast  triamterene 37.5 mG/hydrochlorothiazide 25 mG Tablet 1 Tablet(s) Oral daily    MEDICATIONS  (PRN):  acetaminophen     Tablet .. 650 milliGRAM(s) Oral every 6 hours PRN Temp greater or equal to 38C (100.4F), Mild Pain (1 - 3), Moderate Pain (4 - 6)  dextrose Oral Gel 15 Gram(s) Oral once PRN Blood Glucose LESS THAN 70 milliGRAM(s)/deciliter  melatonin 3 milliGRAM(s) Oral at bedtime PRN Insomnia  nitroglycerin     SubLingual 0.4 milliGRAM(s) SubLingual every 5 minutes PRN Chest Pain  ondansetron Injectable 4 milliGRAM(s) IV Push every 6 hours PRN Nausea and/or Vomiting  traMADol 25 milliGRAM(s) Oral every 6 hours PRN Moderate Pain (4 - 6)  traMADol 50 milliGRAM(s) Oral every 6 hours PRN Severe Pain (7 - 10)      Allergies    No Known Allergies    Intolerances        Vital Signs Last 24 Hrs  T(C): 36.8 (15 Jaxson 2024 11:24), Max: 36.8 (15 Jaxson 2024 11:24)  T(F): 98.2 (15 Jaxson 2024 11:24), Max: 98.2 (15 Jaxson 2024 11:24)  HR: 61 (15 Jaxson 2024 11:24) (58 - 62)  BP: 132/57 (15 Jaxson 2024 11:24) (112/46 - 150/66)  BP(mean): --  RR: 18 (15 Jaxson 2024 11:24) (18 - 18)  SpO2: 98% (15 Jaxson 2024 11:24) (96% - 98%)    Parameters below as of 15 Jaxson 2024 11:24  Patient On (Oxygen Delivery Method): room air      Daily     Daily   I&O's Detail    2024 07:01  -  15 Jaxson 2024 07:00  --------------------------------------------------------  IN:    Lactated Ringers: 1500 mL  Total IN: 1500 mL    OUT:  Total OUT: 0 mL    Total NET: 1500 mL        I&O's Summary    2024 07:01  -  15 Jaxson 2024 07:00  --------------------------------------------------------  IN: 1500 mL / OUT: 0 mL / NET: 1500 mL        PHYSICAL EXAM:    GENERAL: NAD, comfortable flat   HEAD:  Atraumatic, No edema   NECK: Supple, No JVD, Normal thyroid  NERVOUS SYSTEM:  Alert & Oriented X3,   CHEST/LUNG: Clear / EAE , No wheeze   HEART: Regular rate and rhythm; No rub   ABDOMEN: Soft, Nontender, Nondistended; Bowel sounds present  EXTREMITIES: No edema       LABS:                        9.7    6.50  )-----------( 214      ( 15 Jaxson 2024 04:35 )             29.6     01-15    139  |  106  |  38.2<H>  ----------------------------<  110<H>  4.4   |  22.0  |  1.62<H>    Ca    9.8      15 Jaxson 2024 04:35        Urinalysis Basic - ( 15 Jaxson 2024 04:35 )    Color: x / Appearance: x / SG: x / pH: x  Gluc: 110 mg/dL / Ketone: x  / Bili: x / Urobili: x   Blood: x / Protein: x / Nitrite: x   Leuk Esterase: x / RBC: x / WBC x   Sq Epi: x / Non Sq Epi: x / Bacteria: x      < from: Xray Chest 1 View- PORTABLE-Urgent (24 @ 16:49) >    ACC: [id number] EXAM:  XR CHEST PORTABLE URGENT 1V   ORDERED BY: FUENTES PICKETT     PROCEDURE DATE:  2024          INTERPRETATION:  AP chest on 2024 at 4:05 PM. Patient has   chest pain    Heart size normal. Sternotomy and clips in the left upper quadrant again   noted.    Lungs remain clear.    Chest is similar to 2022.    IMPRESSION: No acute finding or change.    --- End of Report ---    < from: CT Angio Abdomen and Pelvis w/ IV Cont (10.25.22 @ 20:43) >    ACC: [id number] EXAM:  CT ANGIO ABD PELV (W)AW IC                        ACC: [id number] EXAM:  CT ANGIO CHEST AORTA WAWIC                          PROCEDURE DATE:  10/25/2022          INTERPRETATION:  CLINICAL INFORMATION: Chest pain radiating to backpain    COMPARISON: None.    CONTRAST/COMPLICATIONS:  IV Contrast: Omnipaque 350  92 cc administered   0 cc discarded  Oral Contrast: NONE  Complications: None reported at time of study completion    PROCEDURE:  CT Angiography of the Chest, Abdomen and Pelvis.  Precontrast imaging was performed through the chest followed by arterial   phase imaging of the chest, abdomen and pelvis.  Sagittal and coronal reformats were performed as well as 3D (MIP)   reconstructions.    FINDINGS:    THORACIC AORTA: Moderate atherosclerosis without aneurysm, dissection, or   intramural hematoma. Widely patent branch vessels of the arch.    ABDOMINAL AORTA: Moderate atherosclerotic plaque without aneurysm or   dissection.    Celiac: Normal  SMA: Normal.  FILIPPO: Normal.  Renal Arteries: Normal.    ILIAC RUNOFF: Patent to the bilateral proximal femorals.    ADDITIONAL FINDINGS: The central airways are patent. The lungs are clear.   Emphysema. No pleural abnormality. Normal heart size. No pericardial   effusion. Status post CABG. Central pulmonary arteries are patent. No   thoracic lymphadenopathy or hematoma.    The liver, spleen, pancreas, gallbladder, and biliary tree are normal.   The kidneys and remainder of the retroperitoneum are normal. No pelvic   abnormalities are seen. No bowel-related abnormality. Normal appendix. No   free air or ascites.    IMPRESSION:  *  No thoracoabdominal aortic dissection.  VERTEBRAL BODY ANALYSIS: No Vertebral fracture or low bone density   identified.      --- End of Report ---            < end of copied text >          DARCIE BEE MD; Attending Radiologist  This document has been electronically signed. 2024  4:53PM    < end of copied text >      DIAGNOSTIC TESTING:  [ ] Echocardiogram:   < from: TTE W or WO Ultrasound Enhancing Agent (24 @ 14:04) >  CONCLUSIONS:      1. Left ventricular cavity is normal. Left ventricular wall thickness is normal. Left ventricular systolic function is normal with an ejection fraction visually estimated at 60 to 65 %.   2. There is mild (grade 1) left ventricular diastolic dysfunction.   3. Normal right ventricular cavity size and normal systolic function.  4. The left atrium is mild-moderately dilated.   5. The right atrium is normal in size.   6. There is moderate calcification of the mitral valve annulus.   7. Thickened mitral valve leaflets. Mild mitral valve leaflet calcification.   8. Trace mitralregurgitation.   9. Calcified mobile structure on tip of anterior mitral valve leaflet may represent ahealed vegetation.  10. Mild to moderate aortic regurgitation.  11. Mild tricuspid regurgitation.  12. Trileaflet aortic valve. moderate to severe aortic stenosis.  13. Estimated pulmonary artery systolic pressure is 41 mmHg, consistent with mild pulmonary hypertension.    < end of copied text >   Patient is a 78y old  Female who presents with a chief complaint of Chest pain in setting of CAD (15 Jaxson 2024 12:37)      HPI:  78yoF hx AS, CAD s/p PCI, CABG (), HTN, DM, CKD, NSTEMI in 10/2022 s/p C showing chronic total occlusion of mLAD with patent FILIPPO graft and no culprit lesion, presenting with few days of intermittent chest pain.  Pt reports sharp, mid-sternal chest pain radiating to her left shoulder and left arm.  Episodes not associated with exertion and can last from a few seconds to up to 5 minutes before it self-resolves.  Pt took ASA during episodes w/out any relief. Denies any associated symptoms of nausea, vomiting, diaphoresis.  Admission labs notable for elevated troponin. (2024 22:21)    Interim noted   Cardio planning DALILA and  cardiac cath tomorrow   She follows with Dr Cash lo CKD III  Her creat in 2o22 was 1.7   Mod to severe As , preserved EF noted on ECHO       PAST MEDICAL & SURGICAL HISTORY:  Hypertension      Diabetes mellitus      Hypothyroidism      HLD (hyperlipidemia)      CAD (coronary artery disease)      CKD (chronic kidney disease)      S/P CABG (coronary artery bypass graft)      H/O  section          FAMILY HISTORY:  FHx: coronary artery disease (Sibling)    FHx: prostate cancer (Father)    FH: stroke (Mother)        Social History:    MEDICATIONS  (STANDING):  allopurinol 100 milliGRAM(s) Oral daily  aspirin enteric coated 81 milliGRAM(s) Oral daily  atorvastatin 80 milliGRAM(s) Oral at bedtime  carvedilol 6.25 milliGRAM(s) Oral every 12 hours  clopidogrel Tablet 75 milliGRAM(s) Oral daily  dextrose 5%. 1000 milliLiter(s) (100 mL/Hr) IV Continuous <Continuous>  dextrose 5%. 1000 milliLiter(s) (50 mL/Hr) IV Continuous <Continuous>  dextrose 50% Injectable 12.5 Gram(s) IV Push once  dextrose 50% Injectable 25 Gram(s) IV Push once  dextrose 50% Injectable 25 Gram(s) IV Push once  ergocalciferol 42798 Unit(s) Oral <User Schedule>  ezetimibe 10 milliGRAM(s) Oral at bedtime  glucagon  Injectable 1 milliGRAM(s) IntraMuscular once  heparin   Injectable 5000 Unit(s) SubCutaneous every 12 hours  hydrALAZINE 25 milliGRAM(s) Oral two times a day  insulin lispro (ADMELOG) corrective regimen sliding scale   SubCutaneous at bedtime  insulin lispro (ADMELOG) corrective regimen sliding scale   SubCutaneous three times a day before meals  isosorbide   mononitrate ER Tablet (IMDUR) 30 milliGRAM(s) Oral daily  lactated ringers. 1000 milliLiter(s) (75 mL/Hr) IV Continuous <Continuous>  levothyroxine 88 MICROGram(s) Oral daily  pantoprazole    Tablet 40 milliGRAM(s) Oral before breakfast  triamterene 37.5 mG/hydrochlorothiazide 25 mG Tablet 1 Tablet(s) Oral daily    MEDICATIONS  (PRN):  acetaminophen     Tablet .. 650 milliGRAM(s) Oral every 6 hours PRN Temp greater or equal to 38C (100.4F), Mild Pain (1 - 3), Moderate Pain (4 - 6)  dextrose Oral Gel 15 Gram(s) Oral once PRN Blood Glucose LESS THAN 70 milliGRAM(s)/deciliter  melatonin 3 milliGRAM(s) Oral at bedtime PRN Insomnia  nitroglycerin     SubLingual 0.4 milliGRAM(s) SubLingual every 5 minutes PRN Chest Pain  ondansetron Injectable 4 milliGRAM(s) IV Push every 6 hours PRN Nausea and/or Vomiting  traMADol 25 milliGRAM(s) Oral every 6 hours PRN Moderate Pain (4 - 6)  traMADol 50 milliGRAM(s) Oral every 6 hours PRN Severe Pain (7 - 10)      Allergies    No Known Allergies    Intolerances        Vital Signs Last 24 Hrs  T(C): 36.8 (15 Jaxson 2024 11:24), Max: 36.8 (15 Jaxson 2024 11:24)  T(F): 98.2 (15 Jaxson 2024 11:24), Max: 98.2 (15 Jaxson 2024 11:24)  HR: 61 (15 Jaxson 2024 11:24) (58 - 62)  BP: 132/57 (15 Jaxson 2024 11:24) (112/46 - 150/66)  BP(mean): --  RR: 18 (15 Jaxson 2024 11:24) (18 - 18)  SpO2: 98% (15 Jaxson 2024 11:24) (96% - 98%)    Parameters below as of 15 Jaxson 2024 11:24  Patient On (Oxygen Delivery Method): room air      Daily     Daily   I&O's Detail    2024 07:01  -  15 Jaxson 2024 07:00  --------------------------------------------------------  IN:    Lactated Ringers: 1500 mL  Total IN: 1500 mL    OUT:  Total OUT: 0 mL    Total NET: 1500 mL        I&O's Summary    2024 07:01  -  15 Jaxson 2024 07:00  --------------------------------------------------------  IN: 1500 mL / OUT: 0 mL / NET: 1500 mL        PHYSICAL EXAM:    GENERAL: NAD, comfortable flat   HEAD:  Atraumatic, No edema   NECK: Supple, No JVD, Normal thyroid  NERVOUS SYSTEM:  Alert & Oriented X3,   CHEST/LUNG: Clear / EAE , No wheeze   HEART: Regular rate and rhythm; No rub   ABDOMEN: Soft, Nontender, Nondistended; Bowel sounds present  EXTREMITIES: No edema       LABS:                        9.7    6.50  )-----------( 214      ( 15 Jaxson 2024 04:35 )             29.6     01-15    139  |  106  |  38.2<H>  ----------------------------<  110<H>  4.4   |  22.0  |  1.62<H>    Ca    9.8      15 Jaxson 2024 04:35        Urinalysis Basic - ( 15 Jaxson 2024 04:35 )    Color: x / Appearance: x / SG: x / pH: x  Gluc: 110 mg/dL / Ketone: x  / Bili: x / Urobili: x   Blood: x / Protein: x / Nitrite: x   Leuk Esterase: x / RBC: x / WBC x   Sq Epi: x / Non Sq Epi: x / Bacteria: x      < from: Xray Chest 1 View- PORTABLE-Urgent (24 @ 16:49) >    ACC: [id number] EXAM:  XR CHEST PORTABLE URGENT 1V   ORDERED BY: FUENTES PICKETT     PROCEDURE DATE:  2024          INTERPRETATION:  AP chest on 2024 at 4:05 PM. Patient has   chest pain    Heart size normal. Sternotomy and clips in the left upper quadrant again   noted.    Lungs remain clear.    Chest is similar to 2022.    IMPRESSION: No acute finding or change.    --- End of Report ---    < from: CT Angio Abdomen and Pelvis w/ IV Cont (10.25.22 @ 20:43) >    ACC: [id number] EXAM:  CT ANGIO ABD PELV (W)AW IC                        ACC: [id number] EXAM:  CT ANGIO CHEST AORTA WAWIC                          PROCEDURE DATE:  10/25/2022          INTERPRETATION:  CLINICAL INFORMATION: Chest pain radiating to backpain    COMPARISON: None.    CONTRAST/COMPLICATIONS:  IV Contrast: Omnipaque 350  92 cc administered   0 cc discarded  Oral Contrast: NONE  Complications: None reported at time of study completion    PROCEDURE:  CT Angiography of the Chest, Abdomen and Pelvis.  Precontrast imaging was performed through the chest followed by arterial   phase imaging of the chest, abdomen and pelvis.  Sagittal and coronal reformats were performed as well as 3D (MIP)   reconstructions.    FINDINGS:    THORACIC AORTA: Moderate atherosclerosis without aneurysm, dissection, or   intramural hematoma. Widely patent branch vessels of the arch.    ABDOMINAL AORTA: Moderate atherosclerotic plaque without aneurysm or   dissection.    Celiac: Normal  SMA: Normal.  FILIPPO: Normal.  Renal Arteries: Normal.    ILIAC RUNOFF: Patent to the bilateral proximal femorals.    ADDITIONAL FINDINGS: The central airways are patent. The lungs are clear.   Emphysema. No pleural abnormality. Normal heart size. No pericardial   effusion. Status post CABG. Central pulmonary arteries are patent. No   thoracic lymphadenopathy or hematoma.    The liver, spleen, pancreas, gallbladder, and biliary tree are normal.   The kidneys and remainder of the retroperitoneum are normal. No pelvic   abnormalities are seen. No bowel-related abnormality. Normal appendix. No   free air or ascites.    IMPRESSION:  *  No thoracoabdominal aortic dissection.  VERTEBRAL BODY ANALYSIS: No Vertebral fracture or low bone density   identified.      --- End of Report ---            < end of copied text >          DARCIE BEE MD; Attending Radiologist  This document has been electronically signed. 2024  4:53PM    < end of copied text >      DIAGNOSTIC TESTING:  [ ] Echocardiogram:   < from: TTE W or WO Ultrasound Enhancing Agent (24 @ 14:04) >  CONCLUSIONS:      1. Left ventricular cavity is normal. Left ventricular wall thickness is normal. Left ventricular systolic function is normal with an ejection fraction visually estimated at 60 to 65 %.   2. There is mild (grade 1) left ventricular diastolic dysfunction.   3. Normal right ventricular cavity size and normal systolic function.  4. The left atrium is mild-moderately dilated.   5. The right atrium is normal in size.   6. There is moderate calcification of the mitral valve annulus.   7. Thickened mitral valve leaflets. Mild mitral valve leaflet calcification.   8. Trace mitralregurgitation.   9. Calcified mobile structure on tip of anterior mitral valve leaflet may represent ahealed vegetation.  10. Mild to moderate aortic regurgitation.  11. Mild tricuspid regurgitation.  12. Trileaflet aortic valve. moderate to severe aortic stenosis.  13. Estimated pulmonary artery systolic pressure is 41 mmHg, consistent with mild pulmonary hypertension.    < end of copied text >   Patient is a 78y old  Female who presents with a chief complaint of Chest pain in setting of CAD (15 Jaxson 2024 12:37)      HPI:  78yoF hx AS, CAD s/p PCI, CABG (), HTN, DM, CKD, NSTEMI in 10/2022 s/p C showing chronic total occlusion of mLAD with patent FILIPPO graft and no culprit lesion, presenting with few days of intermittent chest pain.  Pt reports sharp, mid-sternal chest pain radiating to her left shoulder and left arm.  Episodes not associated with exertion and can last from a few seconds to up to 5 minutes before it self-resolves.  Pt took ASA during episodes w/out any relief. Denies any associated symptoms of nausea, vomiting, diaphoresis.  Admission labs notable for elevated troponin. (2024 22:21)    Interim noted   Cardio planning DALILA and  cardiac cath tomorrow   She follows with Dr Cash lo CKD III  Her creat in 2o22 was 1.7   Mod to severe As , preserved EF noted on ECHO       PAST MEDICAL & SURGICAL HISTORY:  Hypertension      Diabetes mellitus      Hypothyroidism      HLD (hyperlipidemia)      CAD (coronary artery disease)      CKD (chronic kidney disease)      S/P CABG (coronary artery bypass graft)      H/O  section          FAMILY HISTORY:  FHx: coronary artery disease (Sibling)    FHx: prostate cancer (Father)    FH: stroke (Mother)        Social History:    MEDICATIONS  (STANDING):  allopurinol 100 milliGRAM(s) Oral daily  aspirin enteric coated 81 milliGRAM(s) Oral daily  atorvastatin 80 milliGRAM(s) Oral at bedtime  carvedilol 6.25 milliGRAM(s) Oral every 12 hours  clopidogrel Tablet 75 milliGRAM(s) Oral daily  dextrose 5%. 1000 milliLiter(s) (100 mL/Hr) IV Continuous <Continuous>  dextrose 5%. 1000 milliLiter(s) (50 mL/Hr) IV Continuous <Continuous>  dextrose 50% Injectable 12.5 Gram(s) IV Push once  dextrose 50% Injectable 25 Gram(s) IV Push once  dextrose 50% Injectable 25 Gram(s) IV Push once  ergocalciferol 48396 Unit(s) Oral <User Schedule>  ezetimibe 10 milliGRAM(s) Oral at bedtime  glucagon  Injectable 1 milliGRAM(s) IntraMuscular once  heparin   Injectable 5000 Unit(s) SubCutaneous every 12 hours  hydrALAZINE 25 milliGRAM(s) Oral two times a day  insulin lispro (ADMELOG) corrective regimen sliding scale   SubCutaneous at bedtime  insulin lispro (ADMELOG) corrective regimen sliding scale   SubCutaneous three times a day before meals  isosorbide   mononitrate ER Tablet (IMDUR) 30 milliGRAM(s) Oral daily  lactated ringers. 1000 milliLiter(s) (75 mL/Hr) IV Continuous <Continuous>  levothyroxine 88 MICROGram(s) Oral daily  pantoprazole    Tablet 40 milliGRAM(s) Oral before breakfast  triamterene 37.5 mG/hydrochlorothiazide 25 mG Tablet 1 Tablet(s) Oral daily    MEDICATIONS  (PRN):  acetaminophen     Tablet .. 650 milliGRAM(s) Oral every 6 hours PRN Temp greater or equal to 38C (100.4F), Mild Pain (1 - 3), Moderate Pain (4 - 6)  dextrose Oral Gel 15 Gram(s) Oral once PRN Blood Glucose LESS THAN 70 milliGRAM(s)/deciliter  melatonin 3 milliGRAM(s) Oral at bedtime PRN Insomnia  nitroglycerin     SubLingual 0.4 milliGRAM(s) SubLingual every 5 minutes PRN Chest Pain  ondansetron Injectable 4 milliGRAM(s) IV Push every 6 hours PRN Nausea and/or Vomiting  traMADol 25 milliGRAM(s) Oral every 6 hours PRN Moderate Pain (4 - 6)  traMADol 50 milliGRAM(s) Oral every 6 hours PRN Severe Pain (7 - 10)      Allergies    No Known Allergies    Intolerances        Vital Signs Last 24 Hrs  T(C): 36.8 (15 Jaxson 2024 11:24), Max: 36.8 (15 Jaxson 2024 11:24)  T(F): 98.2 (15 Jaxson 2024 11:24), Max: 98.2 (15 Jaxson 2024 11:24)  HR: 61 (15 Jaxson 2024 11:24) (58 - 62)  BP: 132/57 (15 Jaxson 2024 11:24) (112/46 - 150/66)  BP(mean): --  RR: 18 (15 Jaxson 2024 11:24) (18 - 18)  SpO2: 98% (15 Jaxson 2024 11:24) (96% - 98%)    Parameters below as of 15 Jaxson 2024 11:24  Patient On (Oxygen Delivery Method): room air      Daily     Daily   I&O's Detail    2024 07:01  -  15 Jaxson 2024 07:00  --------------------------------------------------------  IN:    Lactated Ringers: 1500 mL  Total IN: 1500 mL    OUT:  Total OUT: 0 mL    Total NET: 1500 mL        I&O's Summary    2024 07:01  -  15 Jaxson 2024 07:00  --------------------------------------------------------  IN: 1500 mL / OUT: 0 mL / NET: 1500 mL        PHYSICAL EXAM:    GENERAL: NAD, comfortable flat   HEAD:  Atraumatic, No edema   NECK: Supple, No JVD, Normal thyroid  NERVOUS SYSTEM:  Alert & Oriented X3,   CHEST/LUNG: Clear / EAE , No wheeze   HEART: Regular rate and rhythm; No rub   ABDOMEN: Soft, Nontender, Nondistended; Bowel sounds present  EXTREMITIES: No edema       LABS:                        9.7    6.50  )-----------( 214      ( 15 Jaxson 2024 04:35 )             29.6     01-15    139  |  106  |  38.2<H>  ----------------------------<  110<H>  4.4   |  22.0  |  1.62<H>    Ca    9.8      15 Jaxson 2024 04:35        Urinalysis Basic - ( 15 Jaxson 2024 04:35 )    Color: x / Appearance: x / SG: x / pH: x  Gluc: 110 mg/dL / Ketone: x  / Bili: x / Urobili: x   Blood: x / Protein: x / Nitrite: x   Leuk Esterase: x / RBC: x / WBC x   Sq Epi: x / Non Sq Epi: x / Bacteria: x      < from: Xray Chest 1 View- PORTABLE-Urgent (24 @ 16:49) >    ACC: [id number] EXAM:  XR CHEST PORTABLE URGENT 1V   ORDERED BY: FUENTES PICKETT     PROCEDURE DATE:  2024          INTERPRETATION:  AP chest on 2024 at 4:05 PM. Patient has   chest pain    Heart size normal. Sternotomy and clips in the left upper quadrant again   noted.    Lungs remain clear.    Chest is similar to 2022.    IMPRESSION: No acute finding or change.    --- End of Report ---    < from: CT Angio Abdomen and Pelvis w/ IV Cont (10.25.22 @ 20:43) >    ACC: [id number] EXAM:  CT ANGIO ABD PELV (W)AW IC                        ACC: [id number] EXAM:  CT ANGIO CHEST AORTA WAWIC                          PROCEDURE DATE:  10/25/2022          INTERPRETATION:  CLINICAL INFORMATION: Chest pain radiating to backpain    COMPARISON: None.    CONTRAST/COMPLICATIONS:  IV Contrast: Omnipaque 350  92 cc administered   0 cc discarded  Oral Contrast: NONE  Complications: None reported at time of study completion    PROCEDURE:  CT Angiography of the Chest, Abdomen and Pelvis.  Precontrast imaging was performed through the chest followed by arterial   phase imaging of the chest, abdomen and pelvis.  Sagittal and coronal reformats were performed as well as 3D (MIP)   reconstructions.    FINDINGS:    THORACIC AORTA: Moderate atherosclerosis without aneurysm, dissection, or   intramural hematoma. Widely patent branch vessels of the arch.    ABDOMINAL AORTA: Moderate atherosclerotic plaque without aneurysm or   dissection.    Celiac: Normal  SMA: Normal.  FILIPPO: Normal.  Renal Arteries: Normal.    ILIAC RUNOFF: Patent to the bilateral proximal femorals.    ADDITIONAL FINDINGS: The central airways are patent. The lungs are clear.   Emphysema. No pleural abnormality. Normal heart size. No pericardial   effusion. Status post CABG. Central pulmonary arteries are patent. No   thoracic lymphadenopathy or hematoma.    The liver, spleen, pancreas, gallbladder, and biliary tree are normal.   The kidneys and remainder of the retroperitoneum are normal. No pelvic   abnormalities are seen. No bowel-related abnormality. Normal appendix. No   free air or ascites.    IMPRESSION:  *  No thoracoabdominal aortic dissection.  VERTEBRAL BODY ANALYSIS: No Vertebral fracture or low bone density   identified.      --- End of Report ---            < end of copied text >          DARCIE BEE MD; Attending Radiologist  This document has been electronically signed. 2024  4:53PM    < end of copied text >      DIAGNOSTIC TESTING:  [ ] Echocardiogram:   < from: TTE W or WO Ultrasound Enhancing Agent (24 @ 14:04) >  CONCLUSIONS:      1. Left ventricular cavity is normal. Left ventricular wall thickness is normal. Left ventricular systolic function is normal with an ejection fraction visually estimated at 60 to 65 %.   2. There is mild (grade 1) left ventricular diastolic dysfunction.   3. Normal right ventricular cavity size and normal systolic function.  4. The left atrium is mild-moderately dilated.   5. The right atrium is normal in size.   6. There is moderate calcification of the mitral valve annulus.   7. Thickened mitral valve leaflets. Mild mitral valve leaflet calcification.   8. Trace mitralregurgitation.   9. Calcified mobile structure on tip of anterior mitral valve leaflet may represent ahealed vegetation.  10. Mild to moderate aortic regurgitation.  11. Mild tricuspid regurgitation.  12. Trileaflet aortic valve. moderate to severe aortic stenosis.  13. Estimated pulmonary artery systolic pressure is 41 mmHg, consistent with mild pulmonary hypertension.    < end of copied text >

## 2024-01-15 NOTE — PROGRESS NOTE ADULT - ASSESSMENT
A/P: 77yo F w/ CAD, CABG x2, HTN, DM, HTN and HLD with intermittent mid-sternal chest pain with left shoulder radiation down her arm, non exertional, no other associated concerning risk factors for ACS.  Here for stable angina.  EKG: NSR, LVH no acute ischemia changes.  Trop: 53  pro-BNP: 776   A/P: 79yo F w/ CAD, CABG x2, HTN, DM, HTN and HLD with intermittent mid-sternal chest pain with left shoulder radiation down her arm, non exertional, no other associated concerning risk factors for ACS.  Here for stable angina.  EKG: NSR, LVH no acute ischemia changes.  Trop: 53  pro-BNP: 776

## 2024-01-15 NOTE — PROGRESS NOTE ADULT - NS ATTEND AMEND GEN_ALL_CORE FT
-      78 F hx AS moderate to severe/mild to moderate AI on TTE 10/2022, (AV gradient on cath 10/2022 was 10 mmHg not indicative of severe AS).    CAD, CABG x2, HTN, DM, HTN and HLD with intermittent mid-sternal chest pain with left shoulder radiation down her arm, non exertional, no other associated concerning risk factors for ACS.  Here for nonexertional  left shoulder pain, L arm pain and chest pain over the last 4-5 da days. .   s/p LHC last subsequent to NSTEMI 10/26/2022 did not show culprit lesion to account for NSTEMI- medical management, normal LVEF on TTE, pt on DAPT, lipitor, zetia, and coreg, denies CP/SOB.  EKG: NSR, LVH no acute ischemia changes.  Trop: 53  pro-BNP: 776      Chest Pain  CAD  ACS  - Patient with h/o NSTEMI 10/22 s/p LHC that did not reveal a culprit lesion, medically managed.   - Still with chest and left arm pain yesterday, improved with NTG.   - Troponin stable 52, 55, 64  - pro-BNP: 776  - Continue aspirin, plavix, lipitor, and coreg.   - Plan DALILA and LHC on Tuesday 01/16, needs nephrology clearance for optimization  - d/w Hospitalist    Moderate to Severe Aortic Stenosis  Mobile Structure on Mitral Valve  -TTE 1/13: LVEF 90-65. Calcified mobile structure on tip of anterior mitral valve leaflet may represent a healed vegetation. Mild to moderate aortic        regurgitation. Mild tricuspid regurgitation. Trileaflet aortic valve. moderate to severe aortic stenosis.  - Plan for DALILA tomorrow to further evaluate.   - NPO after midnight.    PAD  -Carotid Doppler 1/14: Proximal internal carotid arteries bilaterally consistent with at least 50-69% stenosis in the bilateral proximal internal carotid arteries.

## 2024-01-15 NOTE — PROGRESS NOTE ADULT - PROBLEM SELECTOR PLAN 1
- Patient with NSTEMI 10/22 s/p LHC that did not reveal a culprit lesion, medically managed.   - Echocardiogram with EF 60-65%, moderate to severe AS.   - Still with chest and left arm pain yesterday, improved with NTG.   - Plan for C 01/16/24.   - NPO after midnight.   - Continue aspirin, plavix, lipitor, coreg, and imdur.  - Will need nephrology optimization for LHC.

## 2024-01-15 NOTE — PROGRESS NOTE ADULT - SUBJECTIVE AND OBJECTIVE BOX
[facility]                                                         CARDIOLOGY AT [address], [place] [address]                                                         Telephone: [phone]. Fax:[phone]                                                                             PROGRESS NOTE    Reason for follow up: Moderate to severe AS  Update: Patient feeling well, no chest pain or dyspnea at this time. Plan for LHC and DALILA tomorrow.       Review of symptoms:   Cardiac:  No chest pain. No dyspnea. No palpitations.  Respiratory: no cough. No dyspnea  Gastrointestinal: No diarrhea. No abdominal pain. No bleeding.   Neuro: No focal neuro complaints.    Vitals:  T(C): 36.7 (01-15-24 @ 05:48), Max: 36.7 (01-14-24 @ 21:31)  HR: 62 (01-15-24 @ 05:48) (58 - 66)  BP: 150/66 (01-15-24 @ 05:48) (112/46 - 152/48)  RR: 18 (01-15-24 @ 05:48) (18 - 20)  SpO2: 98% (01-15-24 @ 05:48) (96% - 99%)  Wt(kg): --  I&O's Summary    14 Jan 2024 07:01  -  15 Jaxson 2024 07:00  --------------------------------------------------------  IN: 1500 mL / OUT: 0 mL / NET: 1500 mL      Weight (kg): 54.3 (01-12 @ 14:31)    PHYSICAL EXAM:  Appearance: Comfortable. No acute distress  HEENT:  Atraumatic. Normocephalic.  Normal oral mucosa  Neurologic: A & O x 3, no gross focal deficits.  Cardiovascular: RRR S1 S2, no rubs/gallops. No JVD, II/VI systolic murmur RUSB  Respiratory: Lungs clear to auscultation, unlabored   Gastrointestinal:  Soft, Non-tender, + BS  Lower Extremities: 2+ Peripheral Pulses, No clubbing, cyanosis, or edema  Psychiatry: Patient is calm. No agitation.   Skin: warm and dry.    CURRENT CARDIAC MEDICATIONS:  carvedilol 6.25 milliGRAM(s) Oral every 12 hours  hydrALAZINE 25 milliGRAM(s) Oral two times a day  isosorbide   mononitrate ER Tablet (IMDUR) 30 milliGRAM(s) Oral daily  nitroglycerin     SubLingual 0.4 milliGRAM(s) SubLingual every 5 minutes PRN  triamterene 37.5 mG/hydrochlorothiazide 25 mG Tablet 1 Tablet(s) Oral daily      CURRENT OTHER MEDICATIONS:  acetaminophen     Tablet .. 650 milliGRAM(s) Oral every 6 hours PRN Temp greater or equal to 38C (100.4F), Mild Pain (1 - 3), Moderate Pain (4 - 6)  melatonin 3 milliGRAM(s) Oral at bedtime PRN Insomnia  ondansetron Injectable 4 milliGRAM(s) IV Push every 6 hours PRN Nausea and/or Vomiting  traMADol 50 milliGRAM(s) Oral every 6 hours PRN Severe Pain (7 - 10)  traMADol 25 milliGRAM(s) Oral every 6 hours PRN Moderate Pain (4 - 6)  pantoprazole    Tablet 40 milliGRAM(s) Oral before breakfast  allopurinol 100 milliGRAM(s) Oral daily  atorvastatin 80 milliGRAM(s) Oral at bedtime  dextrose 50% Injectable 12.5 Gram(s) IV Push once, Stop order after: 1 Doses  dextrose 50% Injectable 25 Gram(s) IV Push once, Stop order after: 1 Doses  dextrose 50% Injectable 25 Gram(s) IV Push once, Stop order after: 1 Doses  dextrose Oral Gel 15 Gram(s) Oral once, Stop order after: 1 Doses PRN Blood Glucose LESS THAN 70 milliGRAM(s)/deciliter  ezetimibe 10 milliGRAM(s) Oral at bedtime  glucagon  Injectable 1 milliGRAM(s) IntraMuscular once, Stop order after: 1 Doses  insulin lispro (ADMELOG) corrective regimen sliding scale   SubCutaneous three times a day before meals  insulin lispro (ADMELOG) corrective regimen sliding scale   SubCutaneous at bedtime  levothyroxine 88 MICROGram(s) Oral daily  aspirin enteric coated 81 milliGRAM(s) Oral daily  clopidogrel Tablet 75 milliGRAM(s) Oral daily  dextrose 5%. 1000 milliLiter(s) (100 mL/Hr) IV Continuous <Continuous>  dextrose 5%. 1000 milliLiter(s) (50 mL/Hr) IV Continuous <Continuous>  ergocalciferol 22465 Unit(s) Oral <User Schedule>  heparin   Injectable 5000 Unit(s) SubCutaneous every 12 hours  lactated ringers. 1000 milliLiter(s) (75 mL/Hr) IV Continuous <Continuous>, Stop order after: 16 Hours      LABS:	 	  ( 13 Jan 2024 07:03 )  Troponin T  X    ,  CPK  158  , CKMB  X    , BNP X                                  9.7    6.50  )-----------( 214      ( 15 Jaxson 2024 04:35 )             29.6     01-15    139  |  106  |  38.2<H>  ----------------------------<  110<H>  4.4   |  22.0  |  1.62<H>    Ca    9.8      15 Jaxson 2024 04:35    TPro  6.2<L>  /  Alb  3.7  /  TBili  0.3<L>  /  DBili  x   /  AST  27  /  ALT  18  /  AlkPhos  92  01-13    PT/INR/PTT ( 13 Jan 2024 07:03 )                       :                       :      10.3         :       29.2                  .        .                   .              .           .       0.93        .                                       Lipid Profile: Date: 01-13 @ 07:03  Total cholesterol 131; Direct LDL: --; HDL: 62; Triglycerides:102    HgA1c:   TSH: Thyroid Stimulating Hormone, Serum: 0.36 uIU/mL      TELEMETRY:   ECG:    DIAGNOSTIC TESTING:  [ ] Echocardiogram:   < from: TTE W or WO Ultrasound Enhancing Agent (01.13.24 @ 14:04) >  CONCLUSIONS:      1. Left ventricular cavity is normal. Left ventricular wall thickness is normal. Left ventricular systolic function is normal with an ejection fraction visually estimated at 60 to 65 %.   2. There is mild (grade 1) left ventricular diastolic dysfunction.   3. Normal right ventricular cavity size and normal systolic function.  4. The left atrium is mild-moderately dilated.   5. The right atrium is normal in size.   6. There is moderate calcification of the mitral valve annulus.   7. Thickened mitral valve leaflets. Mild mitral valve leaflet calcification.   8. Trace mitralregurgitation.   9. Calcified mobile structure on tip of anterior mitral valve leaflet may represent ahealed vegetation.  10. Mild to moderate aortic regurgitation.  11. Mild tricuspid regurgitation.  12. Trileaflet aortic valve. moderate to severe aortic stenosis.  13. Estimated pulmonary artery systolic pressure is 41 mmHg, consistent with mild pulmonary hypertension.    < end of copied text >    [ ]  Catheterization:    < from: Cardiac Catheterization (10.26.22 @ 14:51) >  Diagnostic Findings:     Coronary Angiography   The coronary circulation is right dominant.      LM   Left main artery: Angiography shows no disease. very short left main.  FABIAN Flow 3.    LAD   Proximal left anterior descending: There is in-stent restenosis. 70%.      CX   Circumflex: Angiography shows no disease. FABIAN Flow 3.      RCA   Proximal right coronary artery: There is a 50 % De Mallory stenosis. FABIAN  Flow 3.    Graft Angiography   LIMA graft to Mid left anterior descending: Angiography shows no  disease. Patent, backfills into diagonal.  Gastroepiploic graft to Right posterior descending artery: The vessel  was not visualized. Angiography shows complete  occlusion. There is assumed to be a graft from thegastroepiploic to  the rPDA given direction of surgical clips, however rPDA  remains open. .    Free radial graft to Mid ramus intermedius: Angiography shows no  disease. This graft was seen back filling from the distal LAD  through collaterals of the native high lateral vessel. Did not search  from graft origin due to prohibitive creatinine. Assume it to be  a free radial as the patient did not have any veins harvested..        < end of copied text >  [ ] Stress Test:    OTHER: 	                                                                [facility]                                                         CARDIOLOGY AT [address], [place] [address]                                                         Telephone: [phone]. Fax:[phone]                                                                             PROGRESS NOTE    Reason for follow up: Moderate to severe AS  Update: Patient feeling well, no chest pain or dyspnea at this time. Plan for LHC and DALILA tomorrow.       Review of symptoms:   Cardiac:  No chest pain. No dyspnea. No palpitations.  Respiratory: no cough. No dyspnea  Gastrointestinal: No diarrhea. No abdominal pain. No bleeding.   Neuro: No focal neuro complaints.    Vitals:  T(C): 36.7 (01-15-24 @ 05:48), Max: 36.7 (01-14-24 @ 21:31)  HR: 62 (01-15-24 @ 05:48) (58 - 66)  BP: 150/66 (01-15-24 @ 05:48) (112/46 - 152/48)  RR: 18 (01-15-24 @ 05:48) (18 - 20)  SpO2: 98% (01-15-24 @ 05:48) (96% - 99%)  Wt(kg): --  I&O's Summary    14 Jan 2024 07:01  -  15 Jaxson 2024 07:00  --------------------------------------------------------  IN: 1500 mL / OUT: 0 mL / NET: 1500 mL      Weight (kg): 54.3 (01-12 @ 14:31)    PHYSICAL EXAM:  Appearance: Comfortable. No acute distress  HEENT:  Atraumatic. Normocephalic.  Normal oral mucosa  Neurologic: A & O x 3, no gross focal deficits.  Cardiovascular: RRR S1 S2, no rubs/gallops. No JVD, II/VI systolic murmur RUSB  Respiratory: Lungs clear to auscultation, unlabored   Gastrointestinal:  Soft, Non-tender, + BS  Lower Extremities: 2+ Peripheral Pulses, No clubbing, cyanosis, or edema  Psychiatry: Patient is calm. No agitation.   Skin: warm and dry.    CURRENT CARDIAC MEDICATIONS:  carvedilol 6.25 milliGRAM(s) Oral every 12 hours  hydrALAZINE 25 milliGRAM(s) Oral two times a day  isosorbide   mononitrate ER Tablet (IMDUR) 30 milliGRAM(s) Oral daily  nitroglycerin     SubLingual 0.4 milliGRAM(s) SubLingual every 5 minutes PRN  triamterene 37.5 mG/hydrochlorothiazide 25 mG Tablet 1 Tablet(s) Oral daily      CURRENT OTHER MEDICATIONS:  acetaminophen     Tablet .. 650 milliGRAM(s) Oral every 6 hours PRN Temp greater or equal to 38C (100.4F), Mild Pain (1 - 3), Moderate Pain (4 - 6)  melatonin 3 milliGRAM(s) Oral at bedtime PRN Insomnia  ondansetron Injectable 4 milliGRAM(s) IV Push every 6 hours PRN Nausea and/or Vomiting  traMADol 50 milliGRAM(s) Oral every 6 hours PRN Severe Pain (7 - 10)  traMADol 25 milliGRAM(s) Oral every 6 hours PRN Moderate Pain (4 - 6)  pantoprazole    Tablet 40 milliGRAM(s) Oral before breakfast  allopurinol 100 milliGRAM(s) Oral daily  atorvastatin 80 milliGRAM(s) Oral at bedtime  dextrose 50% Injectable 12.5 Gram(s) IV Push once, Stop order after: 1 Doses  dextrose 50% Injectable 25 Gram(s) IV Push once, Stop order after: 1 Doses  dextrose 50% Injectable 25 Gram(s) IV Push once, Stop order after: 1 Doses  dextrose Oral Gel 15 Gram(s) Oral once, Stop order after: 1 Doses PRN Blood Glucose LESS THAN 70 milliGRAM(s)/deciliter  ezetimibe 10 milliGRAM(s) Oral at bedtime  glucagon  Injectable 1 milliGRAM(s) IntraMuscular once, Stop order after: 1 Doses  insulin lispro (ADMELOG) corrective regimen sliding scale   SubCutaneous three times a day before meals  insulin lispro (ADMELOG) corrective regimen sliding scale   SubCutaneous at bedtime  levothyroxine 88 MICROGram(s) Oral daily  aspirin enteric coated 81 milliGRAM(s) Oral daily  clopidogrel Tablet 75 milliGRAM(s) Oral daily  dextrose 5%. 1000 milliLiter(s) (100 mL/Hr) IV Continuous <Continuous>  dextrose 5%. 1000 milliLiter(s) (50 mL/Hr) IV Continuous <Continuous>  ergocalciferol 89179 Unit(s) Oral <User Schedule>  heparin   Injectable 5000 Unit(s) SubCutaneous every 12 hours  lactated ringers. 1000 milliLiter(s) (75 mL/Hr) IV Continuous <Continuous>, Stop order after: 16 Hours      LABS:	 	  ( 13 Jan 2024 07:03 )  Troponin T  X    ,  CPK  158  , CKMB  X    , BNP X                                  9.7    6.50  )-----------( 214      ( 15 Jaxson 2024 04:35 )             29.6     01-15    139  |  106  |  38.2<H>  ----------------------------<  110<H>  4.4   |  22.0  |  1.62<H>    Ca    9.8      15 Jaxson 2024 04:35    TPro  6.2<L>  /  Alb  3.7  /  TBili  0.3<L>  /  DBili  x   /  AST  27  /  ALT  18  /  AlkPhos  92  01-13    PT/INR/PTT ( 13 Jan 2024 07:03 )                       :                       :      10.3         :       29.2                  .        .                   .              .           .       0.93        .                                       Lipid Profile: Date: 01-13 @ 07:03  Total cholesterol 131; Direct LDL: --; HDL: 62; Triglycerides:102    HgA1c:   TSH: Thyroid Stimulating Hormone, Serum: 0.36 uIU/mL      TELEMETRY:   ECG:    DIAGNOSTIC TESTING:  [ ] Echocardiogram:   < from: TTE W or WO Ultrasound Enhancing Agent (01.13.24 @ 14:04) >  CONCLUSIONS:      1. Left ventricular cavity is normal. Left ventricular wall thickness is normal. Left ventricular systolic function is normal with an ejection fraction visually estimated at 60 to 65 %.   2. There is mild (grade 1) left ventricular diastolic dysfunction.   3. Normal right ventricular cavity size and normal systolic function.  4. The left atrium is mild-moderately dilated.   5. The right atrium is normal in size.   6. There is moderate calcification of the mitral valve annulus.   7. Thickened mitral valve leaflets. Mild mitral valve leaflet calcification.   8. Trace mitralregurgitation.   9. Calcified mobile structure on tip of anterior mitral valve leaflet may represent ahealed vegetation.  10. Mild to moderate aortic regurgitation.  11. Mild tricuspid regurgitation.  12. Trileaflet aortic valve. moderate to severe aortic stenosis.  13. Estimated pulmonary artery systolic pressure is 41 mmHg, consistent with mild pulmonary hypertension.    < end of copied text >    [ ]  Catheterization:    < from: Cardiac Catheterization (10.26.22 @ 14:51) >  Diagnostic Findings:     Coronary Angiography   The coronary circulation is right dominant.      LM   Left main artery: Angiography shows no disease. very short left main.  FABIAN Flow 3.    LAD   Proximal left anterior descending: There is in-stent restenosis. 70%.      CX   Circumflex: Angiography shows no disease. FABIAN Flow 3.      RCA   Proximal right coronary artery: There is a 50 % De Mallory stenosis. FABIAN  Flow 3.    Graft Angiography   LIMA graft to Mid left anterior descending: Angiography shows no  disease. Patent, backfills into diagonal.  Gastroepiploic graft to Right posterior descending artery: The vessel  was not visualized. Angiography shows complete  occlusion. There is assumed to be a graft from thegastroepiploic to  the rPDA given direction of surgical clips, however rPDA  remains open. .    Free radial graft to Mid ramus intermedius: Angiography shows no  disease. This graft was seen back filling from the distal LAD  through collaterals of the native high lateral vessel. Did not search  from graft origin due to prohibitive creatinine. Assume it to be  a free radial as the patient did not have any veins harvested..        < end of copied text >  [ ] Stress Test:    OTHER: 	                                                                [facility]                                                         CARDIOLOGY AT [address], [place] [address]                                                         Telephone: [phone]. Fax:[phone]                                                                             PROGRESS NOTE    Reason for follow up: Moderate to severe AS  Update: Patient feeling well, no chest pain or dyspnea at this time. Plan for LHC and DALILA tomorrow.       Review of symptoms:   Cardiac:  No chest pain. No dyspnea. No palpitations.  Respiratory: no cough. No dyspnea  Gastrointestinal: No diarrhea. No abdominal pain. No bleeding.   Neuro: No focal neuro complaints.    Vitals:  T(C): 36.7 (01-15-24 @ 05:48), Max: 36.7 (01-14-24 @ 21:31)  HR: 62 (01-15-24 @ 05:48) (58 - 66)  BP: 150/66 (01-15-24 @ 05:48) (112/46 - 152/48)  RR: 18 (01-15-24 @ 05:48) (18 - 20)  SpO2: 98% (01-15-24 @ 05:48) (96% - 99%)  Wt(kg): --  I&O's Summary    14 Jan 2024 07:01  -  15 Jaxson 2024 07:00  --------------------------------------------------------  IN: 1500 mL / OUT: 0 mL / NET: 1500 mL      Weight (kg): 54.3 (01-12 @ 14:31)    PHYSICAL EXAM:  Appearance: Comfortable. No acute distress  HEENT:  Atraumatic. Normocephalic.  Normal oral mucosa  Neurologic: A & O x 3, no gross focal deficits.  Cardiovascular: RRR S1 S2, no rubs/gallops. No JVD, II/VI systolic murmur RUSB  Respiratory: Lungs clear to auscultation, unlabored   Gastrointestinal:  Soft, Non-tender, + BS  Lower Extremities: 2+ Peripheral Pulses, No clubbing, cyanosis, or edema  Psychiatry: Patient is calm. No agitation.   Skin: warm and dry.    CURRENT CARDIAC MEDICATIONS:  carvedilol 6.25 milliGRAM(s) Oral every 12 hours  hydrALAZINE 25 milliGRAM(s) Oral two times a day  isosorbide   mononitrate ER Tablet (IMDUR) 30 milliGRAM(s) Oral daily  nitroglycerin     SubLingual 0.4 milliGRAM(s) SubLingual every 5 minutes PRN  triamterene 37.5 mG/hydrochlorothiazide 25 mG Tablet 1 Tablet(s) Oral daily      CURRENT OTHER MEDICATIONS:  acetaminophen     Tablet .. 650 milliGRAM(s) Oral every 6 hours PRN Temp greater or equal to 38C (100.4F), Mild Pain (1 - 3), Moderate Pain (4 - 6)  melatonin 3 milliGRAM(s) Oral at bedtime PRN Insomnia  ondansetron Injectable 4 milliGRAM(s) IV Push every 6 hours PRN Nausea and/or Vomiting  traMADol 50 milliGRAM(s) Oral every 6 hours PRN Severe Pain (7 - 10)  traMADol 25 milliGRAM(s) Oral every 6 hours PRN Moderate Pain (4 - 6)  pantoprazole    Tablet 40 milliGRAM(s) Oral before breakfast  allopurinol 100 milliGRAM(s) Oral daily  atorvastatin 80 milliGRAM(s) Oral at bedtime  dextrose 50% Injectable 12.5 Gram(s) IV Push once, Stop order after: 1 Doses  dextrose 50% Injectable 25 Gram(s) IV Push once, Stop order after: 1 Doses  dextrose 50% Injectable 25 Gram(s) IV Push once, Stop order after: 1 Doses  dextrose Oral Gel 15 Gram(s) Oral once, Stop order after: 1 Doses PRN Blood Glucose LESS THAN 70 milliGRAM(s)/deciliter  ezetimibe 10 milliGRAM(s) Oral at bedtime  glucagon  Injectable 1 milliGRAM(s) IntraMuscular once, Stop order after: 1 Doses  insulin lispro (ADMELOG) corrective regimen sliding scale   SubCutaneous three times a day before meals  insulin lispro (ADMELOG) corrective regimen sliding scale   SubCutaneous at bedtime  levothyroxine 88 MICROGram(s) Oral daily  aspirin enteric coated 81 milliGRAM(s) Oral daily  clopidogrel Tablet 75 milliGRAM(s) Oral daily  dextrose 5%. 1000 milliLiter(s) (100 mL/Hr) IV Continuous <Continuous>  dextrose 5%. 1000 milliLiter(s) (50 mL/Hr) IV Continuous <Continuous>  ergocalciferol 67865 Unit(s) Oral <User Schedule>  heparin   Injectable 5000 Unit(s) SubCutaneous every 12 hours  lactated ringers. 1000 milliLiter(s) (75 mL/Hr) IV Continuous <Continuous>, Stop order after: 16 Hours      LABS:	 	  ( 13 Jan 2024 07:03 )  Troponin T  X    ,  CPK  158  , CKMB  X    , BNP X                                  9.7    6.50  )-----------( 214      ( 15 Jaxson 2024 04:35 )             29.6     01-15    139  |  106  |  38.2<H>  ----------------------------<  110<H>  4.4   |  22.0  |  1.62<H>    Ca    9.8      15 Jaxson 2024 04:35    TPro  6.2<L>  /  Alb  3.7  /  TBili  0.3<L>  /  DBili  x   /  AST  27  /  ALT  18  /  AlkPhos  92  01-13    PT/INR/PTT ( 13 Jan 2024 07:03 )                       :                       :      10.3         :       29.2                  .        .                   .              .           .       0.93        .                                       Lipid Profile: Date: 01-13 @ 07:03  Total cholesterol 131; Direct LDL: --; HDL: 62; Triglycerides:102    HgA1c:   TSH: Thyroid Stimulating Hormone, Serum: 0.36 uIU/mL      TELEMETRY:   ECG:    DIAGNOSTIC TESTING:  [ ] Echocardiogram:   < from: TTE W or WO Ultrasound Enhancing Agent (01.13.24 @ 14:04) >  CONCLUSIONS:      1. Left ventricular cavity is normal. Left ventricular wall thickness is normal. Left ventricular systolic function is normal with an ejection fraction visually estimated at 60 to 65 %.   2. There is mild (grade 1) left ventricular diastolic dysfunction.   3. Normal right ventricular cavity size and normal systolic function.  4. The left atrium is mild-moderately dilated.   5. The right atrium is normal in size.   6. There is moderate calcification of the mitral valve annulus.   7. Thickened mitral valve leaflets. Mild mitral valve leaflet calcification.   8. Trace mitralregurgitation.   9. Calcified mobile structure on tip of anterior mitral valve leaflet may represent ahealed vegetation.  10. Mild to moderate aortic regurgitation.  11. Mild tricuspid regurgitation.  12. Trileaflet aortic valve. moderate to severe aortic stenosis.  13. Estimated pulmonary artery systolic pressure is 41 mmHg, consistent with mild pulmonary hypertension.    < end of copied text >    [ ]  Catheterization:    < from: Cardiac Catheterization (10.26.22 @ 14:51) >  Diagnostic Findings:     Coronary Angiography   The coronary circulation is right dominant.      LM   Left main artery: Angiography shows no disease. very short left main.  FABIAN Flow 3.    LAD   Proximal left anterior descending: There is in-stent restenosis. 70%.      CX   Circumflex: Angiography shows no disease. FABIAN Flow 3.      RCA   Proximal right coronary artery: There is a 50 % De Mallory stenosis. FABIAN  Flow 3.    Graft Angiography   LIMA graft to Mid left anterior descending: Angiography shows no  disease. Patent, backfills into diagonal.  Gastroepiploic graft to Right posterior descending artery: The vessel  was not visualized. Angiography shows complete  occlusion. There is assumed to be a graft from thegastroepiploic to  the rPDA given direction of surgical clips, however rPDA  remains open. .    Free radial graft to Mid ramus intermedius: Angiography shows no  disease. This graft was seen back filling from the distal LAD  through collaterals of the native high lateral vessel. Did not search  from graft origin due to prohibitive creatinine. Assume it to be  a free radial as the patient did not have any veins harvested..        < end of copied text >  [ ] Stress Test:    OTHER:

## 2024-01-16 DIAGNOSIS — D63.8 ANEMIA IN OTHER CHRONIC DISEASES CLASSIFIED ELSEWHERE: ICD-10-CM

## 2024-01-16 LAB
ANION GAP SERPL CALC-SCNC: 11 MMOL/L — SIGNIFICANT CHANGE UP (ref 5–17)
BASOPHILS # BLD AUTO: 0.04 K/UL — SIGNIFICANT CHANGE UP (ref 0–0.2)
BASOPHILS NFR BLD AUTO: 0.6 % — SIGNIFICANT CHANGE UP (ref 0–2)
BUN SERPL-MCNC: 37.6 MG/DL — HIGH (ref 8–20)
CALCIUM SERPL-MCNC: 10.1 MG/DL — SIGNIFICANT CHANGE UP (ref 8.4–10.5)
CHLORIDE SERPL-SCNC: 101 MMOL/L — SIGNIFICANT CHANGE UP (ref 96–108)
CO2 SERPL-SCNC: 25 MMOL/L — SIGNIFICANT CHANGE UP (ref 22–29)
CREAT SERPL-MCNC: 1.74 MG/DL — HIGH (ref 0.5–1.3)
EGFR: 30 ML/MIN/1.73M2 — LOW
EOSINOPHIL # BLD AUTO: 0.8 K/UL — HIGH (ref 0–0.5)
EOSINOPHIL NFR BLD AUTO: 12.6 % — HIGH (ref 0–6)
FERRITIN SERPL-MCNC: 104 NG/ML — SIGNIFICANT CHANGE UP (ref 13–330)
GLUCOSE BLDC GLUCOMTR-MCNC: 120 MG/DL — HIGH (ref 70–99)
GLUCOSE BLDC GLUCOMTR-MCNC: 122 MG/DL — HIGH (ref 70–99)
GLUCOSE BLDC GLUCOMTR-MCNC: 177 MG/DL — HIGH (ref 70–99)
GLUCOSE SERPL-MCNC: 106 MG/DL — HIGH (ref 70–99)
HCT VFR BLD CALC: 28.7 % — LOW (ref 34.5–45)
HGB BLD-MCNC: 9.3 G/DL — LOW (ref 11.5–15.5)
IMM GRANULOCYTES NFR BLD AUTO: 0.3 % — SIGNIFICANT CHANGE UP (ref 0–0.9)
IRON SATN MFR SERPL: 21 % — SIGNIFICANT CHANGE UP (ref 14–50)
IRON SATN MFR SERPL: 67 UG/DL — SIGNIFICANT CHANGE UP (ref 37–145)
LYMPHOCYTES # BLD AUTO: 1.42 K/UL — SIGNIFICANT CHANGE UP (ref 1–3.3)
LYMPHOCYTES # BLD AUTO: 22.3 % — SIGNIFICANT CHANGE UP (ref 13–44)
MAGNESIUM SERPL-MCNC: 1.9 MG/DL — SIGNIFICANT CHANGE UP (ref 1.6–2.6)
MCHC RBC-ENTMCNC: 30.2 PG — SIGNIFICANT CHANGE UP (ref 27–34)
MCHC RBC-ENTMCNC: 32.4 GM/DL — SIGNIFICANT CHANGE UP (ref 32–36)
MCV RBC AUTO: 93.2 FL — SIGNIFICANT CHANGE UP (ref 80–100)
MONOCYTES # BLD AUTO: 0.7 K/UL — SIGNIFICANT CHANGE UP (ref 0–0.9)
MONOCYTES NFR BLD AUTO: 11 % — SIGNIFICANT CHANGE UP (ref 2–14)
NEUTROPHILS # BLD AUTO: 3.38 K/UL — SIGNIFICANT CHANGE UP (ref 1.8–7.4)
NEUTROPHILS NFR BLD AUTO: 53.2 % — SIGNIFICANT CHANGE UP (ref 43–77)
PHOSPHATE SERPL-MCNC: 3.6 MG/DL — SIGNIFICANT CHANGE UP (ref 2.4–4.7)
PLATELET # BLD AUTO: 222 K/UL — SIGNIFICANT CHANGE UP (ref 150–400)
POTASSIUM SERPL-MCNC: 4.2 MMOL/L — SIGNIFICANT CHANGE UP (ref 3.5–5.3)
POTASSIUM SERPL-SCNC: 4.2 MMOL/L — SIGNIFICANT CHANGE UP (ref 3.5–5.3)
RBC # BLD: 3.08 M/UL — LOW (ref 3.8–5.2)
RBC # FLD: 14 % — SIGNIFICANT CHANGE UP (ref 10.3–14.5)
SODIUM SERPL-SCNC: 137 MMOL/L — SIGNIFICANT CHANGE UP (ref 135–145)
TIBC SERPL-MCNC: 312 UG/DL — SIGNIFICANT CHANGE UP (ref 220–430)
TRANSFERRIN SERPL-MCNC: 218 MG/DL — SIGNIFICANT CHANGE UP (ref 192–382)
WBC # BLD: 6.36 K/UL — SIGNIFICANT CHANGE UP (ref 3.8–10.5)
WBC # FLD AUTO: 6.36 K/UL — SIGNIFICANT CHANGE UP (ref 3.8–10.5)

## 2024-01-16 PROCEDURE — 99232 SBSQ HOSP IP/OBS MODERATE 35: CPT

## 2024-01-16 PROCEDURE — 93325 DOPPLER ECHO COLOR FLOW MAPG: CPT | Mod: 26

## 2024-01-16 PROCEDURE — 93312 ECHO TRANSESOPHAGEAL: CPT | Mod: 26

## 2024-01-16 PROCEDURE — 99152 MOD SED SAME PHYS/QHP 5/>YRS: CPT

## 2024-01-16 PROCEDURE — 93455 CORONARY ART/GRFT ANGIO S&I: CPT | Mod: 26

## 2024-01-16 RX ORDER — LIDOCAINE 4 G/100G
1 CREAM TOPICAL EVERY 24 HOURS
Refills: 0 | Status: DISCONTINUED | OUTPATIENT
Start: 2024-01-16 | End: 2024-01-19

## 2024-01-16 RX ORDER — ERYTHROPOIETIN 10000 [IU]/ML
20000 INJECTION, SOLUTION INTRAVENOUS; SUBCUTANEOUS
Refills: 0 | Status: DISCONTINUED | OUTPATIENT
Start: 2024-01-16 | End: 2024-01-19

## 2024-01-16 RX ADMIN — Medication 1 APPLICATION(S): at 21:24

## 2024-01-16 RX ADMIN — Medication 100 MILLIGRAM(S): at 17:43

## 2024-01-16 RX ADMIN — SODIUM CHLORIDE 70 MILLILITER(S): 9 INJECTION, SOLUTION INTRAVENOUS at 16:52

## 2024-01-16 RX ADMIN — PANTOPRAZOLE SODIUM 40 MILLIGRAM(S): 20 TABLET, DELAYED RELEASE ORAL at 05:04

## 2024-01-16 RX ADMIN — ERYTHROPOIETIN 20000 UNIT(S): 10000 INJECTION, SOLUTION INTRAVENOUS; SUBCUTANEOUS at 17:43

## 2024-01-16 RX ADMIN — Medication 3 MILLIGRAM(S): at 00:28

## 2024-01-16 RX ADMIN — EZETIMIBE 10 MILLIGRAM(S): 10 TABLET ORAL at 21:24

## 2024-01-16 RX ADMIN — Medication 1 TABLET(S): at 05:04

## 2024-01-16 RX ADMIN — Medication 25 MILLIGRAM(S): at 17:44

## 2024-01-16 RX ADMIN — CARVEDILOL PHOSPHATE 6.25 MILLIGRAM(S): 80 CAPSULE, EXTENDED RELEASE ORAL at 05:04

## 2024-01-16 RX ADMIN — Medication 88 MICROGRAM(S): at 05:03

## 2024-01-16 RX ADMIN — ATORVASTATIN CALCIUM 80 MILLIGRAM(S): 80 TABLET, FILM COATED ORAL at 21:23

## 2024-01-16 RX ADMIN — Medication 25 MILLIGRAM(S): at 05:04

## 2024-01-16 RX ADMIN — CARVEDILOL PHOSPHATE 6.25 MILLIGRAM(S): 80 CAPSULE, EXTENDED RELEASE ORAL at 21:23

## 2024-01-16 RX ADMIN — Medication 1200 MILLIGRAM(S): at 06:35

## 2024-01-16 RX ADMIN — Medication 81 MILLIGRAM(S): at 07:33

## 2024-01-16 RX ADMIN — Medication 1200 MILLIGRAM(S): at 17:44

## 2024-01-16 RX ADMIN — Medication 1 MILLIGRAM(S): at 17:43

## 2024-01-16 RX ADMIN — CLOPIDOGREL BISULFATE 75 MILLIGRAM(S): 75 TABLET, FILM COATED ORAL at 07:33

## 2024-01-16 NOTE — PROGRESS NOTE ADULT - NS ATTEND AMEND GEN_ALL_CORE FT
Patient was seen and examined at bedside with no complaints of chest pain or shortness of breath    79yo F w/ CAD, CABG x2, HTN, DM, HTN and HLD with intermittent mid-sternal chest pain with left shoulder radiation down her arm, non exertional, no other associated concerning risk factors for ACS.  Here for stable angina.  EKG: NSR, LVH no acute ischemia changes.  Trop: 53  pro-BNP: 776      CAD s/p 2V CABG    Patient with NSTEMI 10/22 s/p LHC that did not reveal a culprit lesion, medically managed.   - Echocardiogram with EF 60-65%, moderate to severe AS.   - Hemodynamically stable without complaints chest pain    -  C 01/16/24.: revealed grafts unchanged from 10/26/22   -  Continue aspirin, plavix, lipitor, coreg, and imdur and resume Lasix 20mg po q daily   - C shows normal coronaries    MV vegetation - DALILA done shows no evidence of vegetations   Moderate to severe AS on TTE and DALILA now shows severe AS, would recommend CTSx eval for further evaluation     Gloria Fernandez D.O. [facility]  Cardiology/Vascular Cardiology -[facility]   Telephone # [phone] Patient was seen and examined at bedside with no complaints of chest pain or shortness of breath    77yo F w/ CAD, CABG x2, HTN, DM, HTN and HLD with intermittent mid-sternal chest pain with left shoulder radiation down her arm, non exertional, no other associated concerning risk factors for ACS.  Here for stable angina.  EKG: NSR, LVH no acute ischemia changes.  Trop: 53  pro-BNP: 776      CAD s/p 2V CABG    Patient with NSTEMI 10/22 s/p LHC that did not reveal a culprit lesion, medically managed.   - Echocardiogram with EF 60-65%, moderate to severe AS.   - Hemodynamically stable without complaints chest pain    -  C 01/16/24.: revealed grafts unchanged from 10/26/22   -  Continue aspirin, plavix, lipitor, coreg, and imdur and resume Lasix 20mg po q daily   - C shows normal coronaries    MV vegetation - DALILA done shows no evidence of vegetations   Moderate to severe AS on TTE and DALILA now shows severe AS, would recommend CTSx eval for further evaluation     Gloria Fernandez D.O. [facility]  Cardiology/Vascular Cardiology -[facility]   Telephone # [phone]

## 2024-01-16 NOTE — CONSULT NOTE ADULT - ASSESSMENT
78yoF hx AS, CAD s/p PCI, CABG (1999), HTN, DM, CKD, NSTEMI in 10/2022 s/p [facility] showing chronic total occlusion of mLAD with patent FILIPPO graft and no culprit lesion, presenting with few days of intermittent chest pain.  Pt reports sharp, mid-sternal chest pain radiating to her left shoulder and left arm.  Episodes not associated with exertion and can last from a few seconds to up to 5 minutes before it self-resolves.  Pt took ASA during episodes w/out any relief. Denies any associated symptoms of nausea, vomiting, diaphoresis.  Admission labs notable for elevated troponin with no significant new CAD. DALILA with Severe AS 0,5 cm NAVA, CTS asked to consider TAVR as option therapy.

## 2024-01-16 NOTE — CONSULT NOTE ADULT - PROBLEM SELECTOR RECOMMENDATION 2
1. Continue Statin therapy.
Monitor on Tele overnight for acute arrhythmias, trend CE x3, FLP and A1C in AM, check ECG  - continue home BB, ASA and Atorvastatin and low cholesterol diet, monitor lFts  - Nuclear stress test next week  - pain management as needed (NTG/MSO4)  - TTE in AM

## 2024-01-16 NOTE — CONSULT NOTE ADULT - PROBLEM SELECTOR RECOMMENDATION 4
DALILA results reviewed.   Will obtain CT TAVR protocol & d/w Renal any rec's for pre-medications.  Continue Tele moniroing.
Low cholesterol diet, daily exercise and optimal weight management reinforced  - continue home Statin therapy, follow LFTs  - check FLP in AM    case d/w Dr. Shepherd

## 2024-01-16 NOTE — PROGRESS NOTE ADULT - ASSESSMENT
78y/oF PMH AS, CAD s/p PCI, CABG (1999), HTN, DM, NSTEMI 10/2022 s/p LHC with chronic total occlusion of mLAD with patent FILIPPO graft and no culprit lesion, presenting to ER with few days of intermittent radiating midsternal chest pain, admitted with stable angina     Stable angina   CAD   Hx NSTEMI 10/2022  AS   HTN   -TTE: LVEF 60-65%, grade 1ddx, mild-mod AR, mild TR, mod to severe AS , mild pHTN  -cont asa, plavix, lipitor, coreg, imdur   -plan for LHC and DALILA today 1/16    CKD 3b   -s/p ivf   -f/u am bmp   -nephro recs appreciated   -monitor post-cath     PAD   -carotid doppler: 50-69% stenosis in b/l proximal ICA     DM  -hgba1c 6.6   -ISS     anemia of chronic disease   -labs reviewed   -f/u am cbc     Hypothyroid   -synthroid   -tsh wnl     Gout   -allopurinol     vte ppx: heparin sq

## 2024-01-16 NOTE — PROGRESS NOTE ADULT - SUBJECTIVE AND OBJECTIVE BOX
[facility]                                                         CARDIOLOGY AT [place] [address]                                                         Telephone: [phone]. Fax:[phone]                                                                             PROGRESS NOTE    Reason for follow up: CAD / AS/ cp   Update: "feeling good/ oob ambulating   awaiting cath/  denies complaints of chest pain/sob/dizziness/palps       Review of symptoms:   Cardiac:  No chest pain. No dyspnea. No palpitations.  Respiratory: no cough. No dyspnea  Gastrointestinal: No diarrhea. No abdominal pain. No bleeding.   Neuro: No focal neuro complaints.    Vitals:  T(C): 36.6 (01-16-24 @ 09:19), Max: 36.8 (01-16-24 @ 04:07)  HR: 53 (01-16-24 @ 13:15) (53 - 66)  BP: 137/60 (01-16-24 @ 13:15) (115/61 - 149/63)  RR: 16 (01-16-24 @ 13:15) (11 - 20)  SpO2: 98% (01-16-24 @ 13:15) (94% - 100%)  Wt(kg): --  I&O's Summary    15 Jaxson 2024 07:01  -  16 Jan 2024 07:00  --------------------------------------------------------  IN: 540 mL / OUT: 0 mL / NET: 540 mL    16 Jan 2024 07:01  -  16 Jan 2024 13:28  --------------------------------------------------------  IN: 140 mL / OUT: 0 mL / NET: 140 mL      Weight (kg): 54.3 (01-12 @ 14:31)    PHYSICAL EXAM:  Appearance: Comfortable. No acute distress  HEENT:  Atraumatic. Normocephalic.  Normal oral mucosa  Neurologic: A & O x 3, no gross focal deficits.  Cardiovascular: RRR S1 S2,  64 3/6 HEIDE  murmur, no rubs/gallops. No JVD  Respiratory: Lungs clear to auscultation, unlabored   Gastrointestinal:  Soft, Non-tender, + BS  Lower Extremities: 2+ Peripheral Pulses, No clubbing, cyanosis, or edema  Psychiatry: Patient is calm. No agitation.   Skin: warm and dry.    CURRENT CARDIAC MEDICATIONS:  carvedilol 6.25 milliGRAM(s) Oral every 12 hours  hydrALAZINE 25 milliGRAM(s) Oral two times a day  isosorbide   mononitrate ER Tablet (IMDUR) 30 milliGRAM(s) Oral daily  nitroglycerin     SubLingual 0.4 milliGRAM(s) SubLingual every 5 minutes PRN  triamterene 37.5 mG/hydrochlorothiazide 25 mG Tablet 1 Tablet(s) Oral daily      CURRENT OTHER MEDICATIONS:  acetaminophen     Tablet .. 650 milliGRAM(s) Oral every 6 hours PRN Temp greater or equal to 38C (100.4F), Mild Pain (1 - 3), Moderate Pain (4 - 6)  melatonin 3 milliGRAM(s) Oral at bedtime PRN Insomnia  ondansetron Injectable 4 milliGRAM(s) IV Push every 6 hours PRN Nausea and/or Vomiting  traMADol 25 milliGRAM(s) Oral every 6 hours PRN Moderate Pain (4 - 6)  traMADol 50 milliGRAM(s) Oral every 6 hours PRN Severe Pain (7 - 10)  pantoprazole    Tablet 40 milliGRAM(s) Oral before breakfast  allopurinol 100 milliGRAM(s) Oral daily  atorvastatin 80 milliGRAM(s) Oral at bedtime  dextrose 50% Injectable 12.5 Gram(s) IV Push once, Stop order after: 1 Doses  dextrose 50% Injectable 25 Gram(s) IV Push once, Stop order after: 1 Doses  dextrose 50% Injectable 25 Gram(s) IV Push once, Stop order after: 1 Doses  dextrose Oral Gel 15 Gram(s) Oral once, Stop order after: 1 Doses PRN Blood Glucose LESS THAN 70 milliGRAM(s)/deciliter  ezetimibe 10 milliGRAM(s) Oral at bedtime  glucagon  Injectable 1 milliGRAM(s) IntraMuscular once, Stop order after: 1 Doses  insulin lispro (ADMELOG) corrective regimen sliding scale   SubCutaneous three times a day before meals  insulin lispro (ADMELOG) corrective regimen sliding scale   SubCutaneous at bedtime  levothyroxine 88 MICROGram(s) Oral daily  aspirin enteric coated 81 milliGRAM(s) Oral daily  clopidogrel Tablet 75 milliGRAM(s) Oral daily  dextrose 5%. 1000 milliLiter(s) (50 mL/Hr) IV Continuous <Continuous>  dextrose 5%. 1000 milliLiter(s) (100 mL/Hr) IV Continuous <Continuous>  epoetin latisha (PROCRIT) Injectable 84429 Unit(s) SubCutaneous every 7 days  epoetin latisha-epbx (RETACRIT) Injectable 60426 Unit(s) SubCutaneous every 7 days  ergocalciferol 82647 Unit(s) Oral <User Schedule>  folic acid 1 milliGRAM(s) Oral daily  heparin   Injectable 5000 Unit(s) SubCutaneous every 12 hours  lidocaine   4% Patch 1 Patch Transdermal every 24 hours  sodium chloride 0.45% 1000 milliLiter(s) (70 mL/Hr) IV Continuous <Continuous>, Stop order after: 24 Hours      LABS:	 	  ( 13 Jan 2024 07:03 )  Troponin T  X    ,  CPK  158  , CKMB  X    , BNP X                                  9.3    6.36  )-----------( 222      ( 16 Jan 2024 04:41 )             28.7     01-16    137  |  101  |  37.6<H>  ----------------------------<  106<H>  4.2   |  25.0  |  1.74<H>    Ca    10.1      16 Jan 2024 04:41  Phos  3.6     01-16  Mg     1.9     01-16      PT/INR/PTT ( 13 Jan 2024 07:03 )                       :                       :      10.3         :       29.2                  .        .                   .              .           .       0.93        .                                       Lipid Profile: Date: 01-13 @ 07:03  Total cholesterol 131; Direct LDL: --; HDL: 62; Triglycerides:102    HgA1c:   TSH: Thyroid Stimulating Hormone, Serum: 0.36 uIU/mL      TELEMETRY: RSR 60's  no events    DIAGNOSTIC TESTING:    < from: TTE W or WO Ultrasound Enhancing Agent (01.13.24 @ 14:04) >  1. Left ventricular cavity is normal. Left ventricular wall thickness is normal. Left ventricular systolic function is normal with an ejection fraction visually estimated at 60 to 65 %.   2. There is mild (grade 1) left ventricular diastolic dysfunction.   3. Normal right ventricular cavity size and normal systolic function.  4. The left atrium is mild-moderately dilated.   5. The right atrium is normal in size.   6. There is moderate calcification of the mitral valve annulus.   7. Thickened mitral valve leaflets. Mild mitral valve leaflet calcification.   8. Trace mitralregurgitation.   9. Calcified mobile structure on tip of anterior mitral valve leaflet may represent ahealed vegetation.  10. Mild to moderate aortic regurgitation.  11. Mild tricuspid regurgitation.  12. Trileaflet aortic valve. moderate to severe aortic stenosis.  13. Estimated pulmonary artery systolic pressure is 41 mmHg, consistent with mild pulmonary hypertension.      < end of copied text >                                                                [facility]                                                         CARDIOLOGY AT [place] [address]                                                         Telephone: [phone]. Fax:[phone]                                                                             PROGRESS NOTE    Reason for follow up: CAD / AS/ cp   Update: "feeling good/ oob ambulating   awaiting cath/  denies complaints of chest pain/sob/dizziness/palps       Review of symptoms:   Cardiac:  No chest pain. No dyspnea. No palpitations.  Respiratory: no cough. No dyspnea  Gastrointestinal: No diarrhea. No abdominal pain. No bleeding.   Neuro: No focal neuro complaints.    Vitals:  T(C): 36.6 (01-16-24 @ 09:19), Max: 36.8 (01-16-24 @ 04:07)  HR: 53 (01-16-24 @ 13:15) (53 - 66)  BP: 137/60 (01-16-24 @ 13:15) (115/61 - 149/63)  RR: 16 (01-16-24 @ 13:15) (11 - 20)  SpO2: 98% (01-16-24 @ 13:15) (94% - 100%)  Wt(kg): --  I&O's Summary    15 Jaxson 2024 07:01  -  16 Jan 2024 07:00  --------------------------------------------------------  IN: 540 mL / OUT: 0 mL / NET: 540 mL    16 Jan 2024 07:01  -  16 Jan 2024 13:28  --------------------------------------------------------  IN: 140 mL / OUT: 0 mL / NET: 140 mL      Weight (kg): 54.3 (01-12 @ 14:31)    PHYSICAL EXAM:  Appearance: Comfortable. No acute distress  HEENT:  Atraumatic. Normocephalic.  Normal oral mucosa  Neurologic: A & O x 3, no gross focal deficits.  Cardiovascular: RRR S1 S2,  64 3/6 HEIDE  murmur, no rubs/gallops. No JVD  Respiratory: Lungs clear to auscultation, unlabored   Gastrointestinal:  Soft, Non-tender, + BS  Lower Extremities: 2+ Peripheral Pulses, No clubbing, cyanosis, or edema  Psychiatry: Patient is calm. No agitation.   Skin: warm and dry.    CURRENT CARDIAC MEDICATIONS:  carvedilol 6.25 milliGRAM(s) Oral every 12 hours  hydrALAZINE 25 milliGRAM(s) Oral two times a day  isosorbide   mononitrate ER Tablet (IMDUR) 30 milliGRAM(s) Oral daily  nitroglycerin     SubLingual 0.4 milliGRAM(s) SubLingual every 5 minutes PRN  triamterene 37.5 mG/hydrochlorothiazide 25 mG Tablet 1 Tablet(s) Oral daily      CURRENT OTHER MEDICATIONS:  acetaminophen     Tablet .. 650 milliGRAM(s) Oral every 6 hours PRN Temp greater or equal to 38C (100.4F), Mild Pain (1 - 3), Moderate Pain (4 - 6)  melatonin 3 milliGRAM(s) Oral at bedtime PRN Insomnia  ondansetron Injectable 4 milliGRAM(s) IV Push every 6 hours PRN Nausea and/or Vomiting  traMADol 25 milliGRAM(s) Oral every 6 hours PRN Moderate Pain (4 - 6)  traMADol 50 milliGRAM(s) Oral every 6 hours PRN Severe Pain (7 - 10)  pantoprazole    Tablet 40 milliGRAM(s) Oral before breakfast  allopurinol 100 milliGRAM(s) Oral daily  atorvastatin 80 milliGRAM(s) Oral at bedtime  dextrose 50% Injectable 12.5 Gram(s) IV Push once, Stop order after: 1 Doses  dextrose 50% Injectable 25 Gram(s) IV Push once, Stop order after: 1 Doses  dextrose 50% Injectable 25 Gram(s) IV Push once, Stop order after: 1 Doses  dextrose Oral Gel 15 Gram(s) Oral once, Stop order after: 1 Doses PRN Blood Glucose LESS THAN 70 milliGRAM(s)/deciliter  ezetimibe 10 milliGRAM(s) Oral at bedtime  glucagon  Injectable 1 milliGRAM(s) IntraMuscular once, Stop order after: 1 Doses  insulin lispro (ADMELOG) corrective regimen sliding scale   SubCutaneous three times a day before meals  insulin lispro (ADMELOG) corrective regimen sliding scale   SubCutaneous at bedtime  levothyroxine 88 MICROGram(s) Oral daily  aspirin enteric coated 81 milliGRAM(s) Oral daily  clopidogrel Tablet 75 milliGRAM(s) Oral daily  dextrose 5%. 1000 milliLiter(s) (50 mL/Hr) IV Continuous <Continuous>  dextrose 5%. 1000 milliLiter(s) (100 mL/Hr) IV Continuous <Continuous>  epoetin latisha (PROCRIT) Injectable 63212 Unit(s) SubCutaneous every 7 days  epoetin latisha-epbx (RETACRIT) Injectable 73653 Unit(s) SubCutaneous every 7 days  ergocalciferol 73185 Unit(s) Oral <User Schedule>  folic acid 1 milliGRAM(s) Oral daily  heparin   Injectable 5000 Unit(s) SubCutaneous every 12 hours  lidocaine   4% Patch 1 Patch Transdermal every 24 hours  sodium chloride 0.45% 1000 milliLiter(s) (70 mL/Hr) IV Continuous <Continuous>, Stop order after: 24 Hours      LABS:	 	  ( 13 Jan 2024 07:03 )  Troponin T  X    ,  CPK  158  , CKMB  X    , BNP X                                  9.3    6.36  )-----------( 222      ( 16 Jan 2024 04:41 )             28.7     01-16    137  |  101  |  37.6<H>  ----------------------------<  106<H>  4.2   |  25.0  |  1.74<H>    Ca    10.1      16 Jan 2024 04:41  Phos  3.6     01-16  Mg     1.9     01-16      PT/INR/PTT ( 13 Jan 2024 07:03 )                       :                       :      10.3         :       29.2                  .        .                   .              .           .       0.93        .                                       Lipid Profile: Date: 01-13 @ 07:03  Total cholesterol 131; Direct LDL: --; HDL: 62; Triglycerides:102    HgA1c:   TSH: Thyroid Stimulating Hormone, Serum: 0.36 uIU/mL      TELEMETRY: RSR 60's  no events    DIAGNOSTIC TESTING:    < from: TTE W or WO Ultrasound Enhancing Agent (01.13.24 @ 14:04) >  1. Left ventricular cavity is normal. Left ventricular wall thickness is normal. Left ventricular systolic function is normal with an ejection fraction visually estimated at 60 to 65 %.   2. There is mild (grade 1) left ventricular diastolic dysfunction.   3. Normal right ventricular cavity size and normal systolic function.  4. The left atrium is mild-moderately dilated.   5. The right atrium is normal in size.   6. There is moderate calcification of the mitral valve annulus.   7. Thickened mitral valve leaflets. Mild mitral valve leaflet calcification.   8. Trace mitralregurgitation.   9. Calcified mobile structure on tip of anterior mitral valve leaflet may represent ahealed vegetation.  10. Mild to moderate aortic regurgitation.  11. Mild tricuspid regurgitation.  12. Trileaflet aortic valve. moderate to severe aortic stenosis.  13. Estimated pulmonary artery systolic pressure is 41 mmHg, consistent with mild pulmonary hypertension.      < end of copied text >

## 2024-01-16 NOTE — CONSULT NOTE ADULT - PROBLEM SELECTOR RECOMMENDATION 9
S/p LHC; no new obstructive CAD.  Medical management.  Continue nitrates, BB, statin & Antiplatelet Rx.  [facility] follow up - will follow with you.
Admit to Tele, check CBC, CMP, trend CEx3, ECG tonight w/o acute T or ST changes  - follow FLP and A1C  - symptoms likely anginal without ischemia, and in light of HTN and advanced renal disease  - no acute complaints at this time, or changes in symptoms since admission  - the cause of low level cardiac Trops in patients with CKD is likely chronic myocardial injury  - trend CE x3 overnight and may repeat ECG in the morning  - Tele monitoring  - will trend troponin levels, if remain stable then NST next week, if troponin levels on the rise will start Heparin gtt and schedule for LHC early next week  - continue home ASA, Coreg and high dose Statin therapies  - may use NTG for symptom relief and will start Imdur tomorrow morning

## 2024-01-16 NOTE — CONSULT NOTE ADULT - SUBJECTIVE AND OBJECTIVE BOX
77yo F w/ CAD, CABG x2,  10/26/22  s/p Non stemii (cp rad back)She underwent LHC which showed  of mLAD with patent LIMA, no significant RCA dz, patent graft to high lateral vessel seen via collateral from dLAD, no culprit lesion to account for NSTEMI, Moderate to severe AS,  HTN, DM, HTN and HLD with intermittent mid-sternal chest pain with left shoulder radiation down her arm, non exertional, no other associated concerning risk factors for ACS.  Here for stable angina.  EKG: NSR, LVH no acute ischemia changes.  Trop: 53  pro-BNP: 776.  Patient underwent LHC today which revealed no significant new lesions w. recommendations for medical management. DALILA also performed today revealed severe Aortic Stenosis w/NAVA 0.5cm & peak gradient 67mmHg. CTS asked to evaluate for TAVR consideration.  PAST MEDICAL & SURGICAL HISTORY: 1. Hypertension 2. Diabetes mellitus. 3. Hypothyroidism. 4. HLD (hyperlipidemia). 5. CAD (coronary artery disease). 6. CKD (chronic kidney disease). 7. S/P CABG (coronary artery bypass graft)x 2. 8. H/O  section  Social History: Former smoker (2024 22:21).  Home Medications: allopurinol 100 mg oral tablet: 1 tab(s) orally once a day (2024 02:42) aspirin 81 mg oral tablet, chewable: 1 tab(s) orally once a day (2024 04:01) atorvastatin 80 mg oral tablet: 1 tab(s) orally once a day (at bedtime) (2024 04:01) carvedilol 6.25 mg oral tablet: 1 tab(s) orally 2 times a day (2024 02:37) ergocalciferol 1.25 mg (50,000 intl units) oral tablet: 1 tab(s) orally once a week on Monday (2024 02:44) ezetimibe 10 mg oral tablet: 1 tab(s) orally once a day (2024 02:38) Jardiance 10 mg oral tablet: 1 tab(s) orally once a day (2024 02:34) levothyroxine 88 mcg (0.088 mg) oral tablet: 1 tab(s) orally once a day (2024 02:36) omeprazole 20 mg oral delayed release capsule: 1 cap(s) orally once a day (2024 04:01) triamterene-hydrochlorothiazide 37.5 mg-25 mg oral tablet: 1 tab(s) orally once a day (2024 02:39)  MEDICATIONS  (STANDING): acetylcysteine  Oral Solution 1200 milliGRAM(s) Oral every 12 hours allopurinol 100 milliGRAM(s) Oral daily aspirin enteric coated 81 milliGRAM(s) Oral daily atorvastatin 80 milliGRAM(s) Oral at bedtime carvedilol 6.25 milliGRAM(s) Oral every 12 hours clopidogrel Tablet 75 milliGRAM(s) Oral daily epoetin latisha (PROCRIT) Injectable 81769 Unit(s) SubCutaneous every 7 days epoetin latisha-epbx (RETACRIT) Injectable 38278 Unit(s) SubCutaneous every 7 days ergocalciferol 27602 Unit(s) Oral <User Schedule> ezetimibe 10 milliGRAM(s) Oral at bedtime folic acid 1 milliGRAM(s) Oral daily glucagon  Injectable 1 milliGRAM(s) IntraMuscular once heparin   Injectable 5000 Unit(s) SubCutaneous every 12 hours hydrALAZINE 25 milliGRAM(s) Oral two times a day insulin lispro (ADMELOG) corrective regimen sliding scale   SubCutaneous at bedtime insulin lispro (ADMELOG) corrective regimen sliding scale   SubCutaneous three times a day before meals isosorbide   mononitrate ER Tablet (IMDUR) 30 milliGRAM(s) Oral daily levothyroxine 88 MICROGram(s) Oral daily lidocaine   4% Patch 1 Patch Transdermal every 24 hours pantoprazole    Tablet 40 milliGRAM(s) Oral before breakfast silver sulfADIAZINE 1% Cream 1 Application(s) Topical three times a day triamterene 37.5 mG/hydrochlorothiazide 25 mG Tablet 1 Tablet(s) Oral daily  Allergies: No Known Allergies  Vital Signs Last 24 Hrs T(C): 36.8 (2024 16:37), Max: 36.8 (2024 04:07) T(F): 98.2 (2024 16:37), Max: 98.2 (2024 04:07) HR: 55 (2024 17:06) (53 - 66) BP: 151/53 (2024 16:37) (91/44 - 155/69) BP(mean): 79 (15 Jaxson 2024 21:15) (79 - 79) RR: 18 (2024 16:37) (11 - 25) SpO2: 94% (2024 16:37) (94% - 100%)  Parameters below as of 2024 16:37 Patient On (Oxygen Delivery Method): room air  I&O's Summary  15 Jaxson 2024 07:01  -  2024 07:00 -------------------------------------------------------- IN: 540 mL / OUT: 0 mL / NET: 540 mL  2024 07:01  -  2024 19:09 -------------------------------------------------------- IN: 350 mL / OUT: 0 mL / NET: 350 mL  T(C): 36.8 (24 @ 16:37), Max: 36.8 (24 @ 04:07) HR: 55 (24 @ 17:06) (53 - 66) BP: 151/53 (24 @ 16:37) (91/44 - 155/69) RR: 18 (24 @ 16:37) (11 - 25) SpO2: 94% (24 @ 16:37) (94% - 100%)  Physical Exam: CONSTITUTIONAL: Well groomed, no apparent distress EYES: PERRLA and symmetric, EOMI, No conjunctival or scleral injection, non-icteric ENMT: Oral mucosa with moist membranes. Normal dentition; no pharyngeal injection or exudates            NECK: Supple, symmetric and without tracheal deviation  RESP: No respiratory distress, no use of accessory muscles; CTA b/l, no WRR CV: RRR, + holosystolic murmur, +S1S2, no MRG; no JVD; no peripheral edema GI: Soft, NT, ND, no rebound, no guarding; no palpable masses; no hepatosplenomegaly; no hernia palpated LYMPH: No cervical LAD or tenderness; no axillary LAD or tenderness; no inguinal LAD or tenderness MSK: Normal gait; No digital clubbing or cyanosis; examination of the (head/neck/spine/ribs/pelvis, RUE, LUE, RLE, LLE) without misalignment,           Normal ROM without pain, no spinal tenderness, normal muscle strength/tone SKIN: No rashes or ulcers noted; no subcutaneous nodules or induration palpable NEURO: CN II-XII intact; normal reflexes in upper and lower extremities, sensation intact in upper and lower extremities b/l to light touch  PSYCH: Appropriate insight/judgment; A+O x 3, mood and affect appropriate, recent/remote memory intact  Labs:               9.3   6.36  )-----------( 222      ( 2024 04:41 )            28.7    137  |  101  |  37.6<H> ----------------------------<  106<H> 4.2   |  25.0  |  1.74<H>  Ca    10.1      2024 04:41 Phos  3.6     - Mg     1.9       A1c: 6.6%  EKG:   Rate 62 BPM P-R Interval 178 ms QRS Duration 88 ms Q-T Interval 430 ms QTC Calculation(Bazett) 436 ms  Diagnosis: Normal sinus rhythm Left ventricular hypertrophy with repolarization abnormality Abnormal ECG  Confirmed by COY ADAM ([id number]) on 2024 6:13:04 PM  Imaging:  CXR 2024   IMPRESSION: No acute finding or change.  --- End of Report ---  LHC 2024   DALILA 2024   CONCLUSIONS:    1. Left ventricular systolic function is normal with an ejection fraction visually estimated at 60 to 65 %.  2. Normal right ventricular cavity size and normal systolic function.  3.No evidence of left atrial or left atrial appendage thrombus.  4. Severe aortic stenosis. Transgastric doppler, Vmax 4.09 m/s, Peak/Mean Grad 67/33 mmHg, DI 0.20.  5. Moderate aortic regurgitation.  6. No evidence of a patent foramen ovale with noshunt detected by color flow Doppler.  7. No pericardial effusion seen.  8. Compared to the transthoracic echocardiogram performed on 2024, AS is likely severe.. Findings were discussed with patient and Cardiology rounding attending on 2024.

## 2024-01-16 NOTE — PROGRESS NOTE ADULT - SUBJECTIVE AND OBJECTIVE BOX
NEPHROLOGY INTERVAL HPI/OVERNIGHT EVENTS:    No new events.    MEDICATIONS  (STANDING):  acetylcysteine  Oral Solution 1200 milliGRAM(s) Oral every 12 hours  allopurinol 100 milliGRAM(s) Oral daily  aspirin enteric coated 81 milliGRAM(s) Oral daily  atorvastatin 80 milliGRAM(s) Oral at bedtime  carvedilol 6.25 milliGRAM(s) Oral every 12 hours  clopidogrel Tablet 75 milliGRAM(s) Oral daily  dextrose 5%. 1000 milliLiter(s) (100 mL/Hr) IV Continuous <Continuous>  dextrose 5%. 1000 milliLiter(s) (50 mL/Hr) IV Continuous <Continuous>  dextrose 50% Injectable 12.5 Gram(s) IV Push once  dextrose 50% Injectable 25 Gram(s) IV Push once  dextrose 50% Injectable 25 Gram(s) IV Push once  epoetin latisha-epbx (RETACRIT) Injectable 27554 Unit(s) SubCutaneous every 7 days  ergocalciferol 90142 Unit(s) Oral <User Schedule>  ezetimibe 10 milliGRAM(s) Oral at bedtime  folic acid 1 milliGRAM(s) Oral daily  glucagon  Injectable 1 milliGRAM(s) IntraMuscular once  heparin   Injectable 5000 Unit(s) SubCutaneous every 12 hours  hydrALAZINE 25 milliGRAM(s) Oral two times a day  insulin lispro (ADMELOG) corrective regimen sliding scale   SubCutaneous three times a day before meals  insulin lispro (ADMELOG) corrective regimen sliding scale   SubCutaneous at bedtime  isosorbide   mononitrate ER Tablet (IMDUR) 30 milliGRAM(s) Oral daily  levothyroxine 88 MICROGram(s) Oral daily  pantoprazole    Tablet 40 milliGRAM(s) Oral before breakfast  sodium chloride 0.45% 1000 milliLiter(s) (70 mL/Hr) IV Continuous <Continuous>  triamterene 37.5 mG/hydrochlorothiazide 25 mG Tablet 1 Tablet(s) Oral daily    MEDICATIONS  (PRN):  acetaminophen     Tablet .. 650 milliGRAM(s) Oral every 6 hours PRN Temp greater or equal to 38C (100.4F), Mild Pain (1 - 3), Moderate Pain (4 - 6)  dextrose Oral Gel 15 Gram(s) Oral once PRN Blood Glucose LESS THAN 70 milliGRAM(s)/deciliter  melatonin 3 milliGRAM(s) Oral at bedtime PRN Insomnia  nitroglycerin     SubLingual 0.4 milliGRAM(s) SubLingual every 5 minutes PRN Chest Pain  ondansetron Injectable 4 milliGRAM(s) IV Push every 6 hours PRN Nausea and/or Vomiting  traMADol 25 milliGRAM(s) Oral every 6 hours PRN Moderate Pain (4 - 6)  traMADol 50 milliGRAM(s) Oral every 6 hours PRN Severe Pain (7 - 10)      Allergies    No Known Allergies          Vital Signs Last 24 Hrs  T(C): 36.8 (16 Jan 2024 04:07), Max: 36.8 (15 Jaxson 2024 11:24)  T(F): 98.2 (16 Jan 2024 04:07), Max: 98.2 (15 Jaxson 2024 11:24)  HR: 63 (16 Jan 2024 04:07) (61 - 66)  BP: 130/63 (16 Jan 2024 04:07) (115/61 - 132/57)  BP(mean): 79 (15 Jaxson 2024 21:15) (79 - 79)  RR: 18 (16 Jan 2024 04:07) (18 - 18)  SpO2: 97% (16 Jan 2024 04:07) (95% - 98%)    Parameters below as of 16 Jan 2024 04:07  Patient On (Oxygen Delivery Method): room air      PHYSICAL EXAM:    GENERAL: Comfortable  in bed   HEAD:  wnl  EYES:   ENMT:   NECK: veins wnl  NERVOUS SYSTEM:  alert, verbal, oriented  CHEST/LUNG: no  02, clear  HEART: RR with  2/6  sytolicurmur  ABDOMEN: NT  EXTREMITIES: No  edema   LYMPH:   SKIN: No rash  : Neg    LABS:                        9.3    6.36  )-----------( 222      ( 16 Jan 2024 04:41 )             28.7     01-16    137  |  101  |  37.6<H>  ----------------------------<  106<H>  4.2   |  25.0  |  1.74<H>    Ca    10.1      16 Jan 2024 04:41  Phos  3.6     01-16  Mg     1.9     01-16        Urinalysis Basic - ( 16 Jan 2024 04:41 )    Color: x / Appearance: x / SG: x / pH: x  Gluc: 106 mg/dL / Ketone: x  / Bili: x / Urobili: x   Blood: x / Protein: x / Nitrite: x   Leuk Esterase: x / RBC: x / WBC x   Sq Epi: x / Non Sq Epi: x / Bacteria: x      Magnesium: 1.9 mg/dL (01-16 @ 04:41)  Phosphorus: 3.6 mg/dL (01-16 @ 04:41)          RADIOLOGY & ADDITIONAL TESTS:   NEPHROLOGY INTERVAL HPI/OVERNIGHT EVENTS:    No new events.    MEDICATIONS  (STANDING):  acetylcysteine  Oral Solution 1200 milliGRAM(s) Oral every 12 hours  allopurinol 100 milliGRAM(s) Oral daily  aspirin enteric coated 81 milliGRAM(s) Oral daily  atorvastatin 80 milliGRAM(s) Oral at bedtime  carvedilol 6.25 milliGRAM(s) Oral every 12 hours  clopidogrel Tablet 75 milliGRAM(s) Oral daily  dextrose 5%. 1000 milliLiter(s) (100 mL/Hr) IV Continuous <Continuous>  dextrose 5%. 1000 milliLiter(s) (50 mL/Hr) IV Continuous <Continuous>  dextrose 50% Injectable 12.5 Gram(s) IV Push once  dextrose 50% Injectable 25 Gram(s) IV Push once  dextrose 50% Injectable 25 Gram(s) IV Push once  epoetin latisha-epbx (RETACRIT) Injectable 78441 Unit(s) SubCutaneous every 7 days  ergocalciferol 22243 Unit(s) Oral <User Schedule>  ezetimibe 10 milliGRAM(s) Oral at bedtime  folic acid 1 milliGRAM(s) Oral daily  glucagon  Injectable 1 milliGRAM(s) IntraMuscular once  heparin   Injectable 5000 Unit(s) SubCutaneous every 12 hours  hydrALAZINE 25 milliGRAM(s) Oral two times a day  insulin lispro (ADMELOG) corrective regimen sliding scale   SubCutaneous three times a day before meals  insulin lispro (ADMELOG) corrective regimen sliding scale   SubCutaneous at bedtime  isosorbide   mononitrate ER Tablet (IMDUR) 30 milliGRAM(s) Oral daily  levothyroxine 88 MICROGram(s) Oral daily  pantoprazole    Tablet 40 milliGRAM(s) Oral before breakfast  sodium chloride 0.45% 1000 milliLiter(s) (70 mL/Hr) IV Continuous <Continuous>  triamterene 37.5 mG/hydrochlorothiazide 25 mG Tablet 1 Tablet(s) Oral daily    MEDICATIONS  (PRN):  acetaminophen     Tablet .. 650 milliGRAM(s) Oral every 6 hours PRN Temp greater or equal to 38C (100.4F), Mild Pain (1 - 3), Moderate Pain (4 - 6)  dextrose Oral Gel 15 Gram(s) Oral once PRN Blood Glucose LESS THAN 70 milliGRAM(s)/deciliter  melatonin 3 milliGRAM(s) Oral at bedtime PRN Insomnia  nitroglycerin     SubLingual 0.4 milliGRAM(s) SubLingual every 5 minutes PRN Chest Pain  ondansetron Injectable 4 milliGRAM(s) IV Push every 6 hours PRN Nausea and/or Vomiting  traMADol 25 milliGRAM(s) Oral every 6 hours PRN Moderate Pain (4 - 6)  traMADol 50 milliGRAM(s) Oral every 6 hours PRN Severe Pain (7 - 10)      Allergies    No Known Allergies          Vital Signs Last 24 Hrs  T(C): 36.8 (16 Jan 2024 04:07), Max: 36.8 (15 Jaxson 2024 11:24)  T(F): 98.2 (16 Jan 2024 04:07), Max: 98.2 (15 Jaxson 2024 11:24)  HR: 63 (16 Jan 2024 04:07) (61 - 66)  BP: 130/63 (16 Jan 2024 04:07) (115/61 - 132/57)  BP(mean): 79 (15 Jaxson 2024 21:15) (79 - 79)  RR: 18 (16 Jan 2024 04:07) (18 - 18)  SpO2: 97% (16 Jan 2024 04:07) (95% - 98%)    Parameters below as of 16 Jan 2024 04:07  Patient On (Oxygen Delivery Method): room air      PHYSICAL EXAM:    GENERAL: Comfortable  in bed   HEAD:  wnl  EYES:   ENMT:   NECK: veins wnl  NERVOUS SYSTEM:  alert, verbal, oriented  CHEST/LUNG: no  02, clear  HEART: RR with  2/6  sytolicurmur  ABDOMEN: NT  EXTREMITIES: No  edema   LYMPH:   SKIN: No rash  : Neg    LABS:                        9.3    6.36  )-----------( 222      ( 16 Jan 2024 04:41 )             28.7     01-16    137  |  101  |  37.6<H>  ----------------------------<  106<H>  4.2   |  25.0  |  1.74<H>    Ca    10.1      16 Jan 2024 04:41  Phos  3.6     01-16  Mg     1.9     01-16        Urinalysis Basic - ( 16 Jan 2024 04:41 )    Color: x / Appearance: x / SG: x / pH: x  Gluc: 106 mg/dL / Ketone: x  / Bili: x / Urobili: x   Blood: x / Protein: x / Nitrite: x   Leuk Esterase: x / RBC: x / WBC x   Sq Epi: x / Non Sq Epi: x / Bacteria: x      Magnesium: 1.9 mg/dL (01-16 @ 04:41)  Phosphorus: 3.6 mg/dL (01-16 @ 04:41)          RADIOLOGY & ADDITIONAL TESTS:

## 2024-01-16 NOTE — PROGRESS NOTE ADULT - SUBJECTIVE AND OBJECTIVE BOX
DIANA ROGERS    [id number]    78y      Female    CC: angina     INTERVAL HPI/OVERNIGHT EVENTS: Pt seen and examined. no acute events reported o/n     REVIEW OF SYSTEMS:    RESPIRATORY: No cough, wheezing, hemoptysis; No shortness of breath  CARDIOVASCULAR: No palpitations  GASTROINTESTINAL: No abdominal or epigastric pain. No nausea, vomiting    Vital Signs Last 24 Hrs  T(C): 36.6 (16 Jan 2024 09:19), Max: 36.8 (16 Jan 2024 04:07)  T(F): 97.8 (16 Jan 2024 09:19), Max: 98.2 (16 Jan 2024 04:07)  HR: 57 (16 Jan 2024 12:20) (57 - 66)  BP: 130/60 (16 Jan 2024 12:20) (115/61 - 137/51)  BP(mean): 79 (15 Jaxson 2024 21:15) (79 - 79)  RR: 15 (16 Jan 2024 12:20) (15 - 18)  SpO2: 97% (16 Jan 2024 12:20) (94% - 97%)    Parameters below as of 16 Jan 2024 12:20  Patient On (Oxygen Delivery Method): room air        PHYSICAL EXAM:    GENERAL: NAD  CHEST/LUNG: Clear to auscultation bilaterally  HEART: S1S2+, Regular rate and rhythm  ABDOMEN: Soft, Nontender, Nondistended; Bowel sounds present  SKIN: warm, dry   NEURO: Awake, alert   PSYCH: calm, cooperative     LABS:                        9.3    6.36  )-----------( 222      ( 16 Jan 2024 04:41 )             28.7     01-16    137  |  101  |  37.6<H>  ----------------------------<  106<H>  4.2   |  25.0  |  1.74<H>    Ca    10.1      16 Jan 2024 04:41  Phos  3.6     01-16  Mg     1.9     01-16        Urinalysis Basic - ( 16 Jan 2024 04:41 )    Color: x / Appearance: x / SG: x / pH: x  Gluc: 106 mg/dL / Ketone: x  / Bili: x / Urobili: x   Blood: x / Protein: x / Nitrite: x   Leuk Esterase: x / RBC: x / WBC x   Sq Epi: x / Non Sq Epi: x / Bacteria: x          MEDICATIONS  (STANDING):  acetylcysteine  Oral Solution 1200 milliGRAM(s) Oral every 12 hours  allopurinol 100 milliGRAM(s) Oral daily  aspirin enteric coated 81 milliGRAM(s) Oral daily  atorvastatin 80 milliGRAM(s) Oral at bedtime  carvedilol 6.25 milliGRAM(s) Oral every 12 hours  clopidogrel Tablet 75 milliGRAM(s) Oral daily  dextrose 5%. 1000 milliLiter(s) (50 mL/Hr) IV Continuous <Continuous>  dextrose 5%. 1000 milliLiter(s) (100 mL/Hr) IV Continuous <Continuous>  dextrose 50% Injectable 12.5 Gram(s) IV Push once  dextrose 50% Injectable 25 Gram(s) IV Push once  dextrose 50% Injectable 25 Gram(s) IV Push once  epoetin latisha (PROCRIT) Injectable 54940 Unit(s) SubCutaneous every 7 days  epoetin latisha-epbx (RETACRIT) Injectable 62042 Unit(s) SubCutaneous every 7 days  ergocalciferol 12110 Unit(s) Oral <User Schedule>  ezetimibe 10 milliGRAM(s) Oral at bedtime  folic acid 1 milliGRAM(s) Oral daily  glucagon  Injectable 1 milliGRAM(s) IntraMuscular once  heparin   Injectable 5000 Unit(s) SubCutaneous every 12 hours  hydrALAZINE 25 milliGRAM(s) Oral two times a day  insulin lispro (ADMELOG) corrective regimen sliding scale   SubCutaneous three times a day before meals  insulin lispro (ADMELOG) corrective regimen sliding scale   SubCutaneous at bedtime  isosorbide   mononitrate ER Tablet (IMDUR) 30 milliGRAM(s) Oral daily  levothyroxine 88 MICROGram(s) Oral daily  lidocaine   4% Patch 1 Patch Transdermal every 24 hours  pantoprazole    Tablet 40 milliGRAM(s) Oral before breakfast  sodium chloride 0.45% 1000 milliLiter(s) (70 mL/Hr) IV Continuous <Continuous>  triamterene 37.5 mG/hydrochlorothiazide 25 mG Tablet 1 Tablet(s) Oral daily    MEDICATIONS  (PRN):  acetaminophen     Tablet .. 650 milliGRAM(s) Oral every 6 hours PRN Temp greater or equal to 38C (100.4F), Mild Pain (1 - 3), Moderate Pain (4 - 6)  dextrose Oral Gel 15 Gram(s) Oral once PRN Blood Glucose LESS THAN 70 milliGRAM(s)/deciliter  melatonin 3 milliGRAM(s) Oral at bedtime PRN Insomnia  nitroglycerin     SubLingual 0.4 milliGRAM(s) SubLingual every 5 minutes PRN Chest Pain  ondansetron Injectable 4 milliGRAM(s) IV Push every 6 hours PRN Nausea and/or Vomiting  traMADol 25 milliGRAM(s) Oral every 6 hours PRN Moderate Pain (4 - 6)  traMADol 50 milliGRAM(s) Oral every 6 hours PRN Severe Pain (7 - 10)      RADIOLOGY & ADDITIONAL TESTS:   DIANA ROGERS    [id number]    78y      Female    CC: angina     INTERVAL HPI/OVERNIGHT EVENTS: Pt seen and examined. no acute events reported o/n     REVIEW OF SYSTEMS:    RESPIRATORY: No cough, wheezing, hemoptysis; No shortness of breath  CARDIOVASCULAR: No palpitations  GASTROINTESTINAL: No abdominal or epigastric pain. No nausea, vomiting    Vital Signs Last 24 Hrs  T(C): 36.6 (16 Jan 2024 09:19), Max: 36.8 (16 Jan 2024 04:07)  T(F): 97.8 (16 Jan 2024 09:19), Max: 98.2 (16 Jan 2024 04:07)  HR: 57 (16 Jan 2024 12:20) (57 - 66)  BP: 130/60 (16 Jan 2024 12:20) (115/61 - 137/51)  BP(mean): 79 (15 Jaxson 2024 21:15) (79 - 79)  RR: 15 (16 Jan 2024 12:20) (15 - 18)  SpO2: 97% (16 Jan 2024 12:20) (94% - 97%)    Parameters below as of 16 Jan 2024 12:20  Patient On (Oxygen Delivery Method): room air        PHYSICAL EXAM:    GENERAL: NAD  CHEST/LUNG: Clear to auscultation bilaterally  HEART: S1S2+, Regular rate and rhythm  ABDOMEN: Soft, Nontender, Nondistended; Bowel sounds present  SKIN: warm, dry   NEURO: Awake, alert   PSYCH: calm, cooperative     LABS:                        9.3    6.36  )-----------( 222      ( 16 Jan 2024 04:41 )             28.7     01-16    137  |  101  |  37.6<H>  ----------------------------<  106<H>  4.2   |  25.0  |  1.74<H>    Ca    10.1      16 Jan 2024 04:41  Phos  3.6     01-16  Mg     1.9     01-16        Urinalysis Basic - ( 16 Jan 2024 04:41 )    Color: x / Appearance: x / SG: x / pH: x  Gluc: 106 mg/dL / Ketone: x  / Bili: x / Urobili: x   Blood: x / Protein: x / Nitrite: x   Leuk Esterase: x / RBC: x / WBC x   Sq Epi: x / Non Sq Epi: x / Bacteria: x          MEDICATIONS  (STANDING):  acetylcysteine  Oral Solution 1200 milliGRAM(s) Oral every 12 hours  allopurinol 100 milliGRAM(s) Oral daily  aspirin enteric coated 81 milliGRAM(s) Oral daily  atorvastatin 80 milliGRAM(s) Oral at bedtime  carvedilol 6.25 milliGRAM(s) Oral every 12 hours  clopidogrel Tablet 75 milliGRAM(s) Oral daily  dextrose 5%. 1000 milliLiter(s) (50 mL/Hr) IV Continuous <Continuous>  dextrose 5%. 1000 milliLiter(s) (100 mL/Hr) IV Continuous <Continuous>  dextrose 50% Injectable 12.5 Gram(s) IV Push once  dextrose 50% Injectable 25 Gram(s) IV Push once  dextrose 50% Injectable 25 Gram(s) IV Push once  epoetin latisha (PROCRIT) Injectable 47696 Unit(s) SubCutaneous every 7 days  epoetin latisha-epbx (RETACRIT) Injectable 85458 Unit(s) SubCutaneous every 7 days  ergocalciferol 96935 Unit(s) Oral <User Schedule>  ezetimibe 10 milliGRAM(s) Oral at bedtime  folic acid 1 milliGRAM(s) Oral daily  glucagon  Injectable 1 milliGRAM(s) IntraMuscular once  heparin   Injectable 5000 Unit(s) SubCutaneous every 12 hours  hydrALAZINE 25 milliGRAM(s) Oral two times a day  insulin lispro (ADMELOG) corrective regimen sliding scale   SubCutaneous three times a day before meals  insulin lispro (ADMELOG) corrective regimen sliding scale   SubCutaneous at bedtime  isosorbide   mononitrate ER Tablet (IMDUR) 30 milliGRAM(s) Oral daily  levothyroxine 88 MICROGram(s) Oral daily  lidocaine   4% Patch 1 Patch Transdermal every 24 hours  pantoprazole    Tablet 40 milliGRAM(s) Oral before breakfast  sodium chloride 0.45% 1000 milliLiter(s) (70 mL/Hr) IV Continuous <Continuous>  triamterene 37.5 mG/hydrochlorothiazide 25 mG Tablet 1 Tablet(s) Oral daily    MEDICATIONS  (PRN):  acetaminophen     Tablet .. 650 milliGRAM(s) Oral every 6 hours PRN Temp greater or equal to 38C (100.4F), Mild Pain (1 - 3), Moderate Pain (4 - 6)  dextrose Oral Gel 15 Gram(s) Oral once PRN Blood Glucose LESS THAN 70 milliGRAM(s)/deciliter  melatonin 3 milliGRAM(s) Oral at bedtime PRN Insomnia  nitroglycerin     SubLingual 0.4 milliGRAM(s) SubLingual every 5 minutes PRN Chest Pain  ondansetron Injectable 4 milliGRAM(s) IV Push every 6 hours PRN Nausea and/or Vomiting  traMADol 25 milliGRAM(s) Oral every 6 hours PRN Moderate Pain (4 - 6)  traMADol 50 milliGRAM(s) Oral every 6 hours PRN Severe Pain (7 - 10)      RADIOLOGY & ADDITIONAL TESTS:

## 2024-01-16 NOTE — PROGRESS NOTE ADULT - SUBJECTIVE AND OBJECTIVE BOX
Pt doing well s/p uncomplicated  LHC and DALILA  . Denies complaint.   Results   No new CAD compared to cath 2022  DALILA revealed severe AS   Right FA site small hematoma compressed femstop placed for manual pressure during DALILA then removed with out hematoma and active bleeding.      Exam:   VSS, NAD, A&O x 3  Access: Right FA site   Card: S1/S2, RRR, no m/g/r  Resp: lungs CTA b/l  Abd: S/NT/ND  Ext: no edema, distal pulses intact    Assessment:   78y Female h/o CABG CAd admitted for CP CRI , now status post LHC and DALILA revealed severe AS.   Plan:   Observation on telemetry per post op protocol.    Resume PO intake.   Ambulate w/ assist once fully awake & back to baseline mental status w/ VSS.  Resume other home medications.   Continue ROBIN as per Renal

## 2024-01-16 NOTE — PROGRESS NOTE ADULT - ASSESSMENT
A/P: 79yo F w/ CAD, CABG x2, HTN, DM, HTN and HLD with intermittent mid-sternal chest pain with left shoulder radiation down her arm, non exertional, no other associated concerning risk factors for ACS.  Here for stable angina.  EKG: NSR, LVH no acute ischemia changes.  Trop: 53  pro-BNP: 776        Problem/Plan - 1:  ·  Problem: CAD (coronary artery disease).   · - Patient with NSTEMI 10/22 s/p C that did not reveal a culprit lesion, medically managed.   - Echocardiogram with EF 60-65%, moderate to severe AS.   - Hemodynamically stable without complaints chest pain    -  [facility] 01/16/24.: revealed grafts unchanged from 10/26/22   -  Continue aspirin, plavix, lipitor, coreg, and imdur.      Problem/Plan - 2:  ·  Problem: HTN,  - stable    Continue current medications.    Problem/Plan - 3:  ·  Problem: AS (aortic stenosis).   ·  - Echocardiogram with moderate to severe AS.   - DALILA pending will follow up result s A/P: 79yo F w/ CAD, CABG x2, HTN, DM, HTN and HLD with intermittent mid-sternal chest pain with left shoulder radiation down her arm, non exertional, no other associated concerning risk factors for ACS.  Here for stable angina.  EKG: NSR, LVH no acute ischemia changes.  Trop: 53  pro-BNP: 776        Problem/Plan - 1:  ·  Problem: CAD (coronary artery disease).   · - Patient with NSTEMI 10/22 s/p C that did not reveal a culprit lesion, medically managed.   - Echocardiogram with EF 60-65%, moderate to severe AS.   - Hemodynamically stable without complaints chest pain    -  [facility] 01/16/24.: revealed grafts unchanged from 10/26/22   -  Continue aspirin, plavix, lipitor, coreg, and imdur.      Problem/Plan - 2:  ·  Problem: HTN,  - stable    Continue current medications.    Problem/Plan - 3:  ·  Problem: AS (aortic stenosis).   ·  - Echocardiogram with moderate to severe AS.   - DALILA pending will follow up result s

## 2024-01-16 NOTE — CONSULT NOTE ADULT - PROBLEM SELECTOR RECOMMENDATION 3
CKD III - DM / HTN   Atypical CP , responds to nitrates   Mod - sev AS , preserved EF on ECHO   Renal function at baseline ( c/w 2022 )   Normal kidneys on CT angiogram 2022
Assess for target organ damage (CKD, papilledema, encephalopathy) BP goal<130/80mmHg  - lifestyle modifications (DASH diet, daily exercise encouraged, weight loss, limit ETOH intake, avoid NSAIDs)   - VS as per unit protocol  - pharmacologic options: on combination formulations: Triamterene/HCTZ, Norvasc and Coreg 6.25mg oral 2x daily

## 2024-01-17 LAB
ANION GAP SERPL CALC-SCNC: 12 MMOL/L — SIGNIFICANT CHANGE UP (ref 5–17)
BUN SERPL-MCNC: 31.2 MG/DL — HIGH (ref 8–20)
CALCIUM SERPL-MCNC: 9.7 MG/DL — SIGNIFICANT CHANGE UP (ref 8.4–10.5)
CHLORIDE SERPL-SCNC: 101 MMOL/L — SIGNIFICANT CHANGE UP (ref 96–108)
CO2 SERPL-SCNC: 27 MMOL/L — SIGNIFICANT CHANGE UP (ref 22–29)
CREAT SERPL-MCNC: 1.69 MG/DL — HIGH (ref 0.5–1.3)
EGFR: 31 ML/MIN/1.73M2 — LOW
GLUCOSE BLDC GLUCOMTR-MCNC: 123 MG/DL — HIGH (ref 70–99)
GLUCOSE BLDC GLUCOMTR-MCNC: 142 MG/DL — HIGH (ref 70–99)
GLUCOSE BLDC GLUCOMTR-MCNC: 149 MG/DL — HIGH (ref 70–99)
GLUCOSE BLDC GLUCOMTR-MCNC: 156 MG/DL — HIGH (ref 70–99)
GLUCOSE SERPL-MCNC: 101 MG/DL — HIGH (ref 70–99)
HCT VFR BLD CALC: 29.9 % — LOW (ref 34.5–45)
HGB BLD-MCNC: 9.7 G/DL — LOW (ref 11.5–15.5)
MAGNESIUM SERPL-MCNC: 1.9 MG/DL — SIGNIFICANT CHANGE UP (ref 1.6–2.6)
MCHC RBC-ENTMCNC: 30.2 PG — SIGNIFICANT CHANGE UP (ref 27–34)
MCHC RBC-ENTMCNC: 32.4 GM/DL — SIGNIFICANT CHANGE UP (ref 32–36)
MCV RBC AUTO: 93.1 FL — SIGNIFICANT CHANGE UP (ref 80–100)
PLATELET # BLD AUTO: 220 K/UL — SIGNIFICANT CHANGE UP (ref 150–400)
POTASSIUM SERPL-MCNC: 4.3 MMOL/L — SIGNIFICANT CHANGE UP (ref 3.5–5.3)
POTASSIUM SERPL-SCNC: 4.3 MMOL/L — SIGNIFICANT CHANGE UP (ref 3.5–5.3)
RBC # BLD: 3.21 M/UL — LOW (ref 3.8–5.2)
RBC # FLD: 14.2 % — SIGNIFICANT CHANGE UP (ref 10.3–14.5)
SODIUM SERPL-SCNC: 140 MMOL/L — SIGNIFICANT CHANGE UP (ref 135–145)
WBC # BLD: 7.35 K/UL — SIGNIFICANT CHANGE UP (ref 3.8–10.5)
WBC # FLD AUTO: 7.35 K/UL — SIGNIFICANT CHANGE UP (ref 3.8–10.5)

## 2024-01-17 PROCEDURE — 99232 SBSQ HOSP IP/OBS MODERATE 35: CPT

## 2024-01-17 RX ORDER — SODIUM CHLORIDE 9 MG/ML
1000 INJECTION INTRAMUSCULAR; INTRAVENOUS; SUBCUTANEOUS
Refills: 0 | Status: DISCONTINUED | OUTPATIENT
Start: 2024-01-17 | End: 2024-01-18

## 2024-01-17 RX ADMIN — ATORVASTATIN CALCIUM 80 MILLIGRAM(S): 80 TABLET, FILM COATED ORAL at 21:33

## 2024-01-17 RX ADMIN — Medication 88 MICROGRAM(S): at 05:42

## 2024-01-17 RX ADMIN — Medication 25 MILLIGRAM(S): at 05:42

## 2024-01-17 RX ADMIN — Medication 81 MILLIGRAM(S): at 12:03

## 2024-01-17 RX ADMIN — CARVEDILOL PHOSPHATE 6.25 MILLIGRAM(S): 80 CAPSULE, EXTENDED RELEASE ORAL at 07:35

## 2024-01-17 RX ADMIN — Medication 650 MILLIGRAM(S): at 08:36

## 2024-01-17 RX ADMIN — EZETIMIBE 10 MILLIGRAM(S): 10 TABLET ORAL at 21:33

## 2024-01-17 RX ADMIN — Medication 1 APPLICATION(S): at 21:37

## 2024-01-17 RX ADMIN — HEPARIN SODIUM 5000 UNIT(S): 5000 INJECTION INTRAVENOUS; SUBCUTANEOUS at 05:42

## 2024-01-17 RX ADMIN — Medication 1200 MILLIGRAM(S): at 05:42

## 2024-01-17 RX ADMIN — Medication 650 MILLIGRAM(S): at 07:36

## 2024-01-17 RX ADMIN — Medication 3 MILLIGRAM(S): at 21:36

## 2024-01-17 RX ADMIN — Medication 100 MILLIGRAM(S): at 12:04

## 2024-01-17 RX ADMIN — HEPARIN SODIUM 5000 UNIT(S): 5000 INJECTION INTRAVENOUS; SUBCUTANEOUS at 17:25

## 2024-01-17 RX ADMIN — Medication 1 APPLICATION(S): at 05:42

## 2024-01-17 RX ADMIN — PANTOPRAZOLE SODIUM 40 MILLIGRAM(S): 20 TABLET, DELAYED RELEASE ORAL at 05:42

## 2024-01-17 RX ADMIN — CARVEDILOL PHOSPHATE 6.25 MILLIGRAM(S): 80 CAPSULE, EXTENDED RELEASE ORAL at 21:33

## 2024-01-17 RX ADMIN — Medication 1 MILLIGRAM(S): at 12:03

## 2024-01-17 RX ADMIN — ISOSORBIDE MONONITRATE 30 MILLIGRAM(S): 60 TABLET, EXTENDED RELEASE ORAL at 12:03

## 2024-01-17 RX ADMIN — Medication 25 MILLIGRAM(S): at 17:25

## 2024-01-17 RX ADMIN — Medication 1 TABLET(S): at 05:42

## 2024-01-17 RX ADMIN — Medication 1 APPLICATION(S): at 13:03

## 2024-01-17 RX ADMIN — CLOPIDOGREL BISULFATE 75 MILLIGRAM(S): 75 TABLET, FILM COATED ORAL at 12:03

## 2024-01-17 RX ADMIN — Medication 1: at 12:04

## 2024-01-17 NOTE — PROGRESS NOTE ADULT - ASSESSMENT
78y/oF PMH AS, CAD s/p PCI, CABG (1999), HTN, DM, NSTEMI 10/2022 s/p [facility] with chronic total occlusion of mLAD with patent FILIPPO graft and no culprit lesion, presenting to ER with few days of intermittent radiating midsternal chest pain, admitted with stable angina     Stable angina   CAD   Hx NSTEMI 10/2022  Severe AS   HTN   -TTE: LVEF 60-65%, grade 1ddx, mild-mod AR, mild TR, mod to severe AS , mild pHTN  -cont asa, plavix, lipitor, coreg, imdur   -s/p [facility] and DALILA today 1/16, with patent graft and severe AS  -TAVR workup per CT surgery      CKD 3b   -s/p ivf   -f/u am bmp   -nephro recs appreciated   -monitor post-cath     PAD   -carotid doppler: 50-69% stenosis in b/l proximal ICA     DM  -hgba1c 6.6   -ISS     anemia of chronic disease   -labs reviewed   -f/u am cbc     Hypothyroid   -synthroid   -tsh wnl     Gout   -allopurinol     vte ppx: heparin sq   Dispo: pending TAVR workup

## 2024-01-17 NOTE — PROGRESS NOTE ADULT - ASSESSMENT
79yo F w/ CAD, CABG x2, HTN, DM, HTN and HLD with intermittent mid-sternal chest pain with left shoulder radiation down her arm, non exertional, no other associated concerning risk factors for ACS.  Here for stable angina.  EKG: NSR, LVH no acute ischemia changes.  Trop: 53  pro-BNP: 776

## 2024-01-17 NOTE — PROGRESS NOTE ADULT - PROBLEM SELECTOR PLAN 1
Echocardiogram with moderate to severe AS  DALILA - with severe AS  CTS following and will obtain CT TAVR protocol

## 2024-01-17 NOTE — PROGRESS NOTE ADULT - NS ATTEND AMEND GEN_ALL_CORE FT
Patient was seen and examined at bedside with no complaints of chest pain or shortness of breath    79yo F w/ CAD, CABG x2, HTN, DM, HTN and HLD with intermittent mid-sternal chest pain with left shoulder radiation down her arm, non exertional, no other associated concerning risk factors for ACS.  Here for stable angina.  EKG: NSR, LVH no acute ischemia changes.  Trop: 53  pro-BNP: 776      CAD s/p 2V CABG    Patient with NSTEMI 10/22 s/p LHC that did not reveal a culprit lesion, medically managed.   - Echocardiogram with EF 60-65%, moderate to severe AS.   - Hemodynamically stable without complaints chest pain    -  [facility] 01/16/24.: revealed grafts unchanged from 10/26/22   -  Continue aspirin, Plavix, Lipitor, coreg, and Imdur and on triamterene HCTZ    - [facility] shows no evidence of new obstructive disease and so currently being evaluated for TAVR     Severe AS - CTSx eval for TAVR     no further cardiovascular work up and will sign off reconsult ART Fernandez D.O. [facility]  Cardiology/Vascular Cardiology -[facility]   Telephone # [phone].

## 2024-01-17 NOTE — PROGRESS NOTE ADULT - SUBJECTIVE AND OBJECTIVE BOX
Ofelia Madrid M.D.    Patient is a 78y old  Female who presents with a chief complaint of Chest pain in setting of CAD (17 Jan 2024 08:56)      SUBJECTIVE / OVERNIGHT EVENTS: no event overnight.     Patient denies chest pain, SOB, abd pain, N/V, fever, chills, dysuria or any other complaints. All remainder ROS negative.     MEDICATIONS  (STANDING):  allopurinol 100 milliGRAM(s) Oral daily  aspirin enteric coated 81 milliGRAM(s) Oral daily  atorvastatin 80 milliGRAM(s) Oral at bedtime  carvedilol 6.25 milliGRAM(s) Oral every 12 hours  clopidogrel Tablet 75 milliGRAM(s) Oral daily  dextrose 5%. 1000 milliLiter(s) (100 mL/Hr) IV Continuous <Continuous>  dextrose 5%. 1000 milliLiter(s) (50 mL/Hr) IV Continuous <Continuous>  dextrose 50% Injectable 12.5 Gram(s) IV Push once  dextrose 50% Injectable 25 Gram(s) IV Push once  dextrose 50% Injectable 25 Gram(s) IV Push once  epoetin latisha (PROCRIT) Injectable 70080 Unit(s) SubCutaneous every 7 days  epoetin latisha-epbx (RETACRIT) Injectable 53600 Unit(s) SubCutaneous every 7 days  ergocalciferol 46865 Unit(s) Oral <User Schedule>  ezetimibe 10 milliGRAM(s) Oral at bedtime  folic acid 1 milliGRAM(s) Oral daily  glucagon  Injectable 1 milliGRAM(s) IntraMuscular once  heparin   Injectable 5000 Unit(s) SubCutaneous every 12 hours  hydrALAZINE 25 milliGRAM(s) Oral two times a day  insulin lispro (ADMELOG) corrective regimen sliding scale   SubCutaneous three times a day before meals  insulin lispro (ADMELOG) corrective regimen sliding scale   SubCutaneous at bedtime  isosorbide   mononitrate ER Tablet (IMDUR) 30 milliGRAM(s) Oral daily  levothyroxine 88 MICROGram(s) Oral daily  lidocaine   4% Patch 1 Patch Transdermal every 24 hours  pantoprazole    Tablet 40 milliGRAM(s) Oral before breakfast  silver sulfADIAZINE 1% Cream 1 Application(s) Topical three times a day  triamterene 37.5 mG/hydrochlorothiazide 25 mG Tablet 1 Tablet(s) Oral daily    MEDICATIONS  (PRN):  acetaminophen     Tablet .. 650 milliGRAM(s) Oral every 6 hours PRN Temp greater or equal to 38C (100.4F), Mild Pain (1 - 3), Moderate Pain (4 - 6)  dextrose Oral Gel 15 Gram(s) Oral once PRN Blood Glucose LESS THAN 70 milliGRAM(s)/deciliter  melatonin 3 milliGRAM(s) Oral at bedtime PRN Insomnia  nitroglycerin     SubLingual 0.4 milliGRAM(s) SubLingual every 5 minutes PRN Chest Pain  ondansetron Injectable 4 milliGRAM(s) IV Push every 6 hours PRN Nausea and/or Vomiting  traMADol 25 milliGRAM(s) Oral every 6 hours PRN Moderate Pain (4 - 6)      I&O's Summary    16 Jan 2024 07:01  -  17 Jan 2024 07:00  --------------------------------------------------------  IN: 610 mL / OUT: 0 mL / NET: 610 mL        PHYSICAL EXAM:  Vital Signs Last 24 Hrs  T(C): 36.7 (17 Jan 2024 12:06), Max: 36.8 (16 Jan 2024 16:37)  T(F): 98.1 (17 Jan 2024 12:06), Max: 98.2 (16 Jan 2024 16:37)  HR: 61 (17 Jan 2024 12:06) (53 - 66)  BP: 124/59 (17 Jan 2024 12:06) (91/44 - 155/69)  BP(mean): --  RR: 18 (17 Jan 2024 12:06) (12 - 25)  SpO2: 96% (17 Jan 2024 12:06) (94% - 99%)    Parameters below as of 17 Jan 2024 12:06  Patient On (Oxygen Delivery Method): room air      CONSTITUTIONAL: NAD, well-groomed  RESPIRATORY: Normal respiratory effort; lungs are clear to auscultation bilaterally  CARDIOVASCULAR: Regular rate and rhythmp; No lower extremity edema  ABDOMEN: Nontender to palpation, normoactive bowel sounds  PSYCH: A+O x3; affect appropriate  NEUROLOGY: CN 2-12 are intact and symmetric; no gross sensory deficits;   SKIN: No rashes; no palpable lesions    LABS:                        9.7    7.35  )-----------( 220      ( 17 Jan 2024 04:42 )             29.9     01-17    140  |  101  |  31.2<H>  ----------------------------<  101<H>  4.3   |  27.0  |  1.69<H>    Ca    9.7      17 Jan 2024 04:42  Phos  3.6     01-16  Mg     1.9     01-17            Urinalysis Basic - ( 17 Jan 2024 04:42 )    Color: x / Appearance: x / SG: x / pH: x  Gluc: 101 mg/dL / Ketone: x  / Bili: x / Urobili: x   Blood: x / Protein: x / Nitrite: x   Leuk Esterase: x / RBC: x / WBC x   Sq Epi: x / Non Sq Epi: x / Bacteria: x        CAPILLARY BLOOD GLUCOSE      POCT Blood Glucose.: 156 mg/dL (17 Jan 2024 11:55)  POCT Blood Glucose.: 142 mg/dL (17 Jan 2024 07:29)  POCT Blood Glucose.: 177 mg/dL (16 Jan 2024 21:19)  POCT Blood Glucose.: 120 mg/dL (16 Jan 2024 16:54)      RADIOLOGY & ADDITIONAL TESTS:  Results Reviewed:   Imaging Personally Reviewed:  Electrocardiogram Personally Reviewed:

## 2024-01-17 NOTE — PROGRESS NOTE ADULT - SUBJECTIVE AND OBJECTIVE BOX
NEPHROLOGY INTERVAL HPI/OVERNIGHT EVENTS:    No new events.    MEDICATIONS  (STANDING):  acetylcysteine  Oral Solution 1200 milliGRAM(s) Oral every 12 hours  allopurinol 100 milliGRAM(s) Oral daily  aspirin enteric coated 81 milliGRAM(s) Oral daily  atorvastatin 80 milliGRAM(s) Oral at bedtime  carvedilol 6.25 milliGRAM(s) Oral every 12 hours  clopidogrel Tablet 75 milliGRAM(s) Oral daily  dextrose 5%. 1000 milliLiter(s) (100 mL/Hr) IV Continuous <Continuous>  dextrose 5%. 1000 milliLiter(s) (50 mL/Hr) IV Continuous <Continuous>  dextrose 50% Injectable 12.5 Gram(s) IV Push once  dextrose 50% Injectable 25 Gram(s) IV Push once  dextrose 50% Injectable 25 Gram(s) IV Push once  epoetin latisha-epbx (RETACRIT) Injectable 43965 Unit(s) SubCutaneous every 7 days  ergocalciferol 69931 Unit(s) Oral <User Schedule>  ezetimibe 10 milliGRAM(s) Oral at bedtime  folic acid 1 milliGRAM(s) Oral daily  glucagon  Injectable 1 milliGRAM(s) IntraMuscular once  heparin   Injectable 5000 Unit(s) SubCutaneous every 12 hours  hydrALAZINE 25 milliGRAM(s) Oral two times a day  insulin lispro (ADMELOG) corrective regimen sliding scale   SubCutaneous three times a day before meals  insulin lispro (ADMELOG) corrective regimen sliding scale   SubCutaneous at bedtime  isosorbide   mononitrate ER Tablet (IMDUR) 30 milliGRAM(s) Oral daily  levothyroxine 88 MICROGram(s) Oral daily  pantoprazole    Tablet 40 milliGRAM(s) Oral before breakfast  sodium chloride 0.45% 1000 milliLiter(s) (70 mL/Hr) IV Continuous <Continuous>  triamterene 37.5 mG/hydrochlorothiazide 25 mG Tablet 1 Tablet(s) Oral daily    MEDICATIONS  (PRN):  acetaminophen     Tablet .. 650 milliGRAM(s) Oral every 6 hours PRN Temp greater or equal to 38C (100.4F), Mild Pain (1 - 3), Moderate Pain (4 - 6)  dextrose Oral Gel 15 Gram(s) Oral once PRN Blood Glucose LESS THAN 70 milliGRAM(s)/deciliter  melatonin 3 milliGRAM(s) Oral at bedtime PRN Insomnia  nitroglycerin     SubLingual 0.4 milliGRAM(s) SubLingual every 5 minutes PRN Chest Pain  ondansetron Injectable 4 milliGRAM(s) IV Push every 6 hours PRN Nausea and/or Vomiting  traMADol 25 milliGRAM(s) Oral every 6 hours PRN Moderate Pain (4 - 6)  traMADol 50 milliGRAM(s) Oral every 6 hours PRN Severe Pain (7 - 10)      Allergies    No Known Allergies      Vital Signs Last 24 Hrs  T(C): 36.7 (17 Jan 2024 05:13), Max: 36.8 (16 Jan 2024 16:37)  T(F): 98 (17 Jan 2024 05:13), Max: 98.2 (16 Jan 2024 16:37)  HR: 66 (17 Jan 2024 07:37) (53 - 66)  BP: 125/54 (17 Jan 2024 07:37) (91/44 - 155/69)  BP(mean): --  RR: 18 (17 Jan 2024 05:13) (11 - 25)  SpO2: 99% (17 Jan 2024 05:13) (94% - 100%)    Parameters below as of 17 Jan 2024 05:13  Patient On (Oxygen Delivery Method): room air      T(C): 36.8 (16 Jan 2024 04:07), Max: 36.8 (15 Jaxson 2024 11:24)  T(F): 98.2 (16 Jan 2024 04:07), Max: 98.2 (15 Jaxson 2024 11:24)  HR: 63 (16 Jan 2024 04:07) (61 - 66)  BP: 130/63 (16 Jan 2024 04:07) (115/61 - 132/57)  BP(mean): 79 (15 Jaxson 2024 21:15) (79 - 79)  RR: 18 (16 Jan 2024 04:07) (18 - 18)  SpO2: 97% (16 Jan 2024 04:07) (95% - 98%)    Parameters below as of 16 Jan 2024 04:07  Patient On (Oxygen Delivery Method): room air      PHYSICAL EXAM:    GENERAL: Comfortable  in bed   HEAD:  wnl  EYES:   ENMT:   NECK: veins wnl  NERVOUS SYSTEM:  alert, verbal, oriented  CHEST/LUNG: no  02, clear  HEART: RR with  2/6  systolic  murmur  ABDOMEN: NT  EXTREMITIES: No  edema   LYMPH:   SKIN: No rash  : Neg    LABS:    01-17    140  |  101  |  31.2<H>  ----------------------------<  101<H>  4.3   |  27.0  |  1.69<H>    Ca    9.7      17 Jan 2024 04:42  Phos  3.6     01-16  Mg     1.9     01-17                            9.3    6.36  )-----------( 222      ( 16 Jan 2024 04:41 )             28.7     01-16    137  |  101  |  37.6<H>  ----------------------------<  106<H>  4.2   |  25.0  |  1.74<H>    Ca    10.1      16 Jan 2024 04:41  Phos  3.6     01-16  Mg     1.9     01-16        Urinalysis Basic - ( 16 Jan 2024 04:41 )    Color: x / Appearance: x / SG: x / pH: x  Gluc: 106 mg/dL / Ketone: x  / Bili: x / Urobili: x   Blood: x / Protein: x / Nitrite: x   Leuk Esterase: x / RBC: x / WBC x   Sq Epi: x / Non Sq Epi: x / Bacteria: x      Magnesium: 1.9 mg/dL (01-16 @ 04:41)  Phosphorus: 3.6 mg/dL (01-16 @ 04:41)          RADIOLOGY & ADDITIONAL TESTS:   negative

## 2024-01-17 NOTE — PROGRESS NOTE ADULT - PROBLEM SELECTOR PLAN 1
S/p LHC; no new obstructive CAD.  Medical management.  Continue nitrates, BB, statin & Antiplatelet Rx.  Appreciate Cardiology follow up - will follow with you.

## 2024-01-17 NOTE — PROGRESS NOTE ADULT - ASSESSMENT
78yoF hx AS, CAD s/p PCI, CABG (1999), HTN, DM, CKD, NSTEMI in 10/2022 s/p [facility] showing chronic total occlusion of mLAD with patent FILIPPO graft and no culprit lesion, presenting with few days of intermittent chest pain.  Pt reports sharp, mid-sternal chest pain radiating to her left shoulder and left arm.  Episodes not associated with exertion and can last from a few seconds to up to 5 minutes before it self-resolves.  Pt took ASA during episodes w/out any relief. Denies any associated symptoms of nausea, vomiting, diaphoresis.  Admission labs notable for elevated troponin with no significant new CAD. DALILA with Severe AS 0,5 cm NAVA, CTS asked to consider TAVR as option therapy.

## 2024-01-17 NOTE — PROGRESS NOTE ADULT - SUBJECTIVE AND OBJECTIVE BOX
78y Female seen and evaluated bedside. Endorsing only mild fatigue, no other acute complaints. Denies any chest pain, palpitations, shortness of breath, wheezing, abd pain, nausea, vomiting, constipation, lightheadedness, headaches, fevers, or chills.       PAST MEDICAL & SURGICAL HISTORY:  Hypertension      Diabetes mellitus      Hypothyroidism      HLD (hyperlipidemia)      CAD (coronary artery disease)      CKD (chronic kidney disease)      S/P CABG (coronary artery bypass graft)      H/O  section          Medications:  acetaminophen     Tablet .. 650 milliGRAM(s) Oral every 6 hours PRN  allopurinol 100 milliGRAM(s) Oral daily  aspirin enteric coated 81 milliGRAM(s) Oral daily  atorvastatin 80 milliGRAM(s) Oral at bedtime  carvedilol 6.25 milliGRAM(s) Oral every 12 hours  clopidogrel Tablet 75 milliGRAM(s) Oral daily  dextrose 5%. 1000 milliLiter(s) IV Continuous <Continuous>  dextrose 5%. 1000 milliLiter(s) IV Continuous <Continuous>  dextrose 50% Injectable 25 Gram(s) IV Push once  dextrose 50% Injectable 12.5 Gram(s) IV Push once  dextrose 50% Injectable 25 Gram(s) IV Push once  dextrose Oral Gel 15 Gram(s) Oral once PRN  epoetin latisha (PROCRIT) Injectable 40200 Unit(s) SubCutaneous every 7 days  epoetin latisha-epbx (RETACRIT) Injectable 89499 Unit(s) SubCutaneous every 7 days  ergocalciferol 38128 Unit(s) Oral <User Schedule>  ezetimibe 10 milliGRAM(s) Oral at bedtime  folic acid 1 milliGRAM(s) Oral daily  glucagon  Injectable 1 milliGRAM(s) IntraMuscular once  heparin   Injectable 5000 Unit(s) SubCutaneous every 12 hours  hydrALAZINE 25 milliGRAM(s) Oral two times a day  insulin lispro (ADMELOG) corrective regimen sliding scale   SubCutaneous three times a day before meals  insulin lispro (ADMELOG) corrective regimen sliding scale   SubCutaneous at bedtime  isosorbide   mononitrate ER Tablet (IMDUR) 30 milliGRAM(s) Oral daily  levothyroxine 88 MICROGram(s) Oral daily  lidocaine   4% Patch 1 Patch Transdermal every 24 hours  melatonin 3 milliGRAM(s) Oral at bedtime PRN  nitroglycerin     SubLingual 0.4 milliGRAM(s) SubLingual every 5 minutes PRN  ondansetron Injectable 4 milliGRAM(s) IV Push every 6 hours PRN  pantoprazole    Tablet 40 milliGRAM(s) Oral before breakfast  silver sulfADIAZINE 1% Cream 1 Application(s) Topical three times a day  sodium chloride 0.9%. 1000 milliLiter(s) IV Continuous <Continuous>  traMADol 25 milliGRAM(s) Oral every 6 hours PRN  triamterene 37.5 mG/hydrochlorothiazide 25 mG Tablet 1 Tablet(s) Oral daily      MEDICATIONS  (PRN):  acetaminophen     Tablet .. 650 milliGRAM(s) Oral every 6 hours PRN Temp greater or equal to 38C (100.4F), Mild Pain (1 - 3), Moderate Pain (4 - 6)  dextrose Oral Gel 15 Gram(s) Oral once PRN Blood Glucose LESS THAN 70 milliGRAM(s)/deciliter  melatonin 3 milliGRAM(s) Oral at bedtime PRN Insomnia  nitroglycerin     SubLingual 0.4 milliGRAM(s) SubLingual every 5 minutes PRN Chest Pain  ondansetron Injectable 4 milliGRAM(s) IV Push every 6 hours PRN Nausea and/or Vomiting  traMADol 25 milliGRAM(s) Oral every 6 hours PRN Moderate Pain (4 - 6)      Daily Review:                                9.7    7.35  )-----------( 220      ( 2024 04:42 )             29.9       140  |  101  |  31.2<H>  ----------------------------<  101<H>  4.3   |  27.0  |  1.69<H>    Ca    9.7      2024 04:42  Phos  3.6     -  Mg     1.9                 T(C): 36.7 (24 @ 12:06), Max: 36.8 (24 @ 16:37)  HR: 61 (24 @ 12:06) (55 - 66)  BP: 124/59 (24 @ 12:06) (113/63 - 152/63)  RR: 18 (24 @ 12:06) (17 - 18)  SpO2: 96% (01-17-24 @ 12:06) (94% - 99%)  Wt(kg): --    CAPILLARY BLOOD GLUCOSE      POCT Blood Glucose.: 156 mg/dL (2024 11:55)  POCT Blood Glucose.: 142 mg/dL (2024 07:29)  POCT Blood Glucose.: 177 mg/dL (2024 21:19)  POCT Blood Glucose.: 120 mg/dL (2024 16:54)      I&O's Summary    2024 07:01  -  2024 07:00  --------------------------------------------------------  IN: 610 mL / OUT: 0 mL / NET: 610 mL        Physical Exam  General: Well appearing, laying in bed on an incline, NAD  Neurological: AOx3, no focal neurological deficits  Cardiovascular: Regular Rate/Rhythm, S1/2 auscultated, No extra heart sounds  Respiratory: CTA b/l over all lung fields, No adventitious breath sounds  Gastrointestinal: Bowel sounds present in all 4 quadrants, Abdomen is soft, non-tender, non-distended  Extremities: No peripheral edema noted, 5/5 strength and full range of motion in all 4 extremities b/l  Vascular: 2+ peripheral pulses b/l in upper and lower extremities, Radial, DP

## 2024-01-17 NOTE — PROGRESS NOTE ADULT - SUBJECTIVE AND OBJECTIVE BOX
[facility]                                                         CARDIOLOGY AT [facility]                                                                  [address] [facility] [place] [place] [address]                                                         Telephone: [phone]. Fax:[phone]                                                                             PROGRESS NOTE    Reason for follow up:   CAD / AS / CP  Update:   TTE with severe AS,   [facility] s/p 1/16/2024 no new changes noted.    Review of symptoms:   Cardiac:  No chest pain. No dyspnea. No palpitations.  Respiratory: no cough. No dyspnea  Gastrointestinal: No diarrhea. No abdominal pain. No bleeding.   Neuro: No focal neuro complaints.    Vitals:  T(C): 36.7 (01-17-24 @ 12:06), Max: 36.7 (01-17-24 @ 05:13)  HR: 61 (01-17-24 @ 12:06) (55 - 66)  BP: 124/59 (01-17-24 @ 12:06) (113/63 - 129/63)  RR: 18 (01-17-24 @ 12:06) (17 - 18)  SpO2: 96% (01-17-24 @ 12:06) (94% - 99%)    I&O's Summary  16 Jan 2024 07:01  -  17 Jan 2024 07:00  --------------------------------------------------------  IN: 610 mL / OUT: 0 mL / NET: 610 mL    PHYSICAL EXAM:  Appearance: Comfortable. No acute distress  HEENT:  Atraumatic. Normocephalic.  Normal oral mucosa  Neurologic: A & O x 3, no gross focal deficits.  Cardiovascular: RRR S1 S2, No murmur, no rubs/gallops. No JVD  Respiratory: Lungs clear to auscultation, unlabored   Gastrointestinal:  Soft, Non-tender, + BS  Lower Extremities: + Peripheral Pulses, No clubbing, cyanosis, or edema  Psychiatry: Patient is calm. No agitation.   Skin: warm and dry.  Right groin:  No bleeding, no pain, no bruising no hematoma, +PP and no edema.      CURRENT CARDIAC MEDICATIONS:  carvedilol 6.25 milliGRAM(s) Oral every 12 hours  hydrALAZINE 25 milliGRAM(s) Oral two times a day  isosorbide   mononitrate ER Tablet (IMDUR) 30 milliGRAM(s) Oral daily  nitroglycerin     SubLingual 0.4 milliGRAM(s) SubLingual every 5 minutes PRN  triamterene 37.5 mG/hydrochlorothiazide 25 mG Tablet 1 Tablet(s) Oral daily    CURRENT OTHER MEDICATIONS:  acetaminophen     Tablet .. 650 milliGRAM(s) Oral every 6 hours PRN Temp greater or equal to 38C (100.4F), Mild Pain (1 - 3), Moderate Pain (4 - 6)  melatonin 3 milliGRAM(s) Oral at bedtime PRN Insomnia  ondansetron Injectable 4 milliGRAM(s) IV Push every 6 hours PRN Nausea and/or Vomiting  traMADol 25 milliGRAM(s) Oral every 6 hours PRN Moderate Pain (4 - 6)  pantoprazole    Tablet 40 milliGRAM(s) Oral before breakfast  allopurinol 100 milliGRAM(s) Oral daily  atorvastatin 80 milliGRAM(s) Oral at bedtime  dextrose Oral Gel 15 Gram(s) Oral once, Stop order after: 1 Doses PRN Blood Glucose LESS THAN 70 milliGRAM(s)/deciliter  ezetimibe 10 milliGRAM(s) Oral at bedtime  glucagon  Injectable 1 milliGRAM(s) IntraMuscular once, Stop order after: 1 Doses  insulin lispro (ADMELOG) corrective regimen sliding scale   SubCutaneous at bedtime  insulin lispro (ADMELOG) corrective regimen sliding scale   SubCutaneous three times a day before meals  levothyroxine 88 MICROGram(s) Oral daily  aspirin enteric coated 81 milliGRAM(s) Oral daily  clopidogrel Tablet 75 milliGRAM(s) Oral daily  epoetin latisha (PROCRIT) Injectable 61182 Unit(s) SubCutaneous every 7 days  epoetin latisha-epbx (RETACRIT) Injectable 43370 Unit(s) SubCutaneous every 7 days  ergocalciferol 86961 Unit(s) Oral <User Schedule>  folic acid 1 milliGRAM(s) Oral daily  heparin   Injectable 5000 Unit(s) SubCutaneous every 12 hours  lidocaine   4% Patch 1 Patch Transdermal every 24 hours  silver sulfADIAZINE 1% Cream 1 Application(s) Topical three times a day  sodium chloride 0.9%. 1000 milliLiter(s) (40 mL/Hr) IV Continuous <Continuous>      LABS:	 	  ( 13 Jan 2024 07:03 )  Troponin T  X    ,  CPK  158  , CKMB  X    , BNP X                                  9.7    7.35  )-----------( 220      ( 17 Jan 2024 04:42 )             29.9     01-17    140  |  101  |  31.2<H>  ----------------------------<  101<H>  4.3   |  27.0  |  1.69<H>    Ca    9.7      17 Jan 2024 04:42  Phos  3.6     01-16  Mg     1.9     01-17      PT/INR/PTT ( 13 Jan 2024 07:03 )                       :                       :      10.3         :       29.2                  .        .                   .              .           .       0.93        .                                       Lipid Profile: Date: 01-13 @ 07:03  Total cholesterol 131; Direct LDL: --; HDL: 62; Triglycerides:102    TSH: Thyroid Stimulating Hormone, Serum: 0.36 uIU/mL    TELEMETRY:   /sr no acute alarms noted.                                                                    [facility]                                                         CARDIOLOGY AT [facility]                                                                  [address] [facility] [place] [place] [address]                                                         Telephone: [phone]. Fax:[phone]                                                                             PROGRESS NOTE    Reason for follow up:   CAD / AS / CP  Update:   TTE with severe AS,   [facility] s/p 1/16/2024 no new changes noted.    Review of symptoms:   Cardiac:  No chest pain. No dyspnea. No palpitations.  Respiratory: no cough. No dyspnea  Gastrointestinal: No diarrhea. No abdominal pain. No bleeding.   Neuro: No focal neuro complaints.    Vitals:  T(C): 36.7 (01-17-24 @ 12:06), Max: 36.7 (01-17-24 @ 05:13)  HR: 61 (01-17-24 @ 12:06) (55 - 66)  BP: 124/59 (01-17-24 @ 12:06) (113/63 - 129/63)  RR: 18 (01-17-24 @ 12:06) (17 - 18)  SpO2: 96% (01-17-24 @ 12:06) (94% - 99%)    I&O's Summary  16 Jan 2024 07:01  -  17 Jan 2024 07:00  --------------------------------------------------------  IN: 610 mL / OUT: 0 mL / NET: 610 mL    PHYSICAL EXAM:  Appearance: Comfortable. No acute distress  HEENT:  Atraumatic. Normocephalic.  Normal oral mucosa  Neurologic: A & O x 3, no gross focal deficits.  Cardiovascular: RRR S1 S2, No murmur, no rubs/gallops. No JVD  Respiratory: Lungs clear to auscultation, unlabored   Gastrointestinal:  Soft, Non-tender, + BS  Lower Extremities: + Peripheral Pulses, No clubbing, cyanosis, or edema  Psychiatry: Patient is calm. No agitation.   Skin: warm and dry.  Right groin:  No bleeding, no pain, + bruising with a small skin tear noted (on silverdeem) no hematoma, +PP and no edema.      CURRENT CARDIAC MEDICATIONS:  carvedilol 6.25 milliGRAM(s) Oral every 12 hours  hydrALAZINE 25 milliGRAM(s) Oral two times a day  isosorbide   mononitrate ER Tablet (IMDUR) 30 milliGRAM(s) Oral daily  nitroglycerin     SubLingual 0.4 milliGRAM(s) SubLingual every 5 minutes PRN  triamterene 37.5 mG/hydrochlorothiazide 25 mG Tablet 1 Tablet(s) Oral daily    CURRENT OTHER MEDICATIONS:  acetaminophen     Tablet .. 650 milliGRAM(s) Oral every 6 hours PRN Temp greater or equal to 38C (100.4F), Mild Pain (1 - 3), Moderate Pain (4 - 6)  melatonin 3 milliGRAM(s) Oral at bedtime PRN Insomnia  ondansetron Injectable 4 milliGRAM(s) IV Push every 6 hours PRN Nausea and/or Vomiting  traMADol 25 milliGRAM(s) Oral every 6 hours PRN Moderate Pain (4 - 6)  pantoprazole    Tablet 40 milliGRAM(s) Oral before breakfast  allopurinol 100 milliGRAM(s) Oral daily  atorvastatin 80 milliGRAM(s) Oral at bedtime  dextrose Oral Gel 15 Gram(s) Oral once, Stop order after: 1 Doses PRN Blood Glucose LESS THAN 70 milliGRAM(s)/deciliter  ezetimibe 10 milliGRAM(s) Oral at bedtime  glucagon  Injectable 1 milliGRAM(s) IntraMuscular once, Stop order after: 1 Doses  insulin lispro (ADMELOG) corrective regimen sliding scale   SubCutaneous at bedtime  insulin lispro (ADMELOG) corrective regimen sliding scale   SubCutaneous three times a day before meals  levothyroxine 88 MICROGram(s) Oral daily  aspirin enteric coated 81 milliGRAM(s) Oral daily  clopidogrel Tablet 75 milliGRAM(s) Oral daily  epoetin latisha (PROCRIT) Injectable 48464 Unit(s) SubCutaneous every 7 days  epoetin latisha-epbx (RETACRIT) Injectable 54486 Unit(s) SubCutaneous every 7 days  ergocalciferol 41459 Unit(s) Oral <User Schedule>  folic acid 1 milliGRAM(s) Oral daily  heparin   Injectable 5000 Unit(s) SubCutaneous every 12 hours  lidocaine   4% Patch 1 Patch Transdermal every 24 hours  silver sulfADIAZINE 1% Cream 1 Application(s) Topical three times a day  sodium chloride 0.9%. 1000 milliLiter(s) (40 mL/Hr) IV Continuous <Continuous>      LABS:	 	  ( 13 Jan 2024 07:03 )  Troponin T  X    ,  CPK  158  , CKMB  X    , BNP X                                  9.7    7.35  )-----------( 220      ( 17 Jan 2024 04:42 )             29.9     01-17    140  |  101  |  31.2<H>  ----------------------------<  101<H>  4.3   |  27.0  |  1.69<H>    Ca    9.7      17 Jan 2024 04:42  Phos  3.6     01-16  Mg     1.9     01-17      PT/INR/PTT ( 13 Jan 2024 07:03 )                       :                       :      10.3         :       29.2                  .        .                   .              .           .       0.93        .                                       Lipid Profile: Date: 01-13 @ 07:03  Total cholesterol 131; Direct LDL: --; HDL: 62; Triglycerides:102    TSH: Thyroid Stimulating Hormone, Serum: 0.36 uIU/mL    TELEMETRY:   /sr no acute alarms noted.

## 2024-01-18 LAB
ANION GAP SERPL CALC-SCNC: 12 MMOL/L — SIGNIFICANT CHANGE UP (ref 5–17)
ANION GAP SERPL CALC-SCNC: 13 MMOL/L — SIGNIFICANT CHANGE UP (ref 5–17)
APPEARANCE UR: CLEAR — SIGNIFICANT CHANGE UP
BACTERIA # UR AUTO: NEGATIVE /HPF — SIGNIFICANT CHANGE UP
BILIRUB UR-MCNC: NEGATIVE — SIGNIFICANT CHANGE UP
BUN SERPL-MCNC: 30.7 MG/DL — HIGH (ref 8–20)
BUN SERPL-MCNC: 30.8 MG/DL — HIGH (ref 8–20)
CALCIUM SERPL-MCNC: 9.7 MG/DL — SIGNIFICANT CHANGE UP (ref 8.4–10.5)
CALCIUM SERPL-MCNC: 9.9 MG/DL — SIGNIFICANT CHANGE UP (ref 8.4–10.5)
CAST: 4 /LPF — SIGNIFICANT CHANGE UP (ref 0–4)
CHLORIDE SERPL-SCNC: 101 MMOL/L — SIGNIFICANT CHANGE UP (ref 96–108)
CHLORIDE SERPL-SCNC: 102 MMOL/L — SIGNIFICANT CHANGE UP (ref 96–108)
CHLORIDE UR-SCNC: 49 MMOL/L — SIGNIFICANT CHANGE UP
CO2 SERPL-SCNC: 25 MMOL/L — SIGNIFICANT CHANGE UP (ref 22–29)
CO2 SERPL-SCNC: 25 MMOL/L — SIGNIFICANT CHANGE UP (ref 22–29)
COLOR SPEC: YELLOW — SIGNIFICANT CHANGE UP
CREAT SERPL-MCNC: 1.96 MG/DL — HIGH (ref 0.5–1.3)
CREAT SERPL-MCNC: 1.99 MG/DL — HIGH (ref 0.5–1.3)
DIFF PNL FLD: NEGATIVE — SIGNIFICANT CHANGE UP
EGFR: 25 ML/MIN/1.73M2 — LOW
EGFR: 26 ML/MIN/1.73M2 — LOW
GLUCOSE BLDC GLUCOMTR-MCNC: 113 MG/DL — HIGH (ref 70–99)
GLUCOSE BLDC GLUCOMTR-MCNC: 119 MG/DL — HIGH (ref 70–99)
GLUCOSE BLDC GLUCOMTR-MCNC: 123 MG/DL — HIGH (ref 70–99)
GLUCOSE BLDC GLUCOMTR-MCNC: 131 MG/DL — HIGH (ref 70–99)
GLUCOSE SERPL-MCNC: 105 MG/DL — HIGH (ref 70–99)
GLUCOSE SERPL-MCNC: 117 MG/DL — HIGH (ref 70–99)
GLUCOSE UR QL: NEGATIVE MG/DL — SIGNIFICANT CHANGE UP
KETONES UR-MCNC: NEGATIVE MG/DL — SIGNIFICANT CHANGE UP
LEUKOCYTE ESTERASE UR-ACNC: NEGATIVE — SIGNIFICANT CHANGE UP
MRSA PCR RESULT.: SIGNIFICANT CHANGE UP
NITRITE UR-MCNC: NEGATIVE — SIGNIFICANT CHANGE UP
NT-PROBNP SERPL-SCNC: 516 PG/ML — HIGH (ref 0–300)
OSMOLALITY UR: 322 MOSM/KG — SIGNIFICANT CHANGE UP (ref 300–1000)
PH UR: 7.5 — SIGNIFICANT CHANGE UP (ref 5–8)
POTASSIUM SERPL-MCNC: 4.3 MMOL/L — SIGNIFICANT CHANGE UP (ref 3.5–5.3)
POTASSIUM SERPL-MCNC: 4.9 MMOL/L — SIGNIFICANT CHANGE UP (ref 3.5–5.3)
POTASSIUM SERPL-SCNC: 4.3 MMOL/L — SIGNIFICANT CHANGE UP (ref 3.5–5.3)
POTASSIUM SERPL-SCNC: 4.9 MMOL/L — SIGNIFICANT CHANGE UP (ref 3.5–5.3)
POTASSIUM UR-SCNC: 36 MMOL/L — SIGNIFICANT CHANGE UP
PREALB SERPL-MCNC: 21 MG/DL — SIGNIFICANT CHANGE UP (ref 18–38)
PROT UR-MCNC: 30 MG/DL
RBC CASTS # UR COMP ASSIST: 1 /HPF — SIGNIFICANT CHANGE UP (ref 0–4)
S AUREUS DNA NOSE QL NAA+PROBE: DETECTED
SODIUM SERPL-SCNC: 138 MMOL/L — SIGNIFICANT CHANGE UP (ref 135–145)
SODIUM SERPL-SCNC: 140 MMOL/L — SIGNIFICANT CHANGE UP (ref 135–145)
SODIUM UR-SCNC: 64 MMOL/L — SIGNIFICANT CHANGE UP
SP GR SPEC: 1.02 — SIGNIFICANT CHANGE UP (ref 1–1.03)
SQUAMOUS # UR AUTO: 2 /HPF — SIGNIFICANT CHANGE UP (ref 0–5)
UROBILINOGEN FLD QL: 0.2 MG/DL — SIGNIFICANT CHANGE UP (ref 0.2–1)
WBC UR QL: 1 /HPF — SIGNIFICANT CHANGE UP (ref 0–5)

## 2024-01-18 PROCEDURE — 99232 SBSQ HOSP IP/OBS MODERATE 35: CPT

## 2024-01-18 PROCEDURE — 74174 CTA ABD&PLVS W/CONTRAST: CPT | Mod: 26

## 2024-01-18 PROCEDURE — 71275 CT ANGIOGRAPHY CHEST: CPT | Mod: 26

## 2024-01-18 PROCEDURE — 76775 US EXAM ABDO BACK WALL LIM: CPT | Mod: 26

## 2024-01-18 RX ORDER — SODIUM CHLORIDE 9 MG/ML
1000 INJECTION INTRAMUSCULAR; INTRAVENOUS; SUBCUTANEOUS
Refills: 0 | Status: DISCONTINUED | OUTPATIENT
Start: 2024-01-18 | End: 2024-01-18

## 2024-01-18 RX ORDER — SODIUM CHLORIDE 9 MG/ML
1000 INJECTION INTRAMUSCULAR; INTRAVENOUS; SUBCUTANEOUS
Refills: 0 | Status: DISCONTINUED | OUTPATIENT
Start: 2024-01-18 | End: 2024-01-19

## 2024-01-18 RX ADMIN — ISOSORBIDE MONONITRATE 30 MILLIGRAM(S): 60 TABLET, EXTENDED RELEASE ORAL at 10:45

## 2024-01-18 RX ADMIN — Medication 1 APPLICATION(S): at 12:10

## 2024-01-18 RX ADMIN — Medication 1 TABLET(S): at 05:09

## 2024-01-18 RX ADMIN — EZETIMIBE 10 MILLIGRAM(S): 10 TABLET ORAL at 21:00

## 2024-01-18 RX ADMIN — Medication 3 MILLIGRAM(S): at 22:42

## 2024-01-18 RX ADMIN — SODIUM CHLORIDE 82 MILLILITER(S): 9 INJECTION INTRAMUSCULAR; INTRAVENOUS; SUBCUTANEOUS at 21:17

## 2024-01-18 RX ADMIN — HEPARIN SODIUM 5000 UNIT(S): 5000 INJECTION INTRAVENOUS; SUBCUTANEOUS at 05:10

## 2024-01-18 RX ADMIN — CARVEDILOL PHOSPHATE 6.25 MILLIGRAM(S): 80 CAPSULE, EXTENDED RELEASE ORAL at 08:36

## 2024-01-18 RX ADMIN — Medication 25 MILLIGRAM(S): at 17:56

## 2024-01-18 RX ADMIN — Medication 1 APPLICATION(S): at 21:06

## 2024-01-18 RX ADMIN — HEPARIN SODIUM 5000 UNIT(S): 5000 INJECTION INTRAVENOUS; SUBCUTANEOUS at 17:57

## 2024-01-18 RX ADMIN — CLOPIDOGREL BISULFATE 75 MILLIGRAM(S): 75 TABLET, FILM COATED ORAL at 10:44

## 2024-01-18 RX ADMIN — Medication 88 MICROGRAM(S): at 05:09

## 2024-01-18 RX ADMIN — ATORVASTATIN CALCIUM 80 MILLIGRAM(S): 80 TABLET, FILM COATED ORAL at 21:01

## 2024-01-18 RX ADMIN — TRAMADOL HYDROCHLORIDE 25 MILLIGRAM(S): 50 TABLET ORAL at 18:27

## 2024-01-18 RX ADMIN — Medication 81 MILLIGRAM(S): at 10:44

## 2024-01-18 RX ADMIN — SODIUM CHLORIDE 82 MILLILITER(S): 9 INJECTION INTRAMUSCULAR; INTRAVENOUS; SUBCUTANEOUS at 11:45

## 2024-01-18 RX ADMIN — Medication 25 MILLIGRAM(S): at 05:09

## 2024-01-18 RX ADMIN — Medication 100 MILLIGRAM(S): at 10:44

## 2024-01-18 RX ADMIN — Medication 1 MILLIGRAM(S): at 10:44

## 2024-01-18 NOTE — PROGRESS NOTE ADULT - SUBJECTIVE AND OBJECTIVE BOX
78yFemale seen and evaluated bedside. Endorsing no acute complaints. Denies any chest pain, palpitations, shortness of breath, wheezing, abd pain, nausea, vomiting, constipation, lightheadedness, headaches, fevers, or chills.     PAST MEDICAL & SURGICAL HISTORY:  Hypertension      Diabetes mellitus      Hypothyroidism      HLD (hyperlipidemia)      CAD (coronary artery disease)      CKD (chronic kidney disease)      S/P CABG (coronary artery bypass graft)      H/O  section          Medications:  acetaminophen     Tablet .. 650 milliGRAM(s) Oral every 6 hours PRN  allopurinol 100 milliGRAM(s) Oral daily  aspirin enteric coated 81 milliGRAM(s) Oral daily  atorvastatin 80 milliGRAM(s) Oral at bedtime  carvedilol 6.25 milliGRAM(s) Oral every 12 hours  clopidogrel Tablet 75 milliGRAM(s) Oral daily  dextrose 5%. 1000 milliLiter(s) IV Continuous <Continuous>  dextrose 5%. 1000 milliLiter(s) IV Continuous <Continuous>  dextrose 50% Injectable 12.5 Gram(s) IV Push once  dextrose 50% Injectable 25 Gram(s) IV Push once  dextrose 50% Injectable 25 Gram(s) IV Push once  dextrose Oral Gel 15 Gram(s) Oral once PRN  epoetin latisha (PROCRIT) Injectable 11646 Unit(s) SubCutaneous every 7 days  epoetin latisha-epbx (RETACRIT) Injectable 10887 Unit(s) SubCutaneous every 7 days  ergocalciferol 70124 Unit(s) Oral <User Schedule>  ezetimibe 10 milliGRAM(s) Oral at bedtime  folic acid 1 milliGRAM(s) Oral daily  glucagon  Injectable 1 milliGRAM(s) IntraMuscular once  heparin   Injectable 5000 Unit(s) SubCutaneous every 12 hours  hydrALAZINE 25 milliGRAM(s) Oral two times a day  insulin lispro (ADMELOG) corrective regimen sliding scale   SubCutaneous three times a day before meals  insulin lispro (ADMELOG) corrective regimen sliding scale   SubCutaneous at bedtime  isosorbide   mononitrate ER Tablet (IMDUR) 30 milliGRAM(s) Oral daily  levothyroxine 88 MICROGram(s) Oral daily  lidocaine   4% Patch 1 Patch Transdermal every 24 hours  melatonin 3 milliGRAM(s) Oral at bedtime PRN  nitroglycerin     SubLingual 0.4 milliGRAM(s) SubLingual every 5 minutes PRN  ondansetron Injectable 4 milliGRAM(s) IV Push every 6 hours PRN  pantoprazole    Tablet 40 milliGRAM(s) Oral before breakfast  silver sulfADIAZINE 1% Cream 1 Application(s) Topical three times a day  sodium chloride 0.9%. 1000 milliLiter(s) IV Continuous <Continuous>  traMADol 25 milliGRAM(s) Oral every 6 hours PRN  triamterene 37.5 mG/hydrochlorothiazide 25 mG Tablet 1 Tablet(s) Oral daily      MEDICATIONS  (PRN):  acetaminophen     Tablet .. 650 milliGRAM(s) Oral every 6 hours PRN Temp greater or equal to 38C (100.4F), Mild Pain (1 - 3), Moderate Pain (4 - 6)  dextrose Oral Gel 15 Gram(s) Oral once PRN Blood Glucose LESS THAN 70 milliGRAM(s)/deciliter  melatonin 3 milliGRAM(s) Oral at bedtime PRN Insomnia  nitroglycerin     SubLingual 0.4 milliGRAM(s) SubLingual every 5 minutes PRN Chest Pain  ondansetron Injectable 4 milliGRAM(s) IV Push every 6 hours PRN Nausea and/or Vomiting  traMADol 25 milliGRAM(s) Oral every 6 hours PRN Moderate Pain (4 - 6)      Daily Review:                                9.7    7.35  )-----------( 220      ( 2024 04:42 )             29.9       138  |  101  |  30.7<H>  ----------------------------<  117<H>  4.3   |  25.0  |  1.96<H>    Ca    9.7      2024 05:36  Mg     1.9                 T(C): 36.6 (24 @ 08:37), Max: 36.9 (24 @ 17:16)  HR: 61 (24 @ 08:37) (60 - 66)  BP: 125/44 (24 @ 08:37) (122/49 - 133/65)  RR: 18 (24 @ 08:37) (18 - 18)  SpO2: 96% (24 @ 08:37) (95% - 98%)  Wt(kg): --    CAPILLARY BLOOD GLUCOSE      POCT Blood Glucose.: 123 mg/dL (2024 08:02)  POCT Blood Glucose.: 123 mg/dL (2024 21:42)  POCT Blood Glucose.: 149 mg/dL (2024 16:55)  POCT Blood Glucose.: 156 mg/dL (2024 11:55)      I&O's Summary    2024 07:01  -  2024 07:00  --------------------------------------------------------  IN: 320 mL / OUT: 0 mL / NET: 320 mL        Physical Exam    General: Well appearing, laying in bed on an incline, NAD  Neurological: AOx3, no focal neurological deficits  Cardiovascular: Regular Rate/Rhythm, S1/2 auscultated, No extra heart sounds  Respiratory: CTA b/l over all lung fields, No adventitious breath sounds  Gastrointestinal: Bowel sounds present in all 4 quadrants, Abdomen is soft, non-tender, non-distended  Extremities: No peripheral edema noted, 5/5 strength and full range of motion in all 4 extremities b/l  Vascular: 2+ peripheral pulses b/l in upper and lower extremities, Radial, DP

## 2024-01-18 NOTE — PROGRESS NOTE ADULT - SUBJECTIVE AND OBJECTIVE BOX
Ofelia Madrid M.D.    Patient is a 78y old  Female who presents with a chief complaint of Chest pain in setting of CAD (18 Jan 2024 10:35)      SUBJECTIVE / OVERNIGHT EVENTS: no event overnight.     Patient denies chest pain, SOB, abd pain, N/V, fever, chills, dysuria or any other complaints. All remainder ROS negative.     MEDICATIONS  (STANDING):  allopurinol 100 milliGRAM(s) Oral daily  aspirin enteric coated 81 milliGRAM(s) Oral daily  atorvastatin 80 milliGRAM(s) Oral at bedtime  carvedilol 6.25 milliGRAM(s) Oral every 12 hours  clopidogrel Tablet 75 milliGRAM(s) Oral daily  dextrose 5%. 1000 milliLiter(s) (100 mL/Hr) IV Continuous <Continuous>  dextrose 5%. 1000 milliLiter(s) (50 mL/Hr) IV Continuous <Continuous>  dextrose 50% Injectable 12.5 Gram(s) IV Push once  dextrose 50% Injectable 25 Gram(s) IV Push once  dextrose 50% Injectable 25 Gram(s) IV Push once  epoetin latisha (PROCRIT) Injectable 82099 Unit(s) SubCutaneous every 7 days  epoetin latisha-epbx (RETACRIT) Injectable 69276 Unit(s) SubCutaneous every 7 days  ergocalciferol 22894 Unit(s) Oral <User Schedule>  ezetimibe 10 milliGRAM(s) Oral at bedtime  folic acid 1 milliGRAM(s) Oral daily  glucagon  Injectable 1 milliGRAM(s) IntraMuscular once  heparin   Injectable 5000 Unit(s) SubCutaneous every 12 hours  hydrALAZINE 25 milliGRAM(s) Oral two times a day  insulin lispro (ADMELOG) corrective regimen sliding scale   SubCutaneous three times a day before meals  insulin lispro (ADMELOG) corrective regimen sliding scale   SubCutaneous at bedtime  isosorbide   mononitrate ER Tablet (IMDUR) 30 milliGRAM(s) Oral daily  levothyroxine 88 MICROGram(s) Oral daily  lidocaine   4% Patch 1 Patch Transdermal every 24 hours  pantoprazole    Tablet 40 milliGRAM(s) Oral before breakfast  silver sulfADIAZINE 1% Cream 1 Application(s) Topical three times a day  sodium chloride 0.9%. 1000 milliLiter(s) (82 mL/Hr) IV Continuous <Continuous>  triamterene 37.5 mG/hydrochlorothiazide 25 mG Tablet 1 Tablet(s) Oral daily    MEDICATIONS  (PRN):  acetaminophen     Tablet .. 650 milliGRAM(s) Oral every 6 hours PRN Temp greater or equal to 38C (100.4F), Mild Pain (1 - 3), Moderate Pain (4 - 6)  dextrose Oral Gel 15 Gram(s) Oral once PRN Blood Glucose LESS THAN 70 milliGRAM(s)/deciliter  melatonin 3 milliGRAM(s) Oral at bedtime PRN Insomnia  nitroglycerin     SubLingual 0.4 milliGRAM(s) SubLingual every 5 minutes PRN Chest Pain  ondansetron Injectable 4 milliGRAM(s) IV Push every 6 hours PRN Nausea and/or Vomiting  traMADol 25 milliGRAM(s) Oral every 6 hours PRN Moderate Pain (4 - 6)      I&O's Summary    17 Jan 2024 07:01  -  18 Jan 2024 07:00  --------------------------------------------------------  IN: 320 mL / OUT: 0 mL / NET: 320 mL        PHYSICAL EXAM:  Vital Signs Last 24 Hrs  T(C): 36.6 (18 Jan 2024 08:37), Max: 36.9 (17 Jan 2024 17:16)  T(F): 97.8 (18 Jan 2024 08:37), Max: 98.4 (17 Jan 2024 17:16)  HR: 61 (18 Jan 2024 08:37) (60 - 66)  BP: 125/44 (18 Jan 2024 08:37) (122/49 - 133/65)  BP(mean): 76 (18 Jan 2024 05:08) (76 - 76)  RR: 18 (18 Jan 2024 08:37) (18 - 18)  SpO2: 96% (18 Jan 2024 08:37) (95% - 98%)    Parameters below as of 18 Jan 2024 08:37  Patient On (Oxygen Delivery Method): room air        CONSTITUTIONAL: NAD, well-groomed  ENMT: Moist oral mucosa, no pharyngeal injection or exudates; normal dentition  RESPIRATORY: Normal respiratory effort; lungs are clear to auscultation bilaterally  CARDIOVASCULAR: Regular rate and rhythm, no LE edema  ABDOMEN: Nontender to palpation, normoactive bowel sounds  PSYCH: A+O x3; affect appropriate  NEUROLOGY: CN 2-12 are intact and symmetric; no gross sensory deficits;     LABS:                        9.7    7.35  )-----------( 220      ( 17 Jan 2024 04:42 )             29.9     01-18    138  |  101  |  30.7<H>  ----------------------------<  117<H>  4.3   |  25.0  |  1.96<H>    Ca    9.7      18 Jan 2024 05:36  Mg     1.9     01-17            Urinalysis Basic - ( 18 Jan 2024 05:36 )    Color: x / Appearance: x / SG: x / pH: x  Gluc: 117 mg/dL / Ketone: x  / Bili: x / Urobili: x   Blood: x / Protein: x / Nitrite: x   Leuk Esterase: x / RBC: x / WBC x   Sq Epi: x / Non Sq Epi: x / Bacteria: x        CAPILLARY BLOOD GLUCOSE      POCT Blood Glucose.: 123 mg/dL (18 Jan 2024 08:02)  POCT Blood Glucose.: 123 mg/dL (17 Jan 2024 21:42)  POCT Blood Glucose.: 149 mg/dL (17 Jan 2024 16:55)  POCT Blood Glucose.: 156 mg/dL (17 Jan 2024 11:55)      RADIOLOGY & ADDITIONAL TESTS:  Results Reviewed:   Imaging Personally Reviewed:  Electrocardiogram Personally Reviewed:

## 2024-01-18 NOTE — PROGRESS NOTE ADULT - ASSESSMENT
78y/oF PMH AS, CAD s/p PCI, CABG (1999), HTN, DM, NSTEMI 10/2022 s/p [facility] with chronic total occlusion of mLAD with patent FILIPPO graft and no culprit lesion, presenting to ER with few days of intermittent radiating midsternal chest pain, admitted with stable angina     Stable angina   CAD   Hx NSTEMI 10/2022  Severe AS   HTN   -TTE: LVEF 60-65%, grade 1ddx, mild-mod AR, mild TR, mod to severe AS , mild pHTN  -cont asa, plavix, lipitor, coreg, imdur   -s/p [facility] and DALILA 1/16, with patent graft and severe AS  -pending CT TAVR protocol    CKD 3b - worsening   -ivf resumed per renal, f/u repeat BMP at 1pm before proceed to CT TAVR protocol   -f/u am bmp   -nephro recs appreciated     PAD   -carotid doppler: 50-69% stenosis in b/l proximal ICA     DM  -hgba1c 6.6   -ISS     anemia of chronic disease   -labs reviewed   -f/u am cbc     Hypothyroid   -synthroid   -tsh wnl     Gout   -allopurinol     vte ppx: heparin sq   Dispo: pending TAVR workup and monitor of renal function

## 2024-01-18 NOTE — PROCEDURE NOTE - NSPROCDETAILS_GEN_ALL_CORE
location identified, draped/prepped, sterile technique used/blood seen on insertion/dressing applied/flushes easily/secured in place
blood seen on insertion/dressing applied/flushes easily/secured in place

## 2024-01-18 NOTE — PROGRESS NOTE ADULT - PROBLEM SELECTOR PLAN 4
DALILA results reviewed.   Will obtain CT TAVR protocol & d/w Renal any rec's for pre-medications.  NPO   GFR decreased morning of 1/18, IV fluids and repeat labs at 1pm before going for CTA  Continue Tele monitoring. DALILA results reviewed.   Will obtain CT TAVR protocol & d/w Renal any rec's for pre-medications.  NPO   GFR decreased morning of 1/18, IV fluids ordered and repeat labs at 1pm before going for CTA.   Discussed with Nephrology, Dr. Card, if repeat labs stable, can go for CTA today  Continue Tele monitoring.

## 2024-01-18 NOTE — PROCEDURE NOTE - ADDITIONAL PROCEDURE DETAILS
Called to evaluate patient for peripheral access at CT scan due to loss of peripheral access secondary to left arm IV infiltrate. s/p multiple unsuccessful attempts by nursing staff. escalated to ACP     Peripheral Access  20 G Peripheral IV placed in LEFT upper arm using ultrasound, 1 attempt, good flow, +blood return, flushed x2, can be accessed.    Left Arm infiltrate   -warm compress   -elevate arm   -acetaminphen PRN     Continue to monitor   RN to notify provider with any changes in patient status Called to evaluate patient for peripheral access at CT scan due to loss of peripheral access secondary to right arm IV infiltrate. s/p multiple unsuccessful attempts by nursing staff. escalated to ACP     VSS  NAD   right forearm infiltrate with edema with increased warmth, and tenderness to palpation.     Peripheral Access  20 G Peripheral IV placed in LEFT upper arm using ultrasound, 1 attempt, good flow, +blood return, flushed x2, can be accessed.  RN confirmed blood return     RIGHT Arm infiltrate   -warm compress   -elevate arm   -acetaminphen PRN     Continue to monitor   RN to notify provider with any changes in patient status

## 2024-01-18 NOTE — PROGRESS NOTE ADULT - SUBJECTIVE AND OBJECTIVE BOX
NEPHROLOGY INTERVAL HPI/OVERNIGHT EVENTS:    No new events.    MEDICATIONS  (STANDING):  acetylcysteine  Oral Solution 1200 milliGRAM(s) Oral every 12 hours  allopurinol 100 milliGRAM(s) Oral daily  aspirin enteric coated 81 milliGRAM(s) Oral daily  atorvastatin 80 milliGRAM(s) Oral at bedtime  carvedilol 6.25 milliGRAM(s) Oral every 12 hours  clopidogrel Tablet 75 milliGRAM(s) Oral daily  dextrose 5%. 1000 milliLiter(s) (100 mL/Hr) IV Continuous <Continuous>  dextrose 5%. 1000 milliLiter(s) (50 mL/Hr) IV Continuous <Continuous>  dextrose 50% Injectable 12.5 Gram(s) IV Push once  dextrose 50% Injectable 25 Gram(s) IV Push once  dextrose 50% Injectable 25 Gram(s) IV Push once  epoetin latisha-epbx (RETACRIT) Injectable 78101 Unit(s) SubCutaneous every 7 days  ergocalciferol 40886 Unit(s) Oral <User Schedule>  ezetimibe 10 milliGRAM(s) Oral at bedtime  folic acid 1 milliGRAM(s) Oral daily  glucagon  Injectable 1 milliGRAM(s) IntraMuscular once  heparin   Injectable 5000 Unit(s) SubCutaneous every 12 hours  hydrALAZINE 25 milliGRAM(s) Oral two times a day  insulin lispro (ADMELOG) corrective regimen sliding scale   SubCutaneous three times a day before meals  insulin lispro (ADMELOG) corrective regimen sliding scale   SubCutaneous at bedtime  isosorbide   mononitrate ER Tablet (IMDUR) 30 milliGRAM(s) Oral daily  levothyroxine 88 MICROGram(s) Oral daily  pantoprazole    Tablet 40 milliGRAM(s) Oral before breakfast  sodium chloride 0.45% 1000 milliLiter(s) (70 mL/Hr) IV Continuous <Continuous>  triamterene 37.5 mG/hydrochlorothiazide 25 mG Tablet 1 Tablet(s) Oral daily    MEDICATIONS  (PRN):  acetaminophen     Tablet .. 650 milliGRAM(s) Oral every 6 hours PRN Temp greater or equal to 38C (100.4F), Mild Pain (1 - 3), Moderate Pain (4 - 6)  dextrose Oral Gel 15 Gram(s) Oral once PRN Blood Glucose LESS THAN 70 milliGRAM(s)/deciliter  melatonin 3 milliGRAM(s) Oral at bedtime PRN Insomnia  nitroglycerin     SubLingual 0.4 milliGRAM(s) SubLingual every 5 minutes PRN Chest Pain  ondansetron Injectable 4 milliGRAM(s) IV Push every 6 hours PRN Nausea and/or Vomiting  traMADol 25 milliGRAM(s) Oral every 6 hours PRN Moderate Pain (4 - 6)  traMADol 50 milliGRAM(s) Oral every 6 hours PRN Severe Pain (7 - 10)      Allergies    No Known Allergies      Vital Signs Last 24 Hrs  T(C): 36.6 (18 Jan 2024 08:37), Max: 36.9 (17 Jan 2024 17:16)  T(F): 97.8 (18 Jan 2024 08:37), Max: 98.4 (17 Jan 2024 17:16)  HR: 61 (18 Jan 2024 08:37) (60 - 66)  BP: 125/44 (18 Jan 2024 08:37) (122/49 - 133/65)  BP(mean): 76 (18 Jan 2024 05:08) (76 - 76)  RR: 18 (18 Jan 2024 08:37) (18 - 18)  SpO2: 96% (18 Jan 2024 08:37) (95% - 98%)    Parameters below as of 18 Jan 2024 08:37  Patient On (Oxygen Delivery Method): room air      T(C): 36.7 (17 Jan 2024 05:13), Max: 36.8 (16 Jan 2024 16:37)  T(F): 98 (17 Jan 2024 05:13), Max: 98.2 (16 Jan 2024 16:37)  HR: 66 (17 Jan 2024 07:37) (53 - 66)  BP: 125/54 (17 Jan 2024 07:37) (91/44 - 155/69)  BP(mean): --  RR: 18 (17 Jan 2024 05:13) (11 - 25)  SpO2: 99% (17 Jan 2024 05:13) (94% - 100%)    Parameters below as of 17 Jan 2024 05:13  Patient On (Oxygen Delivery Method): room air      T(C): 36.8 (16 Jan 2024 04:07), Max: 36.8 (15 Jaxson 2024 11:24)  T(F): 98.2 (16 Jan 2024 04:07), Max: 98.2 (15 Jaxson 2024 11:24)  HR: 63 (16 Jan 2024 04:07) (61 - 66)  BP: 130/63 (16 Jan 2024 04:07) (115/61 - 132/57)  BP(mean): 79 (15 Jaxsno 2024 21:15) (79 - 79)  RR: 18 (16 Jan 2024 04:07) (18 - 18)  SpO2: 97% (16 Jan 2024 04:07) (95% - 98%)    Parameters below as of 16 Jan 2024 04:07  Patient On (Oxygen Delivery Method): room air      PHYSICAL EXAM:    GENERAL: Comfortable  in bed   HEAD:  wnl  EYES:   ENMT:   NECK: veins wnl  NERVOUS SYSTEM:  alert, verbal, oriented  CHEST/LUNG: no  02, clear  HEART: RR with  2/6  systolic  murmur  ABDOMEN: NT  EXTREMITIES: No  edema   LYMPH:   SKIN: No rash  : Neg    LABS:    01-18    138  |  101  |  30.7<H>  ----------------------------<  117<H>  4.3   |  25.0  |  1.96<H>    Ca    9.7      18 Jan 2024 05:36  Mg     1.9     01-17        01-17    140  |  101  |  31.2<H>  ----------------------------<  101<H>  4.3   |  27.0  |  1.69<H>    Ca    9.7      17 Jan 2024 04:42  Phos  3.6     01-16  Mg     1.9     01-17                            9.3    6.36  )-----------( 222      ( 16 Jan 2024 04:41 )             28.7     01-16    137  |  101  |  37.6<H>  ----------------------------<  106<H>  4.2   |  25.0  |  1.74<H>    Ca    10.1      16 Jan 2024 04:41  Phos  3.6     01-16  Mg     1.9     01-16        Urinalysis Basic - ( 16 Jan 2024 04:41 )    Color: x / Appearance: x / SG: x / pH: x  Gluc: 106 mg/dL / Ketone: x  / Bili: x / Urobili: x   Blood: x / Protein: x / Nitrite: x   Leuk Esterase: x / RBC: x / WBC x   Sq Epi: x / Non Sq Epi: x / Bacteria: x      Magnesium: 1.9 mg/dL (01-16 @ 04:41)  Phosphorus: 3.6 mg/dL (01-16 @ 04:41)          RADIOLOGY & ADDITIONAL TESTS:

## 2024-01-18 NOTE — PROGRESS NOTE ADULT - ASSESSMENT
78yoF hx AS, CAD s/p PCI, CABG (1999), HTN, DM, CKD, NSTEMI in 10/2022 s/p [facility] showing chronic total occlusion of mLAD with patent FILIPPO graft and no culprit lesion, presenting with few days of intermittent chest pain.  Pt reports sharp, mid-sternal chest pain radiating to her left shoulder and left arm.  Episodes not associated with exertion and can last from a few seconds to up to 5 minutes before it self-resolves.  Pt took ASA during episodes w/out any relief. Denies any associated symptoms of nausea, vomiting, diaphoresis.  Admission labs notable for elevated troponin with no significant new CAD. DALILA with Severe AS 0,5 cm NAVA, CTS asked to consider TAVR as option therapy.

## 2024-01-18 NOTE — PROCEDURE NOTE - ADDITIONAL PROCEDURE DETAILS
INTERVAL HX: Called again to evaluate patient for peripheral access at CT scan due to loss of ultrasound guided peripheral access secondary to left upper arm IV contrast infiltrate. Patient without any current complaints. Denies swelling, pain, weakness, or paraesthesias of her extremities.     VITALS   T(C): 36.8 oral   HR: 60 regular   BP: 128/62  RR: 18 unlabored   SpO2: 98% on room air     PHYSICAL EXAM  CONSTITUTIONAL: Patient laying in CT comfortably, no apparent distress  CV: +2 radial pulses b/l   MSK: examination of b/l UE without misalignment,            Normal active and passive ROM without pain, +right forearm infiltrate tender to palpation. rest of b/l UE without tenderness to palpation, normal muscle strength/tone  SKIN: +right forearm edema with increased warmth. No surrounding erythema. No edema, erythema, or increased warmth noted of the right upper arm, left upper arm, or left forearm. No pallor of b/l UE.   NEURO: sensation intact in upper extremities b/l to light touch     A/P: Patient pending CTA with loss of peripheral access x2. Seen today at CT for another ultrasound guided peripheral IV. Remained with CT tech for remainder of exam to monitor patient and IV access. VSS     Peripheral Access  18 G Peripheral IV placed in RIGHT upper arm using ultrasound, 1 attempt, good flow, +blood return, flushed x1, can be accessed.    Left Arm contrast infiltrate  -COLD compress   -neurovascular checks q2  -elevate arm   -acetaminphen PRN     Discussed plan with RN  Continue to monitor   RN to notify provider with any changes in patient status INTERVAL HX: Called again to evaluate patient for peripheral access at CT scan due to loss of ultrasound guided peripheral access secondary to left upper arm IV contrast infiltrate. Patient without any current complaints. Denies swelling, pain, weakness, or paraesthesias of her extremities.     VITALS   T(C): 36.8 oral   HR: 60 regular   BP: 128/62  RR: 18 unlabored   SpO2: 98% on room air     PHYSICAL EXAM  CONSTITUTIONAL: Patient laying in CT comfortably, no apparent distress  CV: +2 radial pulses b/l   MSK: examination of b/l UE without misalignment,            Normal active and passive ROM without pain, +right forearm infiltrate tender to palpation. rest of b/l UE without tenderness to palpation, normal muscle strength/tone  SKIN: +right forearm edema with increased warmth. No surrounding erythema. No edema, erythema, or increased warmth noted of the right upper arm, left upper arm, or left forearm. No pallor of b/l UE.   NEURO: sensation intact in upper extremities b/l to light touch     A/P: Patient pending CTA with loss of peripheral access x2. Seen today at CT for another ultrasound guided peripheral IV. Remained with CT tech for remainder of exam to monitor patient and IV access. VSS     Peripheral Access  18 G Peripheral IV placed in RIGHT upper arm using ultrasound, 1 attempt, good flow, +blood return, flushed x1, can be accessed.  Used for CTA without any issues.     Left Arm contrast infiltrate  -COLD compress   -neurovascular checks q2  -elevate arm   -acetaminphen PRN     Discussed plan with RN  Continue to monitor   RN to notify provider with any changes in patient status

## 2024-01-19 ENCOUNTER — TRANSCRIPTION ENCOUNTER (OUTPATIENT)
Age: 79
End: 2024-01-19

## 2024-01-19 VITALS
DIASTOLIC BLOOD PRESSURE: 62 MMHG | TEMPERATURE: 98 F | SYSTOLIC BLOOD PRESSURE: 160 MMHG | HEART RATE: 69 BPM | OXYGEN SATURATION: 95 % | RESPIRATION RATE: 18 BRPM

## 2024-01-19 LAB
ALBUMIN SERPL ELPH-MCNC: 3.3 G/DL — SIGNIFICANT CHANGE UP (ref 3.3–5.2)
ALP SERPL-CCNC: 87 U/L — SIGNIFICANT CHANGE UP (ref 40–120)
ALT FLD-CCNC: 26 U/L — SIGNIFICANT CHANGE UP
ANION GAP SERPL CALC-SCNC: 11 MMOL/L — SIGNIFICANT CHANGE UP (ref 5–17)
AST SERPL-CCNC: 31 U/L — SIGNIFICANT CHANGE UP
BILIRUB SERPL-MCNC: 0.2 MG/DL — LOW (ref 0.4–2)
BLD GP AB SCN SERPL QL: SIGNIFICANT CHANGE UP
BUN SERPL-MCNC: 29.7 MG/DL — HIGH (ref 8–20)
CALCIUM SERPL-MCNC: 9 MG/DL — SIGNIFICANT CHANGE UP (ref 8.4–10.5)
CHLORIDE SERPL-SCNC: 104 MMOL/L — SIGNIFICANT CHANGE UP (ref 96–108)
CO2 SERPL-SCNC: 23 MMOL/L — SIGNIFICANT CHANGE UP (ref 22–29)
CREAT SERPL-MCNC: 1.82 MG/DL — HIGH (ref 0.5–1.3)
EGFR: 28 ML/MIN/1.73M2 — LOW
GLUCOSE BLDC GLUCOMTR-MCNC: 127 MG/DL — HIGH (ref 70–99)
GLUCOSE BLDC GLUCOMTR-MCNC: 128 MG/DL — HIGH (ref 70–99)
GLUCOSE SERPL-MCNC: 137 MG/DL — HIGH (ref 70–99)
HCT VFR BLD CALC: 27 % — LOW (ref 34.5–45)
HGB BLD-MCNC: 8.5 G/DL — LOW (ref 11.5–15.5)
MCHC RBC-ENTMCNC: 30 PG — SIGNIFICANT CHANGE UP (ref 27–34)
MCHC RBC-ENTMCNC: 31.5 GM/DL — LOW (ref 32–36)
MCV RBC AUTO: 95.4 FL — SIGNIFICANT CHANGE UP (ref 80–100)
PLATELET # BLD AUTO: 222 K/UL — SIGNIFICANT CHANGE UP (ref 150–400)
POTASSIUM SERPL-MCNC: 4.4 MMOL/L — SIGNIFICANT CHANGE UP (ref 3.5–5.3)
POTASSIUM SERPL-SCNC: 4.4 MMOL/L — SIGNIFICANT CHANGE UP (ref 3.5–5.3)
PROT SERPL-MCNC: 5.6 G/DL — LOW (ref 6.6–8.7)
RBC # BLD: 2.83 M/UL — LOW (ref 3.8–5.2)
RBC # FLD: 14.3 % — SIGNIFICANT CHANGE UP (ref 10.3–14.5)
SODIUM SERPL-SCNC: 138 MMOL/L — SIGNIFICANT CHANGE UP (ref 135–145)
WBC # BLD: 8.07 K/UL — SIGNIFICANT CHANGE UP (ref 3.8–10.5)
WBC # FLD AUTO: 8.07 K/UL — SIGNIFICANT CHANGE UP (ref 3.8–10.5)

## 2024-01-19 PROCEDURE — 82962 GLUCOSE BLOOD TEST: CPT

## 2024-01-19 PROCEDURE — 82607 VITAMIN B-12: CPT

## 2024-01-19 PROCEDURE — 84484 ASSAY OF TROPONIN QUANT: CPT

## 2024-01-19 PROCEDURE — 84134 ASSAY OF PREALBUMIN: CPT

## 2024-01-19 PROCEDURE — 85025 COMPLETE CBC W/AUTO DIFF WBC: CPT

## 2024-01-19 PROCEDURE — 83880 ASSAY OF NATRIURETIC PEPTIDE: CPT

## 2024-01-19 PROCEDURE — 84300 ASSAY OF URINE SODIUM: CPT

## 2024-01-19 PROCEDURE — 93880 EXTRACRANIAL BILAT STUDY: CPT

## 2024-01-19 PROCEDURE — 84100 ASSAY OF PHOSPHORUS: CPT

## 2024-01-19 PROCEDURE — 80061 LIPID PANEL: CPT

## 2024-01-19 PROCEDURE — C1894: CPT

## 2024-01-19 PROCEDURE — 84133 ASSAY OF URINE POTASSIUM: CPT

## 2024-01-19 PROCEDURE — 80048 BASIC METABOLIC PNL TOTAL CA: CPT

## 2024-01-19 PROCEDURE — 85730 THROMBOPLASTIN TIME PARTIAL: CPT

## 2024-01-19 PROCEDURE — 82436 ASSAY OF URINE CHLORIDE: CPT

## 2024-01-19 PROCEDURE — 85027 COMPLETE CBC AUTOMATED: CPT

## 2024-01-19 PROCEDURE — C1769: CPT

## 2024-01-19 PROCEDURE — 76775 US EXAM ABDO BACK WALL LIM: CPT

## 2024-01-19 PROCEDURE — 71275 CT ANGIOGRAPHY CHEST: CPT

## 2024-01-19 PROCEDURE — 93306 TTE W/DOPPLER COMPLETE: CPT

## 2024-01-19 PROCEDURE — 99285 EMERGENCY DEPT VISIT HI MDM: CPT

## 2024-01-19 PROCEDURE — 99232 SBSQ HOSP IP/OBS MODERATE 35: CPT

## 2024-01-19 PROCEDURE — 80053 COMPREHEN METABOLIC PANEL: CPT

## 2024-01-19 PROCEDURE — C1887: CPT

## 2024-01-19 PROCEDURE — 83935 ASSAY OF URINE OSMOLALITY: CPT

## 2024-01-19 PROCEDURE — 84443 ASSAY THYROID STIM HORMONE: CPT

## 2024-01-19 PROCEDURE — 83540 ASSAY OF IRON: CPT

## 2024-01-19 PROCEDURE — 82550 ASSAY OF CK (CPK): CPT

## 2024-01-19 PROCEDURE — 36415 COLL VENOUS BLD VENIPUNCTURE: CPT

## 2024-01-19 PROCEDURE — 93312 ECHO TRANSESOPHAGEAL: CPT

## 2024-01-19 PROCEDURE — 87640 STAPH A DNA AMP PROBE: CPT

## 2024-01-19 PROCEDURE — 83735 ASSAY OF MAGNESIUM: CPT

## 2024-01-19 PROCEDURE — 99239 HOSP IP/OBS DSCHRG MGMT >30: CPT

## 2024-01-19 PROCEDURE — 94010 BREATHING CAPACITY TEST: CPT

## 2024-01-19 PROCEDURE — 82728 ASSAY OF FERRITIN: CPT

## 2024-01-19 PROCEDURE — 83550 IRON BINDING TEST: CPT

## 2024-01-19 PROCEDURE — 86900 BLOOD TYPING SEROLOGIC ABO: CPT

## 2024-01-19 PROCEDURE — 93455 CORONARY ART/GRFT ANGIO S&I: CPT

## 2024-01-19 PROCEDURE — 83036 HEMOGLOBIN GLYCOSYLATED A1C: CPT

## 2024-01-19 PROCEDURE — 82746 ASSAY OF FOLIC ACID SERUM: CPT

## 2024-01-19 PROCEDURE — 96374 THER/PROPH/DIAG INJ IV PUSH: CPT

## 2024-01-19 PROCEDURE — 81001 URINALYSIS AUTO W/SCOPE: CPT

## 2024-01-19 PROCEDURE — 86850 RBC ANTIBODY SCREEN: CPT

## 2024-01-19 PROCEDURE — 74174 CTA ABD&PLVS W/CONTRAST: CPT

## 2024-01-19 PROCEDURE — 71045 X-RAY EXAM CHEST 1 VIEW: CPT

## 2024-01-19 PROCEDURE — 87641 MR-STAPH DNA AMP PROBE: CPT

## 2024-01-19 PROCEDURE — 93005 ELECTROCARDIOGRAM TRACING: CPT

## 2024-01-19 PROCEDURE — 82553 CREATINE MB FRACTION: CPT

## 2024-01-19 PROCEDURE — 84466 ASSAY OF TRANSFERRIN: CPT

## 2024-01-19 PROCEDURE — 86901 BLOOD TYPING SEROLOGIC RH(D): CPT

## 2024-01-19 PROCEDURE — 85610 PROTHROMBIN TIME: CPT

## 2024-01-19 RX ORDER — HYDRALAZINE HCL 50 MG
1 TABLET ORAL
Qty: 60 | Refills: 0
Start: 2024-01-19 | End: 2024-02-17

## 2024-01-19 RX ORDER — ISOSORBIDE MONONITRATE 60 MG/1
1 TABLET, EXTENDED RELEASE ORAL
Qty: 30 | Refills: 0
Start: 2024-01-19 | End: 2024-02-17

## 2024-01-19 RX ADMIN — Medication 1 TABLET(S): at 07:20

## 2024-01-19 RX ADMIN — Medication 1 APPLICATION(S): at 05:17

## 2024-01-19 RX ADMIN — Medication 100 MILLIGRAM(S): at 09:10

## 2024-01-19 RX ADMIN — CLOPIDOGREL BISULFATE 75 MILLIGRAM(S): 75 TABLET, FILM COATED ORAL at 09:11

## 2024-01-19 RX ADMIN — PANTOPRAZOLE SODIUM 40 MILLIGRAM(S): 20 TABLET, DELAYED RELEASE ORAL at 05:11

## 2024-01-19 RX ADMIN — HEPARIN SODIUM 5000 UNIT(S): 5000 INJECTION INTRAVENOUS; SUBCUTANEOUS at 05:12

## 2024-01-19 RX ADMIN — Medication 25 MILLIGRAM(S): at 07:19

## 2024-01-19 RX ADMIN — Medication 88 MICROGRAM(S): at 05:11

## 2024-01-19 RX ADMIN — Medication 1 APPLICATION(S): at 12:08

## 2024-01-19 RX ADMIN — Medication 1 MILLIGRAM(S): at 09:11

## 2024-01-19 RX ADMIN — Medication 81 MILLIGRAM(S): at 09:10

## 2024-01-19 NOTE — DISCHARGE NOTE PROVIDER - HOSPITAL COURSE
78F presented with a few days of intermittent chest pain. On presentation, H/H(9.6/29.4), BUN/Cr(45.7/1.84), Trop(52). The patient was evaluated by Cardiology. Serial troponin levels were elevated. Echocardiogram noted normal left ventricular systolic function with an ejection fraction of 60 to 65 %, mild to moderate aortic regurgitation, moderate to severe aortic stenosis. Carotid doppler noted bilateral mixed plaque with elevated velocities in the proximal internal carotid arteries bilaterally consistent with at least 50-69% stenosis. The patient was thought to benefit from further ischemic evaluation. The patient was seen by Nephrology in consultation and continued on intravenous fluids with bicarbonate. The patient underwent cardiac catheterization with patent left main and left circumflex coronary arteries with mild disease, LAD occluded at distal aspect of mid LAD stent with competitive flow from LIMA, sluggish filling of diffusely diseased dominant diagonal branch, with a 70% mid diagonal branch stenosis, patent RCA with an eccentric 50% proximal RCA stenosis and patent RPL / RPDA branches, occluded gastroepiploic graft, patent LIMA graft filling of mid and distal LAD, patent free radial graft. The patient was continued on medical management. Transesophageal echocardiogram noted severe aortic stenosis, moderate aortic regurgitation, no pericardial effusion. Renal ultrasound noted medical renal disease. The renal function remained stable on repeat studies. The patient was planned for outpatient follow up with Cardiothoracic Surgery for eventual TAVR.    38 minutes total time

## 2024-01-19 NOTE — PROGRESS NOTE ADULT - PROBLEM SELECTOR PLAN 5
Supplementation with Fe, FA +/- Fe studies.

## 2024-01-19 NOTE — PROGRESS NOTE ADULT - SUBJECTIVE AND OBJECTIVE BOX
NEPHROLOGY INTERVAL HPI/OVERNIGHT EVENTS:    Examind  Feeling better  Sitting at edge of bed  Dtr visiting  Denies HA CP no SOB    MEDICATIONS  (STANDING):  allopurinol 100 milliGRAM(s) Oral daily  aspirin enteric coated 81 milliGRAM(s) Oral daily  atorvastatin 80 milliGRAM(s) Oral at bedtime  carvedilol 6.25 milliGRAM(s) Oral every 12 hours  clopidogrel Tablet 75 milliGRAM(s) Oral daily  dextrose 5%. 1000 milliLiter(s) (50 mL/Hr) IV Continuous <Continuous>  dextrose 5%. 1000 milliLiter(s) (100 mL/Hr) IV Continuous <Continuous>  dextrose 50% Injectable 12.5 Gram(s) IV Push once  dextrose 50% Injectable 25 Gram(s) IV Push once  dextrose 50% Injectable 25 Gram(s) IV Push once  epoetin latisha (PROCRIT) Injectable 08909 Unit(s) SubCutaneous every 7 days  epoetin latisha-epbx (RETACRIT) Injectable 52402 Unit(s) SubCutaneous every 7 days  ergocalciferol 16248 Unit(s) Oral <User Schedule>  ezetimibe 10 milliGRAM(s) Oral at bedtime  folic acid 1 milliGRAM(s) Oral daily  glucagon  Injectable 1 milliGRAM(s) IntraMuscular once  heparin   Injectable 5000 Unit(s) SubCutaneous every 12 hours  hydrALAZINE 25 milliGRAM(s) Oral two times a day  insulin lispro (ADMELOG) corrective regimen sliding scale   SubCutaneous at bedtime  insulin lispro (ADMELOG) corrective regimen sliding scale   SubCutaneous three times a day before meals  isosorbide   mononitrate ER Tablet (IMDUR) 30 milliGRAM(s) Oral daily  levothyroxine 88 MICROGram(s) Oral daily  lidocaine   4% Patch 1 Patch Transdermal every 24 hours  pantoprazole    Tablet 40 milliGRAM(s) Oral before breakfast  silver sulfADIAZINE 1% Cream 1 Application(s) Topical three times a day  sodium chloride 0.9%. 1000 milliLiter(s) (82 mL/Hr) IV Continuous <Continuous>  triamterene 37.5 mG/hydrochlorothiazide 25 mG Tablet 1 Tablet(s) Oral daily    MEDICATIONS  (PRN):  acetaminophen     Tablet .. 650 milliGRAM(s) Oral every 6 hours PRN Temp greater or equal to 38C (100.4F), Mild Pain (1 - 3), Moderate Pain (4 - 6)  artificial  tears Solution 1 Drop(s) Both EYES two times a day PRN Dry Eyes  dextrose Oral Gel 15 Gram(s) Oral once PRN Blood Glucose LESS THAN 70 milliGRAM(s)/deciliter  melatonin 3 milliGRAM(s) Oral at bedtime PRN Insomnia  nitroglycerin     SubLingual 0.4 milliGRAM(s) SubLingual every 5 minutes PRN Chest Pain  ondansetron Injectable 4 milliGRAM(s) IV Push every 6 hours PRN Nausea and/or Vomiting  traMADol 25 milliGRAM(s) Oral every 6 hours PRN Moderate Pain (4 - 6)      Allergies    No Known Allergies    Intolerances        Vital Signs Last 24 Hrs  T(C): 36.8 (19 Jan 2024 08:10), Max: 36.8 (18 Jan 2024 14:12)  T(F): 98.2 (19 Jan 2024 08:10), Max: 98.3 (18 Jan 2024 18:00)  HR: 65 (19 Jan 2024 08:10) (60 - 73)  BP: 94/55 (19 Jan 2024 08:10) (94/55 - 175/76)  BP(mean): --  RR: 18 (19 Jan 2024 08:10) (18 - 18)  SpO2: 99% (19 Jan 2024 08:10) (92% - 99%)    Parameters below as of 19 Jan 2024 08:10  Patient On (Oxygen Delivery Method): nasal cannula  O2 Flow (L/min): 2    PHYSICAL EXAM:  GENERAL: NAD  HEAD:  NCAT  NECK: Supple  NERVOUS SYSTEM:  Alert & Oriented X3,   CHEST/LUNG: Clear / EAE , No wheeze   HEART: Regular rate and rhythm; No rub   ABDOMEN: Soft, Nontender, Nondistended; Bowel sounds present  EXTREMITIES: No edema     LABS:                        8.5    8.07  )-----------( 222      ( 19 Jan 2024 04:41 )             27.0     01-19    138  |  104  |  29.7<H>  ----------------------------<  137<H>  4.4   |  23.0  |  1.82<H>    Ca    9.0      19 Jan 2024 04:41    TPro  5.6<L>  /  Alb  3.3  /  TBili  0.2<L>  /  DBili  x   /  AST  31  /  ALT  26  /  AlkPhos  87  01-19      Urinalysis Basic - ( 19 Jan 2024 04:41 )    Color: x / Appearance: x / SG: x / pH: x  Gluc: 137 mg/dL / Ketone: x  / Bili: x / Urobili: x   Blood: x / Protein: x / Nitrite: x   Leuk Esterase: x / RBC: x / WBC x   Sq Epi: x / Non Sq Epi: x / Bacteria: x              RADIOLOGY & ADDITIONAL TESTS:

## 2024-01-19 NOTE — PROGRESS NOTE ADULT - PROBLEM SELECTOR PLAN 4
DALILA results reviewed.   CT TAVR protocol complete 1/18  Complete preop testing  OR date to be scheduled based    Discussed with Dr. Hansen DALILA results reviewed.   CT TAVR protocol complete 1/18  Complete preop testing  OR date set for 2/14/24    Discussed with Dr. Hansen DALILA results reviewed.   CT TAVR protocol complete 1/18  Complete preop testing  OR date set for 2/14/24  Outpatient appointment made for 1/23/24 11:15am  Teeth examined at bedside - patient has poor dentition and needs to see a dentist on discharge to evaluate for the need for extractions prior to scheduled TAVR procedure    Discussed with Dr. Hansen DALILA results reviewed.   CT TAVR protocol complete 1/18  Complete preop testing  OR date set for 2/14/24  Outpatient appointment made for 1/23/24 11:15am  Teeth examined at bedside - patient has poor dentition and needs to see a dentist on discharge to evaluate for the need for extractions prior to scheduled TAVR procedure. Patient and family notified and understand the need for dental eval.    Discussed with Dr. Hansen

## 2024-01-19 NOTE — DISCHARGE NOTE NURSING/CASE MANAGEMENT/SOCIAL WORK - PATIENT PORTAL LINK FT
You can access the FollowMyHealth Patient Portal offered by [facility] by registering at the following website: [email or web address]. By joining [email or web address]’s FollowMyHealth portal, you will also be able to view your health information using other applications (apps) compatible with our system.

## 2024-01-19 NOTE — DISCHARGE NOTE PROVIDER - CARE PROVIDERS DIRECT ADDRESSES
,DirectAddress_Unknown,[email or web address] ,DirectAddress_Unknown,[email or web address]

## 2024-01-19 NOTE — PROGRESS NOTE ADULT - ASSESSMENT
78yoF hx AS, CAD s/p PCI, CABG (1999), HTN, DM, CKD, NSTEMI in 10/2022 s/p [facility] showing chronic total occlusion of mLAD with patent FILIPPO graft and no culprit lesion, presenting with few days of intermittent chest pain.  Pt reports sharp, mid-sternal chest pain radiating to her left shoulder and left arm.  Episodes not associated with exertion and can last from a few seconds to up to 5 minutes before it self-resolves.  Pt took ASA during episodes w/out any relief. Denies any associated symptoms of nausea, vomiting, diaphoresis.  Admission labs notable for elevated troponin with no significant new CAD. DALILA with Severe AS 0,5 cm NAVA, CTS asked to consider TAVR as option therapy.

## 2024-01-19 NOTE — PROGRESS NOTE ADULT - REASON FOR ADMISSION
Chest pain in setting of CAD

## 2024-01-19 NOTE — DISCHARGE NOTE PROVIDER - CARE PROVIDER_API CALL
Juan C Hooker  Family Medicine  [address] [place] [address]  Phone: [phone]  Fax: [phone]  Follow Up Time:     Eliazar Simmons  Interventional Cardiology  [address] [facility], [address]  [place] [address]  Phone: [phone]  Fax: [phone]  Follow Up Time:    Juan C Hooker  Family Medicine  [address] [place] [address]  Phone: [phone]  Fax: [phone]  Follow Up Time:     Eliazar Simmons  Interventional Cardiology  [address] [facility], [address]  [place] [address]  Phone: [phone]  Fax: [phone]  Follow Up Time:     Adiel Hansen  Thoracic and Cardiac Surgery  [address] [place] [address]  Phone: [phone]  Fax: [phone]  Scheduled Appointment: 01/23/2024 11:15 AM

## 2024-01-19 NOTE — PROGRESS NOTE ADULT - PROBLEM SELECTOR PLAN 3
- BP still uncontrolled.   - If renal function is still increasing may consider D/Cing Triamtere/HCTZ.   - Start Imdur 30mg PO qday and hydralazine 25mg PO q8.   - Increase Imdur and hydralazine as needed.   - Unable to increase Coreg due to bradycardia.
- Echocardiogram with moderate to severe AS.   - Plan for DALILA tomorrow to further evaluate.   - NPO after midnight.
CKD III - DM / HTN   Atypical CP , responds to nitrates   Mod - sev AS , preserved EF on ECHO   Cont. to monitor kidney function, s/p 2 dye loads for cath and TAVR CTA, IV fluids per nephrology  Normal kidneys on CT angiogram 2022.  Kidney US completed 1/18 showing medical renal disease and mild fullness of left collecting system without hydronephrosis
CKD III - DM / HTN   Atypical CP , responds to nitrates   Mod - sev AS , preserved EF on ECHO   Renal function worsening, IV fluids ordered per nephrology  Normal kidneys on CT angiogram 2022.
CKD III - DM / HTN   Atypical CP , responds to nitrates   Mod - sev AS , preserved EF on ECHO   Renal function at baseline ( c/w 2022 )   Normal kidneys on CT angiogram 2022.
Preserved EF  peak transaortic gradient is 58.1 mmHg  with valve area  The aortic valve area is estimated at 0.89 cm² by the continuity equation. There is mild to moderate aortic regurgitation.  No s/s of heart failure on exam

## 2024-01-19 NOTE — DISCHARGE NOTE PROVIDER - NSDCMRMEDTOKEN_GEN_ALL_CORE_FT
allopurinol 100 mg oral tablet: 1 tab(s) orally once a day  aspirin 81 mg oral tablet, chewable: 1 tab(s) orally once a day  atorvastatin 80 mg oral tablet: 1 tab(s) orally once a day (at bedtime)  carvedilol 6.25 mg oral tablet: 1 tab(s) orally 2 times a day  clopidogrel 75 mg oral tablet: 1 tab(s) orally once a day  ergocalciferol 1.25 mg (50,000 intl units) oral tablet: 1 tab(s) orally once a week on Monday  ezetimibe 10 mg oral tablet: 1 tab(s) orally once a day  Jardiance 10 mg oral tablet: 1 tab(s) orally once a day  levothyroxine 88 mcg (0.088 mg) oral tablet: 1 tab(s) orally once a day  omeprazole 20 mg oral delayed release capsule: 1 cap(s) orally once a day  triamterene-hydrochlorothiazide 37.5 mg-25 mg oral tablet: 1 tab(s) orally once a day   allopurinol 100 mg oral tablet: 1 tab(s) orally once a day  aspirin 81 mg oral tablet, chewable: 1 tab(s) orally once a day  atorvastatin 80 mg oral tablet: 1 tab(s) orally once a day (at bedtime)  carvedilol 6.25 mg oral tablet: 1 tab(s) orally 2 times a day  clopidogrel 75 mg oral tablet: 1 tab(s) orally once a day  ergocalciferol 1.25 mg (50,000 intl units) oral tablet: 1 tab(s) orally once a week on Monday  ezetimibe 10 mg oral tablet: 1 tab(s) orally once a day  hydrALAZINE 25 mg oral tablet: 1 tab(s) orally 2 times a day  isosorbide mononitrate 30 mg oral tablet, extended release: 1 tab(s) orally once a day  Jardiance 10 mg oral tablet: 1 tab(s) orally once a day  levothyroxine 88 mcg (0.088 mg) oral tablet: 1 tab(s) orally once a day  omeprazole 20 mg oral delayed release capsule: 1 cap(s) orally once a day  triamterene-hydrochlorothiazide 37.5 mg-25 mg oral tablet: 1 tab(s) orally once a day

## 2024-01-19 NOTE — PROGRESS NOTE ADULT - SUBJECTIVE AND OBJECTIVE BOX
78y Female seen and evaluated bedside. Endorsing no acute complaints. Denies any chest pain, palpitations, shortness of breath, wheezing, abd pain, nausea, vomiting, constipation, lightheadedness, headaches, fevers, or chills.     PAST MEDICAL & SURGICAL HISTORY:  Hypertension    Diabetes mellitus    Hypothyroidism    HLD (hyperlipidemia)    CAD (coronary artery disease)    CKD (chronic kidney disease)    S/P CABG (coronary artery bypass graft)    H/O  section    Medications:  acetaminophen     Tablet .. 650 milliGRAM(s) Oral every 6 hours PRN  allopurinol 100 milliGRAM(s) Oral daily  artificial  tears Solution 1 Drop(s) Both EYES two times a day PRN  aspirin enteric coated 81 milliGRAM(s) Oral daily  atorvastatin 80 milliGRAM(s) Oral at bedtime  carvedilol 6.25 milliGRAM(s) Oral every 12 hours  clopidogrel Tablet 75 milliGRAM(s) Oral daily  dextrose 5%. 1000 milliLiter(s) IV Continuous <Continuous>  dextrose 5%. 1000 milliLiter(s) IV Continuous <Continuous>  dextrose 50% Injectable 12.5 Gram(s) IV Push once  dextrose 50% Injectable 25 Gram(s) IV Push once  dextrose 50% Injectable 25 Gram(s) IV Push once  dextrose Oral Gel 15 Gram(s) Oral once PRN  epoetin latisha (PROCRIT) Injectable 12748 Unit(s) SubCutaneous every 7 days  epoetin latisha-epbx (RETACRIT) Injectable 42052 Unit(s) SubCutaneous every 7 days  ergocalciferol 35826 Unit(s) Oral <User Schedule>  ezetimibe 10 milliGRAM(s) Oral at bedtime  folic acid 1 milliGRAM(s) Oral daily  glucagon  Injectable 1 milliGRAM(s) IntraMuscular once  heparin   Injectable 5000 Unit(s) SubCutaneous every 12 hours  hydrALAZINE 25 milliGRAM(s) Oral two times a day  insulin lispro (ADMELOG) corrective regimen sliding scale   SubCutaneous at bedtime  insulin lispro (ADMELOG) corrective regimen sliding scale   SubCutaneous three times a day before meals  isosorbide   mononitrate ER Tablet (IMDUR) 30 milliGRAM(s) Oral daily  levothyroxine 88 MICROGram(s) Oral daily  lidocaine   4% Patch 1 Patch Transdermal every 24 hours  melatonin 3 milliGRAM(s) Oral at bedtime PRN  nitroglycerin     SubLingual 0.4 milliGRAM(s) SubLingual every 5 minutes PRN  ondansetron Injectable 4 milliGRAM(s) IV Push every 6 hours PRN  pantoprazole    Tablet 40 milliGRAM(s) Oral before breakfast  silver sulfADIAZINE 1% Cream 1 Application(s) Topical three times a day  sodium chloride 0.9%. 1000 milliLiter(s) IV Continuous <Continuous>  traMADol 25 milliGRAM(s) Oral every 6 hours PRN  triamterene 37.5 mG/hydrochlorothiazide 25 mG Tablet 1 Tablet(s) Oral daily      MEDICATIONS  (PRN):  acetaminophen     Tablet .. 650 milliGRAM(s) Oral every 6 hours PRN Temp greater or equal to 38C (100.4F), Mild Pain (1 - 3), Moderate Pain (4 - 6)  artificial  tears Solution 1 Drop(s) Both EYES two times a day PRN Dry Eyes  dextrose Oral Gel 15 Gram(s) Oral once PRN Blood Glucose LESS THAN 70 milliGRAM(s)/deciliter  melatonin 3 milliGRAM(s) Oral at bedtime PRN Insomnia  nitroglycerin     SubLingual 0.4 milliGRAM(s) SubLingual every 5 minutes PRN Chest Pain  ondansetron Injectable 4 milliGRAM(s) IV Push every 6 hours PRN Nausea and/or Vomiting  traMADol 25 milliGRAM(s) Oral every 6 hours PRN Moderate Pain (4 - 6)      Daily Review:                                8.5    8.07  )-----------( 222      ( 2024 04:41 )             27.0       138  |  104  |  29.7<H>  ----------------------------<  137<H>  4.4   |  23.0  |  1.82<H>    Ca    9.0      2024 04:41    TPro  5.6<L>  /  Alb  3.3  /  TBili  0.2<L>  /  DBili  x   /  AST  31  /  ALT  26  /  AlkPhos  87            T(C): 36.8 (24 @ 08:10), Max: 36.8 (24 @ 14:12)  HR: 65 (24 @ 08:10) (60 - 73)  BP: 94/55 (24 @ 08:10) (94/55 - 175/76)  RR: 18 (24 @ 08:10) (18 - 18)  SpO2: 99% (24 @ 08:10) (92% - 99%)  Wt(kg): --    CAPILLARY BLOOD GLUCOSE      POCT Blood Glucose.: 128 mg/dL (2024 08:17)  POCT Blood Glucose.: 119 mg/dL (2024 20:57)  POCT Blood Glucose.: 113 mg/dL (2024 17:45)  POCT Blood Glucose.: 131 mg/dL (2024 11:48)      I&O's Summary    2024 07:01  -  2024 07:00  --------------------------------------------------------  IN: 820 mL / OUT: 0 mL / NET: 820 mL        Physical Exam  General: Well appearing, laying in bed on an incline, NAD  Neurological: AOx3, no focal neurological deficits  Cardiovascular: Regular Rate/Rhythm, S1/2 auscultated, No extra heart sounds  Respiratory: CTA b/l over all lung fields, No adventitious breath sounds  Gastrointestinal: Bowel sounds present in all 4 quadrants, Abdomen is soft, non-tender, non-distended  Extremities: No peripheral edema noted, 5/5 strength and full range of motion in all 4 extremities b/l  Vascular: 2+ peripheral pulses b/l in upper and lower extremities, Radial, DP

## 2024-01-19 NOTE — DISCHARGE NOTE PROVIDER - PROVIDER TOKENS
PROVIDER:[TOKEN:[[id number]]],PROVIDER:[TOKEN:[[id number]]] PROVIDER:[TOKEN:[[id number]]],PROVIDER:[TOKEN:[[id number]]],PROVIDER:[TOKEN:[[id number]],SCHEDULEDAPPT:[01/23/2024],SCHEDULEDAPPTTIME:[11:15 AM]]

## 2024-01-19 NOTE — PROGRESS NOTE ADULT - PROVIDER SPECIALTY LIST ADULT
Nephrology
Cardiology
Hospitalist
Hospitalist
Intervent Cardiology
Nephrology
Cardiology
Hospitalist
Nephrology
Nephrology
Cardiology
Cardiology
CT Surgery
Cardiology
CT Surgery
CT Surgery

## 2024-01-19 NOTE — PROGRESS NOTE ADULT - ASSESSMENT
77yo F w/ CAD, CABG x2, HTN, DM, HTN and HLD with intermittent mid-sternal chest pain with left shoulder radiation down her arm, non exertional, no other associated concerning risk factors for ACS.  Here for stable angina.  EKG: NSR, LVH no acute ischemia changes.  Trop: 53  pro-BNP: 776      CKD stage III - likely 2/2 h/o DM / HTN   Renal function remains stable   Atypical CP , responds to nitrates   Mod - sev AS , preserved EF on ECHO   Renal function at baseline ( c/w 2022 )   Normal kidneys on CT angiogram 2022     Severe AS pt being evaluated for possible TAVR     Anemia -   Cont Epogen and folate     Will follow

## 2024-01-19 NOTE — PROGRESS NOTE ADULT - PROBLEM SELECTOR PROBLEM 1
AS (aortic stenosis)
Chronic stable angina
CAD (coronary artery disease)
Chronic stable angina
CAD (coronary artery disease)

## 2024-01-19 NOTE — DISCHARGE NOTE PROVIDER - NSDCCPCAREPLAN_GEN_ALL_CORE_FT
PRINCIPAL DISCHARGE DIAGNOSIS  Diagnosis: ACS (acute coronary syndrome)  Assessment and Plan of Treatment: Cardiac catheterization was without culprit lesion. Continue on your cardiac medications. Follow up with your cardiologist for further management.      SECONDARY DISCHARGE DIAGNOSES  Diagnosis: Aortic stenosis  Assessment and Plan of Treatment: Follow up with Cardiothoracic Surgery for valve replacement.    Diagnosis: Hypothyroidism  Assessment and Plan of Treatment: Continue on levothyroxine    Diagnosis: Diabetes  Assessment and Plan of Treatment: Continue on your home diabetes medications. Monitor glucose levels.    Diagnosis: Stage 3 chronic kidney disease  Assessment and Plan of Treatment: Continue monitoring kidney function with your primary care physician.     PRINCIPAL DISCHARGE DIAGNOSIS  Diagnosis: ACS (acute coronary syndrome)  Assessment and Plan of Treatment: Cardiac catheterization was without culprit lesion. Continue on your cardiac medications. Follow up with your cardiologist for further management.      SECONDARY DISCHARGE DIAGNOSES  Diagnosis: Aortic stenosis  Assessment and Plan of Treatment: Follow up with Cardiothoracic Surgery for valve replacement. Have evaluation by your dentist prior to valve surgery to determine if any tooth extractions are required.    Diagnosis: Hypothyroidism  Assessment and Plan of Treatment: Continue on levothyroxine    Diagnosis: Diabetes  Assessment and Plan of Treatment: Continue on your home diabetes medications. Monitor glucose levels.    Diagnosis: Stage 3 chronic kidney disease  Assessment and Plan of Treatment: Continue monitoring kidney function with your primary care physician.

## 2024-01-19 NOTE — DISCHARGE NOTE PROVIDER - NSDCFUSCHEDAPPT_GEN_ALL_CORE_FT
[facility]  [facility] [address]  Scheduled Appointment: 01/23/2024

## 2024-01-19 NOTE — PROGRESS NOTE ADULT - PROBLEM SELECTOR PROBLEM 3
Renal insufficiency
Renal insufficiency
HTN, goal below 130/80
AS (aortic stenosis)
Renal insufficiency
AS (aortic stenosis)

## 2024-01-19 NOTE — PROGRESS NOTE ADULT - PROBLEM SELECTOR PROBLEM 2
HLD (hyperlipidemia)
HTN, goal below 130/80
HLD (hyperlipidemia)
CAD (coronary artery disease)
HLD (hyperlipidemia)
Renal insufficiency
CAD (coronary artery disease)

## 2024-01-19 NOTE — PROGRESS NOTE ADULT - PROBLEM SELECTOR PLAN 2
Continue Statin therapy.
Chronic  appears baseline is creat 1.6  will need iv hydration IF we pan to cath patient
Continue Statin therapy.
[facility] 1/16/24 No change noted,   Asprin / Plavix  / Lipitor and Imdur.  DASH/low cholesterol diet  Right groin precautions:   No bathing or submerging in water, and no driving
- Patient with NSTEMI 10/22 s/p C that did not reveal a culprit lesion, medically managed.   - Echocardiogram pending.   - Continue aspirin, plavix, lipitor, coreg, and imdur.   - Likely NST 01/16/23.
- Continue current medications.
Continue Statin therapy.

## 2024-01-19 NOTE — PROGRESS NOTE ADULT - SUBJECTIVE AND OBJECTIVE BOX
DIANA ROGERS  ----------------------------------------  The patient was seen earlier at bedside. Patient with aortic stenosis. Offered no complaints. Denied chest pain or dyspnea.    Vital Signs Last 24 Hrs  T(C): 36.8 (19 Jan 2024 08:10), Max: 36.8 (18 Jan 2024 14:12)  T(F): 98.2 (19 Jan 2024 08:10), Max: 98.3 (18 Jan 2024 18:00)  HR: 65 (19 Jan 2024 08:10) (60 - 73)  BP: 94/55 (19 Jan 2024 08:10) (94/55 - 175/76)  BP(mean): --  RR: 18 (19 Jan 2024 08:10) (18 - 18)  SpO2: 99% (19 Jan 2024 08:10) (92% - 99%)    Parameters below as of 19 Jan 2024 08:10  Patient On (Oxygen Delivery Method): nasal cannula  O2 Flow (L/min): 2    CAPILLARY BLOOD GLUCOSE  POCT Blood Glucose.: 128 mg/dL (19 Jan 2024 08:17)  POCT Blood Glucose.: 119 mg/dL (18 Jan 2024 20:57)  POCT Blood Glucose.: 113 mg/dL (18 Jan 2024 17:45)  POCT Blood Glucose.: 131 mg/dL (18 Jan 2024 11:48)    PHYSICAL EXAMINATION:  ----------------------------------------  General appearance: No acute distress, Awake, Alert  HEENT: Normocephalic, Atraumatic, Conjunctiva clear, EOMI  Neck: Supple, No JVD, No tenderness  Lungs: Breath sound equal bilaterally, No wheezes, No rales  Cardiovascular: S1S2, Regular rhythm, Positive murmur  Abdomen: Soft, Nontender, Nondistended, No guarding/rebound, Positive bowel sounds  Extremities: No clubbing, No cyanosis, No edema, No calf tenderness  Neuro: Strength equal bilaterally, No tremors  Psychiatric: Appropriate mood, Normal affect    LABORATORY STUDIES:  ----------------------------------------             8.5    8.07  )-----------( 222      ( 19 Jan 2024 04:41 )             27.0     01-19    138  |  104  |  29.7<H>  ----------------------------<  137<H>  4.4   |  23.0  |  1.82<H>    Ca    9.0      19 Jan 2024 04:41    TPro  5.6<L>  /  Alb  3.3  /  TBili  0.2<L>  /  DBili  x   /  AST  31  /  ALT  26  /  AlkPhos  87  01-19    LIVER FUNCTIONS - ( 19 Jan 2024 04:41 )  Alb: 3.3 g/dL / Pro: 5.6 g/dL / ALK PHOS: 87 U/L / ALT: 26 U/L / AST: 31 U/L / GGT: x           Urinalysis Basic - ( 19 Jan 2024 04:41 )  Color: x / Appearance: x / SG: x / pH: x  Gluc: 137 mg/dL / Ketone: x  / Bili: x / Urobili: x   Blood: x / Protein: x / Nitrite: x   Leuk Esterase: x / RBC: x / WBC x   Sq Epi: x / Non Sq Epi: x / Bacteria: x    MEDICATIONS  (STANDING):  allopurinol 100 milliGRAM(s) Oral daily  aspirin enteric coated 81 milliGRAM(s) Oral daily  atorvastatin 80 milliGRAM(s) Oral at bedtime  carvedilol 6.25 milliGRAM(s) Oral every 12 hours  clopidogrel Tablet 75 milliGRAM(s) Oral daily  dextrose 5%. 1000 milliLiter(s) (100 mL/Hr) IV Continuous <Continuous>  dextrose 5%. 1000 milliLiter(s) (50 mL/Hr) IV Continuous <Continuous>  dextrose 50% Injectable 12.5 Gram(s) IV Push once  dextrose 50% Injectable 25 Gram(s) IV Push once  dextrose 50% Injectable 25 Gram(s) IV Push once  epoetin latisha (PROCRIT) Injectable 16852 Unit(s) SubCutaneous every 7 days  epoetin latisha-epbx (RETACRIT) Injectable 98564 Unit(s) SubCutaneous every 7 days  ergocalciferol 49357 Unit(s) Oral <User Schedule>  ezetimibe 10 milliGRAM(s) Oral at bedtime  folic acid 1 milliGRAM(s) Oral daily  glucagon  Injectable 1 milliGRAM(s) IntraMuscular once  heparin   Injectable 5000 Unit(s) SubCutaneous every 12 hours  hydrALAZINE 25 milliGRAM(s) Oral two times a day  insulin lispro (ADMELOG) corrective regimen sliding scale   SubCutaneous three times a day before meals  insulin lispro (ADMELOG) corrective regimen sliding scale   SubCutaneous at bedtime  isosorbide   mononitrate ER Tablet (IMDUR) 30 milliGRAM(s) Oral daily  levothyroxine 88 MICROGram(s) Oral daily  lidocaine   4% Patch 1 Patch Transdermal every 24 hours  pantoprazole    Tablet 40 milliGRAM(s) Oral before breakfast  silver sulfADIAZINE 1% Cream 1 Application(s) Topical three times a day  sodium chloride 0.9%. 1000 milliLiter(s) (82 mL/Hr) IV Continuous <Continuous>  triamterene 37.5 mG/hydrochlorothiazide 25 mG Tablet 1 Tablet(s) Oral daily    MEDICATIONS  (PRN):  acetaminophen     Tablet .. 650 milliGRAM(s) Oral every 6 hours PRN Temp greater or equal to 38C (100.4F), Mild Pain (1 - 3), Moderate Pain (4 - 6)  artificial  tears Solution 1 Drop(s) Both EYES two times a day PRN Dry Eyes  dextrose Oral Gel 15 Gram(s) Oral once PRN Blood Glucose LESS THAN 70 milliGRAM(s)/deciliter  melatonin 3 milliGRAM(s) Oral at bedtime PRN Insomnia  nitroglycerin     SubLingual 0.4 milliGRAM(s) SubLingual every 5 minutes PRN Chest Pain  ondansetron Injectable 4 milliGRAM(s) IV Push every 6 hours PRN Nausea and/or Vomiting  traMADol 25 milliGRAM(s) Oral every 6 hours PRN Moderate Pain (4 - 6)      ASSESSMENT / PLAN:  ----------------------------------------  78y/oF PMH AS, CAD s/p PCI, CABG (1999), HTN, DM, NSTEMI 10/2022 s/p [facility] with chronic total occlusion of mLAD with patent FILIPPO graft and no culprit lesion, presenting to ER with few days of intermittent radiating midsternal chest pain, admitted with stable angina     NSTEMI / Coronary artery disease  - Cardiac catheterization was without culprit lesion  - Continue on aspirin, clopidogrel, and atorvastatin  - On carvedilol, hydralazine and isosorbide    Aortic stenosis  - Cardiothoracic Surgery evaluation noted  - TAVR work up ongoing    Chronic kidney disease with acute kidney injury  - Monitoring renal function  - Status post cardiac catheterization on 1/16 and CT angiogram yesterday  - Nephrology evaluation noted  - On epoetin latisha for anemia    Hypothyroidism  - On levothyroxine    Diabetes  - Hemoglobin A1c was 6.6  - Insulin coverage  - Close monitoring of blood glucose levels    Gout  - On allopurinol DIANA ROGERS  ----------------------------------------  The patient was seen earlier at bedside. Patient with aortic stenosis. Offered no complaints. Denied chest pain or dyspnea.    Vital Signs Last 24 Hrs  T(C): 36.8 (19 Jan 2024 08:10), Max: 36.8 (18 Jan 2024 14:12)  T(F): 98.2 (19 Jan 2024 08:10), Max: 98.3 (18 Jan 2024 18:00)  HR: 65 (19 Jan 2024 08:10) (60 - 73)  BP: 94/55 (19 Jan 2024 08:10) (94/55 - 175/76)  BP(mean): --  RR: 18 (19 Jan 2024 08:10) (18 - 18)  SpO2: 99% (19 Jan 2024 08:10) (92% - 99%)    Parameters below as of 19 Jan 2024 08:10  Patient On (Oxygen Delivery Method): nasal cannula  O2 Flow (L/min): 2    CAPILLARY BLOOD GLUCOSE  POCT Blood Glucose.: 128 mg/dL (19 Jan 2024 08:17)  POCT Blood Glucose.: 119 mg/dL (18 Jan 2024 20:57)  POCT Blood Glucose.: 113 mg/dL (18 Jan 2024 17:45)  POCT Blood Glucose.: 131 mg/dL (18 Jan 2024 11:48)    PHYSICAL EXAMINATION:  ----------------------------------------  General appearance: No acute distress, Awake, Alert  HEENT: Normocephalic, Atraumatic, Conjunctiva clear, EOMI  Neck: Supple, No JVD, No tenderness  Lungs: Breath sound equal bilaterally, No wheezes, No rales  Cardiovascular: S1S2, Regular rhythm, Positive murmur  Abdomen: Soft, Nontender, Nondistended, No guarding/rebound, Positive bowel sounds  Extremities: No clubbing, No cyanosis, No edema, No calf tenderness  Neuro: Strength equal bilaterally, No tremors  Psychiatric: Appropriate mood, Normal affect    LABORATORY STUDIES:  ----------------------------------------             8.5    8.07  )-----------( 222      ( 19 Jan 2024 04:41 )             27.0     01-19    138  |  104  |  29.7<H>  ----------------------------<  137<H>  4.4   |  23.0  |  1.82<H>    Ca    9.0      19 Jan 2024 04:41    TPro  5.6<L>  /  Alb  3.3  /  TBili  0.2<L>  /  DBili  x   /  AST  31  /  ALT  26  /  AlkPhos  87  01-19    LIVER FUNCTIONS - ( 19 Jan 2024 04:41 )  Alb: 3.3 g/dL / Pro: 5.6 g/dL / ALK PHOS: 87 U/L / ALT: 26 U/L / AST: 31 U/L / GGT: x           Urinalysis Basic - ( 19 Jan 2024 04:41 )  Color: x / Appearance: x / SG: x / pH: x  Gluc: 137 mg/dL / Ketone: x  / Bili: x / Urobili: x   Blood: x / Protein: x / Nitrite: x   Leuk Esterase: x / RBC: x / WBC x   Sq Epi: x / Non Sq Epi: x / Bacteria: x    MEDICATIONS  (STANDING):  allopurinol 100 milliGRAM(s) Oral daily  aspirin enteric coated 81 milliGRAM(s) Oral daily  atorvastatin 80 milliGRAM(s) Oral at bedtime  carvedilol 6.25 milliGRAM(s) Oral every 12 hours  clopidogrel Tablet 75 milliGRAM(s) Oral daily  dextrose 5%. 1000 milliLiter(s) (100 mL/Hr) IV Continuous <Continuous>  dextrose 5%. 1000 milliLiter(s) (50 mL/Hr) IV Continuous <Continuous>  dextrose 50% Injectable 12.5 Gram(s) IV Push once  dextrose 50% Injectable 25 Gram(s) IV Push once  dextrose 50% Injectable 25 Gram(s) IV Push once  epoetin latisha (PROCRIT) Injectable 45471 Unit(s) SubCutaneous every 7 days  epoetin latisha-epbx (RETACRIT) Injectable 09759 Unit(s) SubCutaneous every 7 days  ergocalciferol 96034 Unit(s) Oral <User Schedule>  ezetimibe 10 milliGRAM(s) Oral at bedtime  folic acid 1 milliGRAM(s) Oral daily  glucagon  Injectable 1 milliGRAM(s) IntraMuscular once  heparin   Injectable 5000 Unit(s) SubCutaneous every 12 hours  hydrALAZINE 25 milliGRAM(s) Oral two times a day  insulin lispro (ADMELOG) corrective regimen sliding scale   SubCutaneous three times a day before meals  insulin lispro (ADMELOG) corrective regimen sliding scale   SubCutaneous at bedtime  isosorbide   mononitrate ER Tablet (IMDUR) 30 milliGRAM(s) Oral daily  levothyroxine 88 MICROGram(s) Oral daily  lidocaine   4% Patch 1 Patch Transdermal every 24 hours  pantoprazole    Tablet 40 milliGRAM(s) Oral before breakfast  silver sulfADIAZINE 1% Cream 1 Application(s) Topical three times a day  sodium chloride 0.9%. 1000 milliLiter(s) (82 mL/Hr) IV Continuous <Continuous>  triamterene 37.5 mG/hydrochlorothiazide 25 mG Tablet 1 Tablet(s) Oral daily    MEDICATIONS  (PRN):  acetaminophen     Tablet .. 650 milliGRAM(s) Oral every 6 hours PRN Temp greater or equal to 38C (100.4F), Mild Pain (1 - 3), Moderate Pain (4 - 6)  artificial  tears Solution 1 Drop(s) Both EYES two times a day PRN Dry Eyes  dextrose Oral Gel 15 Gram(s) Oral once PRN Blood Glucose LESS THAN 70 milliGRAM(s)/deciliter  melatonin 3 milliGRAM(s) Oral at bedtime PRN Insomnia  nitroglycerin     SubLingual 0.4 milliGRAM(s) SubLingual every 5 minutes PRN Chest Pain  ondansetron Injectable 4 milliGRAM(s) IV Push every 6 hours PRN Nausea and/or Vomiting  traMADol 25 milliGRAM(s) Oral every 6 hours PRN Moderate Pain (4 - 6)      ASSESSMENT / PLAN:  ----------------------------------------  78y/oF PMH AS, CAD s/p PCI, CABG (1999), HTN, DM, NSTEMI 10/2022 s/p [facility] with chronic total occlusion of mLAD with patent FILIPPO graft and no culprit lesion, presenting to ER with few days of intermittent radiating midsternal chest pain, admitted with stable angina     NSTEMI / Coronary artery disease  - Cardiac catheterization was without culprit lesion  - Continue on aspirin, clopidogrel, and atorvastatin  - On carvedilol, hydralazine and isosorbide    Aortic stenosis  - Cardiothoracic Surgery evaluation noted  - Communicated with Cardiothoracic [facility]  - TAVR planned for next month, to follow up on an outpatient basis to schedule    Chronic kidney disease with acute kidney injury  - Monitoring renal function  - Status post cardiac catheterization on 1/16 and CT angiogram yesterday  - Nephrology evaluation noted  - On epoetin latisha for anemia    Hypothyroidism  - On levothyroxine    Diabetes  - Hemoglobin A1c was 6.6  - Insulin coverage  - Close monitoring of blood glucose levels    Gout  - On allopurinol DIANA ROGERS  ----------------------------------------  The patient was seen earlier at bedside. Patient with aortic stenosis. Offered no complaints. Denied chest pain or dyspnea.    Vital Signs Last 24 Hrs  T(C): 36.8 (19 Jan 2024 08:10), Max: 36.8 (18 Jan 2024 14:12)  T(F): 98.2 (19 Jan 2024 08:10), Max: 98.3 (18 Jan 2024 18:00)  HR: 65 (19 Jan 2024 08:10) (60 - 73)  BP: 94/55 (19 Jan 2024 08:10) (94/55 - 175/76)  BP(mean): --  RR: 18 (19 Jan 2024 08:10) (18 - 18)  SpO2: 99% (19 Jan 2024 08:10) (92% - 99%)    Parameters below as of 19 Jan 2024 08:10  Patient On (Oxygen Delivery Method): nasal cannula  O2 Flow (L/min): 2    CAPILLARY BLOOD GLUCOSE  POCT Blood Glucose.: 128 mg/dL (19 Jan 2024 08:17)  POCT Blood Glucose.: 119 mg/dL (18 Jan 2024 20:57)  POCT Blood Glucose.: 113 mg/dL (18 Jan 2024 17:45)  POCT Blood Glucose.: 131 mg/dL (18 Jan 2024 11:48)    PHYSICAL EXAMINATION:  ----------------------------------------  General appearance: No acute distress, Awake, Alert  HEENT: Normocephalic, Atraumatic, Conjunctiva clear, EOMI  Neck: Supple, No JVD, No tenderness  Lungs: Breath sound equal bilaterally, No wheezes, No rales  Cardiovascular: S1S2, Regular rhythm, Positive murmur  Abdomen: Soft, Nontender, Nondistended, No guarding/rebound, Positive bowel sounds  Extremities: No clubbing, No cyanosis, No edema, No calf tenderness  Neuro: Strength equal bilaterally, No tremors  Psychiatric: Appropriate mood, Normal affect    LABORATORY STUDIES:  ----------------------------------------             8.5    8.07  )-----------( 222      ( 19 Jan 2024 04:41 )             27.0     01-19    138  |  104  |  29.7<H>  ----------------------------<  137<H>  4.4   |  23.0  |  1.82<H>    Ca    9.0      19 Jan 2024 04:41    TPro  5.6<L>  /  Alb  3.3  /  TBili  0.2<L>  /  DBili  x   /  AST  31  /  ALT  26  /  AlkPhos  87  01-19    LIVER FUNCTIONS - ( 19 Jan 2024 04:41 )  Alb: 3.3 g/dL / Pro: 5.6 g/dL / ALK PHOS: 87 U/L / ALT: 26 U/L / AST: 31 U/L / GGT: x           Urinalysis Basic - ( 19 Jan 2024 04:41 )  Color: x / Appearance: x / SG: x / pH: x  Gluc: 137 mg/dL / Ketone: x  / Bili: x / Urobili: x   Blood: x / Protein: x / Nitrite: x   Leuk Esterase: x / RBC: x / WBC x   Sq Epi: x / Non Sq Epi: x / Bacteria: x    MEDICATIONS  (STANDING):  allopurinol 100 milliGRAM(s) Oral daily  aspirin enteric coated 81 milliGRAM(s) Oral daily  atorvastatin 80 milliGRAM(s) Oral at bedtime  carvedilol 6.25 milliGRAM(s) Oral every 12 hours  clopidogrel Tablet 75 milliGRAM(s) Oral daily  dextrose 5%. 1000 milliLiter(s) (100 mL/Hr) IV Continuous <Continuous>  dextrose 5%. 1000 milliLiter(s) (50 mL/Hr) IV Continuous <Continuous>  dextrose 50% Injectable 12.5 Gram(s) IV Push once  dextrose 50% Injectable 25 Gram(s) IV Push once  dextrose 50% Injectable 25 Gram(s) IV Push once  epoetin latisha (PROCRIT) Injectable 63140 Unit(s) SubCutaneous every 7 days  epoetin latisha-epbx (RETACRIT) Injectable 39123 Unit(s) SubCutaneous every 7 days  ergocalciferol 68244 Unit(s) Oral <User Schedule>  ezetimibe 10 milliGRAM(s) Oral at bedtime  folic acid 1 milliGRAM(s) Oral daily  glucagon  Injectable 1 milliGRAM(s) IntraMuscular once  heparin   Injectable 5000 Unit(s) SubCutaneous every 12 hours  hydrALAZINE 25 milliGRAM(s) Oral two times a day  insulin lispro (ADMELOG) corrective regimen sliding scale   SubCutaneous three times a day before meals  insulin lispro (ADMELOG) corrective regimen sliding scale   SubCutaneous at bedtime  isosorbide   mononitrate ER Tablet (IMDUR) 30 milliGRAM(s) Oral daily  levothyroxine 88 MICROGram(s) Oral daily  lidocaine   4% Patch 1 Patch Transdermal every 24 hours  pantoprazole    Tablet 40 milliGRAM(s) Oral before breakfast  silver sulfADIAZINE 1% Cream 1 Application(s) Topical three times a day  sodium chloride 0.9%. 1000 milliLiter(s) (82 mL/Hr) IV Continuous <Continuous>  triamterene 37.5 mG/hydrochlorothiazide 25 mG Tablet 1 Tablet(s) Oral daily    MEDICATIONS  (PRN):  acetaminophen     Tablet .. 650 milliGRAM(s) Oral every 6 hours PRN Temp greater or equal to 38C (100.4F), Mild Pain (1 - 3), Moderate Pain (4 - 6)  artificial  tears Solution 1 Drop(s) Both EYES two times a day PRN Dry Eyes  dextrose Oral Gel 15 Gram(s) Oral once PRN Blood Glucose LESS THAN 70 milliGRAM(s)/deciliter  melatonin 3 milliGRAM(s) Oral at bedtime PRN Insomnia  nitroglycerin     SubLingual 0.4 milliGRAM(s) SubLingual every 5 minutes PRN Chest Pain  ondansetron Injectable 4 milliGRAM(s) IV Push every 6 hours PRN Nausea and/or Vomiting  traMADol 25 milliGRAM(s) Oral every 6 hours PRN Moderate Pain (4 - 6)      ASSESSMENT / PLAN:  ----------------------------------------  78y/oF PMH AS, CAD s/p PCI, CABG (1999), HTN, DM, NSTEMI 10/2022 s/p [facility] with chronic total occlusion of mLAD with patent FILIPPO graft and no culprit lesion, presenting to ER with few days of intermittent radiating midsternal chest pain, admitted with stable angina     NSTEMI / Coronary artery disease  - Cardiac catheterization was without culprit lesion  - Continue on aspirin, clopidogrel, and atorvastatin  - On carvedilol, hydralazine and isosorbide    Severe aortic stenosis  - Cardiothoracic Surgery evaluation noted  - Communicated with Cardiothoracic Surgery  - TAVR planned for next month, to follow up on an outpatient basis to schedule    Chronic kidney disease III with acute kidney injury  - Monitoring renal function  - Status post cardiac catheterization on 1/16 and CT angiogram yesterday  - Nephrology evaluation noted  - On epoetin latisha for anemia    Hypothyroidism  - On levothyroxine    Diabetes  - Hemoglobin A1c was 6.6  - Insulin coverage  - Close monitoring of blood glucose levels    Gout  - On allopurinol

## 2024-01-22 PROBLEM — I35.1 MODERATE AORTIC REGURGITATION: Status: ACTIVE | Noted: 2022-11-10

## 2024-01-23 ENCOUNTER — APPOINTMENT (OUTPATIENT)
Dept: CARDIOTHORACIC SURGERY | Facility: CLINIC | Age: 79
End: 2024-01-23
Payer: MEDICARE

## 2024-01-23 DIAGNOSIS — I35.1 NONRHEUMATIC AORTIC (VALVE) INSUFFICIENCY: ICD-10-CM

## 2024-01-24 ENCOUNTER — APPOINTMENT (OUTPATIENT)
Dept: CARDIOLOGY | Facility: CLINIC | Age: 79
End: 2024-01-24
Payer: MEDICARE

## 2024-01-24 VITALS
HEART RATE: 69 BPM | WEIGHT: 116.84 LBS | OXYGEN SATURATION: 98 % | HEIGHT: 58 IN | BODY MASS INDEX: 24.53 KG/M2 | DIASTOLIC BLOOD PRESSURE: 84 MMHG | SYSTOLIC BLOOD PRESSURE: 134 MMHG

## 2024-01-24 DIAGNOSIS — Z09 ENCOUNTER FOR FOLLOW-UP EXAMINATION AFTER COMPLETED TREATMENT FOR CONDITIONS OTHER THAN MALIGNANT NEOPLASM: ICD-10-CM

## 2024-01-24 DIAGNOSIS — E78.5 HYPERLIPIDEMIA, UNSPECIFIED: ICD-10-CM

## 2024-01-24 PROCEDURE — 93000 ELECTROCARDIOGRAM COMPLETE: CPT

## 2024-01-24 PROCEDURE — 99215 OFFICE O/P EST HI 40 MIN: CPT

## 2024-01-24 RX ORDER — HYDRALAZINE HYDROCHLORIDE 25 MG/1
25 TABLET ORAL TWICE DAILY
Refills: 0 | Status: ACTIVE | COMMUNITY

## 2024-01-24 RX ORDER — ISOSORBIDE MONONITRATE 30 MG
30 TABLET, EXTENDED RELEASE 24 HR ORAL DAILY
Refills: 0 | Status: ACTIVE | COMMUNITY

## 2024-01-24 RX ORDER — LEVOTHYROXINE SODIUM 0.09 MG/1
88 TABLET ORAL DAILY
Refills: 0 | Status: ACTIVE | COMMUNITY
Start: 2022-09-19

## 2024-01-31 ENCOUNTER — APPOINTMENT (OUTPATIENT)
Dept: CARDIOTHORACIC SURGERY | Facility: CLINIC | Age: 79
End: 2024-01-31
Payer: MEDICARE

## 2024-01-31 VITALS
WEIGHT: 116 LBS | BODY MASS INDEX: 24.35 KG/M2 | RESPIRATION RATE: 16 BRPM | HEIGHT: 58 IN | HEART RATE: 76 BPM | SYSTOLIC BLOOD PRESSURE: 116 MMHG | OXYGEN SATURATION: 99 % | DIASTOLIC BLOOD PRESSURE: 63 MMHG | TEMPERATURE: 98 F

## 2024-01-31 DIAGNOSIS — Z82.49 FAMILY HISTORY OF ISCHEMIC HEART DISEASE AND OTHER DISEASES OF THE CIRCULATORY SYSTEM: ICD-10-CM

## 2024-01-31 DIAGNOSIS — Z87.891 PERSONAL HISTORY OF NICOTINE DEPENDENCE: ICD-10-CM

## 2024-01-31 DIAGNOSIS — N18.9 CHRONIC KIDNEY DISEASE, UNSPECIFIED: ICD-10-CM

## 2024-01-31 DIAGNOSIS — Z95.1 PRESENCE OF AORTOCORONARY BYPASS GRAFT: ICD-10-CM

## 2024-01-31 DIAGNOSIS — I35.0 NONRHEUMATIC AORTIC (VALVE) STENOSIS: ICD-10-CM

## 2024-01-31 DIAGNOSIS — I77.9 DISORDER OF ARTERIES AND ARTERIOLES, UNSPECIFIED: ICD-10-CM

## 2024-01-31 DIAGNOSIS — Z83.3 FAMILY HISTORY OF DIABETES MELLITUS: ICD-10-CM

## 2024-01-31 DIAGNOSIS — R91.1 SOLITARY PULMONARY NODULE: ICD-10-CM

## 2024-01-31 DIAGNOSIS — I35.1 NONRHEUMATIC AORTIC (VALVE) INSUFFICIENCY: ICD-10-CM

## 2024-01-31 DIAGNOSIS — Z80.42 FAMILY HISTORY OF MALIGNANT NEOPLASM OF PROSTATE: ICD-10-CM

## 2024-01-31 DIAGNOSIS — Z82.3 FAMILY HISTORY OF STROKE: ICD-10-CM

## 2024-01-31 PROCEDURE — 99204 OFFICE O/P NEW MOD 45 MIN: CPT

## 2024-01-31 RX ORDER — CARVEDILOL 6.25 MG/1
6.25 TABLET, FILM COATED ORAL
Refills: 0 | Status: ACTIVE | COMMUNITY

## 2024-01-31 RX ORDER — CARVEDILOL 3.12 MG/1
3.12 TABLET, FILM COATED ORAL
Qty: 60 | Refills: 0 | Status: COMPLETED | COMMUNITY
Start: 2022-09-19 | End: 2024-01-31

## 2024-02-01 PROBLEM — R91.1 PULMONARY NODULE: Status: ACTIVE | Noted: 2024-02-01

## 2024-02-01 PROBLEM — I35.1 SEVERE AORTIC REGURGITATION: Status: RESOLVED | Noted: 2024-01-31 | Resolved: 2024-02-01

## 2024-02-01 PROBLEM — I35.0 SEVERE AORTIC STENOSIS: Status: ACTIVE | Noted: 2024-02-01

## 2024-02-01 PROBLEM — N18.9 CHRONIC KIDNEY DISEASE: Status: ACTIVE | Noted: 2024-02-01

## 2024-02-01 NOTE — DATA REVIEWED
[FreeTextEntry1] : CT ANGIO CHEST TAVR ROSI WAWI CT ANGIO AB PEL TAVR ROSI WAWIC   PROCEDURE DATE:  01/18/2024   INTERPRETATION:  HISTORY: Aortic Stenosis, preop TAVR planning   ] TECHNIQUE: ECG cardiac gated CT angiogram of the chest, abdomen and  pelvis with 70 cc Visipaque intravenous contrast. 3d Reformats and  measurements performed on a [employer] workstation.  COMPARISON: none  FINDINGS:  Calcified aorta: severe  Aortic Valve Calcium score: 353  AORTIC MEASUREMENTS: (mm) Aortic annulus diameter (systole; mm): 17 x 21 Aortic annulus perimeter (systole; mm): 61 Distance of left coronary ostium to aortic annulus: 7 Distance of right coronary ostium to aortic annulus: 9 Sinus of Valsalva diameter (right coronary, diastole; mm): 21 Sinus of Valsalva diameter (left coronary, diastole; mm): 24 Sinus of Valsalva diameter (non coronary, diastole; mm): 22 Maximum ascending aorta diameter at 40 mm above annulus (diastole; mm): 28 Aortic root angulation (degrees): 41 Minimum abdominal aortic diameter: 9 Perpendicular abdominal aortic diameter at minimun: 11  ACCESS VESSEL MEASUREMENTS: Left Femoral:   Minimum Lumen Diameter (mm): 3     Diameter perpendicular to MLD (mm): 7     Tortuosity: No     Calcification: 90 to 180 degrees  Left External Iliac: Minimum Lumen Diameter (mm): 4      Diameter perpendicular to MLD (mm): 8     Tortuosity: moderate     Calcification:  180 to 270 degrees  Left Common Iliac:Minimum Lumen Diameter (mm): 3     Diameter perpendicular to MLD (mm): 9     Tortuosity: moderate     Calcification:  270 to 360 degrees  Right Femoral: Minimum Lumen Diameter (mm): 5     Diameter perpendicular to MLD (mm): 7     Tortuosity: No     Calcification:  180 to 270 degrees  Right External Iliac:Minimum Lumen Diameter (mm): 5     Diameter perpendicular to MLD (mm): 7     Tortuosity: mild     Calcification:  90 to 180 degrees  Right Common Iliac:   Minimum Lumen Diameter (mm): 6     Diameter perpendicular to MLD (mm): 9     Tortuosity: moderate     Calcification:  90 to 180 degrees   NON-VASCULAR FINDINGS:  Thyroid gland: unremarkable  Axillary lymph nodes: No significant.  Mediastinal lymph nodes: No significant.  Hilar Adenopathy: No significant.  Heart: Normal size. Aortic valve is thickened and calcified. Left atrial  appendage is patent. Normal pulmonary venous anatomy.  Coronary artery: This study was not tailored to evaluate the coronary  arteries. Patient has a LIMA bypass. Calcified plaque in the right  coronary artery is seen with multifocal mild narrowing. Left main  coronary artery is normal. There is heterogeneous plaque in the proximal  LAD with moderate narrowing. Multiple stents are seen in the mid LAD,  grossly patent. There is intervening calcified plaque which may result in  high-grade stenosis. Correlate with recent cardiac catheterization. There  is a calcified stenosis of the circumflex artery extending into the  obtuse marginal branch, which may be severe.  Pericardial effusion: No significant.  Lungs: Mild centrilobular emphysema. A peripheral right middle lobe  nodule is seen measuring 4 mm. Scarring is seen in the right middle lobe  medially inferiorly. There is scarring or atelectasis in the lingula.  There is a hiatal hernia.  Liver: normal in size and configuration without focal mass. Portal vein  is patent.  Gallbladder: Contains high density material, compatible with  sludge/stones/vicarious excretion of contrast.  Spleen:   normal in size.  Pancreas:   unremarkable.  Adrenal: No masses are seen.  Kidneys: enhance symmetrically without mass or hydronephrosis.  No paraaortic or pelvic adenopathy.  Small bowel: No obstruction.  Colon: No wall thickening or pericolonic inflammatory changes. There is  diverticulosis without diverticulitis.   Urinary bladder: unremarkable.  There are postprocedure changes in the right groin compatible with recent  catheterization, small hemorrhage. No large hematoma. No pseudoaneurysm.  Osseous structures: Multilevel degenerative changes. Sternotomy wires.   IMPRESSION:  Aortic measurements as described above  Severely calcified iliac arteries.  Gallbladder sludge/stones/vicarious excretion of contrast.  4 mm right lower lobe pulmonary nodule.  Postprocedural changes right groin.  --- End of Report --- RICHARD GARCIA MD; Attending Interventional Radiologist This document has been electronically signed. Jan 19 2024  1:33PM       Cardiac Catherization performed at [facility] on 1/16/24: Diagnostic Conclusions:  Access  Right femoral artery:  Vascular access was obtained using modified seldinger technique.    1. Patent left main and left circumflex coronary arteries with mild disease. 2. LAD occluded at distal aspect of mid LAD stent with competitive flow from LIMA appreciated. Sluggish filling (FABIAN II) of diffusely diseased dominant diagonal branch, with a 70% mid diagonal branch stenosis appreciated. 3. Normal caliber, patent RCA with an eccentric 50% prox RCA stenosis and widely patent RPL / RPDA branches, unchanged from prior. No competitive flow appreciated suggesting occluded gastroepiploic graft. 4. Patent LIMA graft filling of mid and distal LAD.   5. Competitive flow in RI consistent with patent free radial graft, unchanged from prior cath. Recommendations:   No significant change from last cardiac cath. No intervention indicated. Continue aggressive secondary prevention of CAD.    Transesophageal Echocardiogram performed at [facility] on 1/16/24: CONCLUSIONS:    1. Left ventricular systolic function is normal with an ejection fraction visually estimated at 60 to 65 %.  2. Normal right ventricular cavity size and normal systolic function.  3. No evidence of left atrial or left atrial appendage thrombus.  4. Severe aortic stenosis. Transgastric doppler, Vmax 4.09 m/s, Peak/Mean Grad 67/33 mmHg, DI 0.20.  5. Moderate aortic regurgitation.  6. No evidence of a patent foramen ovale with no shunt detected by color flow Doppler.  7. No pericardial effusion seen.  8. Compared to the transthoracic echocardiogram performed on 1/13/2024, AS is likely severe.. Findings were discussed with patient and Cardiology rounding attending on 1/16/2024.     US DPLX CAROTIDS COMPL BI   ORDERED BY: COREY OHARA  PROCEDURE DATE:  01/14/2024   INTERPRETATION:  CLINICAL INFORMATION: Left neck and shoulder pain COMPARISON: None available.  TECHNIQUE: Grayscale, color and spectral Doppler examination of both  carotid arteries was performed.  FINDINGS: Minimal to mild intimal thickening of the right common carotid artery.  Moderate mixed plaque at the right carotid bulb extending into the  proximal right internal and external carotid artery. Additional small  calcific plaques in the proximal to mid right internal carotid artery.  Moderate intimal thickening of the left common carotid artery with  scattered small calcific plaques. Moderate mixed plaque at the left  carotid bulb extending into the proximal left internal carotid artery.  Mixed plaque in the mid left internal carotid artery.  Elevated velocities bilaterally.  Peak systolic velocities are as follows:  RIGHT: PROX CCA = 194 cm/s DIST CCA = 120 cm/s PROX ICA = 186 cm/s DIST ICA = 139 cm/s ECA = 138 cm/s  LEFT: PROX CCA = 144 cm/s DIST CCA = 122 cm/s PROX ICA = 214 cm/s DIST ICA = 198 cm/s ECA = 162 cm/s  Antegrade flow is noted within both vertebral arteries.  IMPRESSION: Bilateral mixed plaque with elevated velocities in the  proximal internal carotid arteries bilaterally consistent with at least  50-69% stenosis in the bilateral proximal internal carotid arteries.  Measurement of carotid stenosis is based on velocity parameters that  correlate the residual internal carotid diameter with that of the more  distal vessel in accordance with a method such as the [place] American  Symptomatic Carotid Endarterectomy Trial (NASCET).  --- End of Report ---  MARCELA HOROWITZ MD; Attending Radiologist This document has been electronically signed. Jan 14 2024  3:10PM         Transthoracic Echocardiogram performed on 1/13/24: CONCLUSIONS:    1. Left ventricular cavity is normal. Left ventricular wall thickness is normal. Left ventricular systolic function is normal with an ejection fraction visually estimated at 60 to 65 %.  2. There is mild (grade 1) left ventricular diastolic dysfunction.  3. Normal right ventricular cavity size and normal systolic function.  4. The left atrium is mild-moderately dilated.  5. The right atrium is normal in size.  6. There is moderate calcification of the mitral valve annulus.  7. Thickened mitral valve leaflets. Mild mitral valve leaflet calcification.  8. Trace mitral regurgitation.  9. Calcified mobile structure on tip of anterior mitral valve leaflet may represent ahealed vegetation. 10. Mild to moderate aortic regurgitation. 11. Mild tricuspid regurgitation. 12. Trileaflet aortic valve. moderate to severe aortic stenosis. 13. Estimated pulmonary artery systolic pressure is 41 mmHg, consistent with mild pulmonary hypertension.

## 2024-02-01 NOTE — PHYSICAL EXAM
[Sclera] : the sclera and conjunctiva were normal [Neck Appearance] : the appearance of the neck was normal [] : no respiratory distress [Respiration, Rhythm And Depth] : normal respiratory rhythm and effort [Auscultation Breath Sounds / Voice Sounds] : lungs were clear to auscultation bilaterally [Heart Rate And Rhythm] : heart rate was normal and rhythm regular [Heart Sounds] : normal S1 and S2 [Examination Of The Chest] : the chest was normal in appearance [Abdomen Tenderness] : non-tender [Abnormal Walk] : normal gait [Skin Color & Pigmentation] : normal skin color and pigmentation [Sensation] : the sensory exam was normal to light touch and pinprick [Motor Exam] : the motor exam was normal [Oriented To Time, Place, And Person] : oriented to person, place, and time [Impaired Insight] : insight and judgment were intact [Affect] : the affect was normal [Mood] : the mood was normal [FreeTextEntry1] : 4/6 systolic murmur;

## 2024-02-01 NOTE — HISTORY OF PRESENT ILLNESS
[FreeTextEntry1] : Ms. DIANA ROGERS is a 78-year-old female who presents today for a follow up appointment, post hospitalization, regarding aortic stenosis and possible candidacy for Transcatheter Aortic Valve Replacement.   Originally, she presented to [facility] ED with left arm pain. On presentation, H/H (9.6/29.4), BUN/Cr (45.7/1.84), Trop (52). Echocardiogram noted normal left ventricular systolic function with an ejection fraction of 60 to 65%, mild to moderate aortic regurgitation, moderate to severe aortic stenosis. Carotid doppler noted bilateral mixed plaque with elevated velocities in the proximal internal carotid arteries bilaterally consistent with at least 50-69% stenosis. Transesophageal echocardiogram noted severe aortic stenosis, moderate aortic regurgitation, no pericardial effusion. Renal ultrasound noted medical renal disease. She obtained a TAVR Protocol CTA on 1/18/24 and was discharged to home on 1/19/24.  Past medical history includes AS, CAD s/p PCI, CABG (1999), HTN, DM, CKD, NSTEMI in 10/2022 s/p LHC showing chronic total occlusion of mLAD with patent FILIPPO graft and no culprit lesion.  She ambulates independently. She is independent with activities of daily living. She lives with her .  [place] Heart Association Class II  Today she reports with her son, who helps with the history taking. She has been experiencing ongoing fatigue and left arm pain, numbness and tingling. Patient denies chest pain, palpitations, dizziness, syncope, lower extremity edema, shortness of breath, weight loss/weight gain.  PCP: TESS VILLAGOMEZ

## 2024-02-01 NOTE — ASSESSMENT
[FreeTextEntry1] : Ms. DIANA ROGERS is a 78-year-old female who presents today for a follow up appointment, post hospitalization, regarding aortic stenosis and possible candidacy for Transcatheter Aortic Valve Replacement.   Originally, she presented to [facility] ED with left arm pain. On presentation, H/H (9.6/29.4), BUN/Cr (45.7/1.84), Trop (52). Echocardiogram noted normal left ventricular systolic function with an ejection fraction of 60 to 65%, mild to moderate aortic regurgitation, moderate to severe aortic stenosis. Carotid doppler noted bilateral mixed plaque with elevated velocities in the proximal internal carotid arteries bilaterally consistent with at least 50-69% stenosis. Transesophageal echocardiogram noted severe aortic stenosis, moderate aortic regurgitation, no pericardial effusion. Renal ultrasound noted medical renal disease. She obtained a TAVR Protocol CTA on 1/18/24 and was discharged to home on 1/19/24.  I have independently reviewed the medical records and imaging at the time of this office consultation, and discussed the following interpretations with Ms. Rogers and her son. After review of the imaging, Ms. DIANA ROGERS has severe aortic stenosis with peak transaortic velocity of 4.09m/s, peak transaortic gradient of 66.9 mmHg, mean transaortic gradient 33.0mmHg, and aortic valve area estimated at 0.50cm2. However, given her varying comorbidities (including carotid disease, kidney disease, and coronary artery disease) along with her body habitus and root anatomy with concern for coronary obstruction, final surgical planning will be deferred until after presented to the Structural Heart Team next week.  Risks, benefits and alternatives to Transcatheter Aortic Valve Replacement were discussed with the patient in detail. Risks discussed included, but not limited to, infection, bleeding, myocardial infarction, cerebrovascular accident, renal failure, vascular injury requiring intervention, cardiac rupture and death. In addition, a roughly 5-10% risk of significant heart block requiring permanent pacemaker implantation was highlighted. The patient fully understood and wishes to proceed with consideration for Transcatheter Aortic Valve Replacement. All questions were answered to the patient's understanding and satisfaction.  Of note, she is noted to have carotid artery disease and multivessel coronary artery disease, which we discussed that the transcatheter aortic valve replacement will not address.   Will present patient to the Structural Heart Team at meeting next week and will contact patient after for final plans.  PREOPERATIVE CHECKLIST: (Discussed with patient)  - Confirm allergies, including latex: NONE - Confirm pacemaker: NONE - Anticoagulation/antiplatelets noted and will be discontinued/continued: ASPIRIN & PLAVIX (CONTINUE) - SGLT-2 Inhibitors (discontinued 3 days prior to surgery) or GLP-1 (discontinued 1 week prior to surgery): JARDIANCE (D/C 3 DAYS) - All other supplements, NSAIDs and fish oil were discussed and will be held one week before surgery  PLAN: - Follow up with nephrology - Structural Heart Team will present patient's case at meeting next week and contact her following meeting regarding TAVR candidacy - Plan to discuss incidental CTA finding of pulmonary nodule at next visit.   I, Dr. Hansen, personally performed the evaluation and management (E/M) services for this established patient who presents today with (a) new problem(s)/exacerbation of (an) existing condition(s).  That E/M includes conducting the examination, assessing all new/exacerbated conditions, and establishing a new plan of care.  Today, my ACP, Dena Posadas NP, was here to observe my evaluation and management services for this new problem/exacerbated condition to be followed going forward.

## 2024-02-01 NOTE — REVIEW OF SYSTEMS
[Feeling Poorly] : feeling poorly [Feeling Tired] : feeling tired [Negative] : Heme/Lymph [As Noted in HPI] : as noted in HPI [Fever] : no fever [Chills] : no chills [Chest Pain] : no chest pain [Palpitations] : no palpitations [Lower Ext Edema] : no lower extremity edema [Shortness Of Breath] : no shortness of breath [Wheezing] : no wheezing [Cough] : no cough [SOB on Exertion] : no shortness of breath during exertion

## 2024-02-08 ENCOUNTER — NON-APPOINTMENT (OUTPATIENT)
Age: 79
End: 2024-02-08

## 2024-04-10 RX ORDER — LEVOTHYROXINE SODIUM 125 MCG
1 TABLET ORAL
Qty: 0 | Refills: 0 | DISCHARGE

## 2024-04-10 RX ORDER — ERGOCALCIFEROL 1.25 MG/1
1 CAPSULE ORAL
Refills: 0 | DISCHARGE

## 2024-04-10 RX ORDER — TRIAMTERENE/HYDROCHLOROTHIAZID 75 MG-50MG
1 TABLET ORAL
Refills: 0 | DISCHARGE

## 2024-04-10 RX ORDER — EZETIMIBE 10 MG/1
1 TABLET ORAL
Refills: 0 | DISCHARGE

## 2024-04-10 RX ORDER — ASPIRIN/CALCIUM CARB/MAGNESIUM 324 MG
1 TABLET ORAL
Qty: 0 | Refills: 0 | DISCHARGE

## 2024-04-10 RX ORDER — EMPAGLIFLOZIN 10 MG/1
1 TABLET, FILM COATED ORAL
Refills: 0 | DISCHARGE

## 2024-04-10 RX ORDER — ALLOPURINOL 300 MG
1 TABLET ORAL
Refills: 0 | DISCHARGE

## 2024-04-10 RX ORDER — ATORVASTATIN CALCIUM 80 MG/1
1 TABLET, FILM COATED ORAL
Qty: 0 | Refills: 0 | DISCHARGE

## 2024-04-10 RX ORDER — CARVEDILOL PHOSPHATE 80 MG/1
1 CAPSULE, EXTENDED RELEASE ORAL
Refills: 0 | DISCHARGE

## 2024-04-10 RX ORDER — LEVOTHYROXINE SODIUM 125 MCG
1 TABLET ORAL
Refills: 0 | DISCHARGE

## 2024-04-10 RX ORDER — TRIAMTERENE/HYDROCHLOROTHIAZID 75 MG-50MG
1 TABLET ORAL
Qty: 0 | Refills: 0 | DISCHARGE

## 2024-04-10 RX ORDER — OMEPRAZOLE 10 MG/1
1 CAPSULE, DELAYED RELEASE ORAL
Qty: 0 | Refills: 0 | DISCHARGE

## 2024-04-10 RX ORDER — CARVEDILOL PHOSPHATE 80 MG/1
1 CAPSULE, EXTENDED RELEASE ORAL
Qty: 0 | Refills: 0 | DISCHARGE

## 2024-06-27 ENCOUNTER — NON-APPOINTMENT (OUTPATIENT)
Age: 79
End: 2024-06-27

## 2024-06-27 ENCOUNTER — APPOINTMENT (OUTPATIENT)
Dept: CARDIOLOGY | Facility: CLINIC | Age: 79
End: 2024-06-27
Payer: MEDICARE

## 2024-06-27 VITALS
HEIGHT: 55 IN | DIASTOLIC BLOOD PRESSURE: 52 MMHG | SYSTOLIC BLOOD PRESSURE: 134 MMHG | WEIGHT: 123 LBS | BODY MASS INDEX: 28.46 KG/M2 | OXYGEN SATURATION: 95 % | HEART RATE: 57 BPM

## 2024-06-27 DIAGNOSIS — I35.0 NONRHEUMATIC AORTIC (VALVE) STENOSIS: ICD-10-CM

## 2024-06-27 DIAGNOSIS — I25.10 ATHEROSCLEROTIC HEART DISEASE OF NATIVE CORONARY ARTERY W/OUT ANGINA PECTORIS: ICD-10-CM

## 2024-06-27 DIAGNOSIS — I10 ESSENTIAL (PRIMARY) HYPERTENSION: ICD-10-CM

## 2024-06-27 PROBLEM — E03.9 HYPOTHYROIDISM, UNSPECIFIED: Chronic | Status: ACTIVE | Noted: 2022-10-26

## 2024-06-27 PROBLEM — E11.9 TYPE 2 DIABETES MELLITUS WITHOUT COMPLICATIONS: Chronic | Status: ACTIVE | Noted: 2022-10-25

## 2024-06-27 PROBLEM — E78.5 HYPERLIPIDEMIA, UNSPECIFIED: Chronic | Status: ACTIVE | Noted: 2022-10-26

## 2024-06-27 PROBLEM — N18.9 CHRONIC KIDNEY DISEASE, UNSPECIFIED: Chronic | Status: ACTIVE | Noted: 2024-01-13

## 2024-06-27 PROCEDURE — 99214 OFFICE O/P EST MOD 30 MIN: CPT

## 2024-06-27 PROCEDURE — 99204 OFFICE O/P NEW MOD 45 MIN: CPT

## 2024-06-27 PROCEDURE — G2211 COMPLEX E/M VISIT ADD ON: CPT

## 2024-06-27 PROCEDURE — 93000 ELECTROCARDIOGRAM COMPLETE: CPT

## 2024-08-27 NOTE — PROGRESS NOTE ADULT - ASSESSMENT
Admission Reconciliation is Not Complete  Discharge Reconciliation is Not Complete 77F with PMHX CAD s/p CABG/PCI, HTN, HLD, Hypothyroidism presents to [facility] ER c/o lower/middle back pain radiating to her chest admitted for chest pain and NSTEMI r/o ACS.    #Chest Pain  - 2/2 nstemi  - s/p cath- no intervention however medical mgmt  - cardio consult appreciated  - aspirin coreg plavix  - s/p heparin drip  - echo 10/2022 lvef 60% aortic stenosis    #HTN-Essential  - monitor blood pressure  - coreg     #HLD  - statin    #Hypothyroidism  - Levothyroxine    #GERD  - Omeprazole     plan of care d/w patient daughter bedside   Admission Reconciliation is Completed  Discharge Reconciliation is Not Complete Admission Reconciliation is Completed  Discharge Reconciliation is Completed

## 2024-10-11 NOTE — PROGRESS NOTE ADULT - PROBLEM SELECTOR PLAN 4
DALILA results reviewed.   Will obtain CT TAVR protocol & d/w Renal any rec's for pre-medications.  NPO after midnight  Please institute IV fluids 1 hour prior to CTA and for 6 hours post CTA for kidney protection per nephrology  Continue Tele monitoring. New  Treatment as above

## 2024-12-09 ENCOUNTER — NON-APPOINTMENT (OUTPATIENT)
Age: 79
End: 2024-12-09

## 2024-12-10 ENCOUNTER — NON-APPOINTMENT (OUTPATIENT)
Age: 79
End: 2024-12-10

## 2024-12-12 ENCOUNTER — APPOINTMENT (OUTPATIENT)
Dept: CARDIOLOGY | Facility: CLINIC | Age: 79
End: 2024-12-12
Payer: MEDICARE

## 2024-12-12 ENCOUNTER — NON-APPOINTMENT (OUTPATIENT)
Age: 79
End: 2024-12-12

## 2024-12-12 VITALS
SYSTOLIC BLOOD PRESSURE: 160 MMHG | WEIGHT: 132 LBS | HEIGHT: 55 IN | BODY MASS INDEX: 30.55 KG/M2 | HEART RATE: 64 BPM | DIASTOLIC BLOOD PRESSURE: 78 MMHG | OXYGEN SATURATION: 99 %

## 2024-12-12 DIAGNOSIS — I25.10 ATHEROSCLEROTIC HEART DISEASE OF NATIVE CORONARY ARTERY W/OUT ANGINA PECTORIS: ICD-10-CM

## 2024-12-12 DIAGNOSIS — I77.9 DISORDER OF ARTERIES AND ARTERIOLES, UNSPECIFIED: ICD-10-CM

## 2024-12-12 PROCEDURE — 99215 OFFICE O/P EST HI 40 MIN: CPT

## 2024-12-12 PROCEDURE — 93000 ELECTROCARDIOGRAM COMPLETE: CPT

## 2024-12-12 PROCEDURE — G2211 COMPLEX E/M VISIT ADD ON: CPT

## 2024-12-12 RX ORDER — GLIPIZIDE 5 MG/1
5 TABLET ORAL DAILY
Refills: 0 | Status: ACTIVE | COMMUNITY

## 2024-12-19 NOTE — H&P PST ADULT - HISTORY OF PRESENT ILLNESS
HPI: This is a 79 year old female with a PMHx of CAD s/p CABG/PCI in 1999 at [facility], HTN, DM (A1C 6.6), HLD, former smoker (quit 1998) who presents for a LHC. Patient had previously presented to [facility] ED 10/26/22 with chest pain that radiated to her neck. She was admitted with an NSTEMI, underwent a LHC which showed a  of mLAD, patent LIMA, no significant RCA disease, and patent graft to high level vessel seen via collateral from mLAD. No culprit lesion for NSTEMI, At the time, LHC also demonstrated a peak to peak AV gradient of 10mmHg, no severe AS shown. ECHO showed EF 60-65%, basal inferior akinesis, trace MR, mild to moderate MI, moderate to severe AS DI 0.32. Patient was then admitted to [facility] Jan 2024 with chest pain, +NSTEMI, with no significant changes from prior cath. Repeat TTE DALILA with LVEF 60-65% severe AS. TAVR work up initiated, and patient was to follow with Dr. Hansen. In June 2024, patient determined not to be a TAVR candidate due to small root anatomy and risk for coronary obstruction. Patient planned for medical management of AS. Patient now reporting frequent symptoms of chest pressure with mild to moderate activity.   ?  ?    Symptoms:        Angina (Class):        Ischemic Symptoms:     Heart Failure:        Systolic/Diastolic/Combined:        NYHA Class (within 2 weeks):     Assessment of LVEF (Must be within 6 months):       EF:        Assessed by:        Date:     Prior Cardiac Interventions:       PCI's (Date, Stents, Vessels):        CABG (Date, Grafts):     Noninvasive Testing:   Stress Test: Date:        Protocol:        Duration of Exercise:        Symptoms:        EKG Changes:        DTS:        Myocardial Imaging:        Risk Assessment (Low, Medium, High):     Echo (Date, Findings):     Antianginal Therapies:        Beta Blockers:         Calcium Channel Blockers:        Long Acting Nitrates:        Ranexa:     Risk Assessments:  ASA:  Mallampati:  GFR:   Cr:  BRA:    Associated Risk Factors:        Cerebrovascular Disease: N/A       Chronic Lung Disease: N/A       Peripheral Arterial Disease: N/A       Chronic Kidney Disease (if yes, what is GFR): N/A       Uncontrolled Diabetes (if yes, what is HgbA1C or FBS): N/A       Poorly Controlled Hypertension (if yes, what is SBP): N/A       Morbid Obesity (if yes, what is BMI): N/A       History of Recent Ventricular Arrhythmia: N/A       Inability to Ambulate Safely: N/A       Need for Therapeutic Anticoagulation: N/A       Antiplatelet or Contrast Allergy: N/A    Impression:    Plan:    -plan for *** via ***  -patient seen and examined  -confirmed appropriate NPO duration  -ECG and Labs reviewed  -Aspirin 81mg po pre-cath  -procedure discussed with patient; risks and benefits explained, questions answered  -consent obtained by attending IC   HPI: This is a 79 year old female with a PMHx of CAD s/p CABG/PCI in 1999 at [facility], HTN, DM (A1C 6.6), HLD, former smoker (quit 1998) who presents for a C. Patient had previously presented to [facility] ED 10/26/22 with chest pain that radiated to her neck. She was admitted with an NSTEMI, underwent a LHC which showed a  of mLAD, patent LIMA, no significant RCA disease, and patent graft to high level vessel seen via collateral from mLAD. No culprit lesion for NSTEMI, At the time, LHC also demonstrated a peak to peak AV gradient of 10mmHg, no severe AS shown. ECHO showed EF 60-65%, basal inferior akinesis, trace MR, mild to moderate MI, moderate to severe AS DI 0.32. Patient was then admitted to [facility] Jan 2024 with chest pain, +NSTEMI, with no significant changes from prior cath. Repeat TTE with LVEF 60-65% severe AS. TAVR work up initiated, and patient was to follow with Dr. Hansen. In June 2024, patient determined not to be a TAVR candidate due to small root anatomy and risk for coronary obstruction. Patient planned for medical management of AS. Patient now reporting frequent symptoms of chest pressure with mild to moderate activity.   ?  ?    Symptoms:        Angina (Class):        Ischemic Symptoms:     Heart Failure:        Systolic/Diastolic/Combined: n/a       NYHA Class (within 2 weeks):     Assessment of LVEF (Must be within 6 months):       EF: 60-65%       Assessed by: ECHO        Date: 1/16/2024    Prior Cardiac Interventions:       PCI's (Date, Stents, Vessels):   C: 1/16/2024  Procedures:                 1.    Ultrasound Guided Access   2.    Arterial Access - Right Femoral   3.    Diagnostic Coronary Angiography   4.    Diagnostic Graft Angiography   Diagnostic Conclusions:   Access   Right femoral artery:   Vascular access was obtained using modified seldinger technique.    1. Patent left main and left circumflex coronary arteries with mild disease.  2. LAD occluded at distal aspect of mid LAD stent with competitive flow from LIMA appreciated. Sluggish filling (FABIAN II) of diffusely diseased dominant diagonal branch, with a 70% mid diagonal branch stenosis appreciated.  3. Normal caliber, patent RCA with an eccentric 50% prox RCA stenosis and widely patent RPL / RPDA branches, unchanged from prior. No competitive flow appreciated suggesting occluded gastroepiploic graft.  4. Patent LIMA graft filling of mid and distal LAD.    5. Competitive flow in RI consistent with patent free radial graft, unchanged from prior cath.  Recommendations:   No significant change from last cardiac cath. No intervention indicated. Continue aggressive secondary prevention of CAD.    [facility] 10/26/2022:  Procedures:                 1.    Ultrasound Guided Access   2.    Arterial Access - Right Femoral   3.    Diagnostic Coronary Angiography   4.    Diagnostic Graft Angiography   5.    Left Heart Cath   Indications:                ACS Less than 24 hours   Myocardial infarction without ST elevation (NSTEMI)   Primary ICD-10: I21.4 - Non-ST elevation (NSTEMI) myocardial  infarction  Diagnostic Conclusions:   Chronic total occlusion of mid LAD with patent LIMA supplying the distal vessel.  No significant disease in RCA. Assume that gastroepiploic graft has closed off, although not selectively engaged.  Patent graft to high lateral vessel seen via collaterals from distal LAD.  No culprit lesion to account for NSTEMI.    Elevated left heart filling pressures.    Peak to peak gradient of 10 mmHg, not indicative of severe aortic stenosis.  Recommendations:   Maximize medical management of NSTEMI and diastolic heart failure.       CABG (Date, Grafts): CABG at [facility] 1999    Noninvasive Testing: n/a  Stress Test: Date:        Protocol:        Duration of Exercise:        Symptoms:        EKG Changes:        DTS:        Myocardial Imaging:        Risk Assessment (Low, Medium, High):     Echo (Date, Findings):   CONCLUSIONS:   1. Left ventricular systolic function is normal with an ejection fraction visually estimated at 60 to 65 %.   2. Normal right ventricular cavity size and normal systolic function.   3. No evidence of left atrial or left atrial appendage thrombus.   4. Severe aortic stenosis. Transgastric doppler, Vmax 4.09 m/s, Peak/Mean Grad 67/33 mmHg, DI 0.20.   5. Moderate aortic regurgitation.   6. No evidence of a patent foramen ovale with no shunt detected by color flow Doppler.   7. No pericardial effusion seen.   8. Compared to the transthoracic echocardiogram performed on 1/13/2024, AS is likely severe.. Findings were discussed with patient and Cardiology rounding attending on 1/16/2024.    Antianginal Therapies:        Beta Blockers:  Coreg 6.25mg        Calcium Channel Blockers:        Long Acting Nitrates:        Ranexa:       Associated Risk Factors:        Cerebrovascular Disease: N/A       Chronic Lung Disease: N/A       Peripheral Arterial Disease: N/A       Chronic Kidney Disease (if yes, what is GFR): N/A       Uncontrolled Diabetes (if yes, what is HgbA1C or FBS): N/A       Poorly Controlled Hypertension (if yes, what is SBP): N/A       Morbid Obesity (if yes, what is BMI): N/A       History of Recent Ventricular Arrhythmia: N/A       Inability to Ambulate Safely: N/A       Need for Therapeutic Anticoagulation: N/A       Antiplatelet or Contrast Allergy: N/A      ROS**    PHYSICAL EXAM**    VITAL SIGNS**    LABS**   HPI: This is a 79 year old female with a PMHx of CAD s/p CABG/PCI in 1999 at [facility], HTN, DM (A1C 6.6), HLD, former smoker (quit 1998) who presents for a C. Patient had previously presented to [facility] ED 10/26/22 with chest pain that radiated to her neck. She was admitted with an NSTEMI, underwent a LHC which showed a  of mLAD, patent LIMA, no significant RCA disease, and patent graft to high level vessel seen via collateral from mLAD. No culprit lesion for NSTEMI, At the time, LHC also demonstrated a peak to peak AV gradient of 10mmHg, no severe AS shown. ECHO showed EF 60-65%, basal inferior akinesis, trace MR, mild to moderate MI, moderate to severe AS DI 0.32. Patient was then admitted to [facility] Jan 2024 with chest pain, +NSTEMI, with no significant changes from prior cath. Repeat TTE with LVEF 60-65% severe AS. TAVR work up initiated, and patient was to follow with Dr. Hansen. In June 2024, patient determined not to be a TAVR candidate due to small root anatomy and risk for coronary obstruction. Patient planned for medical management of AS. Patient now reporting frequent symptoms of chest pressure with mild to moderate activity.   ?  ?    Symptoms:        Angina (Class):        Ischemic Symptoms:     Heart Failure:        Systolic/Diastolic/Combined: n/a       NYHA Class (within 2 weeks):     Assessment of LVEF (Must be within 6 months):       EF: 60-65%       Assessed by: ECHO        Date: 1/16/2024    Prior Cardiac Interventions:       PCI's (Date, Stents, Vessels):   C: 1/16/2024  Procedures:                 1.    Ultrasound Guided Access   2.    Arterial Access - Right Femoral   3.    Diagnostic Coronary Angiography   4.    Diagnostic Graft Angiography   Diagnostic Conclusions:   Access   Right femoral artery:   Vascular access was obtained using modified seldinger technique.    1. Patent left main and left circumflex coronary arteries with mild disease.  2. LAD occluded at distal aspect of mid LAD stent with competitive flow from LIMA appreciated. Sluggish filling (FABIAN II) of diffusely diseased dominant diagonal branch, with a 70% mid diagonal branch stenosis appreciated.  3. Normal caliber, patent RCA with an eccentric 50% prox RCA stenosis and widely patent RPL / RPDA branches, unchanged from prior. No competitive flow appreciated suggesting occluded gastroepiploic graft.  4. Patent LIMA graft filling of mid and distal LAD.    5. Competitive flow in RI consistent with patent free radial graft, unchanged from prior cath.  Recommendations:   No significant change from last cardiac cath. No intervention indicated. Continue aggressive secondary prevention of CAD.    [facility] 10/26/2022:  Procedures:                 1.    Ultrasound Guided Access   2.    Arterial Access - Right Femoral   3.    Diagnostic Coronary Angiography   4.    Diagnostic Graft Angiography   5.    Left Heart Cath   Indications:                ACS Less than 24 hours   Myocardial infarction without ST elevation (NSTEMI)   Primary ICD-10: I21.4 - Non-ST elevation (NSTEMI) myocardial  infarction  Diagnostic Conclusions:   Chronic total occlusion of mid LAD with patent LIMA supplying the distal vessel.  No significant disease in RCA. Assume that gastroepiploic graft has closed off, although not selectively engaged.  Patent graft to high lateral vessel seen via collaterals from distal LAD.  No culprit lesion to account for NSTEMI.    Elevated left heart filling pressures.    Peak to peak gradient of 10 mmHg, not indicative of severe aortic stenosis.  Recommendations:   Maximize medical management of NSTEMI and diastolic heart failure.       CABG (Date, Grafts): CABG at [facility] 1999    Noninvasive Testing: n/a  Stress Test: Date:        Protocol:        Duration of Exercise:        Symptoms:        EKG Changes:        DTS:        Myocardial Imaging:        Risk Assessment (Low, Medium, High):     Echo (Date, Findings):   CONCLUSIONS:   1. Left ventricular systolic function is normal with an ejection fraction visually estimated at 60 to 65 %.   2. Normal right ventricular cavity size and normal systolic function.   3. No evidence of left atrial or left atrial appendage thrombus.   4. Severe aortic stenosis. Transgastric doppler, Vmax 4.09 m/s, Peak/Mean Grad 67/33 mmHg, DI 0.20.   5. Moderate aortic regurgitation.   6. No evidence of a patent foramen ovale with no shunt detected by color flow Doppler.   7. No pericardial effusion seen.   8. Compared to the transthoracic echocardiogram performed on 1/13/2024, AS is likely severe.. Findings were discussed with patient and Cardiology rounding attending on 1/16/2024.    Antianginal Therapies:        Beta Blockers:  Coreg 6.25mg        Calcium Channel Blockers:        Long Acting Nitrates:        Ranexa:       Associated Risk Factors:        Cerebrovascular Disease: N/A       Chronic Lung Disease: N/A       Peripheral Arterial Disease: N/A       Chronic Kidney Disease (if yes, what is GFR): N/A       Uncontrolled Diabetes (if yes, what is HgbA1C or FBS): N/A       Poorly Controlled Hypertension (if yes, what is SBP): N/A       Morbid Obesity (if yes, what is BMI): N/A       History of Recent Ventricular Arrhythmia: N/A       Inability to Ambulate Safely: N/A       Need for Therapeutic Anticoagulation: N/A       Antiplatelet or Contrast Allergy: N/A

## 2024-12-19 NOTE — H&P PST ADULT - ASSESSMENT
Impression: This is a 79 year old female with a significant PMHx of CAD with previous LHC demonstrating patent stents, CABG in 1999, not a TAVR candidate, with chest pain for LHC.     Risk Assessments:  ASA:  Mallampati:  GFR:   Cr:  BRA:      Plan:  -plan for LHC via RRA/RFA  -patient seen and examined  -confirmed appropriate NPO duration  -ECG and Labs reviewed  -Aspirin 81mg po pre-cath to be ordered**  -NS 250cc bolus to be given for ROBIN ppx**  Risks, benefits, and alternatives reviewed.  Risks including but not limited to MI, death, stroke, bleeding, infection, vessel injury, hematoma, renal failure, allergic reaction, urgent open heart surgery, restenosis and stent thrombosis were reviewed.  All questions answered.  Patient is agreeable to proceed.   Pt. assessed, appropriate for sedation, pt. educated regarding the plan for Versed/Fentanyl as needed.  -procedure discussed with patient; risks and benefits explained, questions answered  -consent obtained by attending IC Impression: This is a 79 year old female with a significant PMHx of CAD with previous LHC demonstrating patent stents, CABG in 1999, not a TAVR candidate, with chest pain for LHC.     Risk Assessments:  ASA: 3  Mallampati: 2  BRA: 13.3%      Plan:  -plan for LHC via RRA/RFA  -patient seen and examined  -confirmed appropriate NPO duration  -ECG and Labs reviewed  -Aspirin 81mg po pre-cath to be ordered  -NS 250cc bolus to be given for ROBIN ppx  -Pt. assessed, appropriate for sedation, pt. educated regarding the plan for Versed/Fentanyl as needed.  -procedure discussed with patient; risks and benefits explained, questions answered  -consent obtained by attending IC

## 2024-12-19 NOTE — H&P PST ADULT - REASON FOR ADMISSION
Nephrology - Follow-up Progress Note    Patient: Chencho Howard MRN # [id number]   : 1962 Attending: Ryan Puentes MD   58 year old male Date of admission 3/5/2021      Subjective  • The pt is doing OK  • Still with significant volume overload  • Urine output is 600 in the last 24 hours and has 900 since midnight    Review of Systems:  12 point system were reviewed and negative except as above    Reviewed: all data below with other service notes.     Assessment and plan    Acute kidney injury on chronic kidney disease stage 4; versus progression of chronic kidney disease  · Baseline serum creatinine is in the range of 2.5-3.1 mg/dl since 2020.    · Creatinine was 2020 and was 3.19 mg/dL on 2020.  · He was recently hospitalized with creatinine of 4.0 - 4.2 mg/dL. Etiology of recent acute kidney injury is likely secondary to acute tubular necrosis in the setting of infection.   · He presented with creat of 5.1 mg/dl  · UA with 100 mg/dl protein and small blood  · Renal US showed \"mild increased cortical echogenicity consistent with chronic medical renal disease\". No evidence of obstruction or hydronephrosis  · Has significant volume overload. continue Bumex and increase dose to 2 mg bid  · No role of Ace/Arb with his advanced chronic kidney disease  · Avoid nephrotoxic agents  · Monitor I's and os closely  · Monitor renal function. No indication of RRT, however, pt might need it if no improvement.      Nephrotic range proteinuria.  Latest protein/creatinine ratio on 2020 was suggestive of 17.7 g of protein.  Urine protein/creatinine ratio improved to 401 8 mg/g  · No role of Ace/Arb in the setting of his advanced renal function  · Follow repeat urine protein/creatinine ratio     Bone mineral metabolism  · Hyperphosphatemia.   start PhosLo 1 tab 3 times with meals for target 3.5-5.5 mg/dL  · Vitamin-D deficiency.  Start ergocalciferol 38941 units weekly for total of 12 weeks.   Repeat labs with next visit  · Secondary hyperparathyroidism.  Start calcitriol 0.25 mg daily.  Repeat labs with next visit     Benign essential hypertension; blood pressure stable today  · Continue amlodipine 10 mg once a day  · Continue carvedilol 25 mg twice daily  · Continue hydralazine 25 mg 3 times daily   · Continue diuretics  · Monitor closely for target less than 140/90     Anemia of renal disease  · Monitor H/H for target 10-12 mg/dL  · Consider EPO if remains low     Acute metabolic acidosis.  Likely secondary to MIKAYLA/CKD. resolved  · Treated with sodium bicarbonate  · Monitor of bicarbonate tabs for target > 22     Hyperkalemia;   · Treat medically. Will give one dose of Kayexalate  · Continue Bumex  · Might require dialysis    Other medical problems: rest of medical problems per the primary team    * Creatinine remains elevated. Will continue aggressive diuretics. Will increase Bumex to 2 mg bid. Monitor renal function, K level, and bicarb closely. Will consider initiating dialysis on Monday if no improvement       Vital Last Value 24Hour Range   Temperature 98.2 °F (36.8 °C) Temp  Min: 98.2 °F (36.8 °C)  Max: 98.2 °F (36.8 °C)   Pulse 74 Pulse  Min: 68  Max: 74   Respiratory (!) 24 Resp  Min: 24  Max: 24   Blood Pressure 136/63 BP  Min: 130/82  Max: 136/63   ArtBP   No data recorded   Pulse Oximetry 95 % SpO2  Min: 95 %  Max: 95 %    No results found for: APH, APCO2, APO2, AHCO3, ASAT, FIO2  Vital Today Admitted   Weight (!) 162.8 kg Weight: (!) 162.8 kg   Height N/A Height: 5' 11.5\" (181.6 cm)   BMI N/A       Weight over the past 48 Hours:  Intake/Output: No intake/output data recorded.   I/O last 3 completed shifts:  In: 240 [P.O.:240]  Out: 0     Intake/Output Summary (Last 24 hours) at 3/5/2021 1509  Last data filed at 3/5/2021 1309  Gross per 24 hour   Intake 240 ml   Output 0 ml   Net 240 ml        Medications/Infusions: Medications reviewed    Physical Exam:  General: NAD  HEENT: Head atraumatic,  normocephalic.   Neck:  Supple, No JVD. No lymphadenopathy.  No thyroid enlargement  CVS: S1,S2 well heard. There is no S3 or S4 gallop.   Chest/Lungs: Bilateral breath sounds present. No wheeze. No rhonchi, and no rales  Abdomen: Soft, non tender, no hepatosplenomegaly. BS present.  Neurological: AAO x 3. No focal neurological deficit.   Skin: No rash, warm, intact.  Extremities: +ve BL LE edema.     Laboratory Results:  Recent Labs   Lab 03/05/21  1220   GLUCOSE 109*   SODIUM 144   POTASSIUM 5.5*   CHLORIDE 116*   CO2 19*   BUN 83*   CREATININE 5.18*   ANIONGAP 15   BILIRUBIN 0.3   AST 10   GPT 17   ALKPT 121*   ALBUMIN 2.3*   MG 2.6*   CALCIUM 8.1*      Recent Labs   Lab 03/05/21  1220   CALCIUM 8.1*     Recent Labs   Lab 03/05/21  1220   WBC 9.1   HGB 9.0*   HCT 29.8*        No results found  Iron Panel  No results found    Urinalysis  No results found    Urine Chem Panel  @LASTLAB(UOSM,UCR,UPROT,UCL,NAY,UK):30)@    Microbiology:  No results found for this or any previous visit.    Echo Ejection Fraction:   Ejection Fraction   Date Value Ref Range Status   01/31/2021 55.0 % Final       Time spent approximately greater than 35 minutes    Thank you for the consult.  Danny Jennings MD, MPH   C

## 2024-12-20 ENCOUNTER — TRANSCRIPTION ENCOUNTER (OUTPATIENT)
Age: 79
End: 2024-12-20

## 2024-12-20 ENCOUNTER — OUTPATIENT (OUTPATIENT)
Dept: OUTPATIENT SERVICES | Facility: HOSPITAL | Age: 79
LOS: 1 days | End: 2024-12-20
Payer: MEDICARE

## 2024-12-20 ENCOUNTER — RESULT REVIEW (OUTPATIENT)
Age: 79
End: 2024-12-20

## 2024-12-20 VITALS
RESPIRATION RATE: 14 BRPM | SYSTOLIC BLOOD PRESSURE: 147 MMHG | DIASTOLIC BLOOD PRESSURE: 53 MMHG | OXYGEN SATURATION: 100 % | HEART RATE: 53 BPM

## 2024-12-20 VITALS
TEMPERATURE: 98 F | OXYGEN SATURATION: 99 % | SYSTOLIC BLOOD PRESSURE: 158 MMHG | DIASTOLIC BLOOD PRESSURE: 45 MMHG | HEART RATE: 52 BPM | RESPIRATION RATE: 16 BRPM

## 2024-12-20 DIAGNOSIS — Z98.891 HISTORY OF UTERINE SCAR FROM PREVIOUS SURGERY: Chronic | ICD-10-CM

## 2024-12-20 DIAGNOSIS — I25.10 ATHEROSCLEROTIC HEART DISEASE OF NATIVE CORONARY ARTERY WITHOUT ANGINA PECTORIS: ICD-10-CM

## 2024-12-20 DIAGNOSIS — Z95.1 PRESENCE OF AORTOCORONARY BYPASS GRAFT: Chronic | ICD-10-CM

## 2024-12-20 LAB
ANION GAP SERPL CALC-SCNC: 12 MMOL/L — SIGNIFICANT CHANGE UP (ref 5–17)
APTT BLD: 29.1 SEC — SIGNIFICANT CHANGE UP (ref 24.5–35.6)
BASOPHILS # BLD AUTO: 0.05 K/UL — SIGNIFICANT CHANGE UP (ref 0–0.2)
BASOPHILS NFR BLD AUTO: 0.6 % — SIGNIFICANT CHANGE UP (ref 0–2)
BUN SERPL-MCNC: 43.1 MG/DL — HIGH (ref 8–20)
CALCIUM SERPL-MCNC: 9.9 MG/DL — SIGNIFICANT CHANGE UP (ref 8.4–10.5)
CHLORIDE SERPL-SCNC: 107 MMOL/L — SIGNIFICANT CHANGE UP (ref 96–108)
CO2 SERPL-SCNC: 24 MMOL/L — SIGNIFICANT CHANGE UP (ref 22–29)
CREAT SERPL-MCNC: 1.77 MG/DL — HIGH (ref 0.5–1.3)
EGFR: 29 ML/MIN/1.73M2 — LOW
EOSINOPHIL # BLD AUTO: 0.88 K/UL — HIGH (ref 0–0.5)
EOSINOPHIL NFR BLD AUTO: 10.8 % — HIGH (ref 0–6)
GLUCOSE SERPL-MCNC: 119 MG/DL — HIGH (ref 70–99)
HCT VFR BLD CALC: 31.7 % — LOW (ref 34.5–45)
HGB BLD-MCNC: 10.3 G/DL — LOW (ref 11.5–15.5)
IMM GRANULOCYTES NFR BLD AUTO: 0.2 % — SIGNIFICANT CHANGE UP (ref 0–0.9)
INR BLD: 0.92 RATIO — SIGNIFICANT CHANGE UP (ref 0.85–1.16)
LYMPHOCYTES # BLD AUTO: 1.48 K/UL — SIGNIFICANT CHANGE UP (ref 1–3.3)
LYMPHOCYTES # BLD AUTO: 18.1 % — SIGNIFICANT CHANGE UP (ref 13–44)
MAGNESIUM SERPL-MCNC: 2.2 MG/DL — SIGNIFICANT CHANGE UP (ref 1.6–2.6)
MCHC RBC-ENTMCNC: 30.3 PG — SIGNIFICANT CHANGE UP (ref 27–34)
MCHC RBC-ENTMCNC: 32.5 G/DL — SIGNIFICANT CHANGE UP (ref 32–36)
MCV RBC AUTO: 93.2 FL — SIGNIFICANT CHANGE UP (ref 80–100)
MONOCYTES # BLD AUTO: 0.87 K/UL — SIGNIFICANT CHANGE UP (ref 0–0.9)
MONOCYTES NFR BLD AUTO: 10.6 % — SIGNIFICANT CHANGE UP (ref 2–14)
NEUTROPHILS # BLD AUTO: 4.88 K/UL — SIGNIFICANT CHANGE UP (ref 1.8–7.4)
NEUTROPHILS NFR BLD AUTO: 59.7 % — SIGNIFICANT CHANGE UP (ref 43–77)
PLATELET # BLD AUTO: 236 K/UL — SIGNIFICANT CHANGE UP (ref 150–400)
POTASSIUM SERPL-MCNC: 5.1 MMOL/L — SIGNIFICANT CHANGE UP (ref 3.5–5.3)
POTASSIUM SERPL-SCNC: 5.1 MMOL/L — SIGNIFICANT CHANGE UP (ref 3.5–5.3)
PROTHROM AB SERPL-ACNC: 10.4 SEC — SIGNIFICANT CHANGE UP (ref 9.9–13.4)
RBC # BLD: 3.4 M/UL — LOW (ref 3.8–5.2)
RBC # FLD: 13.6 % — SIGNIFICANT CHANGE UP (ref 10.3–14.5)
SODIUM SERPL-SCNC: 143 MMOL/L — SIGNIFICANT CHANGE UP (ref 135–145)
WBC # BLD: 8.18 K/UL — SIGNIFICANT CHANGE UP (ref 3.8–10.5)
WBC # FLD AUTO: 8.18 K/UL — SIGNIFICANT CHANGE UP (ref 3.8–10.5)

## 2024-12-20 PROCEDURE — 80048 BASIC METABOLIC PNL TOTAL CA: CPT

## 2024-12-20 PROCEDURE — 93306 TTE W/DOPPLER COMPLETE: CPT | Mod: 26

## 2024-12-20 PROCEDURE — 85610 PROTHROMBIN TIME: CPT

## 2024-12-20 PROCEDURE — 36415 COLL VENOUS BLD VENIPUNCTURE: CPT

## 2024-12-20 PROCEDURE — C1760: CPT

## 2024-12-20 PROCEDURE — C1894: CPT

## 2024-12-20 PROCEDURE — 93306 TTE W/DOPPLER COMPLETE: CPT

## 2024-12-20 PROCEDURE — 93005 ELECTROCARDIOGRAM TRACING: CPT

## 2024-12-20 PROCEDURE — C1887: CPT

## 2024-12-20 PROCEDURE — 85730 THROMBOPLASTIN TIME PARTIAL: CPT

## 2024-12-20 PROCEDURE — 83735 ASSAY OF MAGNESIUM: CPT

## 2024-12-20 PROCEDURE — 85025 COMPLETE CBC W/AUTO DIFF WBC: CPT

## 2024-12-20 PROCEDURE — 99152 MOD SED SAME PHYS/QHP 5/>YRS: CPT

## 2024-12-20 PROCEDURE — 93459 L HRT ART/GRFT ANGIO: CPT | Mod: 26

## 2024-12-20 PROCEDURE — 93459 L HRT ART/GRFT ANGIO: CPT

## 2024-12-20 PROCEDURE — C1769: CPT

## 2024-12-20 PROCEDURE — 93010 ELECTROCARDIOGRAM REPORT: CPT

## 2024-12-20 RX ORDER — ERGOCALCIFEROL (VITAMIN D2) 200 MCG/ML
1 DROPS ORAL
Refills: 0 | DISCHARGE

## 2024-12-20 RX ORDER — CLOPIDOGREL 75 MG/1
75 TABLET, FILM COATED ORAL DAILY
Refills: 0 | Status: ACTIVE | OUTPATIENT
Start: 2024-12-20 | End: 2025-11-18

## 2024-12-20 RX ORDER — ISOSORBIDE MONONITRATE 10 MG
1 TABLET ORAL
Qty: 90 | Refills: 0
Start: 2024-12-20

## 2024-12-20 RX ORDER — SODIUM CHLORIDE 9 MG/ML
250 INJECTION, SOLUTION INTRAMUSCULAR; INTRAVENOUS; SUBCUTANEOUS ONCE
Refills: 0 | Status: COMPLETED | OUTPATIENT
Start: 2024-12-20 | End: 2024-12-20

## 2024-12-20 RX ORDER — EZETIMIBE 10 MG
1 TABLET ORAL
Refills: 0 | DISCHARGE

## 2024-12-20 RX ORDER — ISOSORBIDE MONONITRATE 10 MG
30 TABLET ORAL DAILY
Refills: 0 | Status: ACTIVE | OUTPATIENT
Start: 2024-12-20 | End: 2025-11-18

## 2024-12-20 RX ADMIN — Medication 81 MILLIGRAM(S): at 10:43

## 2024-12-20 RX ADMIN — Medication 30 MILLIGRAM(S): at 12:17

## 2024-12-20 RX ADMIN — CLOPIDOGREL 75 MILLIGRAM(S): 75 TABLET, FILM COATED ORAL at 10:43

## 2024-12-20 RX ADMIN — SODIUM CHLORIDE 250 MILLILITER(S): 9 INJECTION, SOLUTION INTRAMUSCULAR; INTRAVENOUS; SUBCUTANEOUS at 10:43

## 2024-12-20 NOTE — ASU PATIENT PROFILE, ADULT - FALL HARM RISK - HARM RISK INTERVENTIONS

## 2024-12-20 NOTE — DISCHARGE NOTE NURSING/CASE MANAGEMENT/SOCIAL WORK - FINANCIAL ASSISTANCE
[facility] provides services at a reduced cost to those who are determined to be eligible through [facility]’s financial assistance program. Information regarding [facility]’s financial assistance program can be found by going to [email or web address] or by calling [phone].

## 2024-12-20 NOTE — DISCHARGE NOTE PROVIDER - HOSPITAL COURSE
79 year old female with a PMHx of CAD s/p CABG/PCI in 1999 at [facility], HTN, DM (A1C 6.6), HLD, former smoker (quit 1998) who presents for a LHC. Patient had previously presented to [facility] ED 10/26/22 with chest pain that radiated to her neck. She was admitted with an NSTEMI, underwent a LHC which showed a  of mLAD, patent LIMA, no significant RCA disease, and patent graft to high level vessel seen via collateral from mLAD. No culprit lesion for NSTEMI, At the time, LHC also demonstrated a peak to peak AV gradient of 10mmHg, no severe AS shown. ECHO showed EF 60-65%, basal inferior akinesis, trace MR, mild to moderate MI, moderate to severe AS DI 0.32. Patient was then admitted to [facility] Jan 2024 with chest pain, +NSTEMI, with no significant changes from prior cath. Repeat TTE with LVEF 60-65% severe AS. TAVR work up initiated, and patient was to follow with Dr. Hansen. In June 2024, patient determined not to be a TAVR candidate due to small root anatomy and risk for coronary obstruction. Patient planned for medical management of AS. Patient now reporting frequent symptoms of chest pressure with mild to moderate activity. 79 year old female with a PMHx of CAD s/p CABG/PCI in 1999 at [facility], HTN, DM (A1C 6.6), HLD, former smoker (quit 1998) who presents for a C. Patient had previously presented to [facility] ED 10/26/22 with chest pain that radiated to her neck. She was admitted with an NSTEMI, underwent a LHC which showed a  of mLAD, patent LIMA, no significant RCA disease, and patent graft to high level vessel seen via collateral from mLAD. No culprit lesion for NSTEMI, At the time, LHC also demonstrated a peak to peak AV gradient of 10mmHg, no severe AS shown. ECHO showed EF 60-65%, basal inferior akinesis, trace MR, mild to moderate MI, moderate to severe AS DI 0.32. Patient was then admitted to [facility] Jan 2024 with chest pain, +NSTEMI, with no significant changes from prior cath. Repeat TTE with LVEF 60-65% severe AS. TAVR work up initiated, and patient was to follow with Dr. Hansen. In June 2024, patient determined not to be a TAVR candidate due to small root anatomy and risk for coronary obstruction. Patient planned for medical management of AS. Patient now reporting frequent symptoms of chest pressure with mild to moderate activity. Now S/[facility] no intervention, official report pending. KUO to LAD patent, Aortic valve area 30mmhg, via RFA with mynx closure device, site benign. Patient awake and alert without complaints. Denies chest pain, sob, palps.      Neuro: A&OX3  Lungs: CTA B/L  CV: S1, S2, no murmur, RRR  Abd: Soft  Right groin no bleeding, no hematoma, no ecchymosis  Extremity: + distal pulses, cap refill <3 sec      A/P: 79y Female s/p C no intervention  1. Groin management discussed with patient  2. Continue current meds  3. Follow up as an outpatient with cardiologist  4. Bedrest x 1 hour  5. Remove radial band in 1hour  6. Add Imdur 30mg po daily  7. Discharge at 1200 if groin stable  8. Stat echo to assess aortic stenosis

## 2024-12-20 NOTE — DISCHARGE NOTE PROVIDER - NSDCFUSCHEDAPPT_GEN_ALL_CORE_FT
[facility]  CARDIOLOGY [address]  Scheduled Appointment: 01/07/2025    Eliazar Simmons  [facility]  CARDIOLOGY [address]  Scheduled Appointment: 02/25/2025    
no

## 2024-12-20 NOTE — DISCHARGE NOTE PROVIDER - NSDCCPCAREPLAN_GEN_ALL_CORE_FT
PRINCIPAL DISCHARGE DIAGNOSIS  Diagnosis: Dyspnea, unspecified  Assessment and Plan of Treatment: Follow up with your doctor as instructed.  Continue medications as instructed.  Follow prescribed diet. No intervention patent LIMA to LAD. RFA groin site benign, with mynx closure device.

## 2024-12-20 NOTE — DISCHARGE NOTE PROVIDER - CARE PROVIDER_API CALL
Eliazar Simmons  Cardiovascular Disease  [address], [address]  [place] [address]  Phone: [phone]  Fax: [phone]  Follow Up Time: 1 week

## 2024-12-20 NOTE — PROGRESS NOTE ADULT - SUBJECTIVE AND OBJECTIVE BOX
I have examined the patient. I have explained risk and benefits. I have attained consent. I agree with cardiac catheterization.

## 2024-12-20 NOTE — DISCHARGE NOTE PROVIDER - NSDCCPTREATMENT_GEN_ALL_CORE_FT
PRINCIPAL PROCEDURE  Procedure: Left heart catheterization  Findings and Treatment: No heavy lifting, driving, sex, tub baths, swimming, or any activity that submerges the lower half of the body in water for 48 hours.  Limited walking and stairs for 48 hours.    Observe the site frequently.  If bleeding or a large lump (the size of a golf ball or bigger) occurs lie flat, apply continuous direct pressure just above the puncture site for at least 10 minutes, and notify your physician immediately.  If the bleeding cannot be controlled, call 911 immediately for assistance.  Notify your physician of pain, swelling or any drainage.    Notify your physician immediately if coldness, numbness, discoloration or pain in your foot occurs.

## 2024-12-20 NOTE — DISCHARGE NOTE PROVIDER - NSDCMRMEDTOKEN_GEN_ALL_CORE_FT
allopurinol 100 mg oral tablet: 1 tab(s) orally once a day  aspirin 81 mg oral tablet, chewable: 1 tab(s) orally once a day  atorvastatin 80 mg oral tablet: 1 tab(s) orally once a day (at bedtime)  carvedilol 6.25 mg oral tablet: 1 tab(s) orally 2 times a day  clopidogrel 75 mg oral tablet: 1 tab(s) orally once a day  ergocalciferol 1.25 mg (50,000 intl units) oral tablet: 1 tab(s) orally once a week on Monday  ergocalciferol 1.25 mg (50,000 intl units) oral tablet: 1 orally once a week  ezetimibe 10 mg oral tablet: 1 tab(s) orally once a day (at bedtime)  ezetimibe 10 mg oral tablet: 1 tab(s) orally once a day  Jardiance 10 mg oral tablet: 1 tab(s) orally once a day  levothyroxine 88 mcg (0.088 mg) oral tablet: 1 tab(s) orally once a day  omeprazole 20 mg oral delayed release capsule: 1 cap(s) orally once a day  triamterene-hydrochlorothiazide 37.5 mg-25 mg oral tablet: 1 tab(s) orally once a day   allopurinol 100 mg oral tablet: 1 tab(s) orally once a day  aspirin 81 mg oral tablet, chewable: 1 tab(s) orally once a day  atorvastatin 80 mg oral tablet: 1 tab(s) orally once a day (at bedtime)  carvedilol 6.25 mg oral tablet: 1 tab(s) orally 2 times a day  clopidogrel 75 mg oral tablet: 1 tab(s) orally once a day  ergocalciferol 1.25 mg (50,000 intl units) oral tablet: 1 tab(s) orally once a week on Monday  ergocalciferol 1.25 mg (50,000 intl units) oral tablet: 1 orally once a week  ezetimibe 10 mg oral tablet: 1 tab(s) orally once a day (at bedtime)  ezetimibe 10 mg oral tablet: 1 tab(s) orally once a day  isosorbide mononitrate 30 mg oral tablet, extended release: 1 tab(s) orally  Jardiance 10 mg oral tablet: 1 tab(s) orally once a day  levothyroxine 88 mcg (0.088 mg) oral tablet: 1 tab(s) orally once a day  omeprazole 20 mg oral delayed release capsule: 1 cap(s) orally once a day  triamterene-hydrochlorothiazide 37.5 mg-25 mg oral tablet: 1 tab(s) orally once a day

## 2024-12-20 NOTE — DISCHARGE NOTE NURSING/CASE MANAGEMENT/SOCIAL WORK - PATIENT PORTAL LINK FT
You can access the FollowMyHealth Patient Portal offered by [facility] by registering at the following website: [email or web address]. By joining [employer]’s FollowMyHealth portal, you will also be able to view your health information using other applications (apps) compatible with our system.

## 2025-01-07 ENCOUNTER — APPOINTMENT (OUTPATIENT)
Dept: CARDIOLOGY | Facility: CLINIC | Age: 80
End: 2025-01-07
Payer: MEDICARE

## 2025-01-07 VITALS
HEART RATE: 63 BPM | OXYGEN SATURATION: 97 % | SYSTOLIC BLOOD PRESSURE: 158 MMHG | BODY MASS INDEX: 30.32 KG/M2 | WEIGHT: 131 LBS | DIASTOLIC BLOOD PRESSURE: 68 MMHG | HEIGHT: 55 IN

## 2025-01-07 DIAGNOSIS — Z95.1 PRESENCE OF AORTOCORONARY BYPASS GRAFT: ICD-10-CM

## 2025-01-07 DIAGNOSIS — E78.5 HYPERLIPIDEMIA, UNSPECIFIED: ICD-10-CM

## 2025-01-07 DIAGNOSIS — I35.0 NONRHEUMATIC AORTIC (VALVE) STENOSIS: ICD-10-CM

## 2025-01-07 DIAGNOSIS — I10 ESSENTIAL (PRIMARY) HYPERTENSION: ICD-10-CM

## 2025-01-07 DIAGNOSIS — I25.10 ATHEROSCLEROTIC HEART DISEASE OF NATIVE CORONARY ARTERY W/OUT ANGINA PECTORIS: ICD-10-CM

## 2025-01-07 PROCEDURE — 99214 OFFICE O/P EST MOD 30 MIN: CPT

## 2025-01-14 NOTE — ED ADULT NURSE REASSESSMENT NOTE - TEMPLATE LIST FOR HEAD TO TOE ASSESSMENT
Addended by: RIP REHMAN III on: 1/14/2025 01:25 PM     Modules accepted: Level of Service     VIEW ALL

## 2025-02-25 ENCOUNTER — NON-APPOINTMENT (OUTPATIENT)
Age: 80
End: 2025-02-25

## 2025-02-25 ENCOUNTER — APPOINTMENT (OUTPATIENT)
Dept: CARDIOLOGY | Facility: CLINIC | Age: 80
End: 2025-02-25
Payer: MEDICARE

## 2025-02-25 VITALS
WEIGHT: 132 LBS | HEIGHT: 55 IN | DIASTOLIC BLOOD PRESSURE: 54 MMHG | SYSTOLIC BLOOD PRESSURE: 126 MMHG | OXYGEN SATURATION: 98 % | BODY MASS INDEX: 30.55 KG/M2 | HEART RATE: 70 BPM

## 2025-02-25 DIAGNOSIS — I25.10 ATHEROSCLEROTIC HEART DISEASE OF NATIVE CORONARY ARTERY W/OUT ANGINA PECTORIS: ICD-10-CM

## 2025-02-25 DIAGNOSIS — I10 ESSENTIAL (PRIMARY) HYPERTENSION: ICD-10-CM

## 2025-02-25 DIAGNOSIS — N18.9 CHRONIC KIDNEY DISEASE, UNSPECIFIED: ICD-10-CM

## 2025-02-25 PROCEDURE — 99204 OFFICE O/P NEW MOD 45 MIN: CPT

## 2025-02-25 PROCEDURE — 93000 ELECTROCARDIOGRAM COMPLETE: CPT

## 2025-02-25 RX ORDER — GLIPIZIDE 10 MG/1
10 TABLET ORAL DAILY
Refills: 0 | Status: ACTIVE | COMMUNITY

## 2025-03-17 ENCOUNTER — APPOINTMENT (OUTPATIENT)
Dept: CARDIOLOGY | Facility: CLINIC | Age: 80
End: 2025-03-17

## 2025-03-17 ENCOUNTER — APPOINTMENT (OUTPATIENT)
Dept: CARDIOTHORACIC SURGERY | Facility: CLINIC | Age: 80
End: 2025-03-17
Payer: MEDICARE

## 2025-03-17 ENCOUNTER — NON-APPOINTMENT (OUTPATIENT)
Age: 80
End: 2025-03-17

## 2025-03-17 VITALS
WEIGHT: 125 LBS | DIASTOLIC BLOOD PRESSURE: 72 MMHG | OXYGEN SATURATION: 96 % | HEIGHT: 58 IN | HEART RATE: 72 BPM | RESPIRATION RATE: 18 BRPM | BODY MASS INDEX: 26.24 KG/M2 | SYSTOLIC BLOOD PRESSURE: 161 MMHG

## 2025-03-17 DIAGNOSIS — I35.0 NONRHEUMATIC AORTIC (VALVE) STENOSIS: ICD-10-CM

## 2025-03-17 PROCEDURE — 99205 OFFICE O/P NEW HI 60 MIN: CPT

## 2025-03-17 PROCEDURE — 99215 OFFICE O/P EST HI 40 MIN: CPT

## 2025-03-26 ENCOUNTER — OUTPATIENT (OUTPATIENT)
Dept: OUTPATIENT SERVICES | Facility: HOSPITAL | Age: 80
LOS: 1 days | End: 2025-03-26
Payer: MEDICARE

## 2025-03-26 VITALS
WEIGHT: 127.87 LBS | OXYGEN SATURATION: 98 % | HEIGHT: 58 IN | RESPIRATION RATE: 16 BRPM | SYSTOLIC BLOOD PRESSURE: 158 MMHG | TEMPERATURE: 98 F | DIASTOLIC BLOOD PRESSURE: 60 MMHG | HEART RATE: 56 BPM

## 2025-03-26 DIAGNOSIS — I35.0 NONRHEUMATIC AORTIC (VALVE) STENOSIS: ICD-10-CM

## 2025-03-26 DIAGNOSIS — E03.9 HYPOTHYROIDISM, UNSPECIFIED: ICD-10-CM

## 2025-03-26 DIAGNOSIS — Z98.890 OTHER SPECIFIED POSTPROCEDURAL STATES: Chronic | ICD-10-CM

## 2025-03-26 DIAGNOSIS — Z95.1 PRESENCE OF AORTOCORONARY BYPASS GRAFT: Chronic | ICD-10-CM

## 2025-03-26 DIAGNOSIS — Z98.891 HISTORY OF UTERINE SCAR FROM PREVIOUS SURGERY: Chronic | ICD-10-CM

## 2025-03-26 DIAGNOSIS — Z01.818 ENCOUNTER FOR OTHER PREPROCEDURAL EXAMINATION: ICD-10-CM

## 2025-03-26 DIAGNOSIS — Z29.9 ENCOUNTER FOR PROPHYLACTIC MEASURES, UNSPECIFIED: ICD-10-CM

## 2025-03-26 DIAGNOSIS — Z13.89 ENCOUNTER FOR SCREENING FOR OTHER DISORDER: ICD-10-CM

## 2025-03-26 DIAGNOSIS — I10 ESSENTIAL (PRIMARY) HYPERTENSION: ICD-10-CM

## 2025-03-26 DIAGNOSIS — E11.9 TYPE 2 DIABETES MELLITUS WITHOUT COMPLICATIONS: ICD-10-CM

## 2025-03-26 LAB
A1C WITH ESTIMATED AVERAGE GLUCOSE RESULT: 6.9 % — HIGH (ref 4–5.6)
ALBUMIN SERPL ELPH-MCNC: 4 G/DL — SIGNIFICANT CHANGE UP (ref 3.3–5.2)
ALP SERPL-CCNC: 91 U/L — SIGNIFICANT CHANGE UP (ref 40–120)
ALT FLD-CCNC: 17 U/L — SIGNIFICANT CHANGE UP
ANION GAP SERPL CALC-SCNC: 11 MMOL/L — SIGNIFICANT CHANGE UP (ref 5–17)
APPEARANCE UR: CLEAR — SIGNIFICANT CHANGE UP
APTT BLD: 29.3 SEC — SIGNIFICANT CHANGE UP (ref 24.5–35.6)
AST SERPL-CCNC: 26 U/L — SIGNIFICANT CHANGE UP
BACTERIA # UR AUTO: NEGATIVE /HPF — SIGNIFICANT CHANGE UP
BASOPHILS # BLD AUTO: 0.04 K/UL — SIGNIFICANT CHANGE UP (ref 0–0.2)
BASOPHILS NFR BLD AUTO: 0.6 % — SIGNIFICANT CHANGE UP (ref 0–2)
BILIRUB SERPL-MCNC: 0.2 MG/DL — LOW (ref 0.4–2)
BILIRUB UR-MCNC: NEGATIVE — SIGNIFICANT CHANGE UP
BLD GP AB SCN SERPL QL: SIGNIFICANT CHANGE UP
BUN SERPL-MCNC: 36.6 MG/DL — HIGH (ref 8–20)
CALCIUM SERPL-MCNC: 9.3 MG/DL — SIGNIFICANT CHANGE UP (ref 8.4–10.5)
CAST: 1 /LPF — SIGNIFICANT CHANGE UP (ref 0–4)
CHLORIDE SERPL-SCNC: 107 MMOL/L — SIGNIFICANT CHANGE UP (ref 96–108)
CO2 SERPL-SCNC: 24 MMOL/L — SIGNIFICANT CHANGE UP (ref 22–29)
COLOR SPEC: YELLOW — SIGNIFICANT CHANGE UP
CREAT SERPL-MCNC: 1.58 MG/DL — HIGH (ref 0.5–1.3)
DIFF PNL FLD: NEGATIVE — SIGNIFICANT CHANGE UP
EGFR: 33 ML/MIN/1.73M2 — LOW
EGFR: 33 ML/MIN/1.73M2 — LOW
EOSINOPHIL # BLD AUTO: 0.76 K/UL — HIGH (ref 0–0.5)
EOSINOPHIL NFR BLD AUTO: 10.5 % — HIGH (ref 0–6)
ESTIMATED AVERAGE GLUCOSE: 151 MG/DL — HIGH (ref 68–114)
GLUCOSE SERPL-MCNC: 133 MG/DL — HIGH (ref 70–99)
GLUCOSE UR QL: >=1000 MG/DL
HCT VFR BLD CALC: 31.6 % — LOW (ref 34.5–45)
HGB BLD-MCNC: 9.8 G/DL — LOW (ref 11.5–15.5)
IMM GRANULOCYTES # BLD AUTO: 0.02 K/UL — SIGNIFICANT CHANGE UP (ref 0–0.07)
IMM GRANULOCYTES NFR BLD AUTO: 0.3 % — SIGNIFICANT CHANGE UP (ref 0–0.9)
INR BLD: 0.9 RATIO — SIGNIFICANT CHANGE UP (ref 0.85–1.16)
KETONES UR-MCNC: NEGATIVE MG/DL — SIGNIFICANT CHANGE UP
LEUKOCYTE ESTERASE UR-ACNC: NEGATIVE — SIGNIFICANT CHANGE UP
LYMPHOCYTES # BLD AUTO: 1.34 K/UL — SIGNIFICANT CHANGE UP (ref 1–3.3)
LYMPHOCYTES NFR BLD AUTO: 18.5 % — SIGNIFICANT CHANGE UP (ref 13–44)
MAGNESIUM SERPL-MCNC: 2.3 MG/DL — SIGNIFICANT CHANGE UP (ref 1.6–2.6)
MCHC RBC-ENTMCNC: 30.3 PG — SIGNIFICANT CHANGE UP (ref 27–34)
MCHC RBC-ENTMCNC: 31 G/DL — LOW (ref 32–36)
MCV RBC AUTO: 97.8 FL — SIGNIFICANT CHANGE UP (ref 80–100)
MONOCYTES # BLD AUTO: 0.73 K/UL — SIGNIFICANT CHANGE UP (ref 0–0.9)
MONOCYTES NFR BLD AUTO: 10.1 % — SIGNIFICANT CHANGE UP (ref 2–14)
MRSA PCR RESULT.: SIGNIFICANT CHANGE UP
NEUTROPHILS # BLD AUTO: 4.36 K/UL — SIGNIFICANT CHANGE UP (ref 1.8–7.4)
NEUTROPHILS NFR BLD AUTO: 60 % — SIGNIFICANT CHANGE UP (ref 43–77)
NITRITE UR-MCNC: NEGATIVE — SIGNIFICANT CHANGE UP
NRBC # BLD AUTO: 0 K/UL — SIGNIFICANT CHANGE UP (ref 0–0)
NRBC # FLD: 0 K/UL — SIGNIFICANT CHANGE UP (ref 0–0)
NRBC BLD AUTO-RTO: 0 /100 WBCS — SIGNIFICANT CHANGE UP (ref 0–0)
NT-PROBNP SERPL-SCNC: 492 PG/ML — HIGH (ref 0–300)
PH UR: 6 — SIGNIFICANT CHANGE UP (ref 5–8)
PLATELET # BLD AUTO: 246 K/UL — SIGNIFICANT CHANGE UP (ref 150–400)
PMV BLD: 10.2 FL — SIGNIFICANT CHANGE UP (ref 7–13)
POTASSIUM SERPL-MCNC: 4.9 MMOL/L — SIGNIFICANT CHANGE UP (ref 3.5–5.3)
POTASSIUM SERPL-SCNC: 4.9 MMOL/L — SIGNIFICANT CHANGE UP (ref 3.5–5.3)
PREALB SERPL-MCNC: 26 MG/DL — SIGNIFICANT CHANGE UP (ref 18–38)
PROT SERPL-MCNC: 6.6 G/DL — SIGNIFICANT CHANGE UP (ref 6.6–8.7)
PROT UR-MCNC: 30 MG/DL
PROTHROM AB SERPL-ACNC: 10.2 SEC — SIGNIFICANT CHANGE UP (ref 9.9–13.4)
RBC # BLD: 3.23 M/UL — LOW (ref 3.8–5.2)
RBC # FLD: 13.8 % — SIGNIFICANT CHANGE UP (ref 10.3–14.5)
RBC CASTS # UR COMP ASSIST: 0 /HPF — SIGNIFICANT CHANGE UP (ref 0–4)
S AUREUS DNA NOSE QL NAA+PROBE: DETECTED
SODIUM SERPL-SCNC: 142 MMOL/L — SIGNIFICANT CHANGE UP (ref 135–145)
SP GR SPEC: 1.02 — SIGNIFICANT CHANGE UP (ref 1–1.03)
SQUAMOUS # UR AUTO: 1 /HPF — SIGNIFICANT CHANGE UP (ref 0–5)
T3 SERPL-MCNC: 77 NG/DL — LOW (ref 80–200)
T4 AB SER-ACNC: 7.1 UG/DL — SIGNIFICANT CHANGE UP (ref 4.5–12)
TSH SERPL-MCNC: 0.64 UIU/ML — SIGNIFICANT CHANGE UP (ref 0.27–4.2)
UROBILINOGEN FLD QL: 0.2 MG/DL — SIGNIFICANT CHANGE UP (ref 0.2–1)
WBC # BLD: 7.25 K/UL — SIGNIFICANT CHANGE UP (ref 3.8–10.5)
WBC # FLD AUTO: 7.25 K/UL — SIGNIFICANT CHANGE UP (ref 3.8–10.5)
WBC UR QL: 2 /HPF — SIGNIFICANT CHANGE UP (ref 0–5)

## 2025-03-26 PROCEDURE — 93010 ELECTROCARDIOGRAM REPORT: CPT

## 2025-03-26 PROCEDURE — 71046 X-RAY EXAM CHEST 2 VIEWS: CPT | Mod: 26

## 2025-03-26 RX ORDER — MUPIROCIN CALCIUM 20 MG/G
1 CREAM TOPICAL
Qty: 1 | Refills: 0
Start: 2025-03-26 | End: 2025-03-30

## 2025-03-26 RX ORDER — POVIDONE-IODINE 7.5 %
1 SOLUTION, NON-ORAL TOPICAL ONCE
Refills: 0 | Status: COMPLETED | OUTPATIENT
Start: 2025-03-28 | End: 2025-03-28

## 2025-03-26 RX ORDER — CEFUROXIME SODIUM 1.5 G
1500 VIAL (EA) INJECTION ONCE
Refills: 0 | Status: COMPLETED | OUTPATIENT
Start: 2025-03-28 | End: 2025-03-28

## 2025-03-26 NOTE — H&P PST ADULT - ASSESSMENT
This is a             CAPRINI SCORE    AGE RELATED RISK FACTORS                                                             [ ] Age 41-60 years                                            (1 Point)  [ ] Age: 61-74 years                                           (2 Points)                 [ ] Age= 75 years                                                (3 Points)             DISEASE RELATED RISK FACTORS                                                       [ ] Edema in the lower extremities                 (1 Point)                     [ ] Varicose veins                                               (1 Point)                                 [ ] BMI > 25 Kg/m2                                            (1 Point)                                  [ ] Serious infection (ie PNA)                            (1 Point)                     [ ] Lung disease ( COPD, Emphysema)            (1 Point)                                                                          [ ] Acute myocardial infarction                         (1 Point)                  [ ] Congestive heart failure (in the previous month)  (1 Point)         [ ] Inflammatory bowel disease                            (1 Point)                  [ ] Central venous access, PICC or Port               (2 points)       (within the last month)                                                                [ ] Stroke (in the previous month)                        (5 Points)    [ ] Previous or present malignancy                       (2 points)                                                                                                                                                         HEMATOLOGY RELATED FACTORS                                                         [ ] Prior episodes of VTE                                     (3 Points)                     [ ] Positive family history for VTE                      (3 Points)                  [ ] Prothrombin 45193 A                                     (3 Points)                     [ ] Factor V Leiden                                                (3 Points)                        [ ] Lupus anticoagulants                                      (3 Points)                                                           [ ] Anticardiolipin antibodies                              (3 Points)                                                       [ ] High homocysteine in the blood                   (3 Points)                                             [ ] Other congenital or acquired thrombophilia      (3 Points)                                                [ ] Heparin induced thrombocytopenia                  (3 Points)                                        MOBILITY RELATED FACTORS  [ ] Bed rest                                                         (1 Point)  [ ] Plaster cast                                                    (2 points)  [ ] Bed bound for more than 72 hours           (2 Points)    GENDER SPECIFIC FACTORS  [ ] Pregnancy or had a baby within the last month   (1 Point)  [ ] Post-partum < 6 weeks                                   (1 Point)  [ ] Hormonal therapy  or oral contraception   (1 Point)  [ ] History of pregnancy complications              (1 point)  [ ] Unexplained or recurrent              (1 Point)    OTHER RISK FACTORS                                           (1 Point)  [ ] BMI >40, smoking, diabetes requiring insulin, chemotherapy  blood transfusions and length of surgery over 2 hours    SURGERY RELATED RISK FACTORS  [ ]  Section within the last month     (1 Point)  [ ] Minor surgery                                                  (1 Point)  [ ] Arthroscopic surgery                                       (2 Points)  [ ] Planned major surgery lasting more            (2 Points)      than 45 minutes     [ ] Elective hip or knee joint replacement       (5 points)       surgery                                                TRAUMA RELATED RISK FACTORS  [ ] Fracture of the hip, pelvis, or leg                       (5 Points)  [ ] Spinal cord injury resulting in paralysis             (5 points)       (in the previous month)    [ ] Paralysis  (less than 1 month)                             (5 Points)  [ ] Multiple Trauma within 1 month                        (5 Points)    Total Score [        ]    Caprini Score 0-2: Low Risk, NO VTE prophylaxis required for most patients, encourage ambulation  Caprini Score 3-6: Moderate Risk , pharmacologic VTE prophylaxis is indicated for most patients (in the absence of contraindications)  Caprini Score Greater than or =7: High risk, pharmocologic VTE prophylaxis indicated for most patients (in the absence of contraindications)      OPIOID RISK TOOL    CAROLANN EACH BOX THAT APPLIES AND ADD TOTALS AT THE END    FAMILY HISTORY OF SUBSTANCE ABUSE                 FEMALE         MALE                                                Alcohol                             [  ]1 pt          [  ]3pts                                               Illegal Durgs                     [  ]2 pts        [  ]3pts                                               Rx Drugs                           [  ]4 pts        [  ]4 pts    PERSONAL HISTORY OF SUBSTANCE ABUSE                                                                                          Alcohol                             [  ]3 pts       [  ]3 pts                                               Illegal Drugs                     [  ]4 pts        [  ]4 pts                                               Rx Drugs                           [  ]5 pts        [  ]5 pts    AGE BETWEEN 16-45 YEARS                                      [  ]1 pt         [  ]1 pt    HISTORY OF PREADOLESCENT   SEXUAL ABUSE                                                             [  ]3 pts        [  ]0pts    PSYCHOLOGICAL DISEASE                     ADD, OCD, Bipolar, Schizophrenia        [  ]2 pts         [  ]2 pts                      Depression                                               [  ]1 pt           [  ]1 pt           SCORING TOTAL   (add numbers and type here)              (***)                                     A score of 3 or lower indicated LOW risk for future opioid abuse  A score of 4 to 7 indicated moderate risk for future opioid abuse  A score of 8 or higher indicates a high risk for opioid abuse                             This is a pleasant 79 year old  female in NAD presenting today for PST, PMH includes HTN, diabetes, CKD, NSTEMI, CAD with stenting, CABG x 2 and aortic stenosis. Patient c/o "tightness" in chest with exertion and intermittent shortness of breath with activity. Patient with aortic stenosis. She is s/p cardiac cath 2024. She is now scheduled for transcatheter aortic valve replacement  transfemoral Medtronic with basilica on 3/28/25 with Dr. Hansen.                 CAPRINI SCORE    AGE RELATED RISK FACTORS                                                             [ ] Age 41-60 years                                            (1 Point)  [ ] Age: 61-74 years                                           (2 Points)                 [ ] Age= 75 years                                                (3 Points)             DISEASE RELATED RISK FACTORS                                                       [ ] Edema in the lower extremities                 (1 Point)                     [ ] Varicose veins                                               (1 Point)                                 [ ] BMI > 25 Kg/m2                                            (1 Point)                                  [ ] Serious infection (ie PNA)                            (1 Point)                     [ ] Lung disease ( COPD, Emphysema)            (1 Point)                                                                          [ ] Acute myocardial infarction                         (1 Point)                  [ ] Congestive heart failure (in the previous month)  (1 Point)         [ ] Inflammatory bowel disease                            (1 Point)                  [ ] Central venous access, PICC or Port               (2 points)       (within the last month)                                                                [ ] Stroke (in the previous month)                        (5 Points)    [ ] Previous or present malignancy                       (2 points)                                                                                                                                                         HEMATOLOGY RELATED FACTORS                                                         [ ] Prior episodes of VTE                                     (3 Points)                     [ ] Positive family history for VTE                      (3 Points)                  [ ] Prothrombin 02018 A                                     (3 Points)                     [ ] Factor V Leiden                                                (3 Points)                        [ ] Lupus anticoagulants                                      (3 Points)                                                           [ ] Anticardiolipin antibodies                              (3 Points)                                                       [ ] High homocysteine in the blood                   (3 Points)                                             [ ] Other congenital or acquired thrombophilia      (3 Points)                                                [ ] Heparin induced thrombocytopenia                  (3 Points)                                        MOBILITY RELATED FACTORS  [ ] Bed rest                                                         (1 Point)  [ ] Plaster cast                                                    (2 points)  [ ] Bed bound for more than 72 hours           (2 Points)    GENDER SPECIFIC FACTORS  [ ] Pregnancy or had a baby within the last month   (1 Point)  [ ] Post-partum < 6 weeks                                   (1 Point)  [ ] Hormonal therapy  or oral contraception   (1 Point)  [ ] History of pregnancy complications              (1 point)  [ ] Unexplained or recurrent              (1 Point)    OTHER RISK FACTORS                                           (1 Point)  [ ] BMI >40, smoking, diabetes requiring insulin, chemotherapy  blood transfusions and length of surgery over 2 hours    SURGERY RELATED RISK FACTORS  [ ]  Section within the last month     (1 Point)  [ ] Minor surgery                                                  (1 Point)  [ ] Arthroscopic surgery                                       (2 Points)  [ ] Planned major surgery lasting more            (2 Points)      than 45 minutes     [ ] Elective hip or knee joint replacement       (5 points)       surgery                                                TRAUMA RELATED RISK FACTORS  [ ] Fracture of the hip, pelvis, or leg                       (5 Points)  [ ] Spinal cord injury resulting in paralysis             (5 points)       (in the previous month)    [ ] Paralysis  (less than 1 month)                             (5 Points)  [ ] Multiple Trauma within 1 month                        (5 Points)    Total Score [        ]    Caprini Score 0-2: Low Risk, NO VTE prophylaxis required for most patients, encourage ambulation  Caprini Score 3-6: Moderate Risk , pharmacologic VTE prophylaxis is indicated for most patients (in the absence of contraindications)  Caprini Score Greater than or =7: High risk, pharmocologic VTE prophylaxis indicated for most patients (in the absence of contraindications)      OPIOID RISK TOOL    CAROLANN EACH BOX THAT APPLIES AND ADD TOTALS AT THE END    FAMILY HISTORY OF SUBSTANCE ABUSE                 FEMALE         MALE                                                Alcohol                             [  ]1 pt          [  ]3pts                                               Illegal Durgs                     [  ]2 pts        [  ]3pts                                               Rx Drugs                           [  ]4 pts        [  ]4 pts    PERSONAL HISTORY OF SUBSTANCE ABUSE                                                                                          Alcohol                             [  ]3 pts       [  ]3 pts                                               Illegal Drugs                     [  ]4 pts        [  ]4 pts                                               Rx Drugs                           [  ]5 pts        [  ]5 pts    AGE BETWEEN 16-45 YEARS                                      [  ]1 pt         [  ]1 pt    HISTORY OF PREADOLESCENT   SEXUAL ABUSE                                                             [  ]3 pts        [  ]0pts    PSYCHOLOGICAL DISEASE                     ADD, OCD, Bipolar, Schizophrenia        [  ]2 pts         [  ]2 pts                      Depression                                               [  ]1 pt           [  ]1 pt           SCORING TOTAL   (add numbers and type here)              (***)                                     A score of 3 or lower indicated LOW risk for future opioid abuse  A score of 4 to 7 indicated moderate risk for future opioid abuse  A score of 8 or higher indicates a high risk for opioid abuse                             This is a pleasant 79 year old  female in NAD presenting today for PST, PMH includes HTN, diabetes, CKD, NSTEMI, CAD with stenting, CABG x 2 and aortic stenosis. Patient c/o "tightness" in chest with exertion and intermittent shortness of breath with activity. Patient with aortic stenosis. She is s/p cardiac cath 2024. She is now scheduled for transcatheter aortic valve replacement  transfemoral Medtronic with basilica on 3/28/25 with Dr. Hansen.        Labs, EKG, UA, U C&S and MRSA/MSSA performed. Written and verbal instructions provided. Patient educated on surgical scrub, preadmission instructions, clearance and day of procedure medications, verbalizes understanding.  Patient instructed to stop vitamins/supplements/herbal medications/NSAIDS immediately,  verbalized understanding. Patient instructed to continue ASA and Plavix with no interruptions as per Dr. Hansen. Patient provided instructions on diabetes medication.   Patient verbalized understanding of instructions and was given the opportunity to ask questions. Out patient medications reviewed and verified with patient.      CAPRINI SCORE    AGE RELATED RISK FACTORS                                                             [ ] Age 41-60 years                                            (1 Point)  [ ] Age: 61-74 years                                           (2 Points)                 [ X] Age= 75 years                                                (3 Points)             DISEASE RELATED RISK FACTORS                                                       [ ] Edema in the lower extremities                 (1 Point)                     [ ] Varicose veins                                               (1 Point)                                 [X ] BMI > 25 Kg/m2                                            (1 Point)                                  [ ] Serious infection (ie PNA)                            (1 Point)                     [ ] Lung disease ( COPD, Emphysema)            (1 Point)                                                                          [ ] Acute myocardial infarction                         (1 Point)                  [ ] Congestive heart failure (in the previous month)  (1 Point)         [ ] Inflammatory bowel disease                            (1 Point)                  [ ] Central venous access, PICC or Port               (2 points)       (within the last month)                                                                [ ] Stroke (in the previous month)                        (5 Points)    [ ] Previous or present malignancy                       (2 points)                                                                                                                                                         HEMATOLOGY RELATED FACTORS                                                         [ ] Prior episodes of VTE                                     (3 Points)                     [ ] Positive family history for VTE                      (3 Points)                  [ ] Prothrombin 25010 A                                     (3 Points)                     [ ] Factor V Leiden                                                (3 Points)                        [ ] Lupus anticoagulants                                      (3 Points)                                                           [ ] Anticardiolipin antibodies                              (3 Points)                                                       [ ] High homocysteine in the blood                   (3 Points)                                             [ ] Other congenital or acquired thrombophilia      (3 Points)                                                [ ] Heparin induced thrombocytopenia                  (3 Points)                                        MOBILITY RELATED FACTORS  [ ] Bed rest                                                         (1 Point)  [ ] Plaster cast                                                    (2 points)  [ ] Bed bound for more than 72 hours           (2 Points)    GENDER SPECIFIC FACTORS  [ ] Pregnancy or had a baby within the last month   (1 Point)  [ ] Post-partum < 6 weeks                                   (1 Point)  [ ] Hormonal therapy  or oral contraception   (1 Point)  [ ] History of pregnancy complications              (1 point)  [ ] Unexplained or recurrent              (1 Point)    OTHER RISK FACTORS                                           (1 Point)  [X ] BMI >40, smoking, diabetes requiring insulin, chemotherapy  blood transfusions and length of surgery over 2 hours    SURGERY RELATED RISK FACTORS  [ ]  Section within the last month     (1 Point)  [ ] Minor surgery                                                  (1 Point)  [ ] Arthroscopic surgery                                       (2 Points)  [X ] Planned major surgery lasting more            (2 Points)      than 45 minutes     [ ] Elective hip or knee joint replacement       (5 points)       surgery                                                TRAUMA RELATED RISK FACTORS  [ ] Fracture of the hip, pelvis, or leg                       (5 Points)  [ ] Spinal cord injury resulting in paralysis             (5 points)       (in the previous month)    [ ] Paralysis  (less than 1 month)                             (5 Points)  [ ] Multiple Trauma within 1 month                        (5 Points)    Total Score [    7    ]    Caprini Score 0-2: Low Risk, NO VTE prophylaxis required for most patients, encourage ambulation  Caprini Score 3-6: Moderate Risk , pharmacologic VTE prophylaxis is indicated for most patients (in the absence of contraindications)  Caprini Score Greater than or =7: High risk, pharmocologic VTE prophylaxis indicated for most patients (in the absence of contraindications)      OPIOID RISK TOOL    CAROLANN EACH BOX THAT APPLIES AND ADD TOTALS AT THE END    FAMILY HISTORY OF SUBSTANCE ABUSE                 FEMALE         MALE                                                Alcohol                             [  ]1 pt          [  ]3pts                                               Illegal Durgs                     [  ]2 pts        [  ]3pts                                               Rx Drugs                           [  ]4 pts        [  ]4 pts    PERSONAL HISTORY OF SUBSTANCE ABUSE                                                                                          Alcohol                             [  ]3 pts       [  ]3 pts                                               Illegal Drugs                     [  ]4 pts        [  ]4 pts                                               Rx Drugs                           [  ]5 pts        [  ]5 pts    AGE BETWEEN 16-45 YEARS                                      [  ]1 pt         [  ]1 pt    HISTORY OF PREADOLESCENT   SEXUAL ABUSE                                                             [  ]3 pts        [  ]0pts    PSYCHOLOGICAL DISEASE                     ADD, OCD, Bipolar, Schizophrenia        [  ]2 pts         [  ]2 pts                      Depression                                               [  ]1 pt           [  ]1 pt           SCORING TOTAL   (add numbers and type here)              (**0*)                                     A score of 3 or lower indicated LOW risk for future opioid abuse  A score of 4 to 7 indicated moderate risk for future opioid abuse  A score of 8 or higher indicates a high risk for opioid abuse                             This is a pleasant 79 year old  female in NAD presenting today for PST, PMH includes HTN, hypothyroidism, diabetes, CKD, NSTEMI, CAD with stenting, CABG x 2 and aortic stenosis. Patient c/o "tightness" in chest with exertion and intermittent shortness of breath with activity. Patient with aortic stenosis. She is s/p cardiac cath 2024. She is now scheduled for transcatheter aortic valve replacement  transfemoral Medtronic with basilica on 3/28/25 with Dr. Hansen.        Labs, EKG, UA, U C&S and MRSA/MSSA performed. Written and verbal instructions provided. Patient educated on surgical scrub, preadmission instructions, clearance and day of procedure medications, verbalizes understanding.  Patient instructed to stop vitamins/supplements/herbal medications/NSAIDS immediately,  verbalized understanding. Patient instructed to continue ASA and Plavix with no interruptions as per Dr. Hansen. Patient provided instructions on diabetes medication.   Patient verbalized understanding of instructions and was given the opportunity to ask questions. Out patient medications reviewed and verified with patient.      CAPRINI SCORE    AGE RELATED RISK FACTORS                                                             [ ] Age 41-60 years                                            (1 Point)  [ ] Age: 61-74 years                                           (2 Points)                 [ X] Age= 75 years                                                (3 Points)             DISEASE RELATED RISK FACTORS                                                       [ ] Edema in the lower extremities                 (1 Point)                     [ ] Varicose veins                                               (1 Point)                                 [X ] BMI > 25 Kg/m2                                            (1 Point)                                  [ ] Serious infection (ie PNA)                            (1 Point)                     [ ] Lung disease ( COPD, Emphysema)            (1 Point)                                                                          [ ] Acute myocardial infarction                         (1 Point)                  [ ] Congestive heart failure (in the previous month)  (1 Point)         [ ] Inflammatory bowel disease                            (1 Point)                  [ ] Central venous access, PICC or Port               (2 points)       (within the last month)                                                                [ ] Stroke (in the previous month)                        (5 Points)    [ ] Previous or present malignancy                       (2 points)                                                                                                                                                         HEMATOLOGY RELATED FACTORS                                                         [ ] Prior episodes of VTE                                     (3 Points)                     [ ] Positive family history for VTE                      (3 Points)                  [ ] Prothrombin 24240 A                                     (3 Points)                     [ ] Factor V Leiden                                                (3 Points)                        [ ] Lupus anticoagulants                                      (3 Points)                                                           [ ] Anticardiolipin antibodies                              (3 Points)                                                       [ ] High homocysteine in the blood                   (3 Points)                                             [ ] Other congenital or acquired thrombophilia      (3 Points)                                                [ ] Heparin induced thrombocytopenia                  (3 Points)                                        MOBILITY RELATED FACTORS  [ ] Bed rest                                                         (1 Point)  [ ] Plaster cast                                                    (2 points)  [ ] Bed bound for more than 72 hours           (2 Points)    GENDER SPECIFIC FACTORS  [ ] Pregnancy or had a baby within the last month   (1 Point)  [ ] Post-partum < 6 weeks                                   (1 Point)  [ ] Hormonal therapy  or oral contraception   (1 Point)  [ ] History of pregnancy complications              (1 point)  [ ] Unexplained or recurrent              (1 Point)    OTHER RISK FACTORS                                           (1 Point)  [X ] BMI >40, smoking, diabetes requiring insulin, chemotherapy  blood transfusions and length of surgery over 2 hours    SURGERY RELATED RISK FACTORS  [ ]  Section within the last month     (1 Point)  [ ] Minor surgery                                                  (1 Point)  [ ] Arthroscopic surgery                                       (2 Points)  [X ] Planned major surgery lasting more            (2 Points)      than 45 minutes     [ ] Elective hip or knee joint replacement       (5 points)       surgery                                                TRAUMA RELATED RISK FACTORS  [ ] Fracture of the hip, pelvis, or leg                       (5 Points)  [ ] Spinal cord injury resulting in paralysis             (5 points)       (in the previous month)    [ ] Paralysis  (less than 1 month)                             (5 Points)  [ ] Multiple Trauma within 1 month                        (5 Points)    Total Score [    7    ]    Caprini Score 0-2: Low Risk, NO VTE prophylaxis required for most patients, encourage ambulation  Caprini Score 3-6: Moderate Risk , pharmacologic VTE prophylaxis is indicated for most patients (in the absence of contraindications)  Caprini Score Greater than or =7: High risk, pharmocologic VTE prophylaxis indicated for most patients (in the absence of contraindications)      OPIOID RISK TOOL    CAROLANN EACH BOX THAT APPLIES AND ADD TOTALS AT THE END    FAMILY HISTORY OF SUBSTANCE ABUSE                 FEMALE         MALE                                                Alcohol                             [  ]1 pt          [  ]3pts                                               Illegal Durgs                     [  ]2 pts        [  ]3pts                                               Rx Drugs                           [  ]4 pts        [  ]4 pts    PERSONAL HISTORY OF SUBSTANCE ABUSE                                                                                          Alcohol                             [  ]3 pts       [  ]3 pts                                               Illegal Drugs                     [  ]4 pts        [  ]4 pts                                               Rx Drugs                           [  ]5 pts        [  ]5 pts    AGE BETWEEN 16-45 YEARS                                      [  ]1 pt         [  ]1 pt    HISTORY OF PREADOLESCENT   SEXUAL ABUSE                                                             [  ]3 pts        [  ]0pts    PSYCHOLOGICAL DISEASE                     ADD, OCD, Bipolar, Schizophrenia        [  ]2 pts         [  ]2 pts                      Depression                                               [  ]1 pt           [  ]1 pt           SCORING TOTAL   (add numbers and type here)              (**0*)                                     A score of 3 or lower indicated LOW risk for future opioid abuse  A score of 4 to 7 indicated moderate risk for future opioid abuse  A score of 8 or higher indicates a high risk for opioid abuse

## 2025-03-26 NOTE — H&P PST ADULT - HISTORY OF PRESENT ILLNESS
Patient is a 79 year old  female presenting today for PST, PMH includes HTN, diabetes, CKD, NSTEMI (10/22), CAD, CABG and aortic stenosis.   Patient c/o "tightness" in chest with exertion and intermittent shortness of breath.  Every once and a while she has fast palpitations. Denies any chest pain, fatigue, lightheaded/dizziness, syncope, orthopnea, cough, or lower extremity edema. Patient is a 79 year old  female presenting today for PST, PMH includes HTN, diabetes, CKD, NSTEMI (10/22), CAD with stenting, CABG x 2 and aortic stenosis.   Patient c/o "tightness" in chest with exertion and intermittent shortness of breath.  Every once and a while she has fast palpitations. Denies any chest pain, fatigue, lightheaded/dizziness, syncope, orthopnea, cough, or lower extremity edema.  s/p cath 12/2024 Patient is a 79 year old  female presenting today for PST, PMH includes HTN, diabetes, CKD, NSTEMI, CAD with stenting, CABG x 2 and aortic stenosis. Patient c/o "tightness" in chest with exertion and intermittent shortness of breath with activity. Reports on occasion she has palpitations, that resolve quickly on their own and fatigue. She is s/p cardiac cath 12/2024. Denies chest pain, dizziness,  lightheadedness, syncope, orthopnea, cough, or lower extremity edema. She is now scheduled for transcatheter aortic valve replacement  transfemoral Medtronic with basilica on 3/28/25 with Dr. Hansen.     Testing as per chart:    Cardiac Catheterization 12/20/24  LM     Left main artery: Angiography shows no disease. LAD Left anterior descending artery: Proximal 20-30%, mid 100%. Distal LADfills via LIMA..CX Circumflex: Angiography shows mild atherosclerosis. RCA Right coronary artery: Mid 40% eccentric lesion.. Graft Angiography LIMA graft to Mid left anterior descending: Angiography shows no disease .Left Heart Cath [facility] performed: Aortic valve crossed and left ventricular pressures were obtained.    Transthoracic Echocardiogram 12/20/24   1. Left ventricular cavity is normal in size. Left ventricular systolic function is normal with an ejection fraction visually estimated at 60 to 65 %. 2. There is mild (grade 1) left ventricular diastolic dysfunction. 3. Normal right ventricular cavity size and normal right ventricular systolic function. 4. Left atrium is moderately dilated. 5. The right atrium is normal in size. 6. The interatrial septum appears intact. 7. There is moderate calcification of the mitral valve annulus. 8. Thickened mitral valve leaflets. 9. Mild mitral regurgitation.10. Aortic valve anatomy cannot be determined with reduced systolic excursion. There is severe calcification of the aortic valve leaflets. Severe aortic stenosis.11. The peak transaortic velocity is 3.97 m/s, peak transaortic gradient is 63.1 mmHg and mean transaortic gradient is 33.2 mmHg with an LVOT/aortic valve VTI ratio of 0.28. The aortic valve acceleration time is 118 msec. The effective orifice area is estimated at 0.60 cm by the continuity equation.12. Moderate aortic regurgitation.13. No pericardial effusion seen.  Patient is a 79 year old  female presenting today for PST, PMH includes HTN, diabetes, hypothyroidism,  CKD, NSTEMI, CAD with stenting, CABG x 2 and aortic stenosis. Patient c/o "tightness" in chest with exertion and intermittent shortness of breath with activity. Reports on occasion she has palpitations, that resolve quickly on their own and fatigue. She is s/p cardiac cath 12/2024. Denies chest pain, dizziness,  lightheadedness, syncope, orthopnea, cough, or lower extremity edema. She is now scheduled for transcatheter aortic valve replacement  transfemoral Medtronic with basilica on 3/28/25 with Dr. Hansen.     Testing as per chart:    Cardiac Catheterization 12/20/24  LM     Left main artery: Angiography shows no disease. LAD Left anterior descending artery: Proximal 20-30%, mid 100%. Distal LADfills via LIMA..CX Circumflex: Angiography shows mild atherosclerosis. RCA Right coronary artery: Mid 40% eccentric lesion.. Graft Angiography LIMA graft to Mid left anterior descending: Angiography shows no disease .Left Heart Cath C performed: Aortic valve crossed and left ventricular pressures were obtained.    Transthoracic Echocardiogram 12/20/24   1. Left ventricular cavity is normal in size. Left ventricular systolic function is normal with an ejection fraction visually estimated at 60 to 65 %. 2. There is mild (grade 1) left ventricular diastolic dysfunction. 3. Normal right ventricular cavity size and normal right ventricular systolic function. 4. Left atrium is moderately dilated. 5. The right atrium is normal in size. 6. The interatrial septum appears intact. 7. There is moderate calcification of the mitral valve annulus. 8. Thickened mitral valve leaflets. 9. Mild mitral regurgitation.10. Aortic valve anatomy cannot be determined with reduced systolic excursion. There is severe calcification of the aortic valve leaflets. Severe aortic stenosis.11. The peak transaortic velocity is 3.97 m/s, peak transaortic gradient is 63.1 mmHg and mean transaortic gradient is 33.2 mmHg with an LVOT/aortic valve VTI ratio of 0.28. The aortic valve acceleration time is 118 msec. The effective orifice area is estimated at 0.60 cm by the continuity equation.12. Moderate aortic regurgitation.13. No pericardial effusion seen.

## 2025-03-26 NOTE — H&P PST ADULT - NSICDXPASTSURGICALHX_GEN_ALL_CORE_FT
PAST SURGICAL HISTORY:  H/O  section     S/P CABG (coronary artery bypass graft)      PAST SURGICAL HISTORY:  H/O  section     H/O colonoscopy     History of cardiac cath     S/P CABG (coronary artery bypass graft)

## 2025-03-26 NOTE — H&P PST ADULT - PROBLEM SELECTOR PLAN 4
A1C level performed  Finger stick on day of procedure.  Patient provided instructions on diabetic medication as follows"  Last dose of Jardiance on 3/24, patient stated she was not informed when to stop Jardiance and she took it on 3/25, she was re-educated to not take Jardiance today, 3/27 or 3/28, verbalized understanding.  Se was instructed last dose of Glipizide on 3/27 in the AM no PM dose on 3/27 and no Glipizide on 3/28, . verbalized understanding.

## 2025-03-26 NOTE — H&P PST ADULT - PROBLEM SELECTOR PLAN 3
BP today 158/60, patient stated she did not take her morning blood pressure medications today.  Continue medication.   EKG performed.  Patient instructed to take morning blood pressure medications with a small sip of water day of surgery, verbalized understanding.

## 2025-03-26 NOTE — H&P PST ADULT - RESPIRATORY
"Patient: Jaimie Jeffries    Procedure Summary     Date: 08/27/21 Room / Location:  [facility]    Anesthesia Start: 0914 Anesthesia Stop: 0937    Procedure: COLONOSCOPY INTO CECUM/ TERMINAL ILEUM (N/A ) Diagnosis:       Screen for colon cancer      Internal hemorrhoids      (Screen for colon cancer [Z12.11])    Surgeons: Raheem Fuentes MD Provider: Toribio Bob MD    Anesthesia Type: MAC ASA Status: 2          Anesthesia Type: MAC    Vitals  Vitals Value Taken Time   /85 08/27/21 0950   Temp     Pulse 66 08/27/21 0950   Resp 16 08/27/21 0950   SpO2 95 % 08/27/21 0950           Post Anesthesia Care and Evaluation    Patient location during evaluation: bedside  Patient participation: complete - patient participated  Level of consciousness: awake and alert  Pain management: adequate  Airway patency: patent  Anesthetic complications: No anesthetic complications    Cardiovascular status: acceptable  Respiratory status: acceptable  Hydration status: acceptable    Comments: /85 (BP Location: Left arm, Patient Position: Lying)   Pulse 66   Resp 16   Ht 168.9 cm (66.5\")   Wt 80.9 kg (178 lb 4.8 oz)   SpO2 95%   BMI 28.35 kg/m²       "
clear to auscultation bilaterally/no wheezes/no rales/no rhonchi

## 2025-03-26 NOTE — H&P PST ADULT - PROBLEM SELECTOR PLAN 1
Scheduled for transcatheter aortic valve replacement  transfemoral Medtronic with basilica on 3/28/25 with Dr. Hansen.

## 2025-03-26 NOTE — H&P PST ADULT - PROBLEM SELECTOR PLAN 2
Thyroid panel performed.   Patient instructed to continue levothyroxine as prescribed.  Patient instructed to take levothyroxine with a small sip of water morning of surgery, verbalized understanding.

## 2025-03-27 LAB
CULTURE RESULTS: SIGNIFICANT CHANGE UP
SPECIMEN SOURCE: SIGNIFICANT CHANGE UP

## 2025-03-27 PROCEDURE — 83735 ASSAY OF MAGNESIUM: CPT

## 2025-03-27 PROCEDURE — 36415 COLL VENOUS BLD VENIPUNCTURE: CPT

## 2025-03-27 PROCEDURE — 84480 ASSAY TRIIODOTHYRONINE (T3): CPT

## 2025-03-27 PROCEDURE — 85730 THROMBOPLASTIN TIME PARTIAL: CPT

## 2025-03-27 PROCEDURE — 71046 X-RAY EXAM CHEST 2 VIEWS: CPT

## 2025-03-27 PROCEDURE — 84436 ASSAY OF TOTAL THYROXINE: CPT

## 2025-03-27 PROCEDURE — 93005 ELECTROCARDIOGRAM TRACING: CPT

## 2025-03-27 PROCEDURE — 86905 BLOOD TYPING RBC ANTIGENS: CPT

## 2025-03-27 PROCEDURE — 86923 COMPATIBILITY TEST ELECTRIC: CPT

## 2025-03-27 PROCEDURE — G0463: CPT

## 2025-03-27 PROCEDURE — 83880 ASSAY OF NATRIURETIC PEPTIDE: CPT

## 2025-03-27 PROCEDURE — 87640 STAPH A DNA AMP PROBE: CPT

## 2025-03-27 PROCEDURE — 84134 ASSAY OF PREALBUMIN: CPT

## 2025-03-27 PROCEDURE — 87086 URINE CULTURE/COLONY COUNT: CPT

## 2025-03-27 PROCEDURE — 87641 MR-STAPH DNA AMP PROBE: CPT

## 2025-03-27 PROCEDURE — 85610 PROTHROMBIN TIME: CPT

## 2025-03-27 PROCEDURE — 86901 BLOOD TYPING SEROLOGIC RH(D): CPT

## 2025-03-27 PROCEDURE — 83036 HEMOGLOBIN GLYCOSYLATED A1C: CPT

## 2025-03-27 PROCEDURE — 84443 ASSAY THYROID STIM HORMONE: CPT

## 2025-03-27 PROCEDURE — 85025 COMPLETE CBC W/AUTO DIFF WBC: CPT

## 2025-03-27 PROCEDURE — 80053 COMPREHEN METABOLIC PANEL: CPT

## 2025-03-27 PROCEDURE — 81001 URINALYSIS AUTO W/SCOPE: CPT

## 2025-03-27 PROCEDURE — 86850 RBC ANTIBODY SCREEN: CPT

## 2025-03-27 PROCEDURE — 86900 BLOOD TYPING SEROLOGIC ABO: CPT

## 2025-03-27 NOTE — ASU PATIENT PROFILE, ADULT - NSICDXPASTMEDICALHX_GEN_ALL_CORE_FT
Called [facility] and advised them pt would be coming and was a direct admit to station 22.   PAST MEDICAL HISTORY:  CAD (coronary artery disease)     CKD (chronic kidney disease)     Diabetes mellitus     HLD (hyperlipidemia)     Hypertension     Hypothyroidism

## 2025-03-27 NOTE — ASU PATIENT PROFILE, ADULT - NSICDXPASTSURGICALHX_GEN_ALL_CORE_FT
PAST SURGICAL HISTORY:  H/O  section     H/O colonoscopy     History of cardiac cath     S/P CABG (coronary artery bypass graft)

## 2025-03-28 ENCOUNTER — INPATIENT (INPATIENT)
Facility: HOSPITAL | Age: 80
LOS: 5 days | Discharge: HOME HEALTH | DRG: 307 | End: 2025-04-03
Attending: THORACIC SURGERY (CARDIOTHORACIC VASCULAR SURGERY) | Admitting: THORACIC SURGERY (CARDIOTHORACIC VASCULAR SURGERY)
Payer: MEDICARE

## 2025-03-28 ENCOUNTER — RESULT REVIEW (OUTPATIENT)
Age: 80
End: 2025-03-28

## 2025-03-28 ENCOUNTER — APPOINTMENT (OUTPATIENT)
Dept: CARDIOTHORACIC SURGERY | Facility: HOSPITAL | Age: 80
End: 2025-03-28

## 2025-03-28 VITALS
WEIGHT: 127.87 LBS | RESPIRATION RATE: 16 BRPM | OXYGEN SATURATION: 97 % | SYSTOLIC BLOOD PRESSURE: 171 MMHG | TEMPERATURE: 99 F | HEIGHT: 57.99 IN | HEART RATE: 70 BPM | DIASTOLIC BLOOD PRESSURE: 53 MMHG

## 2025-03-28 DIAGNOSIS — I35.0 NONRHEUMATIC AORTIC (VALVE) STENOSIS: ICD-10-CM

## 2025-03-28 DIAGNOSIS — Z95.1 PRESENCE OF AORTOCORONARY BYPASS GRAFT: Chronic | ICD-10-CM

## 2025-03-28 DIAGNOSIS — Z98.890 OTHER SPECIFIED POSTPROCEDURAL STATES: Chronic | ICD-10-CM

## 2025-03-28 DIAGNOSIS — Z98.891 HISTORY OF UTERINE SCAR FROM PREVIOUS SURGERY: Chronic | ICD-10-CM

## 2025-03-28 LAB
ACETONE SERPL-MCNC: NEGATIVE — SIGNIFICANT CHANGE UP
ALBUMIN SERPL ELPH-MCNC: 3.7 G/DL — SIGNIFICANT CHANGE UP (ref 3.3–5.2)
ALP SERPL-CCNC: 86 U/L — SIGNIFICANT CHANGE UP (ref 40–120)
ALT FLD-CCNC: 17 U/L — SIGNIFICANT CHANGE UP
ANION GAP SERPL CALC-SCNC: 13 MMOL/L — SIGNIFICANT CHANGE UP (ref 5–17)
ANION GAP SERPL CALC-SCNC: 13 MMOL/L — SIGNIFICANT CHANGE UP (ref 5–17)
ANION GAP SERPL CALC-SCNC: 14 MMOL/L — SIGNIFICANT CHANGE UP (ref 5–17)
APTT BLD: 33.7 SEC — SIGNIFICANT CHANGE UP (ref 24.5–35.6)
APTT BLD: 35.9 SEC — HIGH (ref 24.5–35.6)
AST SERPL-CCNC: 31 U/L — SIGNIFICANT CHANGE UP
BASE EXCESS BLDA CALC-SCNC: -0.4 MMOL/L — SIGNIFICANT CHANGE UP (ref -2–3)
BASE EXCESS BLDA CALC-SCNC: -1.8 MMOL/L — SIGNIFICANT CHANGE UP (ref -2–3)
BASE EXCESS BLDA CALC-SCNC: -2.5 MMOL/L — LOW (ref -2–3)
BASE EXCESS BLDA CALC-SCNC: 0.7 MMOL/L — SIGNIFICANT CHANGE UP (ref -2–3)
BASOPHILS # BLD AUTO: 0.01 K/UL — SIGNIFICANT CHANGE UP (ref 0–0.2)
BASOPHILS # BLD AUTO: 0.04 K/UL — SIGNIFICANT CHANGE UP (ref 0–0.2)
BASOPHILS NFR BLD AUTO: 0.1 % — SIGNIFICANT CHANGE UP (ref 0–2)
BASOPHILS NFR BLD AUTO: 0.5 % — SIGNIFICANT CHANGE UP (ref 0–2)
BILIRUB SERPL-MCNC: 0.2 MG/DL — LOW (ref 0.4–2)
BLD GP AB SCN SERPL QL: SIGNIFICANT CHANGE UP
BUN SERPL-MCNC: 30.9 MG/DL — HIGH (ref 8–20)
BUN SERPL-MCNC: 31 MG/DL — HIGH (ref 8–20)
BUN SERPL-MCNC: 35.9 MG/DL — HIGH (ref 8–20)
CA-I BLDA-SCNC: 1.28 MMOL/L — SIGNIFICANT CHANGE UP (ref 1.15–1.33)
CA-I BLDA-SCNC: 1.3 MMOL/L — SIGNIFICANT CHANGE UP (ref 1.15–1.33)
CA-I BLDA-SCNC: 1.3 MMOL/L — SIGNIFICANT CHANGE UP (ref 1.15–1.33)
CA-I BLDA-SCNC: 1.32 MMOL/L — SIGNIFICANT CHANGE UP (ref 1.15–1.33)
CALCIUM SERPL-MCNC: 10.1 MG/DL — SIGNIFICANT CHANGE UP (ref 8.4–10.5)
CALCIUM SERPL-MCNC: 9.4 MG/DL — SIGNIFICANT CHANGE UP (ref 8.4–10.5)
CALCIUM SERPL-MCNC: 9.4 MG/DL — SIGNIFICANT CHANGE UP (ref 8.4–10.5)
CHLORIDE BLDA-SCNC: 108 MMOL/L — SIGNIFICANT CHANGE UP (ref 96–108)
CHLORIDE BLDA-SCNC: 109 MMOL/L — HIGH (ref 96–108)
CHLORIDE SERPL-SCNC: 102 MMOL/L — SIGNIFICANT CHANGE UP (ref 96–108)
CHLORIDE SERPL-SCNC: 104 MMOL/L — SIGNIFICANT CHANGE UP (ref 96–108)
CHLORIDE SERPL-SCNC: 105 MMOL/L — SIGNIFICANT CHANGE UP (ref 96–108)
CO2 SERPL-SCNC: 20 MMOL/L — LOW (ref 22–29)
CO2 SERPL-SCNC: 21 MMOL/L — LOW (ref 22–29)
CO2 SERPL-SCNC: 25 MMOL/L — SIGNIFICANT CHANGE UP (ref 22–29)
COHGB MFR BLDA: 0.7 % — SIGNIFICANT CHANGE UP
COHGB MFR BLDA: 0.9 % — SIGNIFICANT CHANGE UP
COHGB MFR BLDA: 1.1 % — SIGNIFICANT CHANGE UP
COHGB MFR BLDA: 1.2 % — SIGNIFICANT CHANGE UP
CREAT SERPL-MCNC: 1.43 MG/DL — HIGH (ref 0.5–1.3)
CREAT SERPL-MCNC: 1.6 MG/DL — HIGH (ref 0.5–1.3)
CREAT SERPL-MCNC: 1.63 MG/DL — HIGH (ref 0.5–1.3)
EGFR: 32 ML/MIN/1.73M2 — LOW
EGFR: 32 ML/MIN/1.73M2 — LOW
EGFR: 33 ML/MIN/1.73M2 — LOW
EGFR: 33 ML/MIN/1.73M2 — LOW
EGFR: 38 ML/MIN/1.73M2 — LOW
EGFR: 38 ML/MIN/1.73M2 — LOW
EOSINOPHIL # BLD AUTO: 0.01 K/UL — SIGNIFICANT CHANGE UP (ref 0–0.5)
EOSINOPHIL # BLD AUTO: 0.89 K/UL — HIGH (ref 0–0.5)
EOSINOPHIL NFR BLD AUTO: 0.1 % — SIGNIFICANT CHANGE UP (ref 0–6)
EOSINOPHIL NFR BLD AUTO: 11.3 % — HIGH (ref 0–6)
FIBRINOGEN PPP-MCNC: 332 MG/DL — SIGNIFICANT CHANGE UP (ref 200–450)
GAS PNL BLDA: SIGNIFICANT CHANGE UP
GLUCOSE BLDA-MCNC: 119 MG/DL — HIGH (ref 70–99)
GLUCOSE BLDA-MCNC: 127 MG/DL — HIGH (ref 70–99)
GLUCOSE BLDA-MCNC: 150 MG/DL — HIGH (ref 70–99)
GLUCOSE BLDA-MCNC: 157 MG/DL — HIGH (ref 70–99)
GLUCOSE BLDC GLUCOMTR-MCNC: 132 MG/DL — HIGH (ref 70–99)
GLUCOSE BLDC GLUCOMTR-MCNC: 166 MG/DL — HIGH (ref 70–99)
GLUCOSE SERPL-MCNC: 135 MG/DL — HIGH (ref 70–99)
GLUCOSE SERPL-MCNC: 148 MG/DL — HIGH (ref 70–99)
GLUCOSE SERPL-MCNC: 149 MG/DL — HIGH (ref 70–99)
HCO3 BLDA-SCNC: 22 MMOL/L — SIGNIFICANT CHANGE UP (ref 21–28)
HCO3 BLDA-SCNC: 22 MMOL/L — SIGNIFICANT CHANGE UP (ref 21–28)
HCO3 BLDA-SCNC: 23 MMOL/L — SIGNIFICANT CHANGE UP (ref 21–28)
HCO3 BLDA-SCNC: 26 MMOL/L — SIGNIFICANT CHANGE UP (ref 21–28)
HCT VFR BLD CALC: 27.1 % — LOW (ref 34.5–45)
HCT VFR BLD CALC: 27.3 % — LOW (ref 34.5–45)
HCT VFR BLD CALC: 33.9 % — LOW (ref 34.5–45)
HCT VFR BLDA CALC: 24 % — SIGNIFICANT CHANGE UP
HCT VFR BLDA CALC: 25 % — SIGNIFICANT CHANGE UP
HCT VFR BLDA CALC: 26 % — SIGNIFICANT CHANGE UP
HCT VFR BLDA CALC: 39 % — SIGNIFICANT CHANGE UP
HGB BLD-MCNC: 10.5 G/DL — LOW (ref 11.5–15.5)
HGB BLD-MCNC: 8.4 G/DL — LOW (ref 11.5–15.5)
HGB BLD-MCNC: 8.8 G/DL — LOW (ref 11.5–15.5)
HGB BLDA-MCNC: 13.1 G/DL — SIGNIFICANT CHANGE UP (ref 11.7–16.1)
HGB BLDA-MCNC: 8.1 G/DL — LOW (ref 11.7–16.1)
HGB BLDA-MCNC: 8.3 G/DL — LOW (ref 11.7–16.1)
HGB BLDA-MCNC: 8.5 G/DL — LOW (ref 11.7–16.1)
IMM GRANULOCYTES # BLD AUTO: 0.02 K/UL — SIGNIFICANT CHANGE UP (ref 0–0.07)
IMM GRANULOCYTES # BLD AUTO: 0.05 K/UL — SIGNIFICANT CHANGE UP (ref 0–0.07)
IMM GRANULOCYTES NFR BLD AUTO: 0.3 % — SIGNIFICANT CHANGE UP (ref 0–0.9)
IMM GRANULOCYTES NFR BLD AUTO: 0.4 % — SIGNIFICANT CHANGE UP (ref 0–0.9)
INR BLD: 0.98 RATIO — SIGNIFICANT CHANGE UP (ref 0.85–1.16)
INR BLD: 1.03 RATIO — SIGNIFICANT CHANGE UP (ref 0.85–1.16)
LACTATE BLDA-MCNC: 0.9 MMOL/L — SIGNIFICANT CHANGE UP (ref 0.5–2)
LACTATE BLDA-MCNC: 0.9 MMOL/L — SIGNIFICANT CHANGE UP (ref 0.5–2)
LACTATE BLDA-MCNC: 1.1 MMOL/L — SIGNIFICANT CHANGE UP (ref 0.5–2)
LACTATE BLDA-MCNC: 1.4 MMOL/L — SIGNIFICANT CHANGE UP (ref 0.5–2)
LYMPHOCYTES # BLD AUTO: 0.81 K/UL — LOW (ref 1–3.3)
LYMPHOCYTES # BLD AUTO: 1.43 K/UL — SIGNIFICANT CHANGE UP (ref 1–3.3)
LYMPHOCYTES NFR BLD AUTO: 18.1 % — SIGNIFICANT CHANGE UP (ref 13–44)
LYMPHOCYTES NFR BLD AUTO: 7.2 % — LOW (ref 13–44)
MAGNESIUM SERPL-MCNC: 1.9 MG/DL — SIGNIFICANT CHANGE UP (ref 1.6–2.6)
MCHC RBC-ENTMCNC: 29.7 PG — SIGNIFICANT CHANGE UP (ref 27–34)
MCHC RBC-ENTMCNC: 29.7 PG — SIGNIFICANT CHANGE UP (ref 27–34)
MCHC RBC-ENTMCNC: 30.6 PG — SIGNIFICANT CHANGE UP (ref 27–34)
MCHC RBC-ENTMCNC: 31 G/DL — LOW (ref 32–36)
MCHC RBC-ENTMCNC: 31 G/DL — LOW (ref 32–36)
MCHC RBC-ENTMCNC: 32.2 G/DL — SIGNIFICANT CHANGE UP (ref 32–36)
MCV RBC AUTO: 94.8 FL — SIGNIFICANT CHANGE UP (ref 80–100)
MCV RBC AUTO: 95.8 FL — SIGNIFICANT CHANGE UP (ref 80–100)
MCV RBC AUTO: 95.8 FL — SIGNIFICANT CHANGE UP (ref 80–100)
METHGB MFR BLDA: 0.6 % — SIGNIFICANT CHANGE UP
METHGB MFR BLDA: 0.6 % — SIGNIFICANT CHANGE UP
METHGB MFR BLDA: 0.8 % — SIGNIFICANT CHANGE UP
METHGB MFR BLDA: 0.9 % — SIGNIFICANT CHANGE UP
MONOCYTES # BLD AUTO: 0.23 K/UL — SIGNIFICANT CHANGE UP (ref 0–0.9)
MONOCYTES # BLD AUTO: 0.76 K/UL — SIGNIFICANT CHANGE UP (ref 0–0.9)
MONOCYTES NFR BLD AUTO: 2 % — SIGNIFICANT CHANGE UP (ref 2–14)
MONOCYTES NFR BLD AUTO: 9.6 % — SIGNIFICANT CHANGE UP (ref 2–14)
NEUTROPHILS # BLD AUTO: 10.21 K/UL — HIGH (ref 1.8–7.4)
NEUTROPHILS # BLD AUTO: 4.76 K/UL — SIGNIFICANT CHANGE UP (ref 1.8–7.4)
NEUTROPHILS NFR BLD AUTO: 60.2 % — SIGNIFICANT CHANGE UP (ref 43–77)
NEUTROPHILS NFR BLD AUTO: 90.2 % — HIGH (ref 43–77)
NRBC # BLD AUTO: 0 K/UL — SIGNIFICANT CHANGE UP (ref 0–0)
NRBC # FLD: 0 K/UL — SIGNIFICANT CHANGE UP (ref 0–0)
NRBC BLD AUTO-RTO: 0 /100 WBCS — SIGNIFICANT CHANGE UP (ref 0–0)
OXYHGB MFR BLDA: 98 % — HIGH (ref 90–95)
PCO2 BLDA: 30 MMHG — LOW (ref 32–45)
PCO2 BLDA: 31 MMHG — LOW (ref 32–45)
PCO2 BLDA: 32 MMHG — SIGNIFICANT CHANGE UP (ref 32–45)
PCO2 BLDA: 45 MMHG — SIGNIFICANT CHANGE UP (ref 32–45)
PH BLDA: 7.37 — SIGNIFICANT CHANGE UP (ref 7.35–7.45)
PH BLDA: 7.45 — SIGNIFICANT CHANGE UP (ref 7.35–7.45)
PH BLDA: 7.45 — SIGNIFICANT CHANGE UP (ref 7.35–7.45)
PH BLDA: 7.49 — HIGH (ref 7.35–7.45)
PHOSPHATE SERPL-MCNC: 3 MG/DL — SIGNIFICANT CHANGE UP (ref 2.4–4.7)
PLATELET # BLD AUTO: 183 K/UL — SIGNIFICANT CHANGE UP (ref 150–400)
PLATELET # BLD AUTO: 184 K/UL — SIGNIFICANT CHANGE UP (ref 150–400)
PLATELET # BLD AUTO: 262 K/UL — SIGNIFICANT CHANGE UP (ref 150–400)
PMV BLD: 9.7 FL — SIGNIFICANT CHANGE UP (ref 7–13)
PMV BLD: 9.7 FL — SIGNIFICANT CHANGE UP (ref 7–13)
PMV BLD: 9.9 FL — SIGNIFICANT CHANGE UP (ref 7–13)
PO2 BLDA: 429 MMHG — HIGH (ref 83–108)
PO2 BLDA: 465 MMHG — HIGH (ref 83–108)
PO2 BLDA: 484 MMHG — HIGH (ref 83–108)
PO2 BLDA: 485 MMHG — HIGH (ref 83–108)
POTASSIUM BLDA-SCNC: 4.1 MMOL/L — SIGNIFICANT CHANGE UP (ref 3.5–5.1)
POTASSIUM BLDA-SCNC: 4.3 MMOL/L — SIGNIFICANT CHANGE UP (ref 3.5–5.1)
POTASSIUM BLDA-SCNC: 4.4 MMOL/L — SIGNIFICANT CHANGE UP (ref 3.5–5.1)
POTASSIUM BLDA-SCNC: 4.5 MMOL/L — SIGNIFICANT CHANGE UP (ref 3.5–5.1)
POTASSIUM SERPL-MCNC: 4.5 MMOL/L — SIGNIFICANT CHANGE UP (ref 3.5–5.3)
POTASSIUM SERPL-MCNC: 4.6 MMOL/L — SIGNIFICANT CHANGE UP (ref 3.5–5.3)
POTASSIUM SERPL-MCNC: 4.9 MMOL/L — SIGNIFICANT CHANGE UP (ref 3.5–5.3)
POTASSIUM SERPL-SCNC: 4.5 MMOL/L — SIGNIFICANT CHANGE UP (ref 3.5–5.3)
POTASSIUM SERPL-SCNC: 4.6 MMOL/L — SIGNIFICANT CHANGE UP (ref 3.5–5.3)
POTASSIUM SERPL-SCNC: 4.9 MMOL/L — SIGNIFICANT CHANGE UP (ref 3.5–5.3)
PROT SERPL-MCNC: 5.6 G/DL — LOW (ref 6.6–8.7)
PROTHROM AB SERPL-ACNC: 11.4 SEC — SIGNIFICANT CHANGE UP (ref 9.9–13.4)
PROTHROM AB SERPL-ACNC: 11.9 SEC — SIGNIFICANT CHANGE UP (ref 9.9–13.4)
RBC # BLD: 2.83 M/UL — LOW (ref 3.8–5.2)
RBC # BLD: 2.88 M/UL — LOW (ref 3.8–5.2)
RBC # BLD: 3.54 M/UL — LOW (ref 3.8–5.2)
RBC # FLD: 13.8 % — SIGNIFICANT CHANGE UP (ref 10.3–14.5)
RBC # FLD: 13.8 % — SIGNIFICANT CHANGE UP (ref 10.3–14.5)
RBC # FLD: 13.9 % — SIGNIFICANT CHANGE UP (ref 10.3–14.5)
SAO2 % BLDA: 100 % — HIGH (ref 94–98)
SAO2 % BLDA: 99.3 % — HIGH (ref 94–98)
SODIUM BLDA-SCNC: 135 MMOL/L — LOW (ref 136–145)
SODIUM BLDA-SCNC: 137 MMOL/L — SIGNIFICANT CHANGE UP (ref 136–145)
SODIUM SERPL-SCNC: 135 MMOL/L — SIGNIFICANT CHANGE UP (ref 135–145)
SODIUM SERPL-SCNC: 138 MMOL/L — SIGNIFICANT CHANGE UP (ref 135–145)
SODIUM SERPL-SCNC: 143 MMOL/L — SIGNIFICANT CHANGE UP (ref 135–145)
WBC # BLD: 11.32 K/UL — HIGH (ref 3.8–10.5)
WBC # BLD: 11.51 K/UL — HIGH (ref 3.8–10.5)
WBC # BLD: 7.9 K/UL — SIGNIFICANT CHANGE UP (ref 3.8–10.5)
WBC # FLD AUTO: 11.32 K/UL — HIGH (ref 3.8–10.5)
WBC # FLD AUTO: 11.51 K/UL — HIGH (ref 3.8–10.5)
WBC # FLD AUTO: 7.9 K/UL — SIGNIFICANT CHANGE UP (ref 3.8–10.5)

## 2025-03-28 PROCEDURE — 33361 REPLACE AORTIC VALVE PERQ: CPT | Mod: 62,Q0

## 2025-03-28 PROCEDURE — ZZZZZ: CPT

## 2025-03-28 PROCEDURE — 93355 ECHO TRANSESOPHAGEAL (TEE): CPT

## 2025-03-28 PROCEDURE — 71045 X-RAY EXAM CHEST 1 VIEW: CPT | Mod: 26

## 2025-03-28 PROCEDURE — 93306 TTE W/DOPPLER COMPLETE: CPT | Mod: 26

## 2025-03-28 PROCEDURE — 99291 CRITICAL CARE FIRST HOUR: CPT

## 2025-03-28 PROCEDURE — 93010 ELECTROCARDIOGRAM REPORT: CPT

## 2025-03-28 DEVICE — CATH TAVR EVOLUT FX 14FR 23-29MM: Type: IMPLANTABLE DEVICE | Status: FUNCTIONAL

## 2025-03-28 DEVICE — SYS CEREBRAL PROT NCT04149535 SENTINEL FOR STUDY ONLY: Type: IMPLANTABLE DEVICE | Status: FUNCTIONAL

## 2025-03-28 DEVICE — VLV AORTIC EVOLUT FX PLUS 23MM: Type: IMPLANTABLE DEVICE | Status: FUNCTIONAL

## 2025-03-28 DEVICE — KIT A-LINE 1LUM 20G X 12CM SAFE KIT: Type: IMPLANTABLE DEVICE | Status: FUNCTIONAL

## 2025-03-28 DEVICE — SHEATH INTRO DRYSEAL FLEX 14FR 33CM: Type: IMPLANTABLE DEVICE | Status: FUNCTIONAL

## 2025-03-28 DEVICE — IMPLANTABLE DEVICE: Type: IMPLANTABLE DEVICE | Status: FUNCTIONAL

## 2025-03-28 RX ORDER — ACETAMINOPHEN 500 MG/5ML
975 LIQUID (ML) ORAL EVERY 6 HOURS
Refills: 0 | Status: DISCONTINUED | OUTPATIENT
Start: 2025-03-28 | End: 2025-03-29

## 2025-03-28 RX ORDER — ATORVASTATIN CALCIUM 80 MG/1
80 TABLET, FILM COATED ORAL AT BEDTIME
Refills: 0 | Status: DISCONTINUED | OUTPATIENT
Start: 2025-03-28 | End: 2025-03-29

## 2025-03-28 RX ORDER — MELATONIN 5 MG
5 TABLET ORAL AT BEDTIME
Refills: 0 | Status: DISCONTINUED | OUTPATIENT
Start: 2025-03-28 | End: 2025-03-29

## 2025-03-28 RX ORDER — ASPIRIN 325 MG
81 TABLET ORAL DAILY
Refills: 0 | Status: DISCONTINUED | OUTPATIENT
Start: 2025-03-28 | End: 2025-03-29

## 2025-03-28 RX ORDER — EZETIMIBE 10 MG/1
10 TABLET ORAL AT BEDTIME
Refills: 0 | Status: DISCONTINUED | OUTPATIENT
Start: 2025-03-28 | End: 2025-03-29

## 2025-03-28 RX ORDER — ACETAMINOPHEN 500 MG/5ML
975 LIQUID (ML) ORAL ONCE
Refills: 0 | Status: COMPLETED | OUTPATIENT
Start: 2025-03-28 | End: 2025-03-28

## 2025-03-28 RX ORDER — NICARDIPINE HCL 30 MG
5 CAPSULE ORAL
Qty: 40 | Refills: 0 | Status: DISCONTINUED | OUTPATIENT
Start: 2025-03-28 | End: 2025-03-29

## 2025-03-28 RX ORDER — CEFUROXIME SODIUM 1.5 G
1500 VIAL (EA) INJECTION EVERY 8 HOURS
Refills: 0 | Status: COMPLETED | OUTPATIENT
Start: 2025-03-28 | End: 2025-03-29

## 2025-03-28 RX ORDER — CLOPIDOGREL BISULFATE 75 MG/1
75 TABLET, FILM COATED ORAL DAILY
Refills: 0 | Status: DISCONTINUED | OUTPATIENT
Start: 2025-03-29 | End: 2025-03-29

## 2025-03-28 RX ORDER — ISOSORBIDE MONONITRATE 60 MG/1
30 TABLET, EXTENDED RELEASE ORAL DAILY
Refills: 0 | Status: DISCONTINUED | OUTPATIENT
Start: 2025-03-29 | End: 2025-03-29

## 2025-03-28 RX ORDER — LEVOTHYROXINE SODIUM 300 MCG
88 TABLET ORAL DAILY
Refills: 0 | Status: DISCONTINUED | OUTPATIENT
Start: 2025-03-28 | End: 2025-03-29

## 2025-03-28 RX ORDER — ACETAMINOPHEN 500 MG/5ML
1000 LIQUID (ML) ORAL ONCE
Refills: 0 | Status: COMPLETED | OUTPATIENT
Start: 2025-03-28 | End: 2025-03-28

## 2025-03-28 RX ORDER — MAGNESIUM SULFATE 500 MG/ML
2 SYRINGE (ML) INJECTION ONCE
Refills: 0 | Status: COMPLETED | OUTPATIENT
Start: 2025-03-28 | End: 2025-03-28

## 2025-03-28 RX ORDER — CELECOXIB 50 MG/1
400 CAPSULE ORAL ONCE
Refills: 0 | Status: COMPLETED | OUTPATIENT
Start: 2025-03-28 | End: 2025-03-28

## 2025-03-28 RX ORDER — INSULIN LISPRO 100 U/ML
INJECTION, SOLUTION INTRAVENOUS; SUBCUTANEOUS
Refills: 0 | Status: DISCONTINUED | OUTPATIENT
Start: 2025-03-28 | End: 2025-03-29

## 2025-03-28 RX ORDER — TRIAMTERENE/HYDROCHLOROTHIAZID 50 MG-25MG
1 CAPSULE ORAL DAILY
Refills: 0 | Status: DISCONTINUED | OUTPATIENT
Start: 2025-03-29 | End: 2025-03-29

## 2025-03-28 RX ADMIN — Medication 81 MILLIGRAM(S): at 19:24

## 2025-03-28 RX ADMIN — INSULIN LISPRO 2: 100 INJECTION, SOLUTION INTRAVENOUS; SUBCUTANEOUS at 22:02

## 2025-03-28 RX ADMIN — Medication 100 MILLIGRAM(S): at 19:24

## 2025-03-28 RX ADMIN — Medication 3 MILLILITER(S): at 21:16

## 2025-03-28 RX ADMIN — ATORVASTATIN CALCIUM 80 MILLIGRAM(S): 80 TABLET, FILM COATED ORAL at 21:50

## 2025-03-28 RX ADMIN — Medication 100 MILLIGRAM(S): at 17:02

## 2025-03-28 RX ADMIN — EZETIMIBE 10 MILLIGRAM(S): 10 TABLET ORAL at 21:50

## 2025-03-28 RX ADMIN — Medication 1000 MILLIGRAM(S): at 22:49

## 2025-03-28 RX ADMIN — Medication 400 MILLIGRAM(S): at 21:49

## 2025-03-28 RX ADMIN — Medication 1000 MILLIGRAM(S): at 16:30

## 2025-03-28 RX ADMIN — Medication 100 MILLIGRAM(S): at 21:49

## 2025-03-28 RX ADMIN — Medication 25 GRAM(S): at 16:35

## 2025-03-28 RX ADMIN — Medication 5 MILLIGRAM(S): at 21:50

## 2025-03-28 RX ADMIN — Medication 400 MILLIGRAM(S): at 16:00

## 2025-03-28 RX ADMIN — Medication 3 MILLILITER(S): at 15:28

## 2025-03-28 NOTE — BRIEF OPERATIVE NOTE - NSICDXBRIEFPOSTOP_GEN_ALL_CORE_FT
POST-OP DIAGNOSIS:  Severe aortic stenosis 28-Mar-2025 12:33:41  Magda Nciholson  Chronic diastolic heart failure, NYHA class 2 28-Mar-2025 12:33:57  Magda Nicholson  Moderate aortic insufficiency 28-Mar-2025 12:34:05  Magda Nicholson

## 2025-03-28 NOTE — PROGRESS NOTE ADULT - SUBJECTIVE AND OBJECTIVE BOX
CRITICAL CARE ATTENDING - CTICU    MEDICATIONS  (STANDING):  cefuroxime  IVPB 1500 milliGRAM(s) IV Intermittent once                                    10.5   7.90  )-----------( 262      ( 28 Mar 2025 06:05 )             33.9       03-28    143  |  105  |  35.9[H]  ----------------------------<  135[H]  4.5   |  25.0  |  1.63[H]    Ca    10.1      28 Mar 2025 06:05                Daily Height in cm: 147.3 (28 Mar 2025 06:17)    Daily         Critically Ill patient  : [ ] preoperative ,   [x ] post operative    Requires :  [x ] Arterial Line   [x ] Central Line  [ ] PA catheter  [ ] IABP  [ ] ECMO  [ ] LVAD  [ ] Ventilator  [ ] pacemaker [ x]  HF O2 / NC / ET CO2                      [ x] ABG's     [x ] Pulse Oxymetry Monitoring  Bedside evaluation , monitoring , treatment of hemodynamics , fluids , IVP/ IVCD meds.        Diagnosis:     Op day TAVR     Hypotension a/w Hypertension, requiring intravenous medication.     Hypovolemia     Hemodynamic lability,  instability. Requires IVCD [ ] vasopressors [ ] inotropes  [ ] vasodilator  [ x]IVSS fluid  to maintain MAP, perfusion, C.I.     CHF- acute [ x]   chronic [ x]    systolic [ ]   diastolic [ x]  Valvular [x ]          - Echo- EF -   AS / MR / TR         [ ] RV dysfunction          - Cxr-cardiomegally, edema          - Clinical-  [ ]inotropes   [ ]pressors   [ ]diuresis   [ ]IABP   [ ]ECMO   [ ]LVAD   [ x] Respiratory insuffiencey     Aortic Stenosis    Chronic Renal Failure     Respiratory insuffiencey     Requires Supplemental Oxygen Therapy     Hypoxemia - Requires   [ x] HF O2 --> [ x] Nasal Canula     Requires chest PT, pulmonary toilet,  suctioning to maintain SaO2,  patent airway and treat atelectasis.     Obesity ? OHS / CHEYANNE ?    Ex Smoker     NSTEMI in past     R - femoral Artery Deployment site                                                          - f/u vascular checks   L - femoral vein access site    Requires bedside physical therapy, mobilization and total senior care care.     Bradycardia                     -                     Discussed with CT surgeon, Physician's Assistant - Nurse Practitioner- Critical care medicine team.   Discussed at  AM / PM rounds.   Chart, labs , films reviewed.    Cumulative Critical Care Time Given Today : 40 min

## 2025-03-29 ENCOUNTER — TRANSCRIPTION ENCOUNTER (OUTPATIENT)
Age: 80
End: 2025-03-29

## 2025-03-29 LAB
ALBUMIN SERPL ELPH-MCNC: 2.7 G/DL — LOW (ref 3.3–5.2)
ALP SERPL-CCNC: 60 U/L — SIGNIFICANT CHANGE UP (ref 40–120)
ALT FLD-CCNC: 12 U/L — SIGNIFICANT CHANGE UP
ANION GAP SERPL CALC-SCNC: 14 MMOL/L — SIGNIFICANT CHANGE UP (ref 5–17)
ANION GAP SERPL CALC-SCNC: 15 MMOL/L — SIGNIFICANT CHANGE UP (ref 5–17)
ANISOCYTOSIS BLD QL: SLIGHT — SIGNIFICANT CHANGE UP
APTT BLD: 25.7 SEC — SIGNIFICANT CHANGE UP (ref 24.5–35.6)
APTT BLD: 27.6 SEC — SIGNIFICANT CHANGE UP (ref 24.5–35.6)
APTT BLD: 27.7 SEC — SIGNIFICANT CHANGE UP (ref 24.5–35.6)
AST SERPL-CCNC: 21 U/L — SIGNIFICANT CHANGE UP
BASOPHILS # BLD AUTO: 0.02 K/UL — SIGNIFICANT CHANGE UP (ref 0–0.2)
BASOPHILS # BLD AUTO: 0.03 K/UL — SIGNIFICANT CHANGE UP (ref 0–0.2)
BASOPHILS # BLD AUTO: 0.03 K/UL — SIGNIFICANT CHANGE UP (ref 0–0.2)
BASOPHILS # BLD MANUAL: 0.12 K/UL — SIGNIFICANT CHANGE UP (ref 0–0.2)
BASOPHILS NFR BLD AUTO: 0.2 % — SIGNIFICANT CHANGE UP (ref 0–2)
BASOPHILS NFR BLD AUTO: 0.2 % — SIGNIFICANT CHANGE UP (ref 0–2)
BASOPHILS NFR BLD AUTO: 0.3 % — SIGNIFICANT CHANGE UP (ref 0–2)
BASOPHILS NFR BLD MANUAL: 0.9 % — SIGNIFICANT CHANGE UP (ref 0–2)
BILIRUB SERPL-MCNC: 0.4 MG/DL — SIGNIFICANT CHANGE UP (ref 0.4–2)
BUN SERPL-MCNC: 31.4 MG/DL — HIGH (ref 8–20)
BUN SERPL-MCNC: 35.2 MG/DL — HIGH (ref 8–20)
CALCIUM SERPL-MCNC: 8.2 MG/DL — LOW (ref 8.4–10.5)
CALCIUM SERPL-MCNC: 9 MG/DL — SIGNIFICANT CHANGE UP (ref 8.4–10.5)
CHLORIDE SERPL-SCNC: 102 MMOL/L — SIGNIFICANT CHANGE UP (ref 96–108)
CHLORIDE SERPL-SCNC: 106 MMOL/L — SIGNIFICANT CHANGE UP (ref 96–108)
CO2 SERPL-SCNC: 19 MMOL/L — LOW (ref 22–29)
CO2 SERPL-SCNC: 22 MMOL/L — SIGNIFICANT CHANGE UP (ref 22–29)
CREAT SERPL-MCNC: 1.71 MG/DL — HIGH (ref 0.5–1.3)
CREAT SERPL-MCNC: 1.73 MG/DL — HIGH (ref 0.5–1.3)
EGFR: 30 ML/MIN/1.73M2 — LOW
EOSINOPHIL # BLD AUTO: 0 K/UL — SIGNIFICANT CHANGE UP (ref 0–0.5)
EOSINOPHIL # BLD MANUAL: 0 K/UL — SIGNIFICANT CHANGE UP (ref 0–0.5)
EOSINOPHIL NFR BLD AUTO: 0 % — SIGNIFICANT CHANGE UP (ref 0–6)
EOSINOPHIL NFR BLD MANUAL: 0 % — SIGNIFICANT CHANGE UP (ref 0–6)
GAS PNL BLDA: SIGNIFICANT CHANGE UP
GLUCOSE BLDC GLUCOMTR-MCNC: 147 MG/DL — HIGH (ref 70–99)
GLUCOSE BLDC GLUCOMTR-MCNC: 163 MG/DL — HIGH (ref 70–99)
GLUCOSE SERPL-MCNC: 127 MG/DL — HIGH (ref 70–99)
GLUCOSE SERPL-MCNC: 153 MG/DL — HIGH (ref 70–99)
HCT VFR BLD CALC: 24 % — LOW (ref 34.5–45)
HCT VFR BLD CALC: 26.1 % — LOW (ref 34.5–45)
HCT VFR BLD CALC: 26.4 % — LOW (ref 34.5–45)
HCT VFR BLD CALC: 29.2 % — LOW (ref 34.5–45)
HGB BLD-MCNC: 7.7 G/DL — LOW (ref 11.5–15.5)
HGB BLD-MCNC: 8.8 G/DL — LOW (ref 11.5–15.5)
HGB BLD-MCNC: 8.9 G/DL — LOW (ref 11.5–15.5)
HGB BLD-MCNC: 9.8 G/DL — LOW (ref 11.5–15.5)
HYPOCHROMIA BLD QL: SLIGHT — SIGNIFICANT CHANGE UP
IMM GRANULOCYTES # BLD AUTO: 0.03 K/UL — SIGNIFICANT CHANGE UP (ref 0–0.07)
IMM GRANULOCYTES # BLD AUTO: 0.04 K/UL — SIGNIFICANT CHANGE UP (ref 0–0.07)
IMM GRANULOCYTES # BLD AUTO: 0.04 K/UL — SIGNIFICANT CHANGE UP (ref 0–0.07)
IMM GRANULOCYTES NFR BLD AUTO: 0.3 % — SIGNIFICANT CHANGE UP (ref 0–0.9)
INR BLD: 1 RATIO — SIGNIFICANT CHANGE UP (ref 0.85–1.16)
INR BLD: 1.02 RATIO — SIGNIFICANT CHANGE UP (ref 0.85–1.16)
INR BLD: 1.02 RATIO — SIGNIFICANT CHANGE UP (ref 0.85–1.16)
LYMPHOCYTES # BLD AUTO: 0.86 K/UL — LOW (ref 1–3.3)
LYMPHOCYTES # BLD AUTO: 1.06 K/UL — SIGNIFICANT CHANGE UP (ref 1–3.3)
LYMPHOCYTES # BLD AUTO: 1.15 K/UL — SIGNIFICANT CHANGE UP (ref 1–3.3)
LYMPHOCYTES # BLD MANUAL: 0.34 K/UL — LOW (ref 1–3.3)
LYMPHOCYTES NFR BLD AUTO: 10.7 % — LOW (ref 13–44)
LYMPHOCYTES NFR BLD AUTO: 6.6 % — LOW (ref 13–44)
LYMPHOCYTES NFR BLD AUTO: 9.1 % — LOW (ref 13–44)
LYMPHOCYTES NFR BLD MANUAL: 2.6 % — LOW (ref 13–44)
MAGNESIUM SERPL-MCNC: 2.5 MG/DL — SIGNIFICANT CHANGE UP (ref 1.6–2.6)
MAGNESIUM SERPL-MCNC: 2.9 MG/DL — HIGH (ref 1.6–2.6)
MCHC RBC-ENTMCNC: 30.3 PG — SIGNIFICANT CHANGE UP (ref 27–34)
MCHC RBC-ENTMCNC: 30.3 PG — SIGNIFICANT CHANGE UP (ref 27–34)
MCHC RBC-ENTMCNC: 30.6 PG — SIGNIFICANT CHANGE UP (ref 27–34)
MCHC RBC-ENTMCNC: 30.6 PG — SIGNIFICANT CHANGE UP (ref 27–34)
MCHC RBC-ENTMCNC: 32.1 G/DL — SIGNIFICANT CHANGE UP (ref 32–36)
MCHC RBC-ENTMCNC: 33.3 G/DL — SIGNIFICANT CHANGE UP (ref 32–36)
MCHC RBC-ENTMCNC: 33.6 G/DL — SIGNIFICANT CHANGE UP (ref 32–36)
MCHC RBC-ENTMCNC: 34.1 G/DL — SIGNIFICANT CHANGE UP (ref 32–36)
MCV RBC AUTO: 89.7 FL — SIGNIFICANT CHANGE UP (ref 80–100)
MCV RBC AUTO: 90.4 FL — SIGNIFICANT CHANGE UP (ref 80–100)
MCV RBC AUTO: 91 FL — SIGNIFICANT CHANGE UP (ref 80–100)
MCV RBC AUTO: 95.2 FL — SIGNIFICANT CHANGE UP (ref 80–100)
MICROCYTES BLD QL: SLIGHT — SIGNIFICANT CHANGE UP
MONOCYTES # BLD AUTO: 1.36 K/UL — HIGH (ref 0–0.9)
MONOCYTES # BLD AUTO: 1.47 K/UL — HIGH (ref 0–0.9)
MONOCYTES # BLD AUTO: 1.55 K/UL — HIGH (ref 0–0.9)
MONOCYTES # BLD MANUAL: 0.91 K/UL — HIGH (ref 0–0.9)
MONOCYTES NFR BLD AUTO: 11.7 % — SIGNIFICANT CHANGE UP (ref 2–14)
MONOCYTES NFR BLD AUTO: 12 % — SIGNIFICANT CHANGE UP (ref 2–14)
MONOCYTES NFR BLD AUTO: 13.7 % — SIGNIFICANT CHANGE UP (ref 2–14)
MONOCYTES NFR BLD MANUAL: 7 % — SIGNIFICANT CHANGE UP (ref 2–14)
NEUTROPHILS # BLD AUTO: 10.48 K/UL — HIGH (ref 1.8–7.4)
NEUTROPHILS # BLD AUTO: 8.05 K/UL — HIGH (ref 1.8–7.4)
NEUTROPHILS # BLD AUTO: 9.18 K/UL — HIGH (ref 1.8–7.4)
NEUTROPHILS # BLD MANUAL: 11.6 K/UL — HIGH (ref 1.8–7.4)
NEUTROPHILS NFR BLD AUTO: 75 % — SIGNIFICANT CHANGE UP (ref 43–77)
NEUTROPHILS NFR BLD AUTO: 78.7 % — HIGH (ref 43–77)
NEUTROPHILS NFR BLD AUTO: 80.9 % — HIGH (ref 43–77)
NEUTROPHILS NFR BLD MANUAL: 89.5 % — HIGH (ref 43–77)
NRBC # BLD AUTO: 0 K/UL — SIGNIFICANT CHANGE UP (ref 0–0)
NRBC # FLD: 0 K/UL — SIGNIFICANT CHANGE UP (ref 0–0)
NRBC BLD AUTO-RTO: 0 /100 WBCS — SIGNIFICANT CHANGE UP (ref 0–0)
OVALOCYTES BLD QL SMEAR: SLIGHT — SIGNIFICANT CHANGE UP
PHOSPHATE SERPL-MCNC: 4.7 MG/DL — SIGNIFICANT CHANGE UP (ref 2.4–4.7)
PHOSPHATE SERPL-MCNC: 5.3 MG/DL — HIGH (ref 2.4–4.7)
PLAT MORPH BLD: NORMAL — SIGNIFICANT CHANGE UP
PLATELET # BLD AUTO: 106 K/UL — LOW (ref 150–400)
PLATELET # BLD AUTO: 110 K/UL — LOW (ref 150–400)
PLATELET # BLD AUTO: 123 K/UL — LOW (ref 150–400)
PLATELET # BLD AUTO: 167 K/UL — SIGNIFICANT CHANGE UP (ref 150–400)
PMV BLD: 9.7 FL — SIGNIFICANT CHANGE UP (ref 7–13)
PMV BLD: 9.8 FL — SIGNIFICANT CHANGE UP (ref 7–13)
PMV BLD: 9.9 FL — SIGNIFICANT CHANGE UP (ref 7–13)
PMV BLD: 9.9 FL — SIGNIFICANT CHANGE UP (ref 7–13)
POIKILOCYTOSIS BLD QL AUTO: SLIGHT — SIGNIFICANT CHANGE UP
POLYCHROMASIA BLD QL SMEAR: SLIGHT — SIGNIFICANT CHANGE UP
POTASSIUM SERPL-MCNC: 4.3 MMOL/L — SIGNIFICANT CHANGE UP (ref 3.5–5.3)
POTASSIUM SERPL-MCNC: 4.8 MMOL/L — SIGNIFICANT CHANGE UP (ref 3.5–5.3)
POTASSIUM SERPL-SCNC: 4.3 MMOL/L — SIGNIFICANT CHANGE UP (ref 3.5–5.3)
POTASSIUM SERPL-SCNC: 4.8 MMOL/L — SIGNIFICANT CHANGE UP (ref 3.5–5.3)
PROT SERPL-MCNC: 4.1 G/DL — LOW (ref 6.6–8.7)
PROTHROM AB SERPL-ACNC: 11.3 SEC — SIGNIFICANT CHANGE UP (ref 9.9–13.4)
PROTHROM AB SERPL-ACNC: 11.5 SEC — SIGNIFICANT CHANGE UP (ref 9.9–13.4)
PROTHROM AB SERPL-ACNC: 11.8 SEC — SIGNIFICANT CHANGE UP (ref 9.9–13.4)
RBC # BLD: 2.52 M/UL — LOW (ref 3.8–5.2)
RBC # BLD: 2.9 M/UL — LOW (ref 3.8–5.2)
RBC # BLD: 2.91 M/UL — LOW (ref 3.8–5.2)
RBC # BLD: 3.23 M/UL — LOW (ref 3.8–5.2)
RBC # FLD: 14 % — SIGNIFICANT CHANGE UP (ref 10.3–14.5)
RBC # FLD: 15.1 % — HIGH (ref 10.3–14.5)
RBC # FLD: 15.2 % — HIGH (ref 10.3–14.5)
RBC # FLD: 15.4 % — HIGH (ref 10.3–14.5)
RBC BLD AUTO: ABNORMAL
SODIUM SERPL-SCNC: 136 MMOL/L — SIGNIFICANT CHANGE UP (ref 135–145)
SODIUM SERPL-SCNC: 141 MMOL/L — SIGNIFICANT CHANGE UP (ref 135–145)
WBC # BLD: 10.24 K/UL — SIGNIFICANT CHANGE UP (ref 3.8–10.5)
WBC # BLD: 10.73 K/UL — HIGH (ref 3.8–10.5)
WBC # BLD: 11.66 K/UL — HIGH (ref 3.8–10.5)
WBC # BLD: 12.96 K/UL — HIGH (ref 3.8–10.5)
WBC # FLD AUTO: 10.24 K/UL — SIGNIFICANT CHANGE UP (ref 3.8–10.5)
WBC # FLD AUTO: 10.73 K/UL — HIGH (ref 3.8–10.5)
WBC # FLD AUTO: 11.66 K/UL — HIGH (ref 3.8–10.5)
WBC # FLD AUTO: 12.96 K/UL — HIGH (ref 3.8–10.5)

## 2025-03-29 PROCEDURE — 71045 X-RAY EXAM CHEST 1 VIEW: CPT | Mod: 26

## 2025-03-29 PROCEDURE — 99292 CRITICAL CARE ADDL 30 MIN: CPT

## 2025-03-29 PROCEDURE — 74178 CT ABD&PLV WO CNTR FLWD CNTR: CPT | Mod: 26

## 2025-03-29 PROCEDURE — 35141 REPAIR DEFECT OF ARTERY: CPT | Mod: LT

## 2025-03-29 PROCEDURE — 99291 CRITICAL CARE FIRST HOUR: CPT

## 2025-03-29 DEVICE — CLIP APPLIER COVIDIEN SURGICLIP III 9" SM: Type: IMPLANTABLE DEVICE | Site: LEFT | Status: FUNCTIONAL

## 2025-03-29 DEVICE — AGENT HEMOSTAT COLLAGEN AVITENE ULTRA 8X12.5CM: Type: IMPLANTABLE DEVICE | Site: LEFT | Status: FUNCTIONAL

## 2025-03-29 DEVICE — CLIP APPLIER COVIDIEN SURGICLIP 11.5" MEDIUM: Type: IMPLANTABLE DEVICE | Site: LEFT | Status: FUNCTIONAL

## 2025-03-29 DEVICE — SURGIFLO MATRIX WITH THROMBIN KIT: Type: IMPLANTABLE DEVICE | Site: LEFT | Status: FUNCTIONAL

## 2025-03-29 DEVICE — TISSUE MATRIX STRATTICE FIRM 20 X 20CM: Type: IMPLANTABLE DEVICE | Site: LEFT | Status: FUNCTIONAL

## 2025-03-29 RX ORDER — HYDROMORPHONE/SOD CHLOR,ISO/PF 2 MG/10 ML
0.5 SYRINGE (ML) INJECTION ONCE
Refills: 0 | Status: DISCONTINUED | OUTPATIENT
Start: 2025-03-29 | End: 2025-03-29

## 2025-03-29 RX ORDER — ASPIRIN 325 MG
81 TABLET ORAL DAILY
Refills: 0 | Status: DISCONTINUED | OUTPATIENT
Start: 2025-03-29 | End: 2025-04-03

## 2025-03-29 RX ORDER — EZETIMIBE 10 MG/1
10 TABLET ORAL AT BEDTIME
Refills: 0 | Status: DISCONTINUED | OUTPATIENT
Start: 2025-03-29 | End: 2025-04-03

## 2025-03-29 RX ORDER — TRIAMTERENE/HYDROCHLOROTHIAZID 50 MG-25MG
1 CAPSULE ORAL DAILY
Refills: 0 | Status: DISCONTINUED | OUTPATIENT
Start: 2025-03-29 | End: 2025-04-03

## 2025-03-29 RX ORDER — ACETAMINOPHEN 500 MG/5ML
975 LIQUID (ML) ORAL EVERY 6 HOURS
Refills: 0 | Status: DISCONTINUED | OUTPATIENT
Start: 2025-03-29 | End: 2025-04-03

## 2025-03-29 RX ORDER — ISOSORBIDE MONONITRATE 60 MG/1
30 TABLET, EXTENDED RELEASE ORAL DAILY
Refills: 0 | Status: DISCONTINUED | OUTPATIENT
Start: 2025-03-29 | End: 2025-04-03

## 2025-03-29 RX ORDER — ACETAMINOPHEN 500 MG/5ML
1000 LIQUID (ML) ORAL ONCE
Refills: 0 | Status: COMPLETED | OUTPATIENT
Start: 2025-03-29 | End: 2025-03-29

## 2025-03-29 RX ORDER — LEVOTHYROXINE SODIUM 300 MCG
88 TABLET ORAL DAILY
Refills: 0 | Status: DISCONTINUED | OUTPATIENT
Start: 2025-03-29 | End: 2025-04-03

## 2025-03-29 RX ORDER — INSULIN LISPRO 100 U/ML
INJECTION, SOLUTION INTRAVENOUS; SUBCUTANEOUS
Refills: 0 | Status: DISCONTINUED | OUTPATIENT
Start: 2025-03-29 | End: 2025-04-03

## 2025-03-29 RX ORDER — NICARDIPINE HCL 30 MG
5 CAPSULE ORAL
Qty: 40 | Refills: 0 | Status: DISCONTINUED | OUTPATIENT
Start: 2025-03-29 | End: 2025-03-29

## 2025-03-29 RX ORDER — ATORVASTATIN CALCIUM 80 MG/1
80 TABLET, FILM COATED ORAL AT BEDTIME
Refills: 0 | Status: DISCONTINUED | OUTPATIENT
Start: 2025-03-29 | End: 2025-04-03

## 2025-03-29 RX ORDER — MELATONIN 5 MG
5 TABLET ORAL AT BEDTIME
Refills: 0 | Status: DISCONTINUED | OUTPATIENT
Start: 2025-03-29 | End: 2025-04-03

## 2025-03-29 RX ORDER — SODIUM BICARBONATE 1 MEQ/ML
50 SYRINGE (ML) INTRAVENOUS
Refills: 0 | Status: COMPLETED | OUTPATIENT
Start: 2025-03-29 | End: 2025-03-29

## 2025-03-29 RX ORDER — CLOPIDOGREL BISULFATE 75 MG/1
75 TABLET, FILM COATED ORAL ONCE
Refills: 0 | Status: COMPLETED | OUTPATIENT
Start: 2025-03-29 | End: 2025-03-29

## 2025-03-29 RX ADMIN — CLOPIDOGREL BISULFATE 75 MILLIGRAM(S): 75 TABLET, FILM COATED ORAL at 15:06

## 2025-03-29 RX ADMIN — ATORVASTATIN CALCIUM 80 MILLIGRAM(S): 80 TABLET, FILM COATED ORAL at 22:07

## 2025-03-29 RX ADMIN — Medication 0.5 MILLIGRAM(S): at 07:37

## 2025-03-29 RX ADMIN — Medication 50 MILLIEQUIVALENT(S): at 07:12

## 2025-03-29 RX ADMIN — INSULIN LISPRO 2: 100 INJECTION, SOLUTION INTRAVENOUS; SUBCUTANEOUS at 16:45

## 2025-03-29 RX ADMIN — Medication 100 MILLIGRAM(S): at 06:31

## 2025-03-29 RX ADMIN — Medication 3 MILLILITER(S): at 13:47

## 2025-03-29 RX ADMIN — Medication 0.5 MILLIGRAM(S): at 07:07

## 2025-03-29 RX ADMIN — Medication 10 MILLIGRAM(S): at 22:38

## 2025-03-29 RX ADMIN — Medication 81 MILLIGRAM(S): at 15:05

## 2025-03-29 RX ADMIN — Medication 1 APPLICATION(S): at 06:19

## 2025-03-29 RX ADMIN — Medication 0.5 MILLIGRAM(S): at 05:38

## 2025-03-29 RX ADMIN — Medication 3 MILLILITER(S): at 06:18

## 2025-03-29 RX ADMIN — Medication 0.5 MILLIGRAM(S): at 04:38

## 2025-03-29 RX ADMIN — Medication 5 MILLIGRAM(S): at 22:07

## 2025-03-29 RX ADMIN — EZETIMIBE 10 MILLIGRAM(S): 10 TABLET ORAL at 22:15

## 2025-03-29 RX ADMIN — Medication 50 MILLIEQUIVALENT(S): at 07:00

## 2025-03-29 RX ADMIN — Medication 400 MILLIGRAM(S): at 23:30

## 2025-03-29 NOTE — CHART NOTE - NSCHARTNOTEFT_GEN_A_CORE
Commercial 23 mm Medtronic CoreValve Evolut FX+ Percutaneous Transfemoral TAVR via RIGHT Common Femoral Artery. NCT# [id number], STS/ACC TVT Registry Patient ID# [id number] Basilica Right Cusp
Evaluated patient overnight after request by the CT-ICU due to possible drain malfunction, extensive ecchymoses over surgical site.  Patients drain is functioning, just requires stripping which we explained to the team and nurse.  70cc sanguinous fluid decompressed since the case, we are expecting this due to the raw area of the surgical bed and the hematoma decompressed.  The groin is soft, the mons-pubis area is soft as well, there is some ecchymoses overlying the L groin and mons-pubis area, which is expected after the procedure and decompression of the hematoma.  The area is soft.  No evidence of active bleeding, distal pulses palpable, patient denies pain.  Explained to patient what to expect with the extensive bruising/ecchymoses.  Advised the CT-ICU team to transfuse as appropriate. Patient is hemodynamically stable, Hgb declined a bit from 9.8 to 8.9, will continue to monitor closely.      Updated surgeon, Dr. Magana
Post-op check:    Pt is a 78 yo F s/p Left femoral cutdown, evacuation of hematoma, ligation of PSA sac, repair of arteriotomy today 3/29. POD0.     Vitals:  Vital Signs Last 24 Hrs  T(C): 36.9 (29 Mar 2025 17:00), Max: 37.5 (28 Mar 2025 18:15)  T(F): 98.4 (29 Mar 2025 17:00), Max: 99.5 (28 Mar 2025 18:15)  HR: 63 (29 Mar 2025 17:00) (55 - 82)  BP: 130/43 (29 Mar 2025 17:00) (120/57 - 160/87)  BP(mean): 69 (29 Mar 2025 17:00) (65 - 100)  RR: 13 (29 Mar 2025 17:00) (8 - 21)  SpO2: 100% (29 Mar 2025 17:00) (94% - 100%)    Patient resting in bed with family at the bedside. Pt denies pain at this time. Denies nausea, vomiting, SOB, chest pain. Patient has a teresa, is not yet passing flatus and has not had a BM.    Physical exam:  General: alert, patient resting in bed comfortably, in NAD  Resp: equal chest rise bilaterally, nonlabored breathing  Abdomen: soft, NT, ND  Extremities: L groin w/ ecchymosis and edema, prevena in place. Island dressing c/d/i covering ANTHONY drain w/ sanguinous output.       78 yo F s/p TAVR 3/28 and Left femoral cutdown, evacuation of hematoma, ligation of PSA sac, repair of arteriotomy today 3/29. Pt recovering appropriately post-op.     Plan:   -pain control   -remove Prevena POD5 4/3  -monitor ANTHONY output  -DVT ppx: SCDs, ASA  - rest of care per primary team  - ok to OOB 3/30

## 2025-03-29 NOTE — BRIEF OPERATIVE NOTE - NSICDXBRIEFPROCEDURE_GEN_ALL_CORE_FT
PROCEDURES:  Repair, artery, femoral 29-Mar-2025 10:49:40  Rocco Brock  
PROCEDURES:  TAVR, percutaneous 28-Mar-2025 12:30:33 Commercial 23 mm Medtronic CoreValve Evolut FX+ Percutaneous Transfemoral TAVR via RIGHT Common Femoral Artery.  NCT# [id number], STS/ACC TVT Registry Patient ID# [id number]  [facility] Magda Nicholson

## 2025-03-29 NOTE — DISCHARGE NOTE PROVIDER - PROVIDER TOKENS
PROVIDER:[TOKEN:[[id number]],ESTABLISHEDPATIENT:[T]],PROVIDER:[TOKEN:[[id number]]] PROVIDER:[TOKEN:[[id number]],ESTABLISHEDPATIENT:[T]],PROVIDER:[TOKEN:[[id number]]],PROVIDER:[TOKEN:[[id number]]] PROVIDER:[TOKEN:[[id number]],ESTABLISHEDPATIENT:[T]],PROVIDER:[TOKEN:[[id number]]],PROVIDER:[TOKEN:[[id number]]],FREE:[LAST:[Nelson],FIRST:[Maribel],PHONE:[phone]],FAX:[(   )    -]]

## 2025-03-29 NOTE — DISCHARGE NOTE PROVIDER - NSDCFUSCHEDAPPT_GEN_ALL_CORE_FT
Eliazar Simmons  [facility]  CARDIOLOGY [address]  Scheduled Appointment: 06/17/2025     [facility]  CTSURG [address]  Scheduled Appointment: 04/09/2025    Eliazar Simmons  [facility]  CARDIOLOGY [address]  Scheduled Appointment: 06/17/2025

## 2025-03-29 NOTE — DISCHARGE NOTE PROVIDER - NSDCFUADDINST_GEN_ALL_CORE_FT
Please call the Cardiothoracic Surgery office at [phone] if you are experiencing any shortness of breath, chest pain, fevers or chills, drainage from the incisions, persistent nausea, vomiting or if you have any questions about your medications. If the symptoms are severe, call 911 and go to the nearest hospital.

## 2025-03-29 NOTE — BRIEF OPERATIVE NOTE - NSICDXBRIEFPREOP_GEN_ALL_CORE_FT
PRE-OP DIAGNOSIS:  Chronic diastolic heart failure, NYHA class 2 28-Mar-2025 12:33:06  Magda Nicholson  Severe aortic stenosis 28-Mar-2025 12:33:13  Magda Nicholson  Moderate aortic insufficiency 28-Mar-2025 12:33:29  Magda Nicholson  
PRE-OP DIAGNOSIS:  Pseudoaneurysm 29-Mar-2025 10:49:55  Rocco Brock  Pseudoaneurysm of femoral artery 29-Mar-2025 10:50:17  Rocco Brock

## 2025-03-29 NOTE — DISCHARGE NOTE PROVIDER - NSDCCPCAREPLAN_GEN_ALL_CORE_FT
PRINCIPAL DISCHARGE DIAGNOSIS  Diagnosis: Nonrheumatic aortic valve stenosis  Assessment and Plan of Treatment: TAVR discharge instructions>Keep femoral or groin site clean,please shower daily and pat site dry.Watch site for signs of reddness or drainage, these should be reported to your doctor.You will receive a card about your valve in the mail,please carry in your wallet.  Patient will be discharged with a Ventrixel MCOT monitor to evaluate for potential conduction or rhythm disturbances post TAVR.     Please follow up with your Cardiologist and Primary Care Physician 1-2 weeks from discharge.       PRINCIPAL DISCHARGE DIAGNOSIS  Diagnosis: Nonrheumatic aortic valve stenosis  Assessment and Plan of Treatment: - Keep the groin site clean and dry.  You may shower after surgery, using a gentle soap over the incision and pat the site dry.  - Watch the site for signs of redness or drainage, these should be reported to your doctor immediately.  - You will receive a wallet card about your new valve in the mail.  Please carry it with you to present to anyone who may ask if you have any medical implants.  - Be sure to inform your doctors including your dentist about your valve since you will need to take antibiotics to reduce the risk of infection before certain medical and dental procedures.  - You will be given an appointment to follow up with your doctor in approximately 4 weeks.  It is important to keep this appointment so that your new valve can be assessed.  - You may resume all your normal activities as you feel comfortable doing so. Please avoid heavy lifting for 2 weeks.  You will be discharged with a heart rythm monitoring device called an "MCOT" that will be with your for 30 days.   Make sure to follow the insutrctions provided in the hospital.   Reminders: You can shower with the device. Charge the phone every other day for an hour. Change the patch every 5 days and charge your monitor at the same time. Do not charge devices overnight. If you need to charge the devices sooner than recommended, you will get an alarm on the devices to indicate you to do so. Carry these devices with you at ALL times.     Please follow up with your Cardiologist and Primary Care Physician 1-2 weeks from discharge.  Please follow up with the vascular as outpatient         SECONDARY DISCHARGE DIAGNOSES  Diagnosis: Pseudoaneurysm of left femoral artery  Assessment and Plan of Treatment: Now Repaired   Please follow up with the vascular as outpatient

## 2025-03-29 NOTE — BRIEF OPERATIVE NOTE - OPERATION/FINDINGS
Left femoral cutdown, evacuation of hematoma, ligation of PSA sac, repair of arteriotomy after proximal control, washout, groin closure in layers, prevena dressing applied.

## 2025-03-29 NOTE — BRIEF OPERATIVE NOTE - COMMENTS
tx to ICU on no gtts   extuabted post op   see full echo report   transient left bundle branch block post op - resolution 
Wound Class 1

## 2025-03-29 NOTE — DISCHARGE NOTE PROVIDER - NSDCFUADDAPPT_GEN_ALL_CORE_FT
The cardiac surgery office is located on the first floor of [facility] at [address] [place] [address]. Please enter through the lobby. A [facility] employee will then direct you where to go.    Please follow up with Dr. Hansen on  _____ at [facility].    Your Care Navigator Nurse Practitioner will be in touch to see you in your home within a few days from discharge. The Follow Your Heart program can help ensure you understand your medications, discharge instructions and answer any questions you may have at that time. They are also a great source to address concerns during the day and may be reached at [phone].     The cardiac surgery office is located on the first floor of [facility] at [address] [place], [place]. Please enter through the lobby. A [facility] employee will then direct you where to go.    Please follow up with Dr. Hansen on  ____at [facility].    Your Care Navigator Nurse Practitioner will be in touch to see you in your home within a few days from discharge. The Follow Your Heart program can help ensure you understand your medications, discharge instructions and answer any questions you may have at that time. They are also a great source to address concerns during the day and may be reached at [phone].     The cardiac surgery office is located on the first floor of [facility] at [address] [place], [place]. Please enter through the lobby. A [facility] employee will then direct you where to go.    Please follow up with Dr. Hansen on  ____at [facility].    Your Care Navigator Nurse Practitioner will be in touch to see you in your home within a few days from discharge. The Follow Your Heart program can help ensure you understand your medications, discharge instructions and answer any questions you may have at that time. They are also a great source to address concerns during the day and may be reached at [phone].    Please follow up with Renal with in 1 week of discharge    The cardiac surgery office is located on the first floor of [facility] at [address] [place], [place]. Please enter through the lobby. A [facility] employee will then direct you where to go.    Please follow up with Dr. Hansen's office 4/9 at 10:45AM (you will see Katherine)at [facility].    Your Care Navigator Nurse Practitioner will be in touch to see you in your home within a few days from discharge. The Follow Your Heart program can help ensure you understand your medications, discharge instructions and answer any questions you may have at that time. They are also a great source to address concerns during the day and may be reached at [phone].    Plese follow up with the vascular surgeon in 2 weeks for evaluation and staple removal.  Please call to make an appointment.    Please follow up with Renal with in 1 week of discharge

## 2025-03-29 NOTE — PROGRESS NOTE ADULT - ASSESSMENT
78 y/o F with PMH of includes Chronic diastolic heart failure NYHA Class II, mild MR/ mild TR, HTN, diabetes, hypothyroidism,  CKD, NSTEMI ** multiple most recent 1/20/24, CAD with stenting, CABG x 2, bilateral carotid stneosis (50-69%), hiatal hernia, GERD, and aortic stenosis BASELINE EKG sinus bradyno blocks. Presented to [facility] on 3/28 for known AS. Now s/p GA TF TAVR via RCFA with basicila by Dr. Hansen.  Post op Left groin rebleeding when femstop removed after 4 hours, resolved with manual pressure. Bilateral groin access sites soft and non-tender.

## 2025-03-29 NOTE — DISCHARGE NOTE PROVIDER - CARE PROVIDER_API CALL
Adiel Hansen  Thoracic and Cardiac Surgery  [address] [place] [address]  Phone: [phone]  Fax: [phone]  Established Patient  Follow Up Time:     Eliazar Simmons  Cardiovascular Disease  [address] [facility], [address]  [place] [address]  Phone: [phone]  Fax: [phone]  Follow Up Time:    Adiel Hansen  Thoracic and Cardiac Surgery  [address] [place] [address]  Phone: [phone]  Fax: [phone]  Established Patient  Follow Up Time:     Eliazar Simmons  Cardiovascular Disease  [address] [facility], [address]  [place] [address]  Phone: [phone]  Fax: [phone]  Follow Up Time:     Magda Magana  Vascular Surgery  [address] [facility], [address]  [place] [address]  Phone: [phone]  Fax: [phone]  Follow Up Time:    Adiel Hansen  Thoracic and Cardiac Surgery  [address] [place] [address]  Phone: [phone]  Fax: [phone]  Established Patient  Follow Up Time:     Eliazar Simmons  Cardiovascular Disease  [address] [facility], [address]  [place] [address]  Phone: [phone]  Fax: [phone]  Follow Up Time:     Magda Magana  Vascular Surgery  [address] [facility], [address]  [place] [address]  Phone: [phone]  Fax: [phone]  Follow Up Time:     Maribel Damon  Phone: [phone]  Fax: (   )    -  Follow Up Time:

## 2025-03-29 NOTE — DISCHARGE NOTE PROVIDER - CARE PROVIDERS DIRECT ADDRESSES
,[email or web address] ,[email or web address],[email or web address] ,[email or web address],DirectAddress_Unknown,DirectAddress_Unknown

## 2025-03-29 NOTE — PROGRESS NOTE ADULT - SUBJECTIVE AND OBJECTIVE BOX
CRITICAL CARE ATTENDING - CTICU    MEDICATIONS  (STANDING):  allopurinol 100 milliGRAM(s) Oral daily  aspirin  chewable 81 milliGRAM(s) Oral daily  atorvastatin 80 milliGRAM(s) Oral at bedtime  chlorhexidine 0.12% Liquid 15 milliLiter(s) Oral Mucosa every 12 hours  chlorhexidine 4% Liquid 1 Application(s) Topical <User Schedule>  ezetimibe 10 milliGRAM(s) Oral at bedtime  insulin lispro (ADMELOG) corrective regimen sliding scale   SubCutaneous Before meals and at bedtime  isosorbide   mononitrate ER Tablet (IMDUR) 30 milliGRAM(s) Oral daily  levothyroxine 88 MICROGram(s) Oral daily  melatonin 5 milliGRAM(s) Oral at bedtime  niCARdipine Infusion 5 mG/Hr (25 mL/Hr) IV Continuous <Continuous>  pantoprazole    Tablet 40 milliGRAM(s) Oral before breakfast  sodium chloride 0.9% lock flush 3 milliLiter(s) IV Push every 8 hours  sodium chloride 0.9%. 1000 milliLiter(s) (10 mL/Hr) IV Continuous <Continuous>  sodium chloride 0.9%. 1000 milliLiter(s) (10 mL/Hr) IV Continuous <Continuous>  triamterene 37.5 mG/hydrochlorothiazide 25 mG Tablet 1 Tablet(s) Oral daily                                    7.7    10.24 )-----------( 167      ( 29 Mar 2025 02:11 )             24.0           136  |  102  |  31.4[H]  ----------------------------<  127[H]  4.8   |  19.0[L]  |  1.71[H]    Ca    9.0      29 Mar 2025 02:11  Phos  5.3     -  Mg     2.9         TPro  5.6[L]  /  Alb  3.7  /  TBili  0.2[L]  /  DBili  x   /  AST  31  /  ALT  17  /  AlkPhos  86  28      PT/INR - ( 29 Mar 2025 04:30 )   PT: 11.3 sec;   INR: 1.00 ratio         PTT - ( 29 Mar 2025 04:30 )  PTT:27.6 sec        Daily     Daily Weight in k.8 (29 Mar 2025 04:00)      03-28 @ 07: @ 07:00  --------------------------------------------------------  IN: 745.5 mL / OUT: 1450 mL / NET: -704.5 mL     @ 07:01   @ 08:35  --------------------------------------------------------  IN: 80 mL / OUT: 60 mL / NET: 20 mL        Critically Ill patient  : [ ] preoperative ,   [ x] post operative    Requires :  [x ] Arterial Line   [x ] Central Line  [ ] PA catheter  [ ] IABP  [ ] ECMO  [ ] LVAD  [ ] Ventilator  [ ] pacemaker [ x]  NC                       [ x] ABG's     [ x] Pulse Oxymetry Monitoring  Bedside evaluation , monitoring , treatment of hemodynamics , fluids , IVP/ IVCD meds.        Diagnosis:     POD 1 - TAVR    Hypertension     Hypovolemia     Post Op Bleeding - L Femoral Access site - -> to OR for vascular exploration - s/p transfusion 2 units PRBC's  - Fem Stop Device Employed                           R - Femoral Deployment Site     Vascular Surgical Consultation     f/u Vascular chesks     Metabolic Acidosis     Renal Failure - Acute Kidney Injury     Chronic Renal Failure  ? stage 3 ?    Hemodynamic lability,  instability. Requires IVCD [ ] vasopressors [ ] inotropes  [x ] vasodilator  [x ]IVSS fluid / Blood   to maintain MAP, perfusion, C.I.    CHF- acute [ x]   chronic [ x]    systolic [ ]   diastolic [ x]  Valvular [x ]          - Echo- EF -  65% / AS           [ ] RV dysfunction          - Cxr-cardiomegally, edema          - Clinical-  [ ]inotropes   [ ]pressors   [ ]diuresis   [ ]IABP   [ ]ECMO   [ ]LVAD   [ ]Respiratory Failure    Aortic Stenosis    NSTEMI in past    Requires bedside physical therapy, mobilization and total jail care.                     -                                  Discussed with CT surgeon, Physician's Assistant - Nurse Practitioner- Critical care medicine team.   Discussed at  AM / PM rounds.   Chart, labs , films reviewed.    Cumulative Critical Care Time Given Today : 135 min

## 2025-03-29 NOTE — PROGRESS NOTE ADULT - SUBJECTIVE AND OBJECTIVE BOX
Subjective:   79yFemale  Now s/p GA TF TAVR via RCFA with basicila by Dr. Hansen.  Post op Left groin rebleeding when femstop removed after 4 hours, resolved with manual pressure. Bilateral groin access sites soft and non-tender. Seen at bedside resting comfortably. Denies chest pain, palpitations, SOB, KEENE, cough, N/V/D/C.      HPI: 78 y/o F with PMH of includes Chronic diastolic heart failure NYHA Class II, mild MR/ mild TR, HTN, diabetes, hypothyroidism,  CKD, NSTEMI ** multiple most recent 24, CAD with stenting, CABG x 2, bilateral carotid stneosis (50-69%), hiatal hernia, GERD, and aortic stenosis BASELINE EKG sinus bradyno blocks. Presented to [facility] on 3/28 for known AS. Now s/p GA TF TAVR via RCFA with basicila by Dr. Hansen.  Post op Left groin rebleeding when femstop removed after 4 hours, resolved with manual pressure. Bilateral groin access sites soft and non-tender.   PAST MEDICAL & SURGICAL HISTORY:  Hypertension      Diabetes mellitus      Hypothyroidism      HLD (hyperlipidemia)      CAD (coronary artery disease)      CKD (chronic kidney disease)      S/P CABG (coronary artery bypass graft)      H/O  section      History of cardiac cath      H/O colonoscopy          Medications:  acetaminophen     Tablet .. 975 milliGRAM(s) Oral every 6 hours PRN  allopurinol 100 milliGRAM(s) Oral daily  aspirin  chewable 81 milliGRAM(s) Oral daily  atorvastatin 80 milliGRAM(s) Oral at bedtime  cefuroxime  IVPB 1500 milliGRAM(s) IV Intermittent every 8 hours  chlorhexidine 0.12% Liquid 15 milliLiter(s) Oral Mucosa every 12 hours  chlorhexidine 4% Liquid 1 Application(s) Topical <User Schedule>  clopidogrel Tablet 75 milliGRAM(s) Oral daily  ezetimibe 10 milliGRAM(s) Oral at bedtime  insulin lispro (ADMELOG) corrective regimen sliding scale   SubCutaneous Before meals and at bedtime  isosorbide   mononitrate ER Tablet (IMDUR) 30 milliGRAM(s) Oral daily  levothyroxine 88 MICROGram(s) Oral daily  melatonin 5 milliGRAM(s) Oral at bedtime  niCARdipine Infusion 5 mG/Hr IV Continuous <Continuous>  pantoprazole    Tablet 40 milliGRAM(s) Oral before breakfast  sodium chloride 0.9% lock flush 10 milliLiter(s) IV Push every 1 hour PRN  sodium chloride 0.9% lock flush 3 milliLiter(s) IV Push every 8 hours  sodium chloride 0.9%. 1000 milliLiter(s) IV Continuous <Continuous>  sodium chloride 0.9%. 1000 milliLiter(s) IV Continuous <Continuous>  triamterene 37.5 mG/hydrochlorothiazide 25 mG Tablet 1 Tablet(s) Oral daily      MEDICATIONS  (PRN):  acetaminophen     Tablet .. 975 milliGRAM(s) Oral every 6 hours PRN Temp greater or equal to 38C (100.4F), Moderate Pain (4 - 6)  sodium chloride 0.9% lock flush 10 milliLiter(s) IV Push every 1 hour PRN Pre/post blood products, medications, blood draw, and to maintain line patency      Daily Review:  Height (cm): 147.3 ( @ 06:17)  Weight (kg): 58 ( @ 06:17)  BMI (kg/m2): 26.7 ( @ 06:17)  BSA (m2): 1.51 ( @ 06:17)    ABG - ( 28 Mar 2025 13:26 )  pH, Arterial: 7.340 pH, Blood: x     /  pCO2: 40    /  pO2: 143   / HCO3: 22    / Base Excess: -4.2  /  SaO2: 100.0                                   8.4    11.32 )-----------( 184      ( 28 Mar 2025 17:36 )             27.1       135  |  102  |  31.0[H]  ----------------------------<  148[H]  4.9   |  20.0[L]  |  1.60[H]    Ca    9.4      28 Mar 2025 21:50  Phos  3.0       Mg     1.9         TPro  5.6[L]  /  Alb  3.7  /  TBili  0.2[L]  /  DBili  x   /  AST  31  /  ALT  17  /  AlkPhos  86        PT/INR - ( 28 Mar 2025 17:36 )   PT: 11.4 sec;   INR: 0.98 ratio         PTT - ( 28 Mar 2025 17:36 )  PTT:35.9 sec    T(C): 37 (25 @ 23:00), Max: 37.9 (25 @ 16:00)  HR: 63 (25 @ 23:00) (60 - 83)  BP: 171/53 (25 @ 06:17) (171/53 - 171/53)  RR: 13 (25 @ 18:30) (11 - 27)  SpO2: 100% (25 @ 23:00) (97% - 100%)  Wt(kg): --    CAPILLARY BLOOD GLUCOSE      POCT Blood Glucose.: 166 mg/dL (28 Mar 2025 21:52)  POCT Blood Glucose.: 132 mg/dL (28 Mar 2025 06:34)      I&O's Summary    28 Mar 2025 07:01  -  29 Mar 2025 01:25  --------------------------------------------------------  IN: 37.5 mL / OUT: 1025 mL / NET: -987.5 mL          PHYSICAL EXAM:  GENERAL: No acute distress, well-developed  NECK: no JVD  CHEST/LUNG: CTAB; No wheezes, rales, or rhonchi  HEART: RRR; No murmurs, rubs, or gallops  ABDOMEN: Soft, non-tender, non-distended; normal bowel sounds  EXTREMITIES:  2+ peripheral pulses b/l, No clubbing, cyanosis, or edema  NEUROLOGY: AAO x 4

## 2025-03-29 NOTE — DISCHARGE NOTE PROVIDER - NSDCCPTREATMENT_GEN_ALL_CORE_FT
PRINCIPAL PROCEDURE  Procedure: TAVR, percutaneous  Findings and Treatment: Commercial 23 mm Medtronic CoreValve Evolut FX+ Percutaneous Transfemoral TAVR via RIGHT Common Femoral Artery.  NCT# [id number], STS/ACC TVT Registry Patient ID# [id number]  Basilica Right Cusp     PRINCIPAL PROCEDURE  Procedure: TAVR, percutaneous  Findings and Treatment: Commercial 23 mm Medtronic CoreValve Evolut FX+ Percutaneous Transfemoral TAVR via RIGHT Common Femoral Artery.  NCT# [id number], STS/ACC TVT Registry Patient ID# [id number]  Basilica Right Cusp      SECONDARY PROCEDURE  Procedure: Repair, artery, femoral  Findings and Treatment:

## 2025-03-29 NOTE — DISCHARGE NOTE PROVIDER - NSDCACTIVITY_GEN_ALL_CORE
Return to Work/School allowed/Walking - Indoors allowed/No heavy lifting/straining/Walking - Outdoors allowed/Follow Instructions Provided by your Surgical Team/Activity as tolerated

## 2025-03-29 NOTE — DISCHARGE NOTE PROVIDER - NSDCHHNEEDSERVICE_GEN_ALL_CORE
Medication teaching and assessment Medication teaching and assessment/Observation and assessment/Wound care and assessment

## 2025-03-29 NOTE — DISCHARGE NOTE PROVIDER - NSDCHHHOMEBOUNDOTHER_GEN_ALL_CORE_FT
Left groin wound care.  Xeroform to skin tear then place guaze and island dressing over xeroform and also the staple line daily.  Pt may shower daily

## 2025-03-29 NOTE — DISCHARGE NOTE PROVIDER - HOSPITAL COURSE
78 y/o F with PMH of includes Chronic diastolic heart failure NYHA Class II, mild MR/ mild TR, HTN, diabetes, hypothyroidism,  CKD, NSTEMI ** multiple most recent 1/20/24, CAD with stenting, CABG x 2, bilateral carotid stneosis (50-69%), hiatal hernia, GERD, and aortic stenosis BASELINE EKG sinus bradyno blocks. Presented to [facility] on 3/28 for known AS. Now s/p GA TF TAVR via RCFA with basicila by Dr. Hansen.  Post op Left groin rebleeding when femstop removed after 4 hours, resolved with manual pressure. Bilateral groin access sites soft and non-tender.     < from: TTE W or WO Ultrasound Enhancing Agent (03.28.25 @ 17:51) >    CONCLUSIONS:      1. Left ventricular cavity is normal in size. Left ventricular systolic function is hyperdynamic with an ejection fraction visually estimated at >75 %.   2. There is moderate (grade 2) left ventricular diastolic dysfunction.   3. Normal right ventricular cavity size and normal right ventricular systolic function.   4. Left atrium is mildly dilated.   5. The right atrium is normal in size.   6. A 23mm Medtronic CoreValve Evolut TAVR is visualized in the aortic position. AT= 70.42msec. Dimensionless Index= .51. No evidence of paravalvular regurgitation. No evidence of intravalvular regurgitation.   7. No pericardial effusion seen.   8. Estimated pulmonary artery systolic pressure is 28 mmHg.   9. No evidence of aortic regurgitation.  10. No left ventricular hypertrophy.      < end of copied text >      78 y/o F with PMH of includes Chronic diastolic heart failure NYHA Class II, mild MR/ mild TR, HTN, diabetes, hypothyroidism,  CKD, NSTEMI ** multiple most recent 1/20/24, CAD with stenting, CABG x 2, bilateral carotid stneosis (50-69%), hiatal hernia, GERD, and aortic stenosis BASELINE EKG sinus bradyno blocks. Presented to [facility] on 3/28 for known AS. Now s/p GA TF TAVR via RCFA with basicila by Dr. Hansen.  Post op Left groin rebleeding, Vascular consulted, CT ABD and Pelvis obtained, Left femoral artery pseudoaneurysm with large adjacent hematoma and additional focus of extravasation. Pt taken emergently to the OR for Left femoral cutdown, evacuation of hematoma, ligation of PSA sac, repair of arteriotomy after proximal control, washout, groin closure in layers, with prevena dressing applied performed by Dr. Magda Magana. Post-op notable for JOSE LUIS ( now resolving).      < from: TTE W or WO Ultrasound Enhancing Agent (03.28.25 @ 17:51) >    CONCLUSIONS:      1. Left ventricular cavity is normal in size. Left ventricular systolic function is hyperdynamic with an ejection fraction visually estimated at >75 %.   2. There is moderate (grade 2) left ventricular diastolic dysfunction.   3. Normal right ventricular cavity size and normal right ventricular systolic function.   4. Left atrium is mildly dilated.   5. The right atrium is normal in size.   6. A 23mm Medtronic CoreValve Evolut TAVR is visualized in the aortic position. AT= 70.42msec. Dimensionless Index= .51. No evidence of paravalvular regurgitation. No evidence of intravalvular regurgitation.   7. No pericardial effusion seen.   8. Estimated pulmonary artery systolic pressure is 28 mmHg.   9. No evidence of aortic regurgitation.  10. No left ventricular hypertrophy.      < end of copied text >      78 y/o F with PMH of includes Chronic diastolic heart failure NYHA Class II, mild MR/ mild TR, HTN, diabetes, hypothyroidism,  CKD, NSTEMI ** multiple most recent 1/20/24, CAD with stenting, CABG x 2, bilateral carotid stneosis (50-69%), hiatal hernia, GERD, and aortic stenosis BASELINE EKG sinus betsy no blocks. Presented to [facility] on 3/28 for known AS. Pt s/p GA TF TAVR via RCFA with basicila by Dr. Hansen.  Post op Left groin rebleeding, Vascular consulted, CT ABD and Pelvis obtained, Left femoral artery pseudoaneurysm with large adjacent hematoma and additional focus of extravasation. Pt taken emergently to the OR for Left femoral cutdown, evacuation of hematoma, ligation of PSA sac, repair of arteriotomy after proximal control, washout, groin closure in layers, with prevena dressing applied performed by Dr. Magda Magana. Post-op notable for JOSE LUIS (now resolving).  Patient remains hemodynamically stable and a determined stable for discharge as per Dr. Hansen. Patient will be discharged on an MCOT monitor for 30 days to evaluate for potential rhythm disturbances due to TAVR.    Left groin staples -to be removed two weeks post op    < from: TTE W or WO Ultrasound Enhancing Agent (03.28.25 @ 17:51) >    CONCLUSIONS:      1. Left ventricular cavity is normal in size. Left ventricular systolic function is hyperdynamic with an ejection fraction visually estimated at >75 %.   2. There is moderate (grade 2) left ventricular diastolic dysfunction.   3. Normal right ventricular cavity size and normal right ventricular systolic function.   4. Left atrium is mildly dilated.   5. The right atrium is normal in size.   6. A 23mm Medtronic Core Valve Evolut TAVR is visualized in the aortic position. AT= 70.42msec. Dimensionless Index= .51. No evidence of paravalvular regurgitation. No evidence of intravalvular regurgitation.   7. No pericardial effusion seen.   8. Estimated pulmonary artery systolic pressure is 28 mmHg.   9. No evidence of aortic regurgitation.  10. No left ventricular hypertrophy.      < end of copied text >

## 2025-03-29 NOTE — DISCHARGE NOTE PROVIDER - NPI NUMBER (FOR SYSADMIN USE ONLY) :
[[id number]],[[id number]] pt lives with wife in a private house with 4 steps to enter. steps inside, however does not need to use them. prior to admission, pt was independent with functional tasks with rolling walker. pt required assistance for LB dressing/spouse [[id number]],[[id number]],[[id number]] [[id number]],[[id number]],[[id number]],[UNKNOWN]

## 2025-03-29 NOTE — CONSULT NOTE ADULT - ATTENDING COMMENTS
I was called emergently for bleeding from left groin at 5am Saturday morning.  Per CTICU team, groin had bled earlier in the night after fem stop was removed and resolved with pressure. However it had began to bleed again with expanding hematoma around 4am. Pressure had been held for an hour without resolution.   CTA obtained during which groin hematoma was actively expanding per staff  CTA reviewed which showed bilobed pseudoanerysm and another area of blush within hematoma  Large skin tear from femstop  Taken to OR emergently for repair of femoral artery. Plan discussed with CCU and patient's family who agree to proceed. I was called emergently for bleeding from left groin at 5am Saturday morning.  Per CTICU team, groin had bled earlier in the night after fem stop was removed and resolved with pressure. However it had began to bleed again with expanding hematoma around 4am. Pressure had been held for an hour without resolution.   CTA obtained during which groin hematoma was actively expanding per staff  CTA reviewed which showed bilobed pseudoanerysm and another area of blush within hematoma  Large skin tear from femstop  Taken to OR emergently for repair of femoral artery. Plan discussed with CTICU and patient's family who agree to proceed.

## 2025-03-29 NOTE — DISCHARGE NOTE PROVIDER - NSDCMRMEDTOKEN_GEN_ALL_CORE_FT
allopurinol 100 mg oral tablet: 1 tab(s) orally once a day  aspirin 81 mg oral tablet, chewable: 1 tab(s) orally once a day  atorvastatin 80 mg oral tablet: 1 tab(s) orally once a day (at bedtime)  Cardio for life powder: once a day  carvedilol 6.25 mg oral tablet: 1 tab(s) orally 2 times a day  clopidogrel 75 mg oral tablet: 1 tab(s) orally once a day  ezetimibe 10 mg oral tablet: 1 tab(s) orally once a day (at bedtime)  glipiZIDE 5 mg oral tablet: 1 tab(s) orally once a day  isosorbide mononitrate 30 mg oral tablet, extended release: 1 tab(s) orally once a day  Jardiance 10 mg oral tablet: 1 tab(s) orally once a day  levothyroxine 88 mcg (0.088 mg) oral tablet: 1 tab(s) orally once a day  melatonin 10 mg oral tablet: 1 tab(s) orally once a day (at bedtime) as needed  mupirocin 2% topical kit: Apply topically to affected area 2 times a day Apply pea-sized amount of medication to both nostrils twice a day for 5 days starting on 3/26/25  omeprazole 20 mg oral delayed release capsule: 1 cap(s) orally once a day  triamterene-hydrochlorothiazide 37.5 mg-25 mg oral tablet: 1 tab(s) orally once a day   allopurinol 100 mg oral tablet: 1 tab(s) orally once a day  aspirin 81 mg oral tablet, chewable: 1 tab(s) orally once a day  atorvastatin 80 mg oral tablet: 1 tab(s) orally once a day (at bedtime)  Cardio for life powder: once a day  carvedilol 3.125 mg oral tablet: 1 tab(s) orally every 12 hours  clopidogrel 75 mg oral tablet: 1 tab(s) orally once a day  ezetimibe 10 mg oral tablet: 1 tab(s) orally once a day (at bedtime)  glipiZIDE 5 mg oral tablet: 1 tab(s) orally once a day  isosorbide mononitrate 30 mg oral tablet, extended release: 1 tab(s) orally once a day  Jardiance 10 mg oral tablet: 1 tab(s) orally once a day  levothyroxine 88 mcg (0.088 mg) oral tablet: 1 tab(s) orally once a day  melatonin 5 mg oral tablet: 1 tab(s) orally once a day (at bedtime)  omeprazole 20 mg oral delayed release capsule: 1 cap(s) orally once a day  triamterene-hydrochlorothiazide 37.5 mg-25 mg oral tablet: 1 tab(s) orally once a day   allopurinol 100 mg oral tablet: 1 tab(s) orally once a day  aspirin 81 mg oral tablet, chewable: 1 tab(s) orally once a day  atorvastatin 80 mg oral tablet: 1 tab(s) orally once a day (at bedtime)  carvedilol 3.125 mg oral tablet: 1 tab(s) orally every 12 hours  clopidogrel 75 mg oral tablet: 1 tab(s) orally once a day  ezetimibe 10 mg oral tablet: 1 tab(s) orally once a day (at bedtime)  glipiZIDE 5 mg oral tablet: 1 tab(s) orally once a day  isosorbide mononitrate 30 mg oral tablet, extended release: 1 tab(s) orally once a day  Jardiance 10 mg oral tablet: 1 tab(s) orally once a day  levothyroxine 88 mcg (0.088 mg) oral tablet: 1 tab(s) orally once a day  melatonin 5 mg oral tablet: 1 tab(s) orally once a day (at bedtime)  omeprazole 20 mg oral delayed release capsule: 1 cap(s) orally once a day  triamterene-hydrochlorothiazide 37.5 mg-25 mg oral tablet: 1 tab(s) orally once a day

## 2025-03-30 LAB
ANION GAP SERPL CALC-SCNC: 14 MMOL/L — SIGNIFICANT CHANGE UP (ref 5–17)
BUN SERPL-MCNC: 44.9 MG/DL — HIGH (ref 8–20)
CALCIUM SERPL-MCNC: 8.2 MG/DL — LOW (ref 8.4–10.5)
CHLORIDE SERPL-SCNC: 101 MMOL/L — SIGNIFICANT CHANGE UP (ref 96–108)
CO2 SERPL-SCNC: 21 MMOL/L — LOW (ref 22–29)
CREAT SERPL-MCNC: 1.82 MG/DL — HIGH (ref 0.5–1.3)
EGFR: 28 ML/MIN/1.73M2 — LOW
EGFR: 28 ML/MIN/1.73M2 — LOW
GLUCOSE BLDC GLUCOMTR-MCNC: 125 MG/DL — HIGH (ref 70–99)
GLUCOSE BLDC GLUCOMTR-MCNC: 135 MG/DL — HIGH (ref 70–99)
GLUCOSE BLDC GLUCOMTR-MCNC: 153 MG/DL — HIGH (ref 70–99)
GLUCOSE BLDC GLUCOMTR-MCNC: 171 MG/DL — HIGH (ref 70–99)
GLUCOSE SERPL-MCNC: 153 MG/DL — HIGH (ref 70–99)
HCT VFR BLD CALC: 30.6 % — LOW (ref 34.5–45)
HCT VFR BLD CALC: 31.6 % — LOW (ref 34.5–45)
HGB BLD-MCNC: 10.5 G/DL — LOW (ref 11.5–15.5)
HGB BLD-MCNC: 10.6 G/DL — LOW (ref 11.5–15.5)
IMMATURE PLATELET FRACTION #: 1.5 K/UL — LOW (ref 4.7–11.1)
IMMATURE PLATELET FRACTION #: 2.1 K/UL — LOW (ref 4.7–11.1)
IMMATURE PLATELET FRACTION %: 1.5 % — LOW (ref 1.6–4.9)
IMMATURE PLATELET FRACTION %: 2 % — SIGNIFICANT CHANGE UP (ref 1.6–4.9)
MAGNESIUM SERPL-MCNC: 2.5 MG/DL — SIGNIFICANT CHANGE UP (ref 1.8–2.6)
MCHC RBC-ENTMCNC: 30.5 PG — SIGNIFICANT CHANGE UP (ref 27–34)
MCHC RBC-ENTMCNC: 30.8 PG — SIGNIFICANT CHANGE UP (ref 27–34)
MCHC RBC-ENTMCNC: 33.5 G/DL — SIGNIFICANT CHANGE UP (ref 32–36)
MCHC RBC-ENTMCNC: 34.3 G/DL — SIGNIFICANT CHANGE UP (ref 32–36)
MCV RBC AUTO: 89.7 FL — SIGNIFICANT CHANGE UP (ref 80–100)
MCV RBC AUTO: 90.8 FL — SIGNIFICANT CHANGE UP (ref 80–100)
NRBC # BLD AUTO: 0 K/UL — SIGNIFICANT CHANGE UP (ref 0–0)
NRBC # BLD AUTO: 0 K/UL — SIGNIFICANT CHANGE UP (ref 0–0)
NRBC # FLD: 0 K/UL — SIGNIFICANT CHANGE UP (ref 0–0)
NRBC # FLD: 0 K/UL — SIGNIFICANT CHANGE UP (ref 0–0)
NRBC BLD AUTO-RTO: 0 /100 WBCS — SIGNIFICANT CHANGE UP (ref 0–0)
NRBC BLD AUTO-RTO: 0 /100 WBCS — SIGNIFICANT CHANGE UP (ref 0–0)
PHOSPHATE SERPL-MCNC: 4.2 MG/DL — SIGNIFICANT CHANGE UP (ref 2.4–4.7)
PLATELET # BLD AUTO: 103 K/UL — LOW (ref 150–400)
PLATELET # BLD AUTO: 99 K/UL — LOW (ref 150–400)
PMV BLD: 10 FL — SIGNIFICANT CHANGE UP (ref 7–13)
PMV BLD: 10.1 FL — SIGNIFICANT CHANGE UP (ref 7–13)
POTASSIUM SERPL-MCNC: 4.3 MMOL/L — SIGNIFICANT CHANGE UP (ref 3.5–5.3)
POTASSIUM SERPL-SCNC: 4.3 MMOL/L — SIGNIFICANT CHANGE UP (ref 3.5–5.3)
RBC # BLD: 3.41 M/UL — LOW (ref 3.8–5.2)
RBC # BLD: 3.48 M/UL — LOW (ref 3.8–5.2)
RBC # FLD: 15 % — HIGH (ref 10.3–14.5)
RBC # FLD: 15 % — HIGH (ref 10.3–14.5)
SODIUM SERPL-SCNC: 136 MMOL/L — SIGNIFICANT CHANGE UP (ref 135–145)
WBC # BLD: 11.68 K/UL — HIGH (ref 3.8–10.5)
WBC # BLD: 12.45 K/UL — HIGH (ref 3.8–10.5)
WBC # FLD AUTO: 11.68 K/UL — HIGH (ref 3.8–10.5)
WBC # FLD AUTO: 12.45 K/UL — HIGH (ref 3.8–10.5)

## 2025-03-30 PROCEDURE — 99291 CRITICAL CARE FIRST HOUR: CPT

## 2025-03-30 PROCEDURE — 93010 ELECTROCARDIOGRAM REPORT: CPT

## 2025-03-30 PROCEDURE — 99292 CRITICAL CARE ADDL 30 MIN: CPT

## 2025-03-30 PROCEDURE — 71045 X-RAY EXAM CHEST 1 VIEW: CPT | Mod: 26

## 2025-03-30 RX ORDER — LIDOCAINE HYDROCHLORIDE 20 MG/ML
1 JELLY TOPICAL EVERY 24 HOURS
Refills: 0 | Status: DISCONTINUED | OUTPATIENT
Start: 2025-03-30 | End: 2025-04-03

## 2025-03-30 RX ORDER — OXYCODONE HYDROCHLORIDE 30 MG/1
10 TABLET ORAL EVERY 6 HOURS
Refills: 0 | Status: DISCONTINUED | OUTPATIENT
Start: 2025-03-30 | End: 2025-03-31

## 2025-03-30 RX ORDER — METHOCARBAMOL 500 MG/1
500 TABLET, FILM COATED ORAL ONCE
Refills: 0 | Status: COMPLETED | OUTPATIENT
Start: 2025-03-30 | End: 2025-03-30

## 2025-03-30 RX ORDER — OXYCODONE HYDROCHLORIDE 30 MG/1
5 TABLET ORAL EVERY 6 HOURS
Refills: 0 | Status: DISCONTINUED | OUTPATIENT
Start: 2025-03-30 | End: 2025-03-31

## 2025-03-30 RX ORDER — HYDROMORPHONE/SOD CHLOR,ISO/PF 2 MG/10 ML
0.5 SYRINGE (ML) INJECTION ONCE
Refills: 0 | Status: DISCONTINUED | OUTPATIENT
Start: 2025-03-30 | End: 2025-03-30

## 2025-03-30 RX ORDER — HYDROMORPHONE/SOD CHLOR,ISO/PF 2 MG/10 ML
0.25 SYRINGE (ML) INJECTION ONCE
Refills: 0 | Status: DISCONTINUED | OUTPATIENT
Start: 2025-03-30 | End: 2025-03-30

## 2025-03-30 RX ORDER — CLOPIDOGREL BISULFATE 75 MG/1
75 TABLET, FILM COATED ORAL DAILY
Refills: 0 | Status: DISCONTINUED | OUTPATIENT
Start: 2025-03-30 | End: 2025-04-03

## 2025-03-30 RX ORDER — CARVEDILOL 3.12 MG/1
6.25 TABLET, FILM COATED ORAL EVERY 12 HOURS
Refills: 0 | Status: DISCONTINUED | OUTPATIENT
Start: 2025-03-30 | End: 2025-03-30

## 2025-03-30 RX ADMIN — Medication 40 MILLIGRAM(S): at 05:07

## 2025-03-30 RX ADMIN — INSULIN LISPRO 2: 100 INJECTION, SOLUTION INTRAVENOUS; SUBCUTANEOUS at 12:11

## 2025-03-30 RX ADMIN — LIDOCAINE HYDROCHLORIDE 1 PATCH: 20 JELLY TOPICAL at 21:29

## 2025-03-30 RX ADMIN — Medication 10 MILLIGRAM(S): at 04:13

## 2025-03-30 RX ADMIN — Medication 3 MILLILITER(S): at 13:58

## 2025-03-30 RX ADMIN — Medication 0.5 MILLIGRAM(S): at 11:15

## 2025-03-30 RX ADMIN — OXYCODONE HYDROCHLORIDE 5 MILLIGRAM(S): 30 TABLET ORAL at 21:34

## 2025-03-30 RX ADMIN — Medication 3 MILLILITER(S): at 22:30

## 2025-03-30 RX ADMIN — EZETIMIBE 10 MILLIGRAM(S): 10 TABLET ORAL at 21:31

## 2025-03-30 RX ADMIN — Medication 81 MILLIGRAM(S): at 12:11

## 2025-03-30 RX ADMIN — Medication 88 MICROGRAM(S): at 05:08

## 2025-03-30 RX ADMIN — ISOSORBIDE MONONITRATE 30 MILLIGRAM(S): 60 TABLET, EXTENDED RELEASE ORAL at 12:11

## 2025-03-30 RX ADMIN — Medication 5 MILLIGRAM(S): at 21:31

## 2025-03-30 RX ADMIN — METHOCARBAMOL 500 MILLIGRAM(S): 500 TABLET, FILM COATED ORAL at 05:07

## 2025-03-30 RX ADMIN — OXYCODONE HYDROCHLORIDE 5 MILLIGRAM(S): 30 TABLET ORAL at 23:00

## 2025-03-30 RX ADMIN — CARVEDILOL 6.25 MILLIGRAM(S): 3.12 TABLET, FILM COATED ORAL at 06:29

## 2025-03-30 RX ADMIN — Medication 1 TABLET(S): at 05:08

## 2025-03-30 RX ADMIN — Medication 1 LOZENGE: at 21:31

## 2025-03-30 RX ADMIN — ATORVASTATIN CALCIUM 80 MILLIGRAM(S): 80 TABLET, FILM COATED ORAL at 21:31

## 2025-03-30 RX ADMIN — Medication 100 MILLIGRAM(S): at 12:11

## 2025-03-30 RX ADMIN — CLOPIDOGREL BISULFATE 75 MILLIGRAM(S): 75 TABLET, FILM COATED ORAL at 17:03

## 2025-03-30 RX ADMIN — Medication 0.5 MILLIGRAM(S): at 10:14

## 2025-03-30 RX ADMIN — INSULIN LISPRO 2: 100 INJECTION, SOLUTION INTRAVENOUS; SUBCUTANEOUS at 08:01

## 2025-03-30 RX ADMIN — Medication 0.25 MILLIGRAM(S): at 05:09

## 2025-03-30 RX ADMIN — Medication 1000 MILLIGRAM(S): at 00:30

## 2025-03-30 RX ADMIN — Medication 1 APPLICATION(S): at 05:10

## 2025-03-30 RX ADMIN — Medication 0.25 MILLIGRAM(S): at 06:09

## 2025-03-30 NOTE — PROGRESS NOTE ADULT - SUBJECTIVE AND OBJECTIVE BOX
VASCULAR SURGERY        INTERVAL EVENTS/SUBJECTIVE:   Ms. Cameron is feeling well this morning.  She is having expected levels of pain at her groin sites.  No lower extremity sxs at this time.    ______________________________________________  OBJECTIVE:   T(C): 37.4 (03-30-25 @ 12:00), Max: 37.6 (03-29-25 @ 22:00)  HR: 75 (03-30-25 @ 12:00) (57 - 97)  BP: 129/48 (03-30-25 @ 12:00) (106/41 - 181/51)  RR: 12 (03-30-25 @ 12:00) (9 - 23)  SpO2: 93% (03-30-25 @ 12:00) (91% - 100%)  Wt(kg): --  CAPILLARY BLOOD GLUCOSE      POCT Blood Glucose.: 153 mg/dL (30 Mar 2025 12:10)  POCT Blood Glucose.: 171 mg/dL (30 Mar 2025 07:59)  POCT Blood Glucose.: 147 mg/dL (29 Mar 2025 21:43)  POCT Blood Glucose.: 163 mg/dL (29 Mar 2025 16:42)    I&O's Detail    29 Mar 2025 07:01  -  30 Mar 2025 07:00  --------------------------------------------------------  IN:    IV PiggyBack: 50 mL    IV PiggyBack: 100 mL    NiCARdipine: 25 mL    Oral Fluid: 980 mL    PRBCs (Packed Red Blood Cells): 311 mL    sodium chloride 0.9%: 5 mL  Total IN: 1471 mL    OUT:    Bulb (mL): 135 mL    Indwelling Catheter - Urethral (mL): 1030 mL  Total OUT: 1165 mL    Total NET: 306 mL      30 Mar 2025 07:01  -  30 Mar 2025 12:40  --------------------------------------------------------  IN:    Oral Fluid: 240 mL  Total IN: 240 mL    OUT:    Bulb (mL): 60 mL    Indwelling Catheter - Urethral (mL): 205 mL  Total OUT: 265 mL    Total NET: -25 mL          Physical exam:  Gen: resting in bed comfortably in NAD  Chest: no increased WOB, regular inspiratory effort   Vascular: L groin with expected levels of ecchymosis, prevena in place holding good suction.  Soft and appropriately tender.  Palpable distal pulses  NEURO: awake, alert  ______________________________________________  LABS:                        10.5   12.45 )-----------( 99       ( 30 Mar 2025 01:50 )             30.6       03-30    136  |  101  |  44.9[H]  ----------------------------<  153[H]  4.3   |  21.0[L]  |  1.82[H]    Ca    8.2[L]      30 Mar 2025 01:50  Phos  4.2     03-30  Mg     2.5     03-30    TPro  4.1[L]  /  Alb  2.7[L]  /  TBili  0.4  /  DBili  x   /  AST  21  /  ALT  12  /  AlkPhos  60  03-29    _____________________________________________

## 2025-03-30 NOTE — DIETITIAN INITIAL EVALUATION ADULT - ORAL INTAKE PTA/DIET HISTORY
Pt sleeping soundly upon visit. Spoke with family at bedside. Pt's family reports pt eats well at home, no recent weight changes. Pt currently with poor appetite since surgery noted. Family denies difficulty chewing or swallowing at this time. Family agreeable to ONS (Glucerna shake), recommended pt sips between meals to optimize caloric intake ant this time. Further food preferences obtained.

## 2025-03-30 NOTE — DIETITIAN INITIAL EVALUATION ADULT - PERTINENT MEDS FT
MEDICATIONS  (STANDING):  aspirin  chewable 81 milliGRAM(s) Oral daily  atorvastatin 80 milliGRAM(s) Oral at bedtime  insulin lispro (ADMELOG) corrective regimen sliding scale   SubCutaneous Before meals and at bedtime  melatonin 5 milliGRAM(s) Oral at bedtime  pantoprazole    Tablet 40 milliGRAM(s) Oral before breakfast  sodium chloride 0.9% lock flush 3 milliLiter(s) IV Push every 8 hours

## 2025-03-30 NOTE — PROGRESS NOTE ADULT - ASSESSMENT
80 yo F s/p TAVR 3/28 and Left femoral cutdown, evacuation of hematoma, ligation of PSA sac, repair of arteriotomy on 3/29. Pt recovering appropriately post-op.     Plan:   - pain control   - Will remove Prevena between 4/1 and 4/3  - monitor ANTHONY output, leave drain in place until output is <15cc/24hr.  Can be discharged with drain in place  - Frequent stripping of ANTHONY drain to prevent clogging  - Staples will be removed two weeks post op  - DVT ppx: SCDs, ASA  - rest of care per primary team  - ok to OOB

## 2025-03-30 NOTE — DIETITIAN INITIAL EVALUATION ADULT - OTHER INFO
Pt is a 78 y/o F with PMH of Chronic diastolic heart failure, mild MR/ mild TR, HTN, diabetes, hypothyroidism,  CKD, NSTEMI, CAD with stenting, CABG x 2, bilateral carotid stenosis, hiatal hernia, GERD, and aortic stenosis.  Presented to [facility] on 3/28 for known AS. Now s/p TAVR 3/28 and Left femoral cutdown, evacuation of hematoma, ligation of PSA sac, repair of arteriotomy on 3/29. Pt recovering appropriately post-op.

## 2025-03-30 NOTE — DIETITIAN INITIAL EVALUATION ADULT - PERTINENT LABORATORY DATA
03-30    136  |  101  |  44.9[H]  ----------------------------<  153[H]  4.3   |  21.0[L]  |  1.82[H]    Ca    8.2[L]      30 Mar 2025 01:50  Phos  4.2     03-30  Mg     2.5     03-30    TPro  4.1[L]  /  Alb  2.7[L]  /  TBili  0.4  /  DBili  x   /  AST  21  /  ALT  12  /  AlkPhos  60  03-29  POCT Blood Glucose.: 153 mg/dL (03-30-25 @ 12:10)  A1C with Estimated Average Glucose Result: 6.9 % (03-26-25 @ 08:55)

## 2025-03-30 NOTE — DIETITIAN INITIAL EVALUATION ADULT - CALCULATED TO (CAL/KG)
Anesthesia Post Evaluation    Patient: Wilfrido Eason Jr.    Procedure(s) Performed: Procedure(s) (LRB):  RELEASE, CARPAL TUNNEL, ENDOSCOPIC - left (Left)    Final Anesthesia Type: general      Patient location during evaluation: PACU  Patient participation: Yes- Able to Participate  Level of consciousness: awake and alert  Post-procedure vital signs: reviewed and stable  Pain management: adequate  Airway patency: patent  CRAIG mitigation strategies: Multimodal analgesia  PONV status at discharge: No PONV  Anesthetic complications: no      Cardiovascular status: blood pressure returned to baseline and hemodynamically stable  Respiratory status: unassisted  Hydration status: euvolemic  Follow-up not needed.              Vitals Value Taken Time   /67 10/25/24 1047   Temp 36.4 °C (97.5 °F) 10/25/24 1030   Pulse 55 10/25/24 1054   Resp 24 10/25/24 1054   SpO2 94 % 10/25/24 1054   Vitals shown include unfiled device data.      Event Time   Out of Recovery 10:40:00         Pain/George Score: Pain Rating Prior to Med Admin: 0 (10/25/2024  8:35 AM)  George Score: 10 (10/25/2024 10:46 AM)          
[id number]

## 2025-03-30 NOTE — PROGRESS NOTE ADULT - SUBJECTIVE AND OBJECTIVE BOX
DIANA ROGERS  MRN-[id number]    HPI:  Patient is a 79 year old  female presenting today for PST, PMH includes HTN, diabetes, hypothyroidism,  CKD, NSTEMI, CAD with stenting, CABG x 2 and aortic stenosis. Patient c/o "tightness" in chest with exertion and intermittent shortness of breath with activity. Reports on occasion she has palpitations, that resolve quickly on their own and fatigue. She is s/p cardiac cath 2024. Denies chest pain, dizziness,  lightheadedness, syncope, orthopnea, cough, or lower extremity edema. She is now scheduled for transcatheter aortic valve replacement  transfemoral Medtronic with basilica on 3/28/25 with Dr. Hansen.     Testing as per chart:    Cardiac Catheterization 24  LM     Left main artery: Angiography shows no disease. LAD Left anterior descending artery: Proximal 20-30%, mid 100%. Distal LADfills via LIMA..CX Circumflex: Angiography shows mild atherosclerosis. RCA Right coronary artery: Mid 40% eccentric lesion.. Graft Angiography LIMA graft to Mid left anterior descending: Angiography shows no disease .Left Heart Cath [facility] performed: Aortic valve crossed and left ventricular pressures were obtained.    Transthoracic Echocardiogram 24   1. Left ventricular cavity is normal in size. Left ventricular systolic function is normal with an ejection fraction visually estimated at 60 to 65 %. 2. There is mild (grade 1) left ventricular diastolic dysfunction. 3. Normal right ventricular cavity size and normal right ventricular systolic function. 4. Left atrium is moderately dilated. 5. The right atrium is normal in size. 6. The interatrial septum appears intact. 7. There is moderate calcification of the mitral valve annulus. 8. Thickened mitral valve leaflets. 9. Mild mitral regurgitation.10. Aortic valve anatomy cannot be determined with reduced systolic excursion. There is severe calcification of the aortic valve leaflets. Severe aortic stenosis.11. The peak transaortic velocity is 3.97 m/s, peak transaortic gradient is 63.1 mmHg and mean transaortic gradient is 33.2 mmHg with an LVOT/aortic valve VTI ratio of 0.28. The aortic valve acceleration time is 118 msec. The effective orifice area is estimated at 0.60 cm by the continuity equation.12. Moderate aortic regurgitation.13. No pericardial effusion seen.  (26 Mar 2025 08:40)      Surgery/Hospital Course:    Today:  No acute events     ICU Vital Signs Last 24 Hrs  T(C): 37.5 (30 Mar 2025 14:00), Max: 37.6 (29 Mar 2025 22:00)  T(F): 99.5 (30 Mar 2025 14:00), Max: 99.7 (29 Mar 2025 22:00)  HR: 76 (30 Mar 2025 14:00) (60 - 97)  BP: 131/56 (30 Mar 2025 14:00) (106/41 - 181/51)  BP(mean): 74 (30 Mar 2025 14:00) (62 - 88)  ABP: 152/44 (30 Mar 2025 14:00) (130/39 - 196/53)  ABP(mean): 74 (30 Mar 2025 14:00) (66 - 105)  RR: 19 (30 Mar 2025 14:00) (11 - 23)  SpO2: 97% (30 Mar 2025 14:00) (90% - 100%)    O2 Parameters below as of 30 Mar 2025 15:00  Patient On (Oxygen Delivery Method): room air            Physical Exam:  Gen: resting in bed comfortably in NAD  Chest: no increased WOB, regular inspiratory effort   Vascular: L groin with expected levels of ecchymosis, prevena in place holding good suction.  Soft and appropriately tender.  Palpable distal pulses  NEURO: awake, alert-    ============================I/O===========================   I&O's Detail    29 Mar 2025 07:01  -  30 Mar 2025 07:00  --------------------------------------------------------  IN:    IV PiggyBack: 50 mL    IV PiggyBack: 100 mL    NiCARdipine: 25 mL    Oral Fluid: 980 mL    PRBCs (Packed Red Blood Cells): 311 mL    sodium chloride 0.9%: 5 mL  Total IN: 1471 mL    OUT:    Bulb (mL): 135 mL    Indwelling Catheter - Urethral (mL): 1030 mL  Total OUT: 1165 mL    Total NET: 306 mL      30 Mar 2025 07:01  -  30 Mar 2025 15:35  --------------------------------------------------------  IN:    Oral Fluid: 480 mL  Total IN: 480 mL    OUT:    Bulb (mL): 60 mL    Indwelling Catheter - Urethral (mL): 350 mL  Total OUT: 410 mL    Total NET: 70 mL        ============================ LABS =========================                        10.5   12.45 )-----------( 99       ( 30 Mar 2025 01:50 )             30.6     03-30    136  |  101  |  44.9[H]  ----------------------------<  153[H]  4.3   |  21.0[L]  |  1.82[H]    Ca    8.2[L]      30 Mar 2025 01:50  Phos  4.2     -  Mg     2.5     -30    TPro  4.1[L]  /  Alb  2.7[L]  /  TBili  0.4  /  DBili  x   /  AST  21  /  ALT  12  /  AlkPhos  60  03-29    LIVER FUNCTIONS - ( 29 Mar 2025 11:25 )  Alb: 2.7 g/dL / Pro: 4.1 g/dL / ALK PHOS: 60 U/L / ALT: 12 U/L / AST: 21 U/L / GGT: x           PT/INR - ( 29 Mar 2025 13:00 )   PT: 11.8 sec;   INR: 1.02 ratio         PTT - ( 29 Mar 2025 13:00 )  PTT:25.7 sec  ABG - ( 29 Mar 2025 18:50 )  pH, Arterial: 7.380 pH, Blood: x     /  pCO2: 43    /  pO2: 187   / HCO3: 25    / Base Excess: 0.3   /  SaO2: 100.0             Urinalysis Basic - ( 30 Mar 2025 01:50 )    Color: x / Appearance: x / SG: x / pH: x  Gluc: 153 mg/dL / Ketone: x  / Bili: x / Urobili: x   Blood: x / Protein: x / Nitrite: x   Leuk Esterase: x / RBC: x / WBC x   Sq Epi: x / Non Sq Epi: x / Bacteria: x      ======================Micro/Rad/Cardio=================  Culture: Reviewed   CXR: Reviewed  Echo:Reviewed  ======================================================  PAST MEDICAL & SURGICAL HISTORY:  Hypertension      Diabetes mellitus      Hypothyroidism      HLD (hyperlipidemia)      CAD (coronary artery disease)      CKD (chronic kidney disease)      S/P CABG (coronary artery bypass graft)      H/O  section      History of cardiac cath    H/O colonoscopy        ====================ASSESSMENT ==============  78 yo F s/p TAVR 3/28 and Left femoral cutdown, evacuation of hematoma, ligation of PSA sac, repair of arteriotomy on 3/29. Pt recovering appropriately post-op.-    Hypertension  Diabetes mellitus  Hypothyroidism  HLD (hyperlipidemia)  CAD (coronary artery disease)  CKD (chronic kidney disease)  S/P CABG (coronary artery bypass graft)  S/p Commercial 23 mm Medtronic CoreValve Evolut FX+ Percutaneous Transfemoral TAVR via RIGHT Common Femoral Artery.-  S/p Left femoral cutdown, evacuation of hematoma, ligation of PSA sac, repair of arteriotomy after proximal control, washout, groin closure in layers, prevena dressing applied.-  Post op Hypovolemia  Post op respiratory insufficiency       Plan:  DC Coreg  Xfused 1 U PRBC  Continue pain control   Remove Prevena between  and 4/3  Monitor ANTHONY output, leave drain in place until output is <15cc/24hr.  Can be discharged with drain in place  Frequent stripping of ANTHONY drain to prevent clogging  Staples will be removed two weeks post op  Beta blocker as tolerated by HR and SBP when able  Lipitor   Aspirin  Chest PT and IS use with bedside nurse  Analgesic regimen to optimize function with Tylenol and Narcotics   Continue GI ppx with Protonix and Senna  DVT ppx with Lovenox and SCD boots  Strict glucose control and management    ====================== NEUROLOGY=====================  acetaminophen     Tablet .. 975 milliGRAM(s) Oral every 6 hours PRN Temp greater or equal to 38C (100.4F), Moderate Pain (4 - 6)  melatonin 5 milliGRAM(s) Oral at bedtime  oxyCODONE    IR 5 milliGRAM(s) Oral every 6 hours PRN Moderate Pain (4 - 6)  oxyCODONE    IR 10 milliGRAM(s) Oral every 6 hours PRN Severe Pain (7 - 10)    ==================== RESPIRATORY======================  Post op respiratory insufficiency  ====================CARDIOVASCULAR==================  Post op Hypovolemia  isosorbide   mononitrate ER Tablet (IMDUR) 30 milliGRAM(s) Oral daily  triamterene 37.5 mG/hydrochlorothiazide 25 mG Tablet 1 Tablet(s) Oral daily    ===================HEMATOLOGIC/ONC ===================  Monitor H&H/Plts    aspirin  chewable 81 milliGRAM(s) Oral daily  clopidogrel Tablet 75 milliGRAM(s) Oral daily    ===================== RENAL =========================  Continue monitoring urine output, I&OS, BUN/Cr     ==================== GASTROINTESTINAL===================  pantoprazole    Tablet 40 milliGRAM(s) Oral before breakfast  sodium chloride 0.9% lock flush 3 milliLiter(s) IV Push every 8 hours    =======================    ENDOCRINE  =====================  allopurinol 100 milliGRAM(s) Oral daily  atorvastatin 80 milliGRAM(s) Oral at bedtime  ezetimibe 10 milliGRAM(s) Oral at bedtime  insulin lispro (ADMELOG) corrective regimen sliding scale   SubCutaneous Before meals and at bedtime  levothyroxine 88 MICROGram(s) Oral daily    ========================INFECTIOUS DISEASE================      -Monitor Neurologic status ,   -Head of the bed should remain elevated to 45 degrees,  -Monitor for arrhythmias and monitor parameters for organ perfusion,  -Glycemic control is satisfactory,  -Nutritional goals will be met using po eventually , insure adequate caloric intake and monitor the same ,  -Electrolytes have been repleted as necessary , pain control has been achieved  and wound care has been carried out ,  -Stress ulcer and VTE prophylaxis will be achieved,  -Agressive PT and early mobility and ambulation goals will be met,  -The family was updated about the course and plan .      I have spent 105 minutes providing acute care for this critically ill patient     Patient requires continuous monitoring with bedside rhythm monitoring, pulse ox monitoring, and intermittent blood gas analysis. Care plan discussed with ICU care team. Patient remained critical and at risk for life threatening decompensation.            DIANA ROGERS  MRN-[id number]    HPI:  Patient is a 79 year old  female presenting today for PST, PMH includes HTN, diabetes, hypothyroidism,  CKD, NSTEMI, CAD with stenting, CABG x 2 and aortic stenosis. Patient c/o "tightness" in chest with exertion and intermittent shortness of breath with activity. Reports on occasion she has palpitations, that resolve quickly on their own and fatigue. She is s/p cardiac cath 2024. Denies chest pain, dizziness,  lightheadedness, syncope, orthopnea, cough, or lower extremity edema. She is now scheduled for transcatheter aortic valve replacement  transfemoral Medtronic with basilica on 3/28/25 with Dr. Hansen.     Testing as per chart:    Cardiac Catheterization 24  LM     Left main artery: Angiography shows no disease. LAD Left anterior descending artery: Proximal 20-30%, mid 100%. Distal LADfills via LIMA..CX Circumflex: Angiography shows mild atherosclerosis. RCA Right coronary artery: Mid 40% eccentric lesion.. Graft Angiography LIMA graft to Mid left anterior descending: Angiography shows no disease .Left Heart Cath [facility] performed: Aortic valve crossed and left ventricular pressures were obtained.  (26 Mar 2025 08:40)      ICU Vital Signs Last 24 Hrs  T(C): 37.5 (30 Mar 2025 14:00), Max: 37.6 (29 Mar 2025 22:00)  T(F): 99.5 (30 Mar 2025 14:00), Max: 99.7 (29 Mar 2025 22:00)  HR: 76 (30 Mar 2025 14:00) (60 - 97)  BP: 131/56 (30 Mar 2025 14:00) (106/41 - 181/51)  BP(mean): 74 (30 Mar 2025 14:00) (62 - 88)  ABP: 152/44 (30 Mar 2025 14:00) (130/39 - 196/53)  ABP(mean): 74 (30 Mar 2025 14:00) (66 - 105)  RR: 19 (30 Mar 2025 14:00) (11 - 23)  SpO2: 97% (30 Mar 2025 14:00) (90% - 100%)    O2 Parameters below as of 30 Mar 2025 15:00  Patient On (Oxygen Delivery Method): room air            Physical Exam:  Gen: resting in bed comfortably in NAD  Chest: no increased WOB, regular inspiratory effort   Vascular: L groin with expected levels of ecchymosis, prevena in place holding good suction.  Soft and appropriately tender.  Palpable distal pulses  NEURO: awake, alert-    ============================I/O===========================   I&O's Detail    29 Mar 2025 07:01  -  30 Mar 2025 07:00  --------------------------------------------------------  IN:    IV PiggyBack: 50 mL    IV PiggyBack: 100 mL    NiCARdipine: 25 mL    Oral Fluid: 980 mL    PRBCs (Packed Red Blood Cells): 311 mL    sodium chloride 0.9%: 5 mL  Total IN: 1471 mL    OUT:    Bulb (mL): 135 mL    Indwelling Catheter - Urethral (mL): 1030 mL  Total OUT: 1165 mL    Total NET: 306 mL      30 Mar 2025 07:01  -  30 Mar 2025 15:35  --------------------------------------------------------  IN:    Oral Fluid: 480 mL  Total IN: 480 mL    OUT:    Bulb (mL): 60 mL    Indwelling Catheter - Urethral (mL): 350 mL  Total OUT: 410 mL    Total NET: 70 mL        ============================ LABS =========================                        10.5   12.45 )-----------( 99       ( 30 Mar 2025 01:50 )             30.6     03-30    136  |  101  |  44.9[H]  ----------------------------<  153[H]  4.3   |  21.0[L]  |  1.82[H]    Ca    8.2[L]      30 Mar 2025 01:50  Phos  4.2     -  Mg     2.5     -30    TPro  4.1[L]  /  Alb  2.7[L]  /  TBili  0.4  /  DBili  x   /  AST  21  /  ALT  12  /  AlkPhos  60  -29    LIVER FUNCTIONS - ( 29 Mar 2025 11:25 )  Alb: 2.7 g/dL / Pro: 4.1 g/dL / ALK PHOS: 60 U/L / ALT: 12 U/L / AST: 21 U/L / GGT: x           PT/INR - ( 29 Mar 2025 13:00 )   PT: 11.8 sec;   INR: 1.02 ratio         PTT - ( 29 Mar 2025 13:00 )  PTT:25.7 sec  ABG - ( 29 Mar 2025 18:50 )  pH, Arterial: 7.380 pH, Blood: x     /  pCO2: 43    /  pO2: 187   / HCO3: 25    / Base Excess: 0.3   /  SaO2: 100.0             Urinalysis Basic - ( 30 Mar 2025 01:50 )    Color: x / Appearance: x / SG: x / pH: x  Gluc: 153 mg/dL / Ketone: x  / Bili: x / Urobili: x   Blood: x / Protein: x / Nitrite: x   Leuk Esterase: x / RBC: x / WBC x   Sq Epi: x / Non Sq Epi: x / Bacteria: x      ======================Micro/Rad/Cardio=================  Culture: Reviewed   CXR: Reviewed  Echo:Reviewed  ======================================================  PAST MEDICAL & SURGICAL HISTORY:  Hypertension      Diabetes mellitus      Hypothyroidism      HLD (hyperlipidemia)      CAD (coronary artery disease)      CKD (chronic kidney disease)      S/P CABG (coronary artery bypass graft)      H/O  section      History of cardiac cath    H/O colonoscopy        ====================ASSESSMENT ==============  80 yo F s/p TAVR 3/28 and Left femoral cutdown, evacuation of hematoma, ligation of PSA sac, repair of arteriotomy on 3/29. Pt recovering appropriately post-op.-    Hypertension  Diabetes mellitus  Hypothyroidism  HLD (hyperlipidemia)  CAD (coronary artery disease)  CKD (chronic kidney disease)  S/P CABG (coronary artery bypass graft)  S/p Commercial 23 mm Medtronic CoreValve Evolut FX+ Percutaneous Transfemoral TAVR via RIGHT Common Femoral Artery.-  S/p Left femoral cutdown, evacuation of hematoma, ligation of PSA sac, repair of arteriotomy after proximal control, washout, groin closure in layers, prevena dressing applied.-  Post op Hypovolemia  Post op respiratory insufficiency       Plan:  DC Coreg  Xfused 1 U PRBC  Continue pain control   Remove Prevena between  and 4/3  Monitor ANTHONY output, leave drain in place until output is <15cc/24hr.  Can be discharged with drain in place  Frequent stripping of ANTHONY drain to prevent clogging  Staples will be removed two weeks post op  Beta blocker as tolerated by HR and SBP when able  Lipitor   Aspirin  Chest PT and IS use with bedside nurse  Analgesic regimen to optimize function with Tylenol and Narcotics   Continue GI ppx with Protonix and Senna  DVT ppx with Lovenox and SCD boots  Strict glucose control and management    ====================== NEUROLOGY=====================  acetaminophen     Tablet .. 975 milliGRAM(s) Oral every 6 hours PRN Temp greater or equal to 38C (100.4F), Moderate Pain (4 - 6)  melatonin 5 milliGRAM(s) Oral at bedtime  oxyCODONE    IR 5 milliGRAM(s) Oral every 6 hours PRN Moderate Pain (4 - 6)  oxyCODONE    IR 10 milliGRAM(s) Oral every 6 hours PRN Severe Pain (7 - 10)    ==================== RESPIRATORY======================  Post op respiratory insufficiency  ====================CARDIOVASCULAR==================  Post op Hypovolemia  isosorbide   mononitrate ER Tablet (IMDUR) 30 milliGRAM(s) Oral daily  triamterene 37.5 mG/hydrochlorothiazide 25 mG Tablet 1 Tablet(s) Oral daily    ===================HEMATOLOGIC/ONC ===================  Monitor H&H/Plts    aspirin  chewable 81 milliGRAM(s) Oral daily  clopidogrel Tablet 75 milliGRAM(s) Oral daily    ===================== RENAL =========================  Continue monitoring urine output, I&OS, BUN/Cr     ==================== GASTROINTESTINAL===================  pantoprazole    Tablet 40 milliGRAM(s) Oral before breakfast  sodium chloride 0.9% lock flush 3 milliLiter(s) IV Push every 8 hours    =======================    ENDOCRINE  =====================  allopurinol 100 milliGRAM(s) Oral daily  atorvastatin 80 milliGRAM(s) Oral at bedtime  ezetimibe 10 milliGRAM(s) Oral at bedtime  insulin lispro (ADMELOG) corrective regimen sliding scale   SubCutaneous Before meals and at bedtime  levothyroxine 88 MICROGram(s) Oral daily    ========================INFECTIOUS DISEASE================      -Monitor Neurologic status ,   -Head of the bed should remain elevated to 45 degrees,  -Monitor for arrhythmias and monitor parameters for organ perfusion,  -Glycemic control is satisfactory,  -Nutritional goals will be met using po eventually , insure adequate caloric intake and monitor the same ,  -Electrolytes have been repleted as necessary , pain control has been achieved  and wound care has been carried out ,  -Stress ulcer and VTE prophylaxis will be achieved,  -Agressive PT and early mobility and ambulation goals will be met,  -The family was updated about the course and plan .      I have spent 105 minutes providing acute care for this critically ill patient     Patient requires continuous monitoring with bedside rhythm monitoring, pulse ox monitoring, and intermittent blood gas analysis. Care plan discussed with ICU care team. Patient remained critical and at risk for life threatening decompensation.

## 2025-03-30 NOTE — DIETITIAN INITIAL EVALUATION ADULT - NS FNS DIET ORDER
Diet, Regular:   Consistent Carbohydrate {No Snacks} (CSTCHO)  DASH/TLC {Sodium & Cholesterol Restricted} (DASH) (03-29-25 @ 11:53)

## 2025-03-31 LAB
ANION GAP SERPL CALC-SCNC: 11 MMOL/L — SIGNIFICANT CHANGE UP (ref 5–17)
ANION GAP SERPL CALC-SCNC: 13 MMOL/L — SIGNIFICANT CHANGE UP (ref 5–17)
BLD GP AB SCN SERPL QL: SIGNIFICANT CHANGE UP
BUN SERPL-MCNC: 51.5 MG/DL — HIGH (ref 8–20)
BUN SERPL-MCNC: 52.7 MG/DL — HIGH (ref 8–20)
CALCIUM SERPL-MCNC: 8.1 MG/DL — LOW (ref 8.4–10.5)
CALCIUM SERPL-MCNC: 8.4 MG/DL — SIGNIFICANT CHANGE UP (ref 8.4–10.5)
CHLORIDE SERPL-SCNC: 101 MMOL/L — SIGNIFICANT CHANGE UP (ref 96–108)
CHLORIDE SERPL-SCNC: 102 MMOL/L — SIGNIFICANT CHANGE UP (ref 96–108)
CO2 SERPL-SCNC: 21 MMOL/L — LOW (ref 22–29)
CO2 SERPL-SCNC: 22 MMOL/L — SIGNIFICANT CHANGE UP (ref 22–29)
CREAT SERPL-MCNC: 2.35 MG/DL — HIGH (ref 0.5–1.3)
CREAT SERPL-MCNC: 2.42 MG/DL — HIGH (ref 0.5–1.3)
EGFR: 20 ML/MIN/1.73M2 — LOW
EGFR: 20 ML/MIN/1.73M2 — LOW
EGFR: 21 ML/MIN/1.73M2 — LOW
EGFR: 21 ML/MIN/1.73M2 — LOW
GLUCOSE BLDC GLUCOMTR-MCNC: 116 MG/DL — HIGH (ref 70–99)
GLUCOSE BLDC GLUCOMTR-MCNC: 116 MG/DL — HIGH (ref 70–99)
GLUCOSE BLDC GLUCOMTR-MCNC: 124 MG/DL — HIGH (ref 70–99)
GLUCOSE BLDC GLUCOMTR-MCNC: 128 MG/DL — HIGH (ref 70–99)
GLUCOSE BLDC GLUCOMTR-MCNC: 133 MG/DL — HIGH (ref 70–99)
GLUCOSE SERPL-MCNC: 111 MG/DL — HIGH (ref 70–99)
GLUCOSE SERPL-MCNC: 120 MG/DL — HIGH (ref 70–99)
HCT VFR BLD CALC: 26.7 % — LOW (ref 34.5–45)
HCT VFR BLD CALC: 26.9 % — LOW (ref 34.5–45)
HGB BLD-MCNC: 8.7 G/DL — LOW (ref 11.5–15.5)
HGB BLD-MCNC: 9 G/DL — LOW (ref 11.5–15.5)
IMMATURE PLATELET FRACTION #: 1.4 K/UL — LOW (ref 4.7–11.1)
IMMATURE PLATELET FRACTION #: 1.5 K/UL — LOW (ref 4.7–11.1)
IMMATURE PLATELET FRACTION %: 1.9 % — SIGNIFICANT CHANGE UP (ref 1.6–4.9)
IMMATURE PLATELET FRACTION %: 1.9 % — SIGNIFICANT CHANGE UP (ref 1.6–4.9)
MAGNESIUM SERPL-MCNC: 2.7 MG/DL — HIGH (ref 1.6–2.6)
MCHC RBC-ENTMCNC: 30 PG — SIGNIFICANT CHANGE UP (ref 27–34)
MCHC RBC-ENTMCNC: 30.6 PG — SIGNIFICANT CHANGE UP (ref 27–34)
MCHC RBC-ENTMCNC: 32.6 G/DL — SIGNIFICANT CHANGE UP (ref 32–36)
MCHC RBC-ENTMCNC: 33.5 G/DL — SIGNIFICANT CHANGE UP (ref 32–36)
MCV RBC AUTO: 91.5 FL — SIGNIFICANT CHANGE UP (ref 80–100)
MCV RBC AUTO: 92.1 FL — SIGNIFICANT CHANGE UP (ref 80–100)
NRBC # BLD AUTO: 0 K/UL — SIGNIFICANT CHANGE UP (ref 0–0)
NRBC # BLD AUTO: 0 K/UL — SIGNIFICANT CHANGE UP (ref 0–0)
NRBC # FLD: 0 K/UL — SIGNIFICANT CHANGE UP (ref 0–0)
NRBC # FLD: 0 K/UL — SIGNIFICANT CHANGE UP (ref 0–0)
NRBC BLD AUTO-RTO: 0 /100 WBCS — SIGNIFICANT CHANGE UP (ref 0–0)
NRBC BLD AUTO-RTO: 0 /100 WBCS — SIGNIFICANT CHANGE UP (ref 0–0)
PLATELET # BLD AUTO: 75 K/UL — LOW (ref 150–400)
PLATELET # BLD AUTO: 77 K/UL — LOW (ref 150–400)
PMV BLD: 10.2 FL — SIGNIFICANT CHANGE UP (ref 7–13)
PMV BLD: 9.9 FL — SIGNIFICANT CHANGE UP (ref 7–13)
POTASSIUM SERPL-MCNC: 3.9 MMOL/L — SIGNIFICANT CHANGE UP (ref 3.5–5.3)
POTASSIUM SERPL-MCNC: 4.6 MMOL/L — SIGNIFICANT CHANGE UP (ref 3.5–5.3)
POTASSIUM SERPL-SCNC: 3.9 MMOL/L — SIGNIFICANT CHANGE UP (ref 3.5–5.3)
POTASSIUM SERPL-SCNC: 4.6 MMOL/L — SIGNIFICANT CHANGE UP (ref 3.5–5.3)
RBC # BLD: 2.9 M/UL — LOW (ref 3.8–5.2)
RBC # BLD: 2.94 M/UL — LOW (ref 3.8–5.2)
RBC # FLD: 14.9 % — HIGH (ref 10.3–14.5)
RBC # FLD: 14.9 % — HIGH (ref 10.3–14.5)
SODIUM SERPL-SCNC: 134 MMOL/L — LOW (ref 135–145)
SODIUM SERPL-SCNC: 136 MMOL/L — SIGNIFICANT CHANGE UP (ref 135–145)
WBC # BLD: 8.49 K/UL — SIGNIFICANT CHANGE UP (ref 3.8–10.5)
WBC # BLD: 9.01 K/UL — SIGNIFICANT CHANGE UP (ref 3.8–10.5)
WBC # FLD AUTO: 8.49 K/UL — SIGNIFICANT CHANGE UP (ref 3.8–10.5)
WBC # FLD AUTO: 9.01 K/UL — SIGNIFICANT CHANGE UP (ref 3.8–10.5)

## 2025-03-31 PROCEDURE — 71045 X-RAY EXAM CHEST 1 VIEW: CPT | Mod: 26

## 2025-03-31 PROCEDURE — 99291 CRITICAL CARE FIRST HOUR: CPT

## 2025-03-31 PROCEDURE — 99222 1ST HOSP IP/OBS MODERATE 55: CPT

## 2025-03-31 PROCEDURE — 99292 CRITICAL CARE ADDL 30 MIN: CPT

## 2025-03-31 RX ORDER — OXYCODONE HYDROCHLORIDE 30 MG/1
2.5 TABLET ORAL EVERY 6 HOURS
Refills: 0 | Status: DISCONTINUED | OUTPATIENT
Start: 2025-03-31 | End: 2025-04-03

## 2025-03-31 RX ORDER — ACETAMINOPHEN 500 MG/5ML
1000 LIQUID (ML) ORAL ONCE
Refills: 0 | Status: COMPLETED | OUTPATIENT
Start: 2025-03-31 | End: 2025-03-31

## 2025-03-31 RX ORDER — HYDROMORPHONE/SOD CHLOR,ISO/PF 2 MG/10 ML
0.25 SYRINGE (ML) INJECTION ONCE
Refills: 0 | Status: DISCONTINUED | OUTPATIENT
Start: 2025-03-31 | End: 2025-03-31

## 2025-03-31 RX ORDER — OXYCODONE HYDROCHLORIDE 30 MG/1
5 TABLET ORAL EVERY 6 HOURS
Refills: 0 | Status: DISCONTINUED | OUTPATIENT
Start: 2025-03-31 | End: 2025-04-03

## 2025-03-31 RX ORDER — ACETAMINOPHEN 500 MG/5ML
975 LIQUID (ML) ORAL EVERY 6 HOURS
Refills: 0 | Status: DISCONTINUED | OUTPATIENT
Start: 2025-03-31 | End: 2025-04-03

## 2025-03-31 RX ORDER — IRON SUCROSE 20 MG/ML
200 INJECTION, SOLUTION INTRAVENOUS EVERY 24 HOURS
Refills: 0 | Status: DISCONTINUED | OUTPATIENT
Start: 2025-03-31 | End: 2025-04-03

## 2025-03-31 RX ADMIN — OXYCODONE HYDROCHLORIDE 5 MILLIGRAM(S): 30 TABLET ORAL at 11:30

## 2025-03-31 RX ADMIN — Medication 3 MILLILITER(S): at 06:49

## 2025-03-31 RX ADMIN — Medication 100 MILLIGRAM(S): at 11:55

## 2025-03-31 RX ADMIN — OXYCODONE HYDROCHLORIDE 5 MILLIGRAM(S): 30 TABLET ORAL at 10:27

## 2025-03-31 RX ADMIN — Medication 81 MILLIGRAM(S): at 11:19

## 2025-03-31 RX ADMIN — Medication 3 MILLILITER(S): at 11:27

## 2025-03-31 RX ADMIN — IRON SUCROSE 110 MILLIGRAM(S): 20 INJECTION, SOLUTION INTRAVENOUS at 06:29

## 2025-03-31 RX ADMIN — Medication 5 MILLIGRAM(S): at 23:41

## 2025-03-31 RX ADMIN — Medication 975 MILLIGRAM(S): at 18:14

## 2025-03-31 RX ADMIN — ISOSORBIDE MONONITRATE 30 MILLIGRAM(S): 60 TABLET, EXTENDED RELEASE ORAL at 11:55

## 2025-03-31 RX ADMIN — ATORVASTATIN CALCIUM 80 MILLIGRAM(S): 80 TABLET, FILM COATED ORAL at 22:31

## 2025-03-31 RX ADMIN — Medication 1 APPLICATION(S): at 06:27

## 2025-03-31 RX ADMIN — Medication 400 MILLIGRAM(S): at 10:26

## 2025-03-31 RX ADMIN — Medication 975 MILLIGRAM(S): at 23:41

## 2025-03-31 RX ADMIN — LIDOCAINE HYDROCHLORIDE 1 PATCH: 20 JELLY TOPICAL at 10:13

## 2025-03-31 RX ADMIN — Medication 5 MILLIGRAM(S): at 22:31

## 2025-03-31 RX ADMIN — Medication 88 MICROGRAM(S): at 06:13

## 2025-03-31 RX ADMIN — EZETIMIBE 10 MILLIGRAM(S): 10 TABLET ORAL at 22:44

## 2025-03-31 RX ADMIN — CLOPIDOGREL BISULFATE 75 MILLIGRAM(S): 75 TABLET, FILM COATED ORAL at 11:19

## 2025-03-31 RX ADMIN — Medication 40 MILLIGRAM(S): at 06:14

## 2025-03-31 RX ADMIN — Medication 1 TABLET(S): at 06:13

## 2025-03-31 RX ADMIN — Medication 1000 MILLIGRAM(S): at 11:30

## 2025-03-31 RX ADMIN — Medication 3 MILLILITER(S): at 22:40

## 2025-03-31 RX ADMIN — LIDOCAINE HYDROCHLORIDE 1 PATCH: 20 JELLY TOPICAL at 22:31

## 2025-03-31 RX ADMIN — Medication 0.25 MILLIGRAM(S): at 04:50

## 2025-03-31 RX ADMIN — Medication 20 MILLIEQUIVALENT(S): at 05:00

## 2025-03-31 NOTE — PHYSICAL THERAPY INITIAL EVALUATION ADULT - PERTINENT HX OF CURRENT PROBLEM, REHAB EVAL
s/p TAVR 3/28 by Dr. Hansen and Left femoral cutdown, evacuation of hematoma, ligation of PSA sac, repair of arteriotomy on 3/29 by Dr. Magana

## 2025-03-31 NOTE — CONSULT NOTE ADULT - ASSESSMENT
Patient is a 79 year old  female presenting today for PST, PMH includes HTN, diabetes, hypothyroidism,  CKD, NSTEMI, CAD with stenting, CABG x 2 and aortic stenosis. Vascular surgery consult called after patient was checked ~4am and noted to have worsening swelling to the left groin. Patient is now s/p TAVR with Dr. Hansen. Fem stop to left groin (both groins accessed for case) removed  ~7pm. 4 hours after removal left groin noted to be rebleeding and resolved with direct pressure. However, groin bleeding started again around 4am according to nursing staff and not resolving with direct pressure. Vascular surgery consult placed for same. Note, access to left groin said to be difficult due to calcifications to vessels. Hemoglobin 7.7 (8.4) post operatively. Palpable pulses throughout.     Plan:  - Please hold CONTINUOUS direct pressure  - For CTA with IV contrast to assess for active contrast extravasation  - Remainder of management per primary   - Vascular surgery will continue to follow  - Further recs to follow above imaging    Patient discussed with Dr. Magana
79 YOF PMH HTN, DM, CAD, AS, CKD 3, status post TAVR, ligation sac and repair of arteriotomy.  Nephrology consulted for acute kidney injury    Acute kidney injury on chronic kidney disease stage III:  ·	Baseline serum creatinine 1.4-1.6 mg/deciliter  ·	Now elevated to 2.4 mg/deciliter  ·	UA bland, patient making decent urine output  ·	JOSE LUIS likely hemodynamically mediated, might have a component of tubular injury from contrast.  Expecting it to be plateauing/improving soon.  ·	Will maintain MAP more than 65 and avoid nephrotoxins.    Hypertension: Controlled, continue with current medications  Volume status: Controlled, strict ARIEL's  Hyponatremia: Mild, consider holding HCTZ  Iron deficiency anemia: Continue IV Venofer    Plan discussed with primary team and family.

## 2025-03-31 NOTE — PROGRESS NOTE ADULT - SUBJECTIVE AND OBJECTIVE BOX
CRITICAL CARE ATTENDING - CTICU    MEDICATIONS  (STANDING):  acetaminophen     Tablet .. 975 milliGRAM(s) Oral every 6 hours  allopurinol 100 milliGRAM(s) Oral daily  aspirin  chewable 81 milliGRAM(s) Oral daily  atorvastatin 80 milliGRAM(s) Oral at bedtime  chlorhexidine 2% Cloths 1 Application(s) Topical <User Schedule>  clopidogrel Tablet 75 milliGRAM(s) Oral daily  ezetimibe 10 milliGRAM(s) Oral at bedtime  insulin lispro (ADMELOG) corrective regimen sliding scale   SubCutaneous Before meals and at bedtime  iron sucrose IVPB 200 milliGRAM(s) IV Intermittent every 24 hours  isosorbide   mononitrate ER Tablet (IMDUR) 30 milliGRAM(s) Oral daily  levothyroxine 88 MICROGram(s) Oral daily  lidocaine   4% Patch 1 Patch Transdermal every 24 hours  melatonin 5 milliGRAM(s) Oral at bedtime  pantoprazole    Tablet 40 milliGRAM(s) Oral before breakfast  sodium chloride 0.9% lock flush 3 milliLiter(s) IV Push every 8 hours  triamterene 37.5 mG/hydrochlorothiazide 25 mG Tablet 1 Tablet(s) Oral daily                                    9.0    9.01  )-----------( 75       ( 31 Mar 2025 04:43 )             26.9       03-31    134[L]  |  101  |  51.5[H]  ----------------------------<  120[H]  4.6   |  22.0  |  2.35[H]    Ca    8.4      31 Mar 2025 14:10  Phos  4.2     03-30  Mg     2.7     03-31                Daily     Daily       03-30 @ 07:01 - 03-31 @ 07:00  --------------------------------------------------------  IN: 1160 mL / OUT: 1350 mL / NET: -190 mL    03-31 @ 07:01  - 03-31 @ 14:58  --------------------------------------------------------  IN: 0 mL / OUT: 505 mL / NET: -505 mL        Critically Ill patient  : [ ] preoperative ,   [x ] post operative    Requires :  [ x] Arterial Line   [ ] Central Line  [ ] PA catheter  [ ] IABP  [ ] ECMO  [ ] LVAD  [ ] Ventilator  [ ] pacemaker [x ]  NC                       [x ] ABG's     [x ] Pulse Oxymetry Monitoring  Bedside evaluation , monitoring , treatment of hemodynamics , fluids , IVP/ IVCD meds.        Diagnosis:     POD 3 - TAVR    Post op Bleeding - L - Femoral Artery     POD 2 - Repair L femoral artery     Hypotension - resolved     Hypovolemia     Hemodynamic lability,  instability. Requires IVCD [ ] vasopressors [ ] inotropes  [ ] vasodilator  [x ]IVSS fluid / Blood  to maintain MAP, perfusion, C.I.     Anemia - acute blood loss     Thrombocytopenia     Renal Failure - Acute Kidney Injury     Hyponatremia     f/u vascular checks    CHF- acute [x ]   chronic [ x]    systolic [ ]   diastolic [ x]  Valvular x ]          - Echo- EF -     AS         [ ] RV dysfunction          - Cxr-cardiomegally, edema          - Clinical-  [ ]inotropes   [ ]pressors   [ ]diuresis   [ ]IABP   [ ]ECMO   [ ]LVAD   [x ] Respiratory insuffiencey     Aortic Stenosis    Respiratory insuffiencey     Requires Supplemental Oxygen Therapy     Requires chest PT, pulmonary toilet, , suctioning to maintain SaO2,  patent airway and treat atelectasis.     Obesity ?  [facility]HS / CHEYANNE ?    PMH:  CABG / coronary stents                    -                         Discussed with CT surgeon, Physician's Assistant - Nurse Practitioner- Critical care medicine team.   Discussed at  AM / PM rounds.   Chart, labs , films reviewed.    Cumulative Critical Care Time Given Today : 35 min

## 2025-03-31 NOTE — PROGRESS NOTE ADULT - SUBJECTIVE AND OBJECTIVE BOX
DIANA ROGERS  MRN-[id number]    HPI:  Patient is a 79 year old  female presenting today for PST, PMH includes HTN, diabetes, hypothyroidism,  CKD, NSTEMI, CAD with stenting, CABG x 2 and aortic stenosis. Patient c/o "tightness" in chest with exertion and intermittent shortness of breath with activity. Reports on occasion she has palpitations, that resolve quickly on their own and fatigue. She is s/p cardiac cath 2024. Denies chest pain, dizziness,  lightheadedness, syncope, orthopnea, cough, or lower extremity edema. She is now scheduled for transcatheter aortic valve replacement  transfemoral Medtronic with basilica on 3/28/25 with Dr. Hansen.   (26 Mar 2025 08:40)      ICU Vital Signs Last 24 Hrs  T(C): 37 (31 Mar 2025 10:00), Max: 38.1 (30 Mar 2025 22:00)  T(F): 98.6 (31 Mar 2025 10:00), Max: 100.6 (30 Mar 2025 22:00)  HR: 77 (31 Mar 2025 10:00) (63 - 85)  BP: 156/46 (31 Mar 2025 10:00) (90/18 - 156/46)  BP(mean): 76 (31 Mar 2025 10:00) (32 - 80)  ABP: 171/47 (31 Mar 2025 10:00) (102/22 - 182/52)  ABP(mean): 93 (31 Mar 2025 10:00) (45 - 97)  RR: 19 (31 Mar 2025 10:00) (9 - 19)  SpO2: 96% (31 Mar 2025 10:00) (90% - 99%)    O2 Parameters below as of 31 Mar 2025 08:00  Patient On (Oxygen Delivery Method): room air            Physical Exam:  Gen: resting in bed comfortably in NAD  Chest: no increased WOB, regular inspiratory effort   Vascular: L groin with expected levels of ecchymosis, prevena in place holding good suction.  Soft and appropriately tender.  Palpable distal pulses  NEURO: awake, alert--    ============================I/O===========================   I&O's Detail    30 Mar 2025 07:01  -  31 Mar 2025 07:00  --------------------------------------------------------  IN:    Oral Fluid: 1160 mL  Total IN: 1160 mL    OUT:    Bulb (mL): 180 mL    Indwelling Catheter - Urethral (mL): 1170 mL  Total OUT: 1350 mL    Total NET: -190 mL      31 Mar 2025 07:01  -  31 Mar 2025 11:43  --------------------------------------------------------  IN:  Total IN: 0 mL    OUT:    Indwelling Catheter - Urethral (mL): 280 mL  Total OUT: 280 mL    Total NET: -280 mL        ============================ LABS =========================                        9.0    9.01  )-----------( 75       ( 31 Mar 2025 04:43 )             26.9     -    136  |  102  |  52.7[H]  ----------------------------<  111[H]  3.9   |  21.0[L]  |  2.42[H]    Ca    8.1[L]      31 Mar 2025 03:20  Phos  4.2     03-30  Mg     2.7     -31        PT/INR - ( 29 Mar 2025 13:00 )   PT: 11.8 sec;   INR: 1.02 ratio         PTT - ( 29 Mar 2025 13:00 )  PTT:25.7 sec  ABG - ( 29 Mar 2025 18:50 )  pH, Arterial: 7.380 pH, Blood: x     /  pCO2: 43    /  pO2: 187   / HCO3: 25    / Base Excess: 0.3   /  SaO2: 100.0             Urinalysis Basic - ( 31 Mar 2025 03:20 )    Color: x / Appearance: x / SG: x / pH: x  Gluc: 111 mg/dL / Ketone: x  / Bili: x / Urobili: x   Blood: x / Protein: x / Nitrite: x   Leuk Esterase: x / RBC: x / WBC x   Sq Epi: x / Non Sq Epi: x / Bacteria: x      ======================Micro/Rad/Cardio=================  Culture: Reviewed   CXR: Reviewed  Echo:Reviewed  ======================================================  PAST MEDICAL & SURGICAL HISTORY:  Hypertension      Diabetes mellitus      Hypothyroidism      HLD (hyperlipidemia)      CAD (coronary artery disease)      CKD (chronic kidney disease)      S/P CABG (coronary artery bypass graft)      H/O  section      History of cardiac cath      H/O colonoscopy        ====================ASSESSMENT ==============  78 yo F s/p TAVR 3/28 and Left femoral cutdown, evacuation of hematoma, ligation of PSA sac, repair of arteriotomy on 3/29. Pt recovering appropriately post-op.-    Hypertension  Diabetes mellitus  Hypothyroidism  HLD (hyperlipidemia)  CAD (coronary artery disease)  CKD (chronic kidney disease)  S/P CABG (coronary artery bypass graft)  S/p Commercial 23 mm Medtronic CoreValve Evolut FX+ Percutaneous Transfemoral TAVR via RIGHT Common Femoral Artery.-  S/p Left femoral cutdown, evacuation of hematoma, ligation of PSA sac, repair of arteriotomy after proximal control, washout, groin closure in layers, prevena dressing applied.-  Post op Hypovolemia  Post op respiratory insufficiency   Post op JOSE LUIS    Plan:  Renal Consult today  repeat Cr this pm  Continue pain control   Remove Prevena between  and 4/3  Monitor ANTHONY output, leave drain in place until output is <15cc/24hr.  Can be discharged with drain in place  Frequent stripping of ANTHONY drain to prevent clogging  Staples will be removed two weeks post op  Beta blocker as tolerated by HR and SBP when able  Lipitor   Aspirin  Chest PT and IS use with bedside nurse  Analgesic regimen to optimize function with Tylenol and Narcotics   Continue GI ppx with Protonix and Senna  DVT ppx with Lovenox and SCD boots  Strict glucose control and management-    ====================== NEUROLOGY=====================  acetaminophen     Tablet .. 975 milliGRAM(s) Oral every 6 hours PRN Temp greater or equal to 38C (100.4F), Moderate Pain (4 - 6)  acetaminophen     Tablet .. 975 milliGRAM(s) Oral every 6 hours  melatonin 5 milliGRAM(s) Oral at bedtime  oxyCODONE    IR 2.5 milliGRAM(s) Oral every 6 hours PRN Moderate Pain (4 - 6)  oxyCODONE    IR 5 milliGRAM(s) Oral every 6 hours PRN Severe Pain (7 - 10)    ==================== RESPIRATORY======================  Post op respiratory insufficiency  ====================CARDIOVASCULAR==================  Post op Hypovolemia  isosorbide   mononitrate ER Tablet (IMDUR) 30 milliGRAM(s) Oral daily  triamterene 37.5 mG/hydrochlorothiazide 25 mG Tablet 1 Tablet(s) Oral daily    ===================HEMATOLOGIC/ONC ===================  Monitor H&H/Plts    aspirin  chewable 81 milliGRAM(s) Oral daily  clopidogrel Tablet 75 milliGRAM(s) Oral daily    ===================== RENAL =========================  Continue monitoring urine output, I&OS, BUN/Cr     ==================== GASTROINTESTINAL===================  iron sucrose IVPB 200 milliGRAM(s) IV Intermittent every 24 hours  pantoprazole    Tablet 40 milliGRAM(s) Oral before breakfast  sodium chloride 0.9% lock flush 3 milliLiter(s) IV Push every 8 hours    =======================    ENDOCRINE  =====================  allopurinol 100 milliGRAM(s) Oral daily  atorvastatin 80 milliGRAM(s) Oral at bedtime  ezetimibe 10 milliGRAM(s) Oral at bedtime  insulin lispro (ADMELOG) corrective regimen sliding scale   SubCutaneous Before meals and at bedtime  levothyroxine 88 MICROGram(s) Oral daily    ========================INFECTIOUS DISEASE================      -Monitor Neurologic status ,   -Head of the bed should remain elevated to 45 degrees,  -Monitor for arrhythmias and monitor parameters for organ perfusion,  -Glycemic control is satisfactory,  -Nutritional goals will be met using po eventually , insure adequate caloric intake and monitor the same ,  -Electrolytes have been repleted as necessary , pain control has been achieved  and wound care has been carried out ,  -Stress ulcer and VTE prophylaxis will be achieved,  -Agressive PT and early mobility and ambulation goals will be met,  -The family was updated about the course and plan .  I have spent 35 minutes providing acute care for this critically ill patient     Patient requires continuous monitoring with bedside rhythm monitoring, pulse ox monitoring, and intermittent blood gas analysis. Care plan discussed with ICU care team. Patient remained critical and at risk for life threatening decompensation.

## 2025-03-31 NOTE — PROGRESS NOTE ADULT - ASSESSMENT
80 yo F s/p TAVR 3/28 and Left femoral cutdown, evacuation of hematoma, ligation of PSA sac, repair of arteriotomy on 3/29. Pt recovering appropriately post-op.     Plan:   -Previna down POD5  - monitor ANTHONY output, leave drain in place until output is <15cc/24hr.  Can be discharged with drain in place. 160 out over last 24 hours   - Frequent stripping of ANTHONY drain to prevent clogging  - Staples will be removed two weeks post op  - DVT ppx: SCDs, ASA  - rest of care per primary team  - ok to OOB  -Local wound care to groin

## 2025-03-31 NOTE — CONSULT NOTE ADULT - SUBJECTIVE AND OBJECTIVE BOX
SURGERY CONSULT  ==============================================================================================================  HPI:   Patient is a 79 year old  female presenting today for PST, PMH includes HTN, diabetes, hypothyroidism,  CKD, NSTEMI, CAD with stenting, CABG x 2 and aortic stenosis. Patient c/o "tightness" in chest with exertion and intermittent shortness of breath with activity. Reports on occasion she has palpitations, that resolve quickly on their own and fatigue. She is s/p cardiac cath 2024. Denies chest pain, dizziness,  lightheadedness, syncope, orthopnea, cough, or lower extremity edema. She is now scheduled for transcatheter aortic valve replacement  transfemoral Medtronic with basilica on 3/28/25 with Dr. Hansen.     Testing as per chart:    Cardiac Catheterization 24  LM     Left main artery: Angiography shows no disease. LAD Left anterior descending artery: Proximal 20-30%, mid 100%. Distal LADfills via LIMA..CX Circumflex: Angiography shows mild atherosclerosis. RCA Right coronary artery: Mid 40% eccentric lesion.. Graft Angiography LIMA graft to Mid left anterior descending: Angiography shows no disease .Left Heart Cath [facility] performed: Aortic valve crossed and left ventricular pressures were obtained.    Transthoracic Echocardiogram 24   1. Left ventricular cavity is normal in size. Left ventricular systolic function is normal with an ejection fraction visually estimated at 60 to 65 %. 2. There is mild (grade 1) left ventricular diastolic dysfunction. 3. Normal right ventricular cavity size and normal right ventricular systolic function. 4. Left atrium is moderately dilated. 5. The right atrium is normal in size. 6. The interatrial septum appears intact. 7. There is moderate calcification of the mitral valve annulus. 8. Thickened mitral valve leaflets. 9. Mild mitral regurgitation.10. Aortic valve anatomy cannot be determined with reduced systolic excursion. There is severe calcification of the aortic valve leaflets. Severe aortic stenosis.11. The peak transaortic velocity is 3.97 m/s, peak transaortic gradient is 63.1 mmHg and mean transaortic gradient is 33.2 mmHg with an LVOT/aortic valve VTI ratio of 0.28. The aortic valve acceleration time is 118 msec. The effective orifice area is estimated at 0.60 cm by the continuity equation.12. Moderate aortic regurgitation.13. No pericardial effusion seen.  (26 Mar 2025 08:40)    Consult HPI:  Vascular surgery consult called after patient was checked ~4am and noted to have worsening swelling to the left groin. Patient is now s/p TAVR with Dr. Hansen. Fem stop to left groin (both groins accessed for case) removed  ~7pm. 4 hours after removal left groin noted to be rebleeding and resolved with direct pressure. However, groin bleeding started again around 4am according to nursing staff and not resolving with direct pressure. Vascular surgery consult placed for same. Note, access to left groin said to be difficult due to calcifications to vessels. Hemoglobin 7.7 (8.4) post operatively. Palpable pulses throughout.     PAST MEDICAL & SURGICAL HISTORY:  Hypertension      Diabetes mellitus      Hypothyroidism      HLD (hyperlipidemia)      CAD (coronary artery disease)      CKD (chronic kidney disease)      S/P CABG (coronary artery bypass graft)      H/O  section      History of cardiac cath      H/O colonoscopy        Home Meds: Home Medications:  allopurinol 100 mg oral tablet: 1 tab(s) orally once a day (28 Mar 2025 06:34)  aspirin 81 mg oral tablet, chewable: 1 tab(s) orally once a day (28 Mar 2025 06:34)  atorvastatin 80 mg oral tablet: 1 tab(s) orally once a day (at bedtime) (28 Mar 2025 06:34)  Cardio for life powder: once a day (28 Mar 2025 06:34)  carvedilol 6.25 mg oral tablet: 1 tab(s) orally 2 times a day (28 Mar 2025 06:34)  ezetimibe 10 mg oral tablet: 1 tab(s) orally once a day (at bedtime) (28 Mar 2025 06:34)  glipiZIDE 5 mg oral tablet: 1 tab(s) orally once a day (28 Mar 2025 06:34)  Jardiance 10 mg oral tablet: 1 tab(s) orally once a day (28 Mar 2025 06:34)  levothyroxine 88 mcg (0.088 mg) oral tablet: 1 tab(s) orally once a day (28 Mar 2025 06:34)  melatonin 10 mg oral tablet: 1 tab(s) orally once a day (at bedtime) as needed (28 Mar 2025 06:34)  omeprazole 20 mg oral delayed release capsule: 1 cap(s) orally once a day (28 Mar 2025 06:34)  triamterene-hydrochlorothiazide 37.5 mg-25 mg oral tablet: 1 tab(s) orally once a day (28 Mar 2025 06:34)    Allergies: Allergies    No Known Allergies    Intolerances      Soc:   Advanced Directives: Presumed Full Code     CURRENT MEDICATIONS:   --------------------------------------------------------------------------------------  Neurologic Medications  acetaminophen     Tablet .. 975 milliGRAM(s) Oral every 6 hours PRN Temp greater or equal to 38C (100.4F), Moderate Pain (4 - 6)  melatonin 5 milliGRAM(s) Oral at bedtime    Respiratory Medications    Cardiovascular Medications  isosorbide   mononitrate ER Tablet (IMDUR) 30 milliGRAM(s) Oral daily  niCARdipine Infusion 5 mG/Hr IV Continuous <Continuous>  triamterene 37.5 mG/hydrochlorothiazide 25 mG Tablet 1 Tablet(s) Oral daily    Gastrointestinal Medications  pantoprazole    Tablet 40 milliGRAM(s) Oral before breakfast  sodium chloride 0.9% lock flush 10 milliLiter(s) IV Push every 1 hour PRN Pre/post blood products, medications, blood draw, and to maintain line patency  sodium chloride 0.9% lock flush 3 milliLiter(s) IV Push every 8 hours  sodium chloride 0.9%. 1000 milliLiter(s) IV Continuous <Continuous>  sodium chloride 0.9%. 1000 milliLiter(s) IV Continuous <Continuous>    Genitourinary Medications    Hematologic/Oncologic Medications  aspirin  chewable 81 milliGRAM(s) Oral daily  clopidogrel Tablet 75 milliGRAM(s) Oral daily    Antimicrobial/Immunologic Medications  cefuroxime  IVPB 1500 milliGRAM(s) IV Intermittent every 8 hours    Endocrine/Metabolic Medications  allopurinol 100 milliGRAM(s) Oral daily  atorvastatin 80 milliGRAM(s) Oral at bedtime  ezetimibe 10 milliGRAM(s) Oral at bedtime  insulin lispro (ADMELOG) corrective regimen sliding scale   SubCutaneous Before meals and at bedtime  levothyroxine 88 MICROGram(s) Oral daily    Topical/Other Medications  chlorhexidine 0.12% Liquid 15 milliLiter(s) Oral Mucosa every 12 hours  chlorhexidine 4% Liquid 1 Application(s) Topical <User Schedule>    --------------------------------------------------------------------------------------    VITAL SIGNS, INS/OUTS (last 24 hours):  --------------------------------------------------------------------------------------  ICU Vital Signs Last 24 Hrs  T(C): 36.7 (29 Mar 2025 03:00), Max: 37.9 (28 Mar 2025 16:00)  T(F): 98.1 (29 Mar 2025 03:00), Max: 100.2 (28 Mar 2025 16:00)  HR: 63 (29 Mar 2025 03:00) (55 - 83)  BP: 171/53 (28 Mar 2025 06:17) (171/53 - 171/53)  BP(mean): --  ABP: 148/42 (29 Mar 2025 03:00) (116/32 - 172/48)  ABP(mean): 71 (29 Mar 2025 03:00) (55 - 86)  RR: 18 (29 Mar 2025 03:00) (11 - 27)  SpO2: 94% (29 Mar 2025 03:00) (94% - 100%)    O2 Parameters below as of 28 Mar 2025 16:00  Patient On (Oxygen Delivery Method): nasal cannula  O2 Flow (L/min): 3        I&O's Summary    28 Mar 2025 07:01  -  29 Mar 2025 05:32  --------------------------------------------------------  IN: 399.5 mL / OUT: 1225 mL / NET: -825.5 mL      --------------------------------------------------------------------------------------    EXAM:  Constitutional: patient resting in bed, in some painful distress  HEENT: head normocephalic and atraumatic  Musculoskeletal: MASON x 4 spontaneously, extremities are without point tenderness or deformity  Vascular: swelling and ecchymoses to left groin, soft, palpable pulses throughout LLE, no deficits   Skin: mucous membranes moist, no diaphoresis, pallor, cyanosis or jaundice  LABS  --------------------------------------------------------------------------------------  Labs:  CAPILLARY BLOOD GLUCOSE      POCT Blood Glucose.: 166 mg/dL (28 Mar 2025 21:52)  POCT Blood Glucose.: 132 mg/dL (28 Mar 2025 06:34)                          7.7    10.24 )-----------( 167      ( 29 Mar 2025 02:11 )             24.0       Auto Neutrophil %: 90.2 % (25 @ 17:36)  Auto Immature Granulocyte %: 0.4 % (25 @ 17:36)  Auto Neutrophil %: 60.2 % (25 @ 06:05)  Auto Immature Granulocyte %: 0.3 % (25 @ 06:05)        136  |  102  |  31.4[H]  ----------------------------<  127[H]  4.8   |  19.0[L]  |  1.71[H]      Calcium: 9.0 mg/dL (25 @ 02:11)      LFTs:             5.6  | 0.2  | 31       ------------------[86      ( 28 Mar 2025 21:50 )  3.7  | x    | 17          Lipase:x      Amylase:x         Blood Gas Arterial, Lactate: 1.80 mmol/L (25 @ 04:52)  Blood Gas Arterial, Lactate: 0.60 mmol/L (25 @ 01:25)  Blood Gas Arterial, Lactate: 0.90 mmol/L (25 @ 13:26)  Blood Gas Arterial, Lactate: 0.90 mmol/L (25 @ 11:08)  Blood Gas Arterial, Lactate: 0.90 mmol/L (25 @ 10:40)  Blood Gas Arterial, Lactate: 1.10 mmol/L (25 @ 10:12)  Blood Gas Arterial, Lactate: 1.40 mmol/L (25 @ 08:48)    ABG - ( 29 Mar 2025 04:52 )  pH: 7.330 /  pCO2: 34    /  pO2: 160   / HCO3: 18    / Base Excess: -8.0  /  SaO2: 100.0           ABG - ( 29 Mar 2025 01:25 )  pH: 7.300 /  pCO2: 43    /  pO2: 65    / HCO3: 21    / Base Excess: -5.2  /  SaO2: 92.9            ABG - ( 28 Mar 2025 13:26 )  pH: 7.340 /  pCO2: 40    /  pO2: 143   / HCO3: 22    / Base Excess: -4.2  /  SaO2: 100.0     Coags:     11.3   ----< 1.00    ( 29 Mar 2025 04:30 )     27.6       Urinalysis Basic - ( 29 Mar 2025 02:11 )    Color: x / Appearance: x / SG: x / pH: x  Gluc: 127 mg/dL / Ketone: x  / Bili: x / Urobili: x   Blood: x / Protein: x / Nitrite: x   Leuk Esterase: x / RBC: x / WBC x   Sq Epi: x / Non Sq Epi: x / Bacteria: x        Culture - Urine (collected 26 Mar 2025 09:00)  Source: Clean Catch Clean Catch (Midstream)  Final Report (27 Mar 2025 15:41):    <10,000 CFU/mL Normal Urogenital Kristy  
History of present illness: Patient is a 79-year-old female with past medical history significant for hypertension diabetes hypothyroidism, CAD, CABG, aortic stenosis and CKD stage III who presented with chest tightness and had TAVR on 3/28/2025 with Dr. Hansen.  Procedure complicated by ecchymosis hematoma requiring evacuation and ligation of PSA sac with repair of arteriotomy.  Serum creatinine which is 1.4-1.6 mg/deciliter on baseline now elevated to 2.4 for which nephrology was consulted.  Patient is making decent urine output, blood pressure is WNL.  She denies any chest pain, shortness of breath.  Complains of generalized weakness.  No fever or chills.    Review of systems: All systems were reviewed in detail, pertinent positive and negative have been mentioned above, otherwise negative.  Past medical and surgical history: Hypertension, hyperlipidemia, diabetes mellitus, CAD, CKD, hypothyroidism,  section, cardiac cath, CABG  Allergies: NKDA  Home Medications:  allopurinol 100 mg oral tablet: 1 tab(s) orally once a day (28 Mar 2025 06:34)  aspirin 81 mg oral tablet, chewable: 1 tab(s) orally once a day (28 Mar 2025 06:34)  atorvastatin 80 mg oral tablet: 1 tab(s) orally once a day (at bedtime) (28 Mar 2025 06:34)  Cardio for life powder: once a day (28 Mar 2025 06:34)  carvedilol 6.25 mg oral tablet: 1 tab(s) orally 2 times a day (28 Mar 2025 06:34)  ezetimibe 10 mg oral tablet: 1 tab(s) orally once a day (at bedtime) (28 Mar 2025 06:34)  glipiZIDE 5 mg oral tablet: 1 tab(s) orally once a day (28 Mar 2025 06:34)  Jardiance 10 mg oral tablet: 1 tab(s) orally once a day (28 Mar 2025 06:34)  levothyroxine 88 mcg (0.088 mg) oral tablet: 1 tab(s) orally once a day (28 Mar 2025 06:34)  melatonin 10 mg oral tablet: 1 tab(s) orally once a day (at bedtime) as needed (28 Mar 2025 06:34)  omeprazole 20 mg oral delayed release capsule: 1 cap(s) orally once a day (28 Mar 2025 06:34)  triamterene-hydrochlorothiazide 37.5 mg-25 mg oral tablet: 1 tab(s) orally once a day (28 Mar 2025 06:34)    Family history: No pertinent family history of renal disease or electrolyte disorder in first degree relatives.  Social history: Denies tobacco, alcohol, drug abuse.    Vital Signs Last 24 Hrs  T(C): 36.9 (31 Mar 2025 17:00), Max: 38.1 (30 Mar 2025 22:00)  T(F): 98.4 (31 Mar 2025 17:00), Max: 100.6 (30 Mar 2025 22:00)  HR: 69 (31 Mar 2025 17:00) (63 - 85)  BP: 142/43 (31 Mar 2025 13:00) (90/18 - 156/46)  BP(mean): 68 (31 Mar 2025 13:00) (32 - 77)  RR: 14 (31 Mar 2025 17:00) (7 - 23)  SpO2: 95% (31 Mar 2025 17:00) (90% - 99%)  Parameters below as of 31 Mar 2025 16:00  Patient On (Oxygen Delivery Method): room air    Physical Exam:  Gen: no acute distress  MS: alert, conversing normally  HENT: NCAT, MMM  CV: S1-S2 normal, no LE edema  Chest: CTAB, no w/r/r,  Abd: soft, NT, ND  Skin: dry, warm, no rash or jaundice  Corey present        134[L]  |  101  |  51.5[H]  ----------------------------<  120[H]  4.6   |  22.0  |  2.35[H]    Ca    8.4      31 Mar 2025 14:10  Phos  4.2     03-30  Mg     2.7                               9.0    9.01  )-----------( 75       ( 31 Mar 2025 04:43 )             26.9       Imaging reviewed

## 2025-03-31 NOTE — PHYSICAL THERAPY INITIAL EVALUATION ADULT - ADDITIONAL COMMENTS
lives c daughter who can assist, 3 steps 2 rails to enter home, no stairs within home, owns but does not use a RW and SAC

## 2025-03-31 NOTE — PHYSICAL THERAPY INITIAL EVALUATION ADULT - PHYSICAL ASSIST/NONPHYSICAL ASSIST: SIT/STAND, REHAB EVAL
periarticular osteophytes, and joint space narrowing.    Assessment:      Arthritis of the affected hip. This has significantly affected the patient's quality of life. It interferes with activities of daily living. It is worse with weight bearing and activity.    Plan:       Proceed with scheduled right total hip.    The patient has failed conservative measures including anti-inflammatories, injections, activity modification, and a trial of physical therapy or external joint support which includes a home exercise program.    The various methods of treatment have been discussed with the patient and family. After consideration of risks, benefits, and other options for treatment, the patient has consented to surgical interventions. Questions were answered and preoperative teaching was done by Dr. Soto.     Signed By: Mekhi Redmond PA-C     February 18, 2024        2 person assist

## 2025-04-01 ENCOUNTER — TRANSCRIPTION ENCOUNTER (OUTPATIENT)
Age: 80
End: 2025-04-01

## 2025-04-01 DIAGNOSIS — I72.4 ANEURYSM OF ARTERY OF LOWER EXTREMITY: ICD-10-CM

## 2025-04-01 LAB
ANION GAP SERPL CALC-SCNC: 17 MMOL/L — SIGNIFICANT CHANGE UP (ref 5–17)
BUN SERPL-MCNC: 47.6 MG/DL — HIGH (ref 8–20)
CALCIUM SERPL-MCNC: 8.7 MG/DL — SIGNIFICANT CHANGE UP (ref 8.4–10.5)
CHLORIDE SERPL-SCNC: 102 MMOL/L — SIGNIFICANT CHANGE UP (ref 96–108)
CO2 SERPL-SCNC: 18 MMOL/L — LOW (ref 22–29)
CREAT SERPL-MCNC: 1.97 MG/DL — HIGH (ref 0.5–1.3)
EGFR: 26 ML/MIN/1.73M2 — LOW
EGFR: 26 ML/MIN/1.73M2 — LOW
GLUCOSE BLDC GLUCOMTR-MCNC: 125 MG/DL — HIGH (ref 70–99)
GLUCOSE BLDC GLUCOMTR-MCNC: 131 MG/DL — HIGH (ref 70–99)
GLUCOSE BLDC GLUCOMTR-MCNC: 132 MG/DL — HIGH (ref 70–99)
GLUCOSE BLDC GLUCOMTR-MCNC: 172 MG/DL — HIGH (ref 70–99)
GLUCOSE SERPL-MCNC: 105 MG/DL — HIGH (ref 70–99)
HCT VFR BLD CALC: 26.7 % — LOW (ref 34.5–45)
HGB BLD-MCNC: 8.7 G/DL — LOW (ref 11.5–15.5)
IMMATURE PLATELET FRACTION #: 1.8 K/UL — LOW (ref 4.7–11.1)
IMMATURE PLATELET FRACTION %: 2 % — SIGNIFICANT CHANGE UP (ref 1.6–4.9)
MAGNESIUM SERPL-MCNC: 2.6 MG/DL — SIGNIFICANT CHANGE UP (ref 1.6–2.6)
MCHC RBC-ENTMCNC: 30.4 PG — SIGNIFICANT CHANGE UP (ref 27–34)
MCHC RBC-ENTMCNC: 32.6 G/DL — SIGNIFICANT CHANGE UP (ref 32–36)
MCV RBC AUTO: 93.4 FL — SIGNIFICANT CHANGE UP (ref 80–100)
NRBC # BLD AUTO: 0 K/UL — SIGNIFICANT CHANGE UP (ref 0–0)
NRBC # FLD: 0 K/UL — SIGNIFICANT CHANGE UP (ref 0–0)
NRBC BLD AUTO-RTO: 0 /100 WBCS — SIGNIFICANT CHANGE UP (ref 0–0)
PLATELET # BLD AUTO: 88 K/UL — LOW (ref 150–400)
PMV BLD: 10.7 FL — SIGNIFICANT CHANGE UP (ref 7–13)
POTASSIUM SERPL-MCNC: 4.2 MMOL/L — SIGNIFICANT CHANGE UP (ref 3.5–5.3)
POTASSIUM SERPL-SCNC: 4.2 MMOL/L — SIGNIFICANT CHANGE UP (ref 3.5–5.3)
RBC # BLD: 2.86 M/UL — LOW (ref 3.8–5.2)
RBC # FLD: 14.5 % — SIGNIFICANT CHANGE UP (ref 10.3–14.5)
SODIUM SERPL-SCNC: 137 MMOL/L — SIGNIFICANT CHANGE UP (ref 135–145)
WBC # BLD: 8.34 K/UL — SIGNIFICANT CHANGE UP (ref 3.8–10.5)
WBC # FLD AUTO: 8.34 K/UL — SIGNIFICANT CHANGE UP (ref 3.8–10.5)

## 2025-04-01 PROCEDURE — 74018 RADEX ABDOMEN 1 VIEW: CPT | Mod: 26

## 2025-04-01 PROCEDURE — 71045 X-RAY EXAM CHEST 1 VIEW: CPT | Mod: 26

## 2025-04-01 PROCEDURE — 99292 CRITICAL CARE ADDL 30 MIN: CPT

## 2025-04-01 PROCEDURE — 99024 POSTOP FOLLOW-UP VISIT: CPT

## 2025-04-01 PROCEDURE — 99233 SBSQ HOSP IP/OBS HIGH 50: CPT

## 2025-04-01 PROCEDURE — 99291 CRITICAL CARE FIRST HOUR: CPT

## 2025-04-01 RX ORDER — POLYETHYLENE GLYCOL 3350 17 G/17G
17 POWDER, FOR SOLUTION ORAL DAILY
Refills: 0 | Status: DISCONTINUED | OUTPATIENT
Start: 2025-04-01 | End: 2025-04-03

## 2025-04-01 RX ORDER — CARVEDILOL 3.12 MG/1
3.12 TABLET, FILM COATED ORAL EVERY 12 HOURS
Refills: 0 | Status: DISCONTINUED | OUTPATIENT
Start: 2025-04-01 | End: 2025-04-03

## 2025-04-01 RX ORDER — LACTULOSE 10 G/15ML
20 SOLUTION ORAL ONCE
Refills: 0 | Status: COMPLETED | OUTPATIENT
Start: 2025-04-01 | End: 2025-04-01

## 2025-04-01 RX ORDER — SENNA 187 MG
2 TABLET ORAL AT BEDTIME
Refills: 0 | Status: DISCONTINUED | OUTPATIENT
Start: 2025-04-01 | End: 2025-04-03

## 2025-04-01 RX ADMIN — Medication 975 MILLIGRAM(S): at 18:07

## 2025-04-01 RX ADMIN — ISOSORBIDE MONONITRATE 30 MILLIGRAM(S): 60 TABLET, EXTENDED RELEASE ORAL at 11:52

## 2025-04-01 RX ADMIN — CLOPIDOGREL BISULFATE 75 MILLIGRAM(S): 75 TABLET, FILM COATED ORAL at 11:51

## 2025-04-01 RX ADMIN — Medication 5 MILLIGRAM(S): at 21:05

## 2025-04-01 RX ADMIN — Medication 88 MICROGRAM(S): at 05:50

## 2025-04-01 RX ADMIN — EZETIMIBE 10 MILLIGRAM(S): 10 TABLET ORAL at 21:06

## 2025-04-01 RX ADMIN — Medication 975 MILLIGRAM(S): at 05:49

## 2025-04-01 RX ADMIN — ATORVASTATIN CALCIUM 80 MILLIGRAM(S): 80 TABLET, FILM COATED ORAL at 21:05

## 2025-04-01 RX ADMIN — Medication 3 MILLILITER(S): at 06:19

## 2025-04-01 RX ADMIN — Medication 1 APPLICATION(S): at 05:52

## 2025-04-01 RX ADMIN — Medication 975 MILLIGRAM(S): at 11:51

## 2025-04-01 RX ADMIN — Medication 975 MILLIGRAM(S): at 17:08

## 2025-04-01 RX ADMIN — Medication 975 MILLIGRAM(S): at 12:51

## 2025-04-01 RX ADMIN — OXYCODONE HYDROCHLORIDE 5 MILLIGRAM(S): 30 TABLET ORAL at 09:53

## 2025-04-01 RX ADMIN — LACTULOSE 20 GRAM(S): 10 SOLUTION ORAL at 21:05

## 2025-04-01 RX ADMIN — LIDOCAINE HYDROCHLORIDE 1 PATCH: 20 JELLY TOPICAL at 21:04

## 2025-04-01 RX ADMIN — Medication 100 MILLIGRAM(S): at 11:51

## 2025-04-01 RX ADMIN — Medication 3 MILLILITER(S): at 21:10

## 2025-04-01 RX ADMIN — Medication 2 TABLET(S): at 21:05

## 2025-04-01 RX ADMIN — LACTULOSE 20 GRAM(S): 10 SOLUTION ORAL at 14:33

## 2025-04-01 RX ADMIN — IRON SUCROSE 110 MILLIGRAM(S): 20 INJECTION, SOLUTION INTRAVENOUS at 05:50

## 2025-04-01 RX ADMIN — LIDOCAINE HYDROCHLORIDE 1 PATCH: 20 JELLY TOPICAL at 08:12

## 2025-04-01 RX ADMIN — INSULIN LISPRO 2: 100 INJECTION, SOLUTION INTRAVENOUS; SUBCUTANEOUS at 23:32

## 2025-04-01 RX ADMIN — Medication 5 MILLIGRAM(S): at 04:55

## 2025-04-01 RX ADMIN — Medication 81 MILLIGRAM(S): at 11:51

## 2025-04-01 RX ADMIN — CARVEDILOL 3.12 MILLIGRAM(S): 3.12 TABLET, FILM COATED ORAL at 09:22

## 2025-04-01 RX ADMIN — Medication 1 TABLET(S): at 05:50

## 2025-04-01 RX ADMIN — Medication 40 MILLIGRAM(S): at 11:52

## 2025-04-01 RX ADMIN — CARVEDILOL 3.12 MILLIGRAM(S): 3.12 TABLET, FILM COATED ORAL at 17:08

## 2025-04-01 RX ADMIN — Medication 5 MILLIGRAM(S): at 22:04

## 2025-04-01 RX ADMIN — OXYCODONE HYDROCHLORIDE 5 MILLIGRAM(S): 30 TABLET ORAL at 08:54

## 2025-04-01 RX ADMIN — Medication 3 MILLILITER(S): at 13:23

## 2025-04-01 RX ADMIN — LIDOCAINE HYDROCHLORIDE 1 PATCH: 20 JELLY TOPICAL at 09:46

## 2025-04-01 NOTE — PROGRESS NOTE ADULT - SUBJECTIVE AND OBJECTIVE BOX
CRITICAL CARE ATTENDING - CTICU    MEDICATIONS  (STANDING):  acetaminophen     Tablet .. 975 milliGRAM(s) Oral every 6 hours  allopurinol 100 milliGRAM(s) Oral daily  aspirin  chewable 81 milliGRAM(s) Oral daily  atorvastatin 80 milliGRAM(s) Oral at bedtime  carvedilol 3.125 milliGRAM(s) Oral every 12 hours  chlorhexidine 2% Cloths 1 Application(s) Topical <User Schedule>  clopidogrel Tablet 75 milliGRAM(s) Oral daily  ezetimibe 10 milliGRAM(s) Oral at bedtime  insulin lispro (ADMELOG) corrective regimen sliding scale   SubCutaneous Before meals and at bedtime  iron sucrose IVPB 200 milliGRAM(s) IV Intermittent every 24 hours  isosorbide   mononitrate ER Tablet (IMDUR) 30 milliGRAM(s) Oral daily  levothyroxine 88 MICROGram(s) Oral daily  lidocaine   4% Patch 1 Patch Transdermal every 24 hours  melatonin 5 milliGRAM(s) Oral at bedtime  pantoprazole    Tablet 40 milliGRAM(s) Oral before breakfast  polyethylene glycol 3350 17 Gram(s) Oral daily  senna 2 Tablet(s) Oral at bedtime  sodium chloride 0.9% lock flush 3 milliLiter(s) IV Push every 8 hours  triamterene 37.5 mG/hydrochlorothiazide 25 mG Tablet 1 Tablet(s) Oral daily                                    8.7    8.34  )-----------( 88       ( 2025 02:10 )             26.7       04-01    137  |  102  |  47.6[H]  ----------------------------<  105[H]  4.2   |  18.0[L]  |  1.97[H]    Ca    8.7      2025 02:10  Mg     2.6     04                Daily     Daily Weight in k.8 (2025 04:00)      - @ 07:01  -   @ 07:00  --------------------------------------------------------  IN: 1040 mL / OUT: 1727 mL / NET: -687 mL     @ 07:01   @ 16:42  --------------------------------------------------------  IN: 480 mL / OUT: 750 mL / NET: -270 mL        Critically Ill patient  : [ ] preoperative ,   [ x] post operative    Requires :  [ ] Arterial Line   [ ] Central Line  [ ] PA catheter  [ ] IABP  [ ] ECMO  [ ] LVAD  [ ] Ventilator  [ ] pacemaker [x ]  NC                       [ x] ABG's     [ x] Pulse Oxymetry Monitoring  Bedside evaluation , monitoring , treatment of hemodynamics , fluids , IVP/ IVCD meds.        Diagnosis:     POD 4 - TAVR     POD 3 - Repair L femoral artery    Post op Bleeding     Hypotension - resolved     Hypovolemia     Hemodynamic lability,  instability. Requires IVCD [ ] vasopressors [ ] inotropes  [ ] vasodilator  [x ]IVSS fluid / Blood   to maintain MAP, perfusion, C.I.     L Femoral Vein - access site     R Femoral artery - deployment site     CHF- acute [ x]   chronic [x ]    systolic [ ]   diastolic [x ]  Valvular [x ]          - Echo- EF -  65% / AS           [ ] RV dysfunction          - Cxr-cardiomegally, edema          - Clinical-  [ ]inotropes   [ ]pressors   [ ]diuresis   [ ]IABP   [ ]ECMO   [ ]LVAD   [ x] Respiratory insuffiencey     Aortic Stenosis    Respiratory insuffiencey     Requires Supplemental Oxygen Therapy     Requires chest PT, pulmonary toilet,  suctioning to maintain SaO2,  patent airway and treat atelectasis.     Obesity ?  OHS / CHEYANNE ?    Renal Failure - Acute Kidney Injury     Metabolic Acidosis     Thrombocytopenia     PMH: -  CAD / Stents / CABG X 2     Requires bedside physical therapy, mobilization and total halfway care.                       -                     Discussed with CT surgeon, Physician's Assistant - Nurse Practitioner- Critical care medicine team.   Discussed at  AM / PM rounds.   Chart, labs , films reviewed.    Cumulative Critical Care Time Given Today : 30 min

## 2025-04-01 NOTE — PROGRESS NOTE ADULT - ASSESSMENT
80 y/o F with PMH of includes Chronic diastolic heart failure NYHA Class II, mild MR/ mild TR, HTN, diabetes, hypothyroidism,  CKD, NSTEMI ** multiple most recent 1/20/24, CAD with stenting, CABG x 2, bilateral carotid stneosis (50-69%), hiatal hernia, GERD, and aortic stenosis BASELINE EKG sinus bradyno blocks. Presented to [facility] on 3/28 for known AS. Now s/p GA TF TAVR via RCFA with basicila by Dr. Hansen. Post op Left groin rebleeding when femstop removed after 4 hours; underwent CTA, which revealed L femoral pseudoaneurysm. Patient returned to OR with vascular surgery for repair and hematoma evacuation. Rightgroin access site is soft and non-tender. Seen at bedside resting comfortably. Patient denies acute pain with radiating or aggravating factors. She denies chest pain, shortness of breath, palpitations, headache, dizziness, nausea, or vomiting.

## 2025-04-01 NOTE — PROGRESS NOTE ADULT - SUBJECTIVE AND OBJECTIVE BOX
DIANA ROGERS  MRN-[id number]    HPI:  Patient is a 79 year old  female presenting today for PST, PMH includes HTN, diabetes, hypothyroidism,  CKD, NSTEMI, CAD with stenting, CABG x 2 and aortic stenosis. Patient c/o "tightness" in chest with exertion and intermittent shortness of breath with activity. Reports on occasion she has palpitations, that resolve quickly on their own and fatigue. She is s/p cardiac cath 2024. Denies chest pain, dizziness,  lightheadedness, syncope, orthopnea, cough, or lower extremity edema. She is now scheduled for transcatheter aortic valve replacement  transfemoral Medtronic with basilica on 3/28/25 with Dr. Hansen.   (26 Mar 2025 08:40)      ICU Vital Signs Last 24 Hrs  T(C): 37.2 (2025 09:00), Max: 37.2 (31 Mar 2025 19:00)  T(F): 99 (2025 09:00), Max: 99 (31 Mar 2025 19:00)  HR: 81 (2025 09:00) (63 - 88)  BP: 159/51 (2025 09:00) (130/41 - 169/49)  BP(mean): 81 (2025 09:00) (63 - 87)  ABP: 144/40 (2025 04:00) (136/33 - 172/48)  ABP(mean): 72 (2025 04:00) (63 - 94)  RR: 15 (2025 09:00) (7 - 23)  SpO2: 99% (2025 09:00) (86% - 99%)    O2 Parameters below as of 2025 08:00  Patient On (Oxygen Delivery Method): room air            Physical Exam:  Gen: resting in bed comfortably in NAD  Chest: no increased WOB, regular inspiratory effort   Vascular: L groin with expected levels of ecchymosis, prevena in place holding good suction.  Soft and appropriately tender.  Palpable distal pulses  NEURO: awake, alert---    ============================I/O===========================   I&O's Detail    31 Mar 2025 07:01  -  2025 07:00  --------------------------------------------------------  IN:    IV PiggyBack: 100 mL    Oral Fluid: 940 mL  Total IN: 1040 mL    OUT:    Bulb (mL): 52 mL    Indwelling Catheter - Urethral (mL): 1675 mL  Total OUT: 1727 mL    Total NET: -687 mL      2025 07:01  -  2025 10:46  --------------------------------------------------------  IN:    Oral Fluid: 240 mL  Total IN: 240 mL    OUT:    Indwelling Catheter - Urethral (mL): 375 mL  Total OUT: 375 mL    Total NET: -135 mL        ============================ LABS =========================                        8.7    8.34  )-----------( 88       ( 2025 02:10 )             26.7     04-01    137  |  102  |  47.6[H]  ----------------------------<  105[H]  4.2   |  18.0[L]  |  1.97[H]    Ca    8.7      2025 02:10  Mg     2.6     04-01            Urinalysis Basic - ( 2025 02:10 )    Color: x / Appearance: x / SG: x / pH: x  Gluc: 105 mg/dL / Ketone: x  / Bili: x / Urobili: x   Blood: x / Protein: x / Nitrite: x   Leuk Esterase: x / RBC: x / WBC x   Sq Epi: x / Non Sq Epi: x / Bacteria: x      ======================Micro/Rad/Cardio=================  Culture: Reviewed   CXR: Reviewed  Echo:Reviewed  ======================================================  PAST MEDICAL & SURGICAL HISTORY:  Hypertension      Diabetes mellitus      Hypothyroidism      HLD (hyperlipidemia)      CAD (coronary artery disease)      CKD (chronic kidney disease)      S/P CABG (coronary artery bypass graft)      H/O  section      History of cardiac cath      H/O colonoscopy        ====================ASSESSMENT ==============  78 yo F s/p TAVR 3/28 and Left femoral cutdown, evacuation of hematoma, ligation of PSA sac, repair of arteriotomy on 3/29. Pt recovering appropriately post-op.-    Hypertension  Diabetes mellitus  Hypothyroidism  HLD (hyperlipidemia)  CAD (coronary artery disease)  CKD (chronic kidney disease)  S/P CABG (coronary artery bypass graft)  S/p Commercial 23 mm Medtronic CoreValve Evolut FX+ Percutaneous Transfemoral TAVR via RIGHT Common Femoral Artery.-  S/p Left femoral cutdown, evacuation of hematoma, ligation of PSA sac, repair of arteriotomy after proximal control, washout, groin closure in layers, prevena dressing applied.-  Post op Hypovolemia  Post op respiratory insufficiency   Post op JOSE LUIS    Plan:  Renal Consult appreciated  Ambulate  Continue pain control   Remove Prevena between  and 4/3  Monitor ANTHONY output, leave drain in place until output is <15cc/24hr.  Can be discharged with drain in place  Frequent stripping of ANTHONY drain to prevent clogging  Staples will be removed two weeks post op  Beta blocker as tolerated by HR and SBP  Coreg started  Lipitor   Aspirin  Chest PT and IS use with bedside nurse  Analgesic regimen to optimize function with Tylenol and Narcotics   Continue GI ppx with Protonix and Senna  DVT ppx with Lovenox and SCD boots  Strict glucose control and management--    ====================== NEUROLOGY=====================  acetaminophen     Tablet .. 975 milliGRAM(s) Oral every 6 hours PRN Temp greater or equal to 38C (100.4F), Moderate Pain (4 - 6)  acetaminophen     Tablet .. 975 milliGRAM(s) Oral every 6 hours  melatonin 5 milliGRAM(s) Oral at bedtime  oxyCODONE    IR 2.5 milliGRAM(s) Oral every 6 hours PRN Moderate Pain (4 - 6)  oxyCODONE    IR 5 milliGRAM(s) Oral every 6 hours PRN Severe Pain (7 - 10)    ==================== RESPIRATORY======================  Post op respiratory insufficiency  ====================CARDIOVASCULAR==================  Post op Hypovolemia  carvedilol 3.125 milliGRAM(s) Oral every 12 hours  isosorbide   mononitrate ER Tablet (IMDUR) 30 milliGRAM(s) Oral daily  triamterene 37.5 mG/hydrochlorothiazide 25 mG Tablet 1 Tablet(s) Oral daily    ===================HEMATOLOGIC/ONC ===================  Monitor H&H/Plts    aspirin  chewable 81 milliGRAM(s) Oral daily  clopidogrel Tablet 75 milliGRAM(s) Oral daily    ===================== RENAL =========================  Continue monitoring urine output, I&OS, BUN/Cr     ==================== GASTROINTESTINAL===================  iron sucrose IVPB 200 milliGRAM(s) IV Intermittent every 24 hours  pantoprazole    Tablet 40 milliGRAM(s) Oral before breakfast  sodium chloride 0.9% lock flush 3 milliLiter(s) IV Push every 8 hours    =======================    ENDOCRINE  =====================  allopurinol 100 milliGRAM(s) Oral daily  atorvastatin 80 milliGRAM(s) Oral at bedtime  ezetimibe 10 milliGRAM(s) Oral at bedtime  insulin lispro (ADMELOG) corrective regimen sliding scale   SubCutaneous Before meals and at bedtime  levothyroxine 88 MICROGram(s) Oral daily    ========================INFECTIOUS DISEASE================      -Monitor Neurologic status ,   -Head of the bed should remain elevated to 45 degrees,  -Monitor for arrhythmias and monitor parameters for organ perfusion,  -Glycemic control is satisfactory,  -Nutritional goals will be met using po eventually , insure adequate caloric intake and monitor the same ,  -Electrolytes have been repleted as necessary , pain control has been achieved  and wound care has been carried out ,  -Stress ulcer and VTE prophylaxis will be achieved,  -Agressive PT and early mobility and ambulation goals will be met,  -The family was updated about the course and plan .      I have spent 35 minutes providing acute care for this critically ill patient     Patient requires continuous monitoring with bedside rhythm monitoring, pulse ox monitoring, and intermittent blood gas analysis. Care plan discussed with ICU care team. Patient remained critical and at risk for life threatening decompensation.

## 2025-04-01 NOTE — PROGRESS NOTE ADULT - ASSESSMENT
79 YOF PMH HTN, DM, CAD, AS, CKD 3, status post TAVR, ligation sac and repair of arteriotomy.  Nephrology consulted for acute kidney injury    Acute kidney injury on chronic kidney disease stage III:  ·	Baseline serum creatinine 1.4-1.6 mg/deciliter  ·	Pt now with Catracho with Scr ~ 2.4 mg/deciliter  ·	UA bland  ·	CATRACHO likely hemodynamically mediated, might have a component of tubular injury from contrast.  ·	Scr now improving- expect continued improvement  ·	Bp stable- Continue diuretics  ·	nephrology follow up outpt to monitor Scr trend and establish care in CKD clinic 79 YOF PMH HTN, DM, CAD, AS, CKD 3, status post TAVR, ligation sac and repair of arteriotomy.  Nephrology consulted for acute kidney injury    Acute kidney injury on chronic kidney disease stage III:  ·	Baseline serum creatinine 1.4-1.6 mg/deciliter  ·	Pt now with Catracho with Scr ~ 2.4 mg/deciliter  ·	UA bland  ·	CATRACHO likely hemodynamically mediated, might have a component of tubular injury from contrast.  ·	Scr now improving- expect continued improvement  ·	Bp stable- Continue diuretics  ·	nephrology follow up outpt to monitor Scr trend and establish care in CKD clinic    Anemia- in setting of blood los  on IV iron  will give TATI    AS sp TAVR

## 2025-04-01 NOTE — DISCHARGE NOTE NURSING/CASE MANAGEMENT/SOCIAL WORK - NSDCFUADDAPPT_GEN_ALL_CORE_FT
The cardiac surgery office is located on the first floor of [facility] at [address], [place]. Please enter through the lobby. A [facility] employee will then direct you where to go.    Please follow up with Dr. Hansen on  ____at [facility].    Your Care Navigator Nurse Practitioner will be in touch to see you in your home within a few days from discharge. The Follow Your Heart program can help ensure you understand your medications, discharge instructions and answer any questions you may have at that time. They are also a great source to address concerns during the day and may be reached at [phone].

## 2025-04-01 NOTE — PROGRESS NOTE ADULT - SUBJECTIVE AND OBJECTIVE BOX
Subjective:   79yFemale  Now s/p GA TF TAVR via RCFA with basicila by Dr. Hansen.  Post op Left groin rebleeding when femstop removed after 4 hours. Patient underwent CTA, which revealed L femoral pseudoaneurysm; patient returned to OR with vascular surgery for repair and hematoma evacuation. Rightgroin access site is soft and non-tender. Seen at bedside resting comfortably. Denies chest pain, palpitations, SOB, KEENE, cough, N/V/D/C.      HPI: 80 y/o F with PMH of includes Chronic diastolic heart failure NYHA Class II, mild MR/ mild TR, HTN, diabetes, hypothyroidism,  CKD, NSTEMI ** multiple most recent 24, CAD with stenting, CABG x 2, bilateral carotid stneosis (50-69%), hiatal hernia, GERD, and aortic stenosis BASELINE EKG sinus bradyno blocks. Presented to [facility] on 3/28 for known AS. Now s/p GA TF TAVR via RCFA with basicila by Dr. Hansen.  Post op Left groin rebleeding when femstop removed after 4 hours, resolved with manual pressure. Bilateral groin access sites soft and non-tender.   PAST MEDICAL & SURGICAL HISTORY:  Hypertension      Diabetes mellitus      Hypothyroidism      HLD (hyperlipidemia)      CAD (coronary artery disease)      CKD (chronic kidney disease)      S/P CABG (coronary artery bypass graft)      H/O  section      History of cardiac cath      H/O colonoscopy          Medications:  acetaminophen     Tablet .. 975 milliGRAM(s) Oral every 6 hours PRN  allopurinol 100 milliGRAM(s) Oral daily  aspirin  chewable 81 milliGRAM(s) Oral daily  atorvastatin 80 milliGRAM(s) Oral at bedtime  cefuroxime  IVPB 1500 milliGRAM(s) IV Intermittent every 8 hours  chlorhexidine 0.12% Liquid 15 milliLiter(s) Oral Mucosa every 12 hours  chlorhexidine 4% Liquid 1 Application(s) Topical <User Schedule>  clopidogrel Tablet 75 milliGRAM(s) Oral daily  ezetimibe 10 milliGRAM(s) Oral at bedtime  insulin lispro (ADMELOG) corrective regimen sliding scale   SubCutaneous Before meals and at bedtime  isosorbide   mononitrate ER Tablet (IMDUR) 30 milliGRAM(s) Oral daily  levothyroxine 88 MICROGram(s) Oral daily  melatonin 5 milliGRAM(s) Oral at bedtime  niCARdipine Infusion 5 mG/Hr IV Continuous <Continuous>  pantoprazole    Tablet 40 milliGRAM(s) Oral before breakfast  sodium chloride 0.9% lock flush 10 milliLiter(s) IV Push every 1 hour PRN  sodium chloride 0.9% lock flush 3 milliLiter(s) IV Push every 8 hours  sodium chloride 0.9%. 1000 milliLiter(s) IV Continuous <Continuous>  sodium chloride 0.9%. 1000 milliLiter(s) IV Continuous <Continuous>  triamterene 37.5 mG/hydrochlorothiazide 25 mG Tablet 1 Tablet(s) Oral daily      MEDICATIONS  (PRN):  acetaminophen     Tablet .. 975 milliGRAM(s) Oral every 6 hours PRN Temp greater or equal to 38C (100.4F), Moderate Pain (4 - 6)  sodium chloride 0.9% lock flush 10 milliLiter(s) IV Push every 1 hour PRN Pre/post blood products, medications, blood draw, and to maintain line patency      Daily Review:  Height (cm): 147.3 ( @ 06:17)  Weight (kg): 58 ( @ 06:17)  BMI (kg/m2): 26.7 ( @ 06:17)  BSA (m2): 1.51 ( @ 06:17)    ABG - ( 28 Mar 2025 13:26 )  pH, Arterial: 7.340 pH, Blood: x     /  pCO2: 40    /  pO2: 143   / HCO3: 22    / Base Excess: -4.2  /  SaO2: 100.0                                   8.4    11.32 )-----------( 184      ( 28 Mar 2025 17:36 )             27.1       135  |  102  |  31.0[H]  ----------------------------<  148[H]  4.9   |  20.0[L]  |  1.60[H]    Ca    9.4      28 Mar 2025 21:50  Phos  3.0       Mg     1.9         TPro  5.6[L]  /  Alb  3.7  /  TBili  0.2[L]  /  DBili  x   /  AST  31  /  ALT  17  /  AlkPhos  86        PT/INR - ( 28 Mar 2025 17:36 )   PT: 11.4 sec;   INR: 0.98 ratio         PTT - ( 28 Mar 2025 17:36 )  PTT:35.9 sec    T(C): 37 (25 @ 23:00), Max: 37.9 (25 @ 16:00)  HR: 63 (25 @ 23:00) (60 - 83)  BP: 171/53 (25 @ 06:17) (171/53 - 171/53)  RR: 13 (25 @ 18:30) (11 - 27)  SpO2: 100% (25 @ 23:00) (97% - 100%)  Wt(kg): --    CAPILLARY BLOOD GLUCOSE      POCT Blood Glucose.: 166 mg/dL (28 Mar 2025 21:52)  POCT Blood Glucose.: 132 mg/dL (28 Mar 2025 06:34)      I&O's Summary    28 Mar 2025 07:01  -  29 Mar 2025 01:25  --------------------------------------------------------  IN: 37.5 mL / OUT: 1025 mL / NET: -987.5 mL          PHYSICAL EXAM:  GENERAL: No acute distress, well-developed  NECK: no JVD  CHEST/LUNG: CTAB; No wheezes, rales, or rhonchi  HEART: RRR; No murmurs, rubs, or gallops  ABDOMEN: Soft, non-tender, non-distended; normal bowel sounds  EXTREMITIES:  2+ peripheral pulses b/l, No clubbing, cyanosis, or edema  NEUROLOGY: AAO x 4

## 2025-04-01 NOTE — PROGRESS NOTE ADULT - ASSESSMENT
80 yo F s/p TAVR 3/28 and Left femoral cutdown, evacuation of hematoma, ligation of PSA sac, repair of arteriotomy on 3/29. Pt recovering appropriately post-op.     Plan:   -Previna down POD5  - monitor ANTHONY output, leave drain in place until output is <15cc/24hr.  Can be discharged with drain in place. 52 out over last 24 hours   - Frequent stripping of ANTHONY drain to prevent clogging  - Staples will be removed two weeks post op  - DVT ppx: SCDs, ASA  - rest of care per primary team  - ok for OOB  -Local wound care to groin

## 2025-04-01 NOTE — DISCHARGE NOTE NURSING/CASE MANAGEMENT/SOCIAL WORK - PATIENT PORTAL LINK FT
You can access the FollowMyHealth Patient Portal offered by [facility] by registering at the following website: [email or web address]. By joining [employer]’s FollowMyHealth portal, you will also be able to view your health information using other applications (apps) compatible with our system.

## 2025-04-01 NOTE — PROGRESS NOTE ADULT - SUBJECTIVE AND OBJECTIVE BOX
Patient seen and examined at bedside. NAEON, pain controlled. No complaints this morning otherwise.    MEDICATIONS  (STANDING):  acetaminophen     Tablet .. 975 milliGRAM(s) Oral every 6 hours  allopurinol 100 milliGRAM(s) Oral daily  aspirin  chewable 81 milliGRAM(s) Oral daily  atorvastatin 80 milliGRAM(s) Oral at bedtime  chlorhexidine 2% Cloths 1 Application(s) Topical <User Schedule>  clopidogrel Tablet 75 milliGRAM(s) Oral daily  ezetimibe 10 milliGRAM(s) Oral at bedtime  insulin lispro (ADMELOG) corrective regimen sliding scale   SubCutaneous Before meals and at bedtime  iron sucrose IVPB 200 milliGRAM(s) IV Intermittent every 24 hours  isosorbide   mononitrate ER Tablet (IMDUR) 30 milliGRAM(s) Oral daily  levothyroxine 88 MICROGram(s) Oral daily  lidocaine   4% Patch 1 Patch Transdermal every 24 hours  melatonin 5 milliGRAM(s) Oral at bedtime  pantoprazole    Tablet 40 milliGRAM(s) Oral before breakfast  sodium chloride 0.9% lock flush 3 milliLiter(s) IV Push every 8 hours  triamterene 37.5 mG/hydrochlorothiazide 25 mG Tablet 1 Tablet(s) Oral daily    MEDICATIONS  (PRN):  acetaminophen     Tablet .. 975 milliGRAM(s) Oral every 6 hours PRN Temp greater or equal to 38C (100.4F), Moderate Pain (4 - 6)  benzocaine/menthol Lozenge 1 Lozenge Oral four times a day PRN Sore Throat  oxyCODONE    IR 2.5 milliGRAM(s) Oral every 6 hours PRN Moderate Pain (4 - 6)  oxyCODONE    IR 5 milliGRAM(s) Oral every 6 hours PRN Severe Pain (7 - 10)                          8.7    8.34  )-----------( 88       ( 01 Apr 2025 02:10 )             26.7   04-01    137  |  102  |  47.6[H]  ----------------------------<  105[H]  4.2   |  18.0[L]  |  1.97[H]    Ca    8.7      01 Apr 2025 02:10  Mg     2.6     04-01    Vital Signs Last 24 Hrs  T(C): 37 (01 Apr 2025 06:00), Max: 37.2 (31 Mar 2025 19:00)  T(F): 98.6 (01 Apr 2025 06:00), Max: 99 (31 Mar 2025 19:00)  HR: 80 (01 Apr 2025 06:00) (63 - 88)  BP: 169/49 (01 Apr 2025 06:00) (121/40 - 169/49)  BP(mean): 83 (01 Apr 2025 06:00) (62 - 87)  RR: 14 (01 Apr 2025 06:00) (7 - 23)  SpO2: 96% (01 Apr 2025 06:00) (86% - 99%)    Parameters below as of 01 Apr 2025 00:00  Patient On (Oxygen Delivery Method): room air        Physical exam:  Gen: resting in bed comfortably in NAD  Chest: no increased WOB, regular inspiratory effort   Vascular: L groin with decreased levels of ecchymosis, prevena in place holding good suction.  Soft and appropriately tender.  Palpable distal pulses  NEURO: awake, alert

## 2025-04-01 NOTE — PROGRESS NOTE ADULT - SUBJECTIVE AND OBJECTIVE BOX
[facility] DIVISION OF KIDNEY DISEASES AND HYPERTENSION -- FOLLOW UP NOTE  --------------------------------------------------------------------------------  Chief Complaint:    24 hour events/subjective:        PAST HISTORY  --------------------------------------------------------------------------------  No significant changes to PMH, PSH, FHx, SHx, unless otherwise noted    ALLERGIES & MEDICATIONS  --------------------------------------------------------------------------------  Allergies    No Known Allergies    Intolerances      Standing Inpatient Medications  acetaminophen     Tablet .. 975 milliGRAM(s) Oral every 6 hours  allopurinol 100 milliGRAM(s) Oral daily  aspirin  chewable 81 milliGRAM(s) Oral daily  atorvastatin 80 milliGRAM(s) Oral at bedtime  carvedilol 3.125 milliGRAM(s) Oral every 12 hours  chlorhexidine 2% Cloths 1 Application(s) Topical <User Schedule>  clopidogrel Tablet 75 milliGRAM(s) Oral daily  ezetimibe 10 milliGRAM(s) Oral at bedtime  insulin lispro (ADMELOG) corrective regimen sliding scale   SubCutaneous Before meals and at bedtime  iron sucrose IVPB 200 milliGRAM(s) IV Intermittent every 24 hours  isosorbide   mononitrate ER Tablet (IMDUR) 30 milliGRAM(s) Oral daily  lactulose Syrup 20 Gram(s) Oral once  levothyroxine 88 MICROGram(s) Oral daily  lidocaine   4% Patch 1 Patch Transdermal every 24 hours  melatonin 5 milliGRAM(s) Oral at bedtime  pantoprazole    Tablet 40 milliGRAM(s) Oral before breakfast  polyethylene glycol 3350 17 Gram(s) Oral daily  senna 2 Tablet(s) Oral at bedtime  sodium chloride 0.9% lock flush 3 milliLiter(s) IV Push every 8 hours  triamterene 37.5 mG/hydrochlorothiazide 25 mG Tablet 1 Tablet(s) Oral daily    PRN Inpatient Medications  acetaminophen     Tablet .. 975 milliGRAM(s) Oral every 6 hours PRN  benzocaine/menthol Lozenge 1 Lozenge Oral four times a day PRN  oxyCODONE    IR 2.5 milliGRAM(s) Oral every 6 hours PRN  oxyCODONE    IR 5 milliGRAM(s) Oral every 6 hours PRN      REVIEW OF SYSTEMS  --------------------------------------------------------------------------------  Gen: No weight changes, fatigue, fevers/chills, weakness  Skin: No rashes  Head/Eyes/Ears/Mouth: No headache; Normal hearing; Normal vision w/o blurriness; No sinus pain/discomfort, sore throat  Respiratory: No dyspnea, cough, wheezing, hemoptysis  CV: No chest pain, PND, orthopnea  GI: No abdominal pain, diarrhea, constipation, nausea, vomiting, melena, hematochezia  : No increased frequency, dysuria, hematuria, nocturia  MSK: No joint pain/swelling; no back pain; no edema  Neuro: No dizziness/lightheadedness, weakness, seizures, numbness, tingling  Heme: No easy bruising or bleeding  Endo: No heat/cold intolerance  Psych: No significant nervousness, anxiety, stress, depression    All other systems were reviewed and are negative, except as noted.    VITALS/PHYSICAL EXAM  --------------------------------------------------------------------------------  T(C): 36.6 (04-01-25 @ 12:00), Max: 37.2 (03-31-25 @ 19:00)  HR: 73 (04-01-25 @ 13:00) (63 - 88)  BP: 136/39 (04-01-25 @ 13:00) (130/41 - 169/49)  RR: 13 (04-01-25 @ 13:00) (8 - 20)  SpO2: 95% (04-01-25 @ 13:00) (86% - 99%)  Wt(kg): --        03-31-25 @ 07:01  -  04-01-25 @ 07:00  --------------------------------------------------------  IN: 1040 mL / OUT: 1727 mL / NET: -687 mL    04-01-25 @ 07:01  -  04-01-25 @ 13:58  --------------------------------------------------------  IN: 480 mL / OUT: 750 mL / NET: -270 mL      Physical Exam:  	Gen: NAD, well-appearing  	HEENT: PERRL, supple neck, clear oropharynx  	Pulm: CTA B/L  	CV: RRR, S1S2; no rub  	Back: No spinal or CVA tenderness; no sacral edema  	Abd: +BS, soft, nontender/nondistended  	: No suprapubic tenderness  	UE: Warm, FROM, no clubbing, intact strength; no edema; no asterixis  	LE: Warm, FROM, no clubbing, intact strength; no edema  	Neuro: No focal deficits, intact gait  	Psych: Normal affect and mood  	Skin: Warm, without rashes  	Vascular access:    LABS/STUDIES  --------------------------------------------------------------------------------              8.7    8.34  >-----------<  88       [04-01-25 @ 02:10]              26.7     137  |  102  |  47.6  ----------------------------<  105      [04-01-25 @ 02:10]  4.2   |  18.0  |  1.97        Ca     8.7     [04-01-25 @ 02:10]      Mg     2.6     [04-01-25 @ 02:10]            Creatinine Trend:  SCr 1.97 [04-01 @ 02:10]  SCr 2.35 [03-31 @ 14:10]  SCr 2.42 [03-31 @ 03:20]  SCr 1.82 [03-30 @ 01:50]  SCr 1.73 [03-29 @ 11:25]    Urinalysis - [04-01-25 @ 02:10]      Color  / Appearance  / SG  / pH       Gluc 105 / Ketone   / Bili  / Urobili        Blood  / Protein  / Leuk Est  / Nitrite       RBC  / WBC  / Hyaline  / Gran  / Sq Epi  / Non Sq Epi  / Bacteria       TSH 0.64      [03-26-25 @ 08:55]       [facility] DIVISION OF KIDNEY DISEASES AND HYPERTENSION -- FOLLOW UP NOTE  --------------------------------------------------------------------------------  Chief Complaint: Catracho on CKD    24 hour events/subjective:  no acute event noted  pt seen and examined; feels well    PAST HISTORY  --------------------------------------------------------------------------------  No significant changes to PMH, PSH, FHx, SHx, unless otherwise noted    ALLERGIES & MEDICATIONS  --------------------------------------------------------------------------------  Allergies  No Known Allergies          Standing Inpatient Medications  acetaminophen     Tablet .. 975 milliGRAM(s) Oral every 6 hours  allopurinol 100 milliGRAM(s) Oral daily  aspirin  chewable 81 milliGRAM(s) Oral daily  atorvastatin 80 milliGRAM(s) Oral at bedtime  carvedilol 3.125 milliGRAM(s) Oral every 12 hours  chlorhexidine 2% Cloths 1 Application(s) Topical <User Schedule>  clopidogrel Tablet 75 milliGRAM(s) Oral daily  ezetimibe 10 milliGRAM(s) Oral at bedtime  insulin lispro (ADMELOG) corrective regimen sliding scale   SubCutaneous Before meals and at bedtime  iron sucrose IVPB 200 milliGRAM(s) IV Intermittent every 24 hours  isosorbide   mononitrate ER Tablet (IMDUR) 30 milliGRAM(s) Oral daily  lactulose Syrup 20 Gram(s) Oral once  levothyroxine 88 MICROGram(s) Oral daily  lidocaine   4% Patch 1 Patch Transdermal every 24 hours  melatonin 5 milliGRAM(s) Oral at bedtime  pantoprazole    Tablet 40 milliGRAM(s) Oral before breakfast  polyethylene glycol 3350 17 Gram(s) Oral daily  senna 2 Tablet(s) Oral at bedtime  sodium chloride 0.9% lock flush 3 milliLiter(s) IV Push every 8 hours  triamterene 37.5 mG/hydrochlorothiazide 25 mG Tablet 1 Tablet(s) Oral daily    PRN Inpatient Medications  acetaminophen     Tablet .. 975 milliGRAM(s) Oral every 6 hours PRN  benzocaine/menthol Lozenge 1 Lozenge Oral four times a day PRN  oxyCODONE    IR 2.5 milliGRAM(s) Oral every 6 hours PRN  oxyCODONE    IR 5 milliGRAM(s) Oral every 6 hours PRN      REVIEW OF SYSTEMS  --------------------------------------------------------------------------------  Gen: No weight changes, fatigue, fevers/chills, weakness  Skin: No rashes  Head/Eyes/Ears/Mouth: No headache; Normal hearing; Normal vision w/o blurriness; No sinus pain/discomfort, sore throat  Respiratory: No dyspnea, cough, wheezing, hemoptysis  CV: No chest pain, PND, orthopnea  GI: No abdominal pain, diarrhea, constipation, nausea, vomiting, melena, hematochezia  : No increased frequency, dysuria, hematuria, nocturia  MSK: No joint pain/swelling; no back pain; no edema  Neuro: No dizziness/lightheadedness, weakness, seizures, numbness, tingling  Heme: No easy bruising or bleeding  Endo: No heat/cold intolerance  Psych: No significant nervousness, anxiety, stress, depression    All other systems were reviewed and are negative, except as noted.    VITALS/PHYSICAL EXAM  --------------------------------------------------------------------------------  T(C): 36.6 (04-01-25 @ 12:00), Max: 37.2 (03-31-25 @ 19:00)  HR: 73 (04-01-25 @ 13:00) (63 - 88)  BP: 136/39 (04-01-25 @ 13:00) (130/41 - 169/49)  RR: 13 (04-01-25 @ 13:00) (8 - 20)  SpO2: 95% (04-01-25 @ 13:00) (86% - 99%)  Wt(kg): --        03-31-25 @ 07:01  -  04-01-25 @ 07:00  --------------------------------------------------------  IN: 1040 mL / OUT: 1727 mL / NET: -687 mL    04-01-25 @ 07:01  -  04-01-25 @ 13:58  --------------------------------------------------------  IN: 480 mL / OUT: 750 mL / NET: -270 mL      Physical Exam:  	Gen: NAD, well-appearing  	HEENT: PERRL, supple neck, clear oropharynx  	Pulm: CTA B/L  	CV: RRR, S1S2; no rub  	Back: No spinal or CVA tenderness; no sacral edema  	Abd: +BS, soft, nontender/nondistended  	: No suprapubic tenderness  	UE: Warm, FROM, no clubbing, intact strength; no edema; no asterixis  	LE: Warm, FROM, no clubbing, intact strength; no edema  	Neuro: No focal deficits, intact gait  	Psych: Normal affect and mood  	Skin: Warm, without rashes  	Vascular access:    LABS/STUDIES  --------------------------------------------------------------------------------              8.7    8.34  >-----------<  88       [04-01-25 @ 02:10]              26.7     137  |  102  |  47.6  ----------------------------<  105      [04-01-25 @ 02:10]  4.2   |  18.0  |  1.97        Ca     8.7     [04-01-25 @ 02:10]      Mg     2.6     [04-01-25 @ 02:10]            Creatinine Trend:  SCr 1.97 [04-01 @ 02:10]  SCr 2.35 [03-31 @ 14:10]  SCr 2.42 [03-31 @ 03:20]  SCr 1.82 [03-30 @ 01:50]  SCr 1.73 [03-29 @ 11:25]    Urinalysis - [04-01-25 @ 02:10]      Color  / Appearance  / SG  / pH       Gluc 105 / Ketone   / Bili  / Urobili        Blood  / Protein  / Leuk Est  / Nitrite       RBC  / WBC  / Hyaline  / Gran  / Sq Epi  / Non Sq Epi  / Bacteria       TSH 0.64      [03-26-25 @ 08:55]       [facility] DIVISION OF KIDNEY DISEASES AND HYPERTENSION -- FOLLOW UP NOTE  --------------------------------------------------------------------------------  Chief Complaint: Catracho on CKD    24 hour events/subjective:  no acute event noted  pt seen and examined; feels well    PAST HISTORY  --------------------------------------------------------------------------------  No significant changes to PMH, PSH, FHx, SHx, unless otherwise noted    ALLERGIES & MEDICATIONS  --------------------------------------------------------------------------------  Allergies  No Known Allergies      Standing Inpatient Medications  acetaminophen     Tablet .. 975 milliGRAM(s) Oral every 6 hours  allopurinol 100 milliGRAM(s) Oral daily  aspirin  chewable 81 milliGRAM(s) Oral daily  atorvastatin 80 milliGRAM(s) Oral at bedtime  carvedilol 3.125 milliGRAM(s) Oral every 12 hours  chlorhexidine 2% Cloths 1 Application(s) Topical <User Schedule>  clopidogrel Tablet 75 milliGRAM(s) Oral daily  ezetimibe 10 milliGRAM(s) Oral at bedtime  insulin lispro (ADMELOG) corrective regimen sliding scale   SubCutaneous Before meals and at bedtime  iron sucrose IVPB 200 milliGRAM(s) IV Intermittent every 24 hours  isosorbide   mononitrate ER Tablet (IMDUR) 30 milliGRAM(s) Oral daily  lactulose Syrup 20 Gram(s) Oral once  levothyroxine 88 MICROGram(s) Oral daily  lidocaine   4% Patch 1 Patch Transdermal every 24 hours  melatonin 5 milliGRAM(s) Oral at bedtime  pantoprazole    Tablet 40 milliGRAM(s) Oral before breakfast  polyethylene glycol 3350 17 Gram(s) Oral daily  senna 2 Tablet(s) Oral at bedtime  sodium chloride 0.9% lock flush 3 milliLiter(s) IV Push every 8 hours  triamterene 37.5 mG/hydrochlorothiazide 25 mG Tablet 1 Tablet(s) Oral daily    PRN Inpatient Medications  acetaminophen     Tablet .. 975 milliGRAM(s) Oral every 6 hours PRN  benzocaine/menthol Lozenge 1 Lozenge Oral four times a day PRN  oxyCODONE    IR 2.5 milliGRAM(s) Oral every 6 hours PRN  oxyCODONE    IR 5 milliGRAM(s) Oral every 6 hours PRN      REVIEW OF SYSTEMS  --------------------------------------------------------------------------------  Gen: No weight changes, fatigue, fevers/chills, weakness  Skin: No rashes  Head/Eyes/Ears/Mouth: No headache; Normal hearing; Normal vision w/o blurriness; No sinus pain/discomfort, sore throat  Respiratory: No dyspnea, cough, wheezing, hemoptysis  CV: No chest pain, PND, orthopnea  GI: No abdominal pain, diarrhea, constipation, nausea, vomiting, melena, hematochezia  : No increased frequency, dysuria, hematuria, nocturia  MSK: No joint pain/swelling; no back pain; no edema  Neuro: No dizziness/lightheadedness, weakness, seizures, numbness, tingling  Heme: No easy bruising or bleeding  Endo: No heat/cold intolerance  Psych: No significant nervousness, anxiety, stress, depression    All other systems were reviewed and are negative, except as noted.    VITALS/PHYSICAL EXAM  --------------------------------------------------------------------------------  T(C): 36.6 (04-01-25 @ 12:00), Max: 37.2 (03-31-25 @ 19:00)  HR: 73 (04-01-25 @ 13:00) (63 - 88)  BP: 136/39 (04-01-25 @ 13:00) (130/41 - 169/49)  RR: 13 (04-01-25 @ 13:00) (8 - 20)  SpO2: 95% (04-01-25 @ 13:00) (86% - 99%)  Wt(kg): --        03-31-25 @ 07:01  -  04-01-25 @ 07:00  --------------------------------------------------------  IN: 1040 mL / OUT: 1727 mL / NET: -687 mL    04-01-25 @ 07:01  -  04-01-25 @ 13:58  --------------------------------------------------------  IN: 480 mL / OUT: 750 mL / NET: -270 mL      Physical Exam:  	Gen: NAD, well-appearing  	HEENT: supple neck, clear oropharynx  	Pulm: CTA B/L  	CV: RRR, S1S2; no rub  	Back: No spinal or CVA tenderness; no sacral edema  	Abd: +BS, soft, nontender/nondistended  	: No suprapubic tenderness  	UE: Warm,  no edema  	LE: Warm,  no edema  	Neuro: No focal deficit  	Psych: Normal affect and mood  	Skin: Warm,:    LABS/STUDIES  --------------------------------------------------------------------------------              8.7    8.34  >-----------<  88       [04-01-25 @ 02:10]              26.7     137  |  102  |  47.6  ----------------------------<  105      [04-01-25 @ 02:10]  4.2   |  18.0  |  1.97        Ca     8.7     [04-01-25 @ 02:10]      Mg     2.6     [04-01-25 @ 02:10]      Creatinine Trend:  SCr 1.97 [04-01 @ 02:10]  SCr 2.35 [03-31 @ 14:10]  SCr 2.42 [03-31 @ 03:20]  SCr 1.82 [03-30 @ 01:50]  SCr 1.73 [03-29 @ 11:25]    Urinalysis - [04-01-25 @ 02:10]      Color  / Appearance  / SG  / pH       Gluc 105 / Ketone   / Bili  / Urobili        Blood  / Protein  / Leuk Est  / Nitrite       RBC  / WBC  / Hyaline  / Gran  / Sq Epi  / Non Sq Epi  / Bacteria       TSH 0.64      [03-26-25 @ 08:55]

## 2025-04-01 NOTE — DISCHARGE NOTE NURSING/CASE MANAGEMENT/SOCIAL WORK - FINANCIAL ASSISTANCE
[facility] provides services at a reduced cost to those who are determined to be eligible through [facility]’s financial assistance program. Information regarding [facility]’s financial assistance program can be found by going to [email or web address] or by calling [phone].

## 2025-04-02 LAB
ANION GAP SERPL CALC-SCNC: 12 MMOL/L — SIGNIFICANT CHANGE UP (ref 5–17)
BUN SERPL-MCNC: 44.3 MG/DL — HIGH (ref 8–20)
CALCIUM SERPL-MCNC: 9.2 MG/DL — SIGNIFICANT CHANGE UP (ref 8.4–10.5)
CHLORIDE SERPL-SCNC: 100 MMOL/L — SIGNIFICANT CHANGE UP (ref 96–108)
CO2 SERPL-SCNC: 24 MMOL/L — SIGNIFICANT CHANGE UP (ref 22–29)
CREAT SERPL-MCNC: 1.66 MG/DL — HIGH (ref 0.5–1.3)
EGFR: 31 ML/MIN/1.73M2 — LOW
EGFR: 31 ML/MIN/1.73M2 — LOW
GLUCOSE BLDC GLUCOMTR-MCNC: 115 MG/DL — HIGH (ref 70–99)
GLUCOSE BLDC GLUCOMTR-MCNC: 122 MG/DL — HIGH (ref 70–99)
GLUCOSE BLDC GLUCOMTR-MCNC: 131 MG/DL — HIGH (ref 70–99)
GLUCOSE BLDC GLUCOMTR-MCNC: 150 MG/DL — HIGH (ref 70–99)
GLUCOSE SERPL-MCNC: 111 MG/DL — HIGH (ref 70–99)
HCT VFR BLD CALC: 27.6 % — LOW (ref 34.5–45)
HGB BLD-MCNC: 9.1 G/DL — LOW (ref 11.5–15.5)
MAGNESIUM SERPL-MCNC: 2.5 MG/DL — SIGNIFICANT CHANGE UP (ref 1.8–2.6)
MCHC RBC-ENTMCNC: 30.5 PG — SIGNIFICANT CHANGE UP (ref 27–34)
MCHC RBC-ENTMCNC: 33 G/DL — SIGNIFICANT CHANGE UP (ref 32–36)
MCV RBC AUTO: 92.6 FL — SIGNIFICANT CHANGE UP (ref 80–100)
NRBC # BLD AUTO: 0 K/UL — SIGNIFICANT CHANGE UP (ref 0–0)
NRBC # FLD: 0 K/UL — SIGNIFICANT CHANGE UP (ref 0–0)
NRBC BLD AUTO-RTO: 0 /100 WBCS — SIGNIFICANT CHANGE UP (ref 0–0)
PLATELET # BLD AUTO: 114 K/UL — LOW (ref 150–400)
PMV BLD: 10.1 FL — SIGNIFICANT CHANGE UP (ref 7–13)
POTASSIUM SERPL-MCNC: 4 MMOL/L — SIGNIFICANT CHANGE UP (ref 3.5–5.3)
POTASSIUM SERPL-SCNC: 4 MMOL/L — SIGNIFICANT CHANGE UP (ref 3.5–5.3)
RBC # BLD: 2.98 M/UL — LOW (ref 3.8–5.2)
RBC # FLD: 14.3 % — SIGNIFICANT CHANGE UP (ref 10.3–14.5)
SODIUM SERPL-SCNC: 136 MMOL/L — SIGNIFICANT CHANGE UP (ref 135–145)
WBC # BLD: 7.94 K/UL — SIGNIFICANT CHANGE UP (ref 3.8–10.5)
WBC # FLD AUTO: 7.94 K/UL — SIGNIFICANT CHANGE UP (ref 3.8–10.5)

## 2025-04-02 PROCEDURE — 71045 X-RAY EXAM CHEST 1 VIEW: CPT | Mod: 26

## 2025-04-02 PROCEDURE — 99291 CRITICAL CARE FIRST HOUR: CPT

## 2025-04-02 PROCEDURE — 99233 SBSQ HOSP IP/OBS HIGH 50: CPT

## 2025-04-02 RX ORDER — MELATONIN 5 MG
1 TABLET ORAL
Qty: 0 | Refills: 0 | DISCHARGE
Start: 2025-04-02

## 2025-04-02 RX ORDER — ISOSORBIDE MONONITRATE 60 MG/1
1 TABLET, EXTENDED RELEASE ORAL
Qty: 0 | Refills: 0 | DISCHARGE
Start: 2025-04-02

## 2025-04-02 RX ORDER — LEVOTHYROXINE SODIUM 300 MCG
1 TABLET ORAL
Qty: 0 | Refills: 0 | DISCHARGE
Start: 2025-04-02

## 2025-04-02 RX ORDER — CARVEDILOL 3.12 MG/1
1 TABLET, FILM COATED ORAL
Qty: 0 | Refills: 0 | DISCHARGE
Start: 2025-04-02

## 2025-04-02 RX ORDER — EPOETIN ALFA 10000 [IU]/ML
20000 SOLUTION INTRAVENOUS; SUBCUTANEOUS ONCE
Refills: 0 | Status: DISCONTINUED | OUTPATIENT
Start: 2025-04-02 | End: 2025-04-03

## 2025-04-02 RX ORDER — ASPIRIN 325 MG
1 TABLET ORAL
Qty: 0 | Refills: 0 | DISCHARGE
Start: 2025-04-02

## 2025-04-02 RX ORDER — MELATONIN 5 MG
1 TABLET ORAL
Refills: 0 | DISCHARGE

## 2025-04-02 RX ORDER — TRIAMTERENE/HYDROCHLOROTHIAZID 50 MG-25MG
1 CAPSULE ORAL
Qty: 0 | Refills: 0 | DISCHARGE
Start: 2025-04-02

## 2025-04-02 RX ORDER — CLOPIDOGREL BISULFATE 75 MG/1
1 TABLET, FILM COATED ORAL
Qty: 0 | Refills: 0 | DISCHARGE
Start: 2025-04-02

## 2025-04-02 RX ORDER — ATORVASTATIN CALCIUM 80 MG/1
1 TABLET, FILM COATED ORAL
Qty: 0 | Refills: 0 | DISCHARGE
Start: 2025-04-02

## 2025-04-02 RX ORDER — EZETIMIBE 10 MG/1
1 TABLET ORAL
Qty: 0 | Refills: 0 | DISCHARGE
Start: 2025-04-02

## 2025-04-02 RX ORDER — EPOETIN ALFA 10000 [IU]/ML
20000 SOLUTION INTRAVENOUS; SUBCUTANEOUS ONCE
Refills: 0 | Status: DISCONTINUED | OUTPATIENT
Start: 2025-04-02 | End: 2025-04-02

## 2025-04-02 RX ADMIN — ISOSORBIDE MONONITRATE 30 MILLIGRAM(S): 60 TABLET, EXTENDED RELEASE ORAL at 12:10

## 2025-04-02 RX ADMIN — Medication 975 MILLIGRAM(S): at 12:09

## 2025-04-02 RX ADMIN — POLYETHYLENE GLYCOL 3350 17 GRAM(S): 17 POWDER, FOR SOLUTION ORAL at 12:11

## 2025-04-02 RX ADMIN — Medication 975 MILLIGRAM(S): at 17:55

## 2025-04-02 RX ADMIN — Medication 975 MILLIGRAM(S): at 18:55

## 2025-04-02 RX ADMIN — Medication 100 MILLIGRAM(S): at 12:09

## 2025-04-02 RX ADMIN — Medication 3 MILLILITER(S): at 05:47

## 2025-04-02 RX ADMIN — ATORVASTATIN CALCIUM 80 MILLIGRAM(S): 80 TABLET, FILM COATED ORAL at 21:32

## 2025-04-02 RX ADMIN — Medication 1 TABLET(S): at 05:35

## 2025-04-02 RX ADMIN — Medication 3 MILLILITER(S): at 21:48

## 2025-04-02 RX ADMIN — Medication 88 MICROGRAM(S): at 05:35

## 2025-04-02 RX ADMIN — Medication 975 MILLIGRAM(S): at 06:35

## 2025-04-02 RX ADMIN — Medication 975 MILLIGRAM(S): at 05:35

## 2025-04-02 RX ADMIN — CLOPIDOGREL BISULFATE 75 MILLIGRAM(S): 75 TABLET, FILM COATED ORAL at 12:09

## 2025-04-02 RX ADMIN — Medication 1 APPLICATION(S): at 05:35

## 2025-04-02 RX ADMIN — LIDOCAINE HYDROCHLORIDE 1 PATCH: 20 JELLY TOPICAL at 05:47

## 2025-04-02 RX ADMIN — Medication 5 MILLIGRAM(S): at 21:32

## 2025-04-02 RX ADMIN — IRON SUCROSE 110 MILLIGRAM(S): 20 INJECTION, SOLUTION INTRAVENOUS at 05:34

## 2025-04-02 RX ADMIN — CARVEDILOL 3.12 MILLIGRAM(S): 3.12 TABLET, FILM COATED ORAL at 18:02

## 2025-04-02 RX ADMIN — Medication 40 MILLIGRAM(S): at 05:35

## 2025-04-02 RX ADMIN — Medication 975 MILLIGRAM(S): at 13:09

## 2025-04-02 RX ADMIN — Medication 2 TABLET(S): at 21:32

## 2025-04-02 RX ADMIN — EZETIMIBE 10 MILLIGRAM(S): 10 TABLET ORAL at 21:32

## 2025-04-02 RX ADMIN — Medication 81 MILLIGRAM(S): at 12:09

## 2025-04-02 RX ADMIN — Medication 3 MILLILITER(S): at 13:32

## 2025-04-02 RX ADMIN — CARVEDILOL 3.12 MILLIGRAM(S): 3.12 TABLET, FILM COATED ORAL at 05:35

## 2025-04-02 NOTE — PROGRESS NOTE ADULT - SUBJECTIVE AND OBJECTIVE BOX
New Born rule out epispadius Subjective:   79yFemale  Now s/p GA TF TAVR via [facility] with basicila by Dr. Hansen.  Post op Left groin rebleeding when femstop removed after 4 hours. Patient underwent CTA, which revealed L femoral pseudoaneurysm; patient returned to OR with vascular surgery for repair and hematoma evacuation. Right groin access site is soft and non-tender. Seen at bedside resting comfortably. Denies chest pain, palpitations, SOB, KEENE, cough, N/V/D/C.      HPI: 80 y/o F with PMH of includes Chronic diastolic heart failure NYHA Class II, mild MR/ mild TR, HTN, diabetes, hypothyroidism,  CKD, NSTEMI ** multiple most recent 24, CAD with stenting, CABG x 2, bilateral carotid stneosis (50-69%), hiatal hernia, GERD, and aortic stenosis BASELINE EKG sinus bradyno blocks. Presented to [facility] on 3/28 for known AS. Now s/p GA TF TAVR via [facility] with basicila by Dr. Hansen.  Post op Left groin rebleeding when femstop removed after 4 hours, resolved with manual pressure. Bilateral groin access sites soft and non-tender.   PAST MEDICAL & SURGICAL HISTORY:  Hypertension      Diabetes mellitus      Hypothyroidism      HLD (hyperlipidemia)      CAD (coronary artery disease)      CKD (chronic kidney disease)      S/P CABG (coronary artery bypass graft)      H/O  section      History of cardiac cath      H/O colonoscopy          Medications:  acetaminophen     Tablet .. 975 milliGRAM(s) Oral every 6 hours PRN  allopurinol 100 milliGRAM(s) Oral daily  aspirin  chewable 81 milliGRAM(s) Oral daily  atorvastatin 80 milliGRAM(s) Oral at bedtime  cefuroxime  IVPB 1500 milliGRAM(s) IV Intermittent every 8 hours  chlorhexidine 0.12% Liquid 15 milliLiter(s) Oral Mucosa every 12 hours  chlorhexidine 4% Liquid 1 Application(s) Topical <User Schedule>  clopidogrel Tablet 75 milliGRAM(s) Oral daily  ezetimibe 10 milliGRAM(s) Oral at bedtime  insulin lispro (ADMELOG) corrective regimen sliding scale   SubCutaneous Before meals and at bedtime  isosorbide   mononitrate ER Tablet (IMDUR) 30 milliGRAM(s) Oral daily  levothyroxine 88 MICROGram(s) Oral daily  melatonin 5 milliGRAM(s) Oral at bedtime  niCARdipine Infusion 5 mG/Hr IV Continuous <Continuous>  pantoprazole    Tablet 40 milliGRAM(s) Oral before breakfast  sodium chloride 0.9% lock flush 10 milliLiter(s) IV Push every 1 hour PRN  sodium chloride 0.9% lock flush 3 milliLiter(s) IV Push every 8 hours  sodium chloride 0.9%. 1000 milliLiter(s) IV Continuous <Continuous>  sodium chloride 0.9%. 1000 milliLiter(s) IV Continuous <Continuous>  triamterene 37.5 mG/hydrochlorothiazide 25 mG Tablet 1 Tablet(s) Oral daily      MEDICATIONS  (PRN):  acetaminophen     Tablet .. 975 milliGRAM(s) Oral every 6 hours PRN Temp greater or equal to 38C (100.4F), Moderate Pain (4 - 6)  sodium chloride 0.9% lock flush 10 milliLiter(s) IV Push every 1 hour PRN Pre/post blood products, medications, blood draw, and to maintain line patency      Daily Review:  Height (cm): 147.3 ( @ 06:17)  Weight (kg): 58 ( @ 06:17)  BMI (kg/m2): 26.7 ( @ 06:17)  BSA (m2): 1.51 ( @ 06:17)    ABG - ( 28 Mar 2025 13:26 )  pH, Arterial: 7.340 pH, Blood: x     /  pCO2: 40    /  pO2: 143   / HCO3: 22    / Base Excess: -4.2  /  SaO2: 100.0                                   8.4    11.32 )-----------( 184      ( 28 Mar 2025 17:36 )             27.1       135  |  102  |  31.0[H]  ----------------------------<  148[H]  4.9   |  20.0[L]  |  1.60[H]    Ca    9.4      28 Mar 2025 21:50  Phos  3.0       Mg     1.9         TPro  5.6[L]  /  Alb  3.7  /  TBili  0.2[L]  /  DBili  x   /  AST  31  /  ALT  17  /  AlkPhos  86        PT/INR - ( 28 Mar 2025 17:36 )   PT: 11.4 sec;   INR: 0.98 ratio         PTT - ( 28 Mar 2025 17:36 )  PTT:35.9 sec    T(C): 37 (25 @ 23:00), Max: 37.9 (25 @ 16:00)  HR: 63 (25 @ 23:00) (60 - 83)  BP: 171/53 (25 @ 06:17) (171/53 - 171/53)  RR: 13 (25 @ 18:30) (11 - 27)  SpO2: 100% (25 @ 23:00) (97% - 100%)  Wt(kg): --    CAPILLARY BLOOD GLUCOSE      POCT Blood Glucose.: 166 mg/dL (28 Mar 2025 21:52)  POCT Blood Glucose.: 132 mg/dL (28 Mar 2025 06:34)      I&O's Summary    28 Mar 2025 07:01  -  29 Mar 2025 01:25  --------------------------------------------------------  IN: 37.5 mL / OUT: 1025 mL / NET: -987.5 mL          PHYSICAL EXAM:  GENERAL: No acute distress, well-developed  NECK: no JVD  CHEST/LUNG: CTAB; No wheezes, rales, or rhonchi  HEART: RRR; No murmurs, rubs, or gallops  ABDOMEN: Soft, non-tender, non-distended; normal bowel sounds  EXTREMITIES:  2+ peripheral pulses b/l, No clubbing, cyanosis, or edema  NEUROLOGY: AAO x 4

## 2025-04-02 NOTE — PROGRESS NOTE ADULT - ASSESSMENT
78 y/o F with PMH of includes Chronic diastolic heart failure NYHA Class II, mild MR/ mild TR, HTN, diabetes, hypothyroidism,  CKD, NSTEMI ** multiple most recent 1/20/24, CAD with stenting, CABG x 2, bilateral carotid stneosis (50-69%), hiatal hernia, GERD, and aortic stenosis BASELINE EKG sinus bradyno blocks. Presented to [facility] on 3/28 for known AS. Now s/p GA TF TAVR via [facility] with basicila by Dr. Hansen. Post op Left groin rebleeding when femstop removed after 4 hours; underwent CTA, which revealed L femoral pseudoaneurysm. Patient returned to OR with vascular surgery for repair and hematoma evacuation. Rightgroin access site is soft and non-tender. Seen at bedside resting comfortably. Patient denies acute pain with radiating or aggravating factors. She denies chest pain, shortness of breath, palpitations, headache, dizziness, nausea, or vomiting.

## 2025-04-02 NOTE — PROGRESS NOTE ADULT - SUBJECTIVE AND OBJECTIVE BOX
DIANA ROGERS  MRN-[id number]    HPI:  Patient is a 79 year old  female presenting today for PST, PMH includes HTN, diabetes, hypothyroidism,  CKD, NSTEMI, CAD with stenting, CABG x 2 and aortic stenosis. Patient c/o "tightness" in chest with exertion and intermittent shortness of breath with activity. Reports on occasion she has palpitations, that resolve quickly on their own and fatigue. She is s/p cardiac cath 2024. Denies chest pain, dizziness,  lightheadedness, syncope, orthopnea, cough, or lower extremity edema. She is now scheduled for transcatheter aortic valve replacement  transfemoral Medtronic with basilica on 3/28/25 with Dr. Hansen.   (26 Mar 2025 08:40)      ICU Vital Signs Last 24 Hrs  T(C): 36.8 (2025 08:00), Max: 36.8 (2025 16:01)  T(F): 98.3 (2025 08:00), Max: 98.3 (2025 16:01)  HR: 65 (2025 09:00) (64 - 82)  BP: 139/51 (2025 09:00) (123/47 - 210/61)  BP(mean): 75 (2025 09:00) (68 - 104)  ABP: --  ABP(mean): --  RR: 13 (2025 09:00) (10 - 25)  SpO2: 96% (2025 09:00) (93% - 99%)    O2 Parameters below as of 2025 08:00  Patient On (Oxygen Delivery Method): room air            Physical Exam:  Gen: resting in bed comfortably in NAD  Chest: no increased WOB, regular inspiratory effort   Vascular: L groin with expected levels of ecchymosis, prevena in place holding good suction.  Soft and appropriately tender.  Palpable distal pulses  NEURO: awake, alert----    ============================I/O===========================   I&O's Detail    2025 07:01  -  2025 07:00  --------------------------------------------------------  IN:    IV PiggyBack: 100 mL    Oral Fluid: 1160 mL  Total IN: 1260 mL    OUT:    Bulb (mL): 10 mL    Indwelling Catheter - Urethral (mL): 750 mL    Voided (mL): 2100 mL  Total OUT: 2860 mL    Total NET: -1600 mL        ============================ LABS =========================                        9.1    7.94  )-----------( 114      ( 2025 04:00 )             27.6     04-02    136  |  100  |  44.3[H]  ----------------------------<  111[H]  4.0   |  24.0  |  1.66[H]    Ca    9.2      2025 04:00  Mg     2.5     04-02            Urinalysis Basic - ( 2025 04:00 )    Color: x / Appearance: x / SG: x / pH: x  Gluc: 111 mg/dL / Ketone: x  / Bili: x / Urobili: x   Blood: x / Protein: x / Nitrite: x   Leuk Esterase: x / RBC: x / WBC x   Sq Epi: x / Non Sq Epi: x / Bacteria: x      ======================Micro/Rad/Cardio=================  Culture: Reviewed   CXR: Reviewed  Echo:Reviewed  ======================================================  PAST MEDICAL & SURGICAL HISTORY:  Hypertension      Diabetes mellitus      Hypothyroidism      HLD (hyperlipidemia)      CAD (coronary artery disease)      CKD (chronic kidney disease)      S/P CABG (coronary artery bypass graft)      H/O  section      History of cardiac cath      H/O colonoscopy        ====================ASSESSMENT ==============  78 yo F s/p TAVR 3/28 and Left femoral cutdown, evacuation of hematoma, ligation of PSA sac, repair of arteriotomy on 3/29. Pt recovering appropriately post-op.-    Hypertension  Diabetes mellitus  Hypothyroidism  HLD (hyperlipidemia)  CAD (coronary artery disease)  CKD (chronic kidney disease)  S/P CABG (coronary artery bypass graft)  S/p Commercial 23 mm Medtronic CoreValve Evolut FX+ Percutaneous Transfemoral TAVR via RIGHT Common Femoral Artery.-  S/p Left femoral cutdown, evacuation of hematoma, ligation of PSA sac, repair of arteriotomy after proximal control, washout, groin closure in layers, prevena dressing applied.-  Post op Hypovolemia  Post op respiratory insufficiency   Post op JOSE LUIS    Plan:  Beta blocker as tolerated by HR and SBP  Coreg  Lipitor   Aspirin  Chest PT and IS use with bedside nurse  Analgesic regimen to optimize function with Tylenol and Narcotics   Continue GI ppx with Protonix and Senna  DVT ppx with Lovenox and SCD boots  Strict glucose control and management---    ====================== NEUROLOGY=====================  acetaminophen     Tablet .. 975 milliGRAM(s) Oral every 6 hours PRN Temp greater or equal to 38C (100.4F), Moderate Pain (4 - 6)  acetaminophen     Tablet .. 975 milliGRAM(s) Oral every 6 hours  melatonin 5 milliGRAM(s) Oral at bedtime  oxyCODONE    IR 2.5 milliGRAM(s) Oral every 6 hours PRN Moderate Pain (4 - 6)  oxyCODONE    IR 5 milliGRAM(s) Oral every 6 hours PRN Severe Pain (7 - 10)    ==================== RESPIRATORY======================  Post op respiratory insufficiency  ====================CARDIOVASCULAR==================  Post op Hypovolemia  carvedilol 3.125 milliGRAM(s) Oral every 12 hours  isosorbide   mononitrate ER Tablet (IMDUR) 30 milliGRAM(s) Oral daily  triamterene 37.5 mG/hydrochlorothiazide 25 mG Tablet 1 Tablet(s) Oral daily    ===================HEMATOLOGIC/ONC ===================  Monitor H&H/Plts    aspirin  chewable 81 milliGRAM(s) Oral daily  clopidogrel Tablet 75 milliGRAM(s) Oral daily    ===================== RENAL =========================  Continue monitoring urine output, I&OS, BUN/Cr     ==================== GASTROINTESTINAL===================  iron sucrose IVPB 200 milliGRAM(s) IV Intermittent every 24 hours  pantoprazole    Tablet 40 milliGRAM(s) Oral before breakfast  polyethylene glycol 3350 17 Gram(s) Oral daily  senna 2 Tablet(s) Oral at bedtime  sodium chloride 0.9% lock flush 3 milliLiter(s) IV Push every 8 hours    =======================    ENDOCRINE  =====================  allopurinol 100 milliGRAM(s) Oral daily  atorvastatin 80 milliGRAM(s) Oral at bedtime  ezetimibe 10 milliGRAM(s) Oral at bedtime  insulin lispro (ADMELOG) corrective regimen sliding scale   SubCutaneous Before meals and at bedtime  levothyroxine 88 MICROGram(s) Oral daily    ========================INFECTIOUS DISEASE================      -Monitor Neurologic status ,   -Head of the bed should remain elevated to 45 degrees,  -Monitor for arrhythmias and monitor parameters for organ perfusion,  -Glycemic control is satisfactory,  -Nutritional goals will be met using po eventually , insure adequate caloric intake and monitor the same ,  -Electrolytes have been repleted as necessary , pain control has been achieved  and wound care has been carried out ,  -Stress ulcer and VTE prophylaxis will be achieved,  -Agressive PT and early mobility and ambulation goals will be met,  -The family was updated about the course and plan .      I have spent 35 minutes providing acute care for this critically ill patient     Patient requires continuous monitoring with bedside rhythm monitoring, pulse ox monitoring, and intermittent blood gas analysis. Care plan discussed with ICU care team. Patient remained critical and at risk for life threatening decompensation.

## 2025-04-02 NOTE — PROGRESS NOTE ADULT - SUBJECTIVE AND OBJECTIVE BOX
[facility] DIVISION OF KIDNEY DISEASES AND HYPERTENSION -- FOLLOW UP NOTE  --------------------------------------------------------------------------------  Chief Complaint: loren on ckd    24 hour events/subjective:  Patient seen in CT ICU, feels well      PAST HISTORY  --------------------------------------------------------------------------------  No significant changes to PMH, PSH, FHx, SHx, unless otherwise noted    ALLERGIES & MEDICATIONS  --------------------------------------------------------------------------------  Allergies    No Known Allergies    Intolerances      Standing Inpatient Medications  acetaminophen     Tablet .. 975 milliGRAM(s) Oral every 6 hours  allopurinol 100 milliGRAM(s) Oral daily  aspirin  chewable 81 milliGRAM(s) Oral daily  atorvastatin 80 milliGRAM(s) Oral at bedtime  carvedilol 3.125 milliGRAM(s) Oral every 12 hours  chlorhexidine 2% Cloths 1 Application(s) Topical <User Schedule>  clopidogrel Tablet 75 milliGRAM(s) Oral daily  ezetimibe 10 milliGRAM(s) Oral at bedtime  insulin lispro (ADMELOG) corrective regimen sliding scale   SubCutaneous Before meals and at bedtime  iron sucrose IVPB 200 milliGRAM(s) IV Intermittent every 24 hours  isosorbide   mononitrate ER Tablet (IMDUR) 30 milliGRAM(s) Oral daily  levothyroxine 88 MICROGram(s) Oral daily  lidocaine   4% Patch 1 Patch Transdermal every 24 hours  melatonin 5 milliGRAM(s) Oral at bedtime  pantoprazole    Tablet 40 milliGRAM(s) Oral before breakfast  polyethylene glycol 3350 17 Gram(s) Oral daily  senna 2 Tablet(s) Oral at bedtime  sodium chloride 0.9% lock flush 3 milliLiter(s) IV Push every 8 hours  triamterene 37.5 mG/hydrochlorothiazide 25 mG Tablet 1 Tablet(s) Oral daily    PRN Inpatient Medications  acetaminophen     Tablet .. 975 milliGRAM(s) Oral every 6 hours PRN  benzocaine/menthol Lozenge 1 Lozenge Oral four times a day PRN  oxyCODONE    IR 2.5 milliGRAM(s) Oral every 6 hours PRN  oxyCODONE    IR 5 milliGRAM(s) Oral every 6 hours PRN      REVIEW OF SYSTEMS  --------------------------------------------------------------------------------  Gen: No weight changes, fatigue, fevers/chills, weakness  Skin: No rashes  Head/Eyes/Ears/Mouth: No headache; Normal hearing; Normal vision w/o blurriness; No sinus pain/discomfort, sore throat  Respiratory: No dyspnea, cough, wheezing, hemoptysis  CV: No chest pain, PND, orthopnea  GI: No abdominal pain, diarrhea, constipation, nausea, vomiting, melena, hematochezia  : No increased frequency, dysuria, hematuria, nocturia  MSK: No joint pain/swelling; no back pain; no edema  Neuro: No dizziness/lightheadedness, weakness, seizures, numbness, tingling  Heme: No easy bruising or bleeding  Endo: No heat/cold intolerance  Psych: No significant nervousness, anxiety, stress, depression    All other systems were reviewed and are negative, except as noted.    VITALS/PHYSICAL EXAM  --------------------------------------------------------------------------------  T(C): 36.6 (04-02-25 @ 12:00), Max: 36.8 (04-01-25 @ 16:01)  HR: 67 (04-02-25 @ 14:00) (64 - 82)  BP: 156/56 (04-02-25 @ 14:00) (123/47 - 210/61)  RR: 16 (04-02-25 @ 14:00) (10 - 25)  SpO2: 94% (04-02-25 @ 14:00) (93% - 99%)  Wt(kg): --        04-01-25 @ 07:01  -  04-02-25 @ 07:00  --------------------------------------------------------  IN: 1260 mL / OUT: 2860 mL / NET: -1600 mL      Physical Exam:  	Gen: NAD, well-appearing  	HEENT supple neck, clear oropharynx  	Pulm: CTA B/L  	CV: RRR, S1S2; no rub  	Back: No spinal or CVA tenderness; no sacral edema  	Abd: +BS, soft, nontender/nondistended  	: No suprapubic tenderness  	UE: Warm,no edema  	LE: Warm,  no edema  	Neuro: No focal deficit  	Psych: Normal affect and mood  	Skin: Warm,  LABS/STUDIES  --------------------------------------------------------------------------------              9.1    7.94  >-----------<  114      [04-02-25 @ 04:00]              27.6     136  |  100  |  44.3  ----------------------------<  111      [04-02-25 @ 04:00]  4.0   |  24.0  |  1.66        Ca     9.2     [04-02-25 @ 04:00]      Mg     2.5     [04-02-25 @ 04:00]        Creatinine Trend:  SCr 1.66 [04-02 @ 04:00]  SCr 1.97 [04-01 @ 02:10]  SCr 2.35 [03-31 @ 14:10]  SCr 2.42 [03-31 @ 03:20]  SCr 1.82 [03-30 @ 01:50]    Urinalysis - [04-02-25 @ 04:00]      Color  / Appearance  / SG  / pH       Gluc 111 / Ketone   / Bili  / Urobili        Blood  / Protein  / Leuk Est  / Nitrite       RBC  / WBC  / Hyaline  / Gran  / Sq Epi  / Non Sq Epi  / Bacteria       TSH 0.64      [03-26-25 @ 08:55]

## 2025-04-02 NOTE — PROGRESS NOTE ADULT - ASSESSMENT
A/P    Assessment:   80 yo F s/p TAVR 3/28 and Left femoral cutdown, evacuation of hematoma, ligation of PSA sac, repair of arteriotomy on 3/29. Pt recovering appropriately post-op.     Plan  -Prevena down 4/3/25  - monitor ANTHONY output and   - Frequent stripping of ANTHONY drain to prevent clogging  - Staples will be removed two weeks post op  - DVT ppx: SCDs, ASA/plavix   - ok for OOB  -Local wound care to groin  - Pain control prn   - rest of care per primary team

## 2025-04-02 NOTE — PROGRESS NOTE ADULT - SUBJECTIVE AND OBJECTIVE BOX
HPI/Overnight Events:   Patient seen and examined at bedside this AM. No overnight events. No complaints.       Vital Signs Last 24 Hrs  T(C): 36.8 (02 Apr 2025 05:00), Max: 37.2 (01 Apr 2025 09:00)  T(F): 98.2 (02 Apr 2025 05:00), Max: 99 (01 Apr 2025 09:00)  HR: 70 (02 Apr 2025 06:00) (64 - 86)  BP: 172/60 (02 Apr 2025 06:00) (123/47 - 210/61)  BP(mean): 93 (02 Apr 2025 06:00) (68 - 104)  RR: 17 (02 Apr 2025 06:00) (11 - 25)  SpO2: 96% (02 Apr 2025 06:00) (93% - 99%)    Parameters below as of 02 Apr 2025 05:00  Patient On (Oxygen Delivery Method): room air        I&O's Detail    31 Mar 2025 07:01  -  01 Apr 2025 07:00  --------------------------------------------------------  IN:    IV PiggyBack: 100 mL    Oral Fluid: 940 mL  Total IN: 1040 mL    OUT:    Bulb (mL): 52 mL    Indwelling Catheter - Urethral (mL): 1675 mL  Total OUT: 1727 mL    Total NET: -687 mL      01 Apr 2025 07:01  -  02 Apr 2025 06:34  --------------------------------------------------------  IN:    IV PiggyBack: 100 mL    Oral Fluid: 1160 mL  Total IN: 1260 mL    OUT:    Bulb (mL): 10 mL    Indwelling Catheter - Urethral (mL): 750 mL    Voided (mL): 2100 mL  Total OUT: 2860 mL    Total NET: -1600 mL          LABS:                        9.1    7.94  )-----------( 114      ( 02 Apr 2025 04:00 )             27.6     04-02    136  |  100  |  44.3[H]  ----------------------------<  111[H]  4.0   |  24.0  |  1.66[H]    Ca    9.2      02 Apr 2025 04:00  Mg     2.5     04-02        Urinalysis Basic - ( 02 Apr 2025 04:00 )    Color: x / Appearance: x / SG: x / pH: x  Gluc: 111 mg/dL / Ketone: x  / Bili: x / Urobili: x   Blood: x / Protein: x / Nitrite: x   Leuk Esterase: x / RBC: x / WBC x   Sq Epi: x / Non Sq Epi: x / Bacteria: x          Physical Exam  Constitutional: patient resting comfortably in bed, in no acute distress   Vascular: L groin with decreased levels of ecchymosis, prevena in place holding good suction.  Soft and appropriately tender.  Palpable distal pulses  ANTHONY drain in place with serosang output without clots     MEDICATIONS  (STANDING):  acetaminophen     Tablet .. 975 milliGRAM(s) Oral every 6 hours  allopurinol 100 milliGRAM(s) Oral daily  aspirin  chewable 81 milliGRAM(s) Oral daily  atorvastatin 80 milliGRAM(s) Oral at bedtime  carvedilol 3.125 milliGRAM(s) Oral every 12 hours  chlorhexidine 2% Cloths 1 Application(s) Topical <User Schedule>  clopidogrel Tablet 75 milliGRAM(s) Oral daily  ezetimibe 10 milliGRAM(s) Oral at bedtime  insulin lispro (ADMELOG) corrective regimen sliding scale   SubCutaneous Before meals and at bedtime  iron sucrose IVPB 200 milliGRAM(s) IV Intermittent every 24 hours  isosorbide   mononitrate ER Tablet (IMDUR) 30 milliGRAM(s) Oral daily  levothyroxine 88 MICROGram(s) Oral daily  lidocaine   4% Patch 1 Patch Transdermal every 24 hours  melatonin 5 milliGRAM(s) Oral at bedtime  pantoprazole    Tablet 40 milliGRAM(s) Oral before breakfast  polyethylene glycol 3350 17 Gram(s) Oral daily  senna 2 Tablet(s) Oral at bedtime  sodium chloride 0.9% lock flush 3 milliLiter(s) IV Push every 8 hours  triamterene 37.5 mG/hydrochlorothiazide 25 mG Tablet 1 Tablet(s) Oral daily    MEDICATIONS  (PRN):  acetaminophen     Tablet .. 975 milliGRAM(s) Oral every 6 hours PRN Temp greater or equal to 38C (100.4F), Moderate Pain (4 - 6)  benzocaine/menthol Lozenge 1 Lozenge Oral four times a day PRN Sore Throat  oxyCODONE    IR 2.5 milliGRAM(s) Oral every 6 hours PRN Moderate Pain (4 - 6)  oxyCODONE    IR 5 milliGRAM(s) Oral every 6 hours PRN Severe Pain (7 - 10)        STUDIES:   EKG, CXR, U/S, CT, MRI     MICRO:   Cultures

## 2025-04-02 NOTE — PROGRESS NOTE ADULT - ASSESSMENT
79 YOF PMH HTN, DM, CAD, AS, CKD 3, status post TAVR, ligation sac and repair of arteriotomy.  Nephrology consulted for acute kidney injury    Acute kidney injury on chronic kidney disease stage III:  ·	Baseline serum creatinine 1.4-1.6 mg/deciliter  ·	Pt now with Catracho with Scr ~ 2.4 mg/deciliter  ·	UA bland  ·	CATRACHO likely hemodynamically mediated, might have a component of tubular injury from contrast.  ·	Scr now improving to 1.6  ·	Bp stable- Continue diuretics  ·	nephrology follow up outpt to monitor Scr trend and establish care in CKD clinic    Anemia- in setting of blood los  on IV iron  will give TATI- procrit 10,000 IU SC x1    AS sp TAVR

## 2025-04-03 VITALS
HEART RATE: 72 BPM | RESPIRATION RATE: 19 BRPM | DIASTOLIC BLOOD PRESSURE: 55 MMHG | OXYGEN SATURATION: 97 % | SYSTOLIC BLOOD PRESSURE: 147 MMHG | TEMPERATURE: 98 F

## 2025-04-03 LAB
ANION GAP SERPL CALC-SCNC: 13 MMOL/L — SIGNIFICANT CHANGE UP (ref 5–17)
BUN SERPL-MCNC: 36.1 MG/DL — HIGH (ref 8–20)
CALCIUM SERPL-MCNC: 8.8 MG/DL — SIGNIFICANT CHANGE UP (ref 8.4–10.5)
CHLORIDE SERPL-SCNC: 102 MMOL/L — SIGNIFICANT CHANGE UP (ref 96–108)
CO2 SERPL-SCNC: 23 MMOL/L — SIGNIFICANT CHANGE UP (ref 22–29)
CREAT SERPL-MCNC: 1.55 MG/DL — HIGH (ref 0.5–1.3)
EGFR: 34 ML/MIN/1.73M2 — LOW
EGFR: 34 ML/MIN/1.73M2 — LOW
GLUCOSE BLDC GLUCOMTR-MCNC: 112 MG/DL — HIGH (ref 70–99)
GLUCOSE BLDC GLUCOMTR-MCNC: 212 MG/DL — HIGH (ref 70–99)
GLUCOSE SERPL-MCNC: 104 MG/DL — HIGH (ref 70–99)
HCT VFR BLD CALC: 26.9 % — LOW (ref 34.5–45)
HGB BLD-MCNC: 9 G/DL — LOW (ref 11.5–15.5)
MAGNESIUM SERPL-MCNC: 2.5 MG/DL — SIGNIFICANT CHANGE UP (ref 1.6–2.6)
MCHC RBC-ENTMCNC: 30.7 PG — SIGNIFICANT CHANGE UP (ref 27–34)
MCHC RBC-ENTMCNC: 33.5 G/DL — SIGNIFICANT CHANGE UP (ref 32–36)
MCV RBC AUTO: 91.8 FL — SIGNIFICANT CHANGE UP (ref 80–100)
NRBC # BLD AUTO: 0 K/UL — SIGNIFICANT CHANGE UP (ref 0–0)
NRBC # FLD: 0 K/UL — SIGNIFICANT CHANGE UP (ref 0–0)
NRBC BLD AUTO-RTO: 0 /100 WBCS — SIGNIFICANT CHANGE UP (ref 0–0)
PLATELET # BLD AUTO: 132 K/UL — LOW (ref 150–400)
PMV BLD: 9.7 FL — SIGNIFICANT CHANGE UP (ref 7–13)
POTASSIUM SERPL-MCNC: 4.3 MMOL/L — SIGNIFICANT CHANGE UP (ref 3.5–5.3)
POTASSIUM SERPL-SCNC: 4.3 MMOL/L — SIGNIFICANT CHANGE UP (ref 3.5–5.3)
RBC # BLD: 2.93 M/UL — LOW (ref 3.8–5.2)
RBC # FLD: 14.1 % — SIGNIFICANT CHANGE UP (ref 10.3–14.5)
SODIUM SERPL-SCNC: 137 MMOL/L — SIGNIFICANT CHANGE UP (ref 135–145)
WBC # BLD: 6.99 K/UL — SIGNIFICANT CHANGE UP (ref 3.8–10.5)
WBC # FLD AUTO: 6.99 K/UL — SIGNIFICANT CHANGE UP (ref 3.8–10.5)

## 2025-04-03 PROCEDURE — P9016: CPT

## 2025-04-03 PROCEDURE — C1760: CPT

## 2025-04-03 PROCEDURE — 82330 ASSAY OF CALCIUM: CPT

## 2025-04-03 PROCEDURE — 76000 FLUOROSCOPY <1 HR PHYS/QHP: CPT

## 2025-04-03 PROCEDURE — 99232 SBSQ HOSP IP/OBS MODERATE 35: CPT

## 2025-04-03 PROCEDURE — 82947 ASSAY GLUCOSE BLOOD QUANT: CPT

## 2025-04-03 PROCEDURE — 82009 KETONE BODYS QUAL: CPT

## 2025-04-03 PROCEDURE — 93325 DOPPLER ECHO COLOR FLOW MAPG: CPT

## 2025-04-03 PROCEDURE — 83605 ASSAY OF LACTIC ACID: CPT

## 2025-04-03 PROCEDURE — 83735 ASSAY OF MAGNESIUM: CPT

## 2025-04-03 PROCEDURE — C9399: CPT

## 2025-04-03 PROCEDURE — 82962 GLUCOSE BLOOD TEST: CPT

## 2025-04-03 PROCEDURE — 93005 ELECTROCARDIOGRAM TRACING: CPT

## 2025-04-03 PROCEDURE — 85025 COMPLETE CBC W/AUTO DIFF WBC: CPT

## 2025-04-03 PROCEDURE — C1894: CPT

## 2025-04-03 PROCEDURE — C1889: CPT

## 2025-04-03 PROCEDURE — 93320 DOPPLER ECHO COMPLETE: CPT

## 2025-04-03 PROCEDURE — 93306 TTE W/DOPPLER COMPLETE: CPT

## 2025-04-03 PROCEDURE — C1773: CPT

## 2025-04-03 PROCEDURE — 80053 COMPREHEN METABOLIC PANEL: CPT

## 2025-04-03 PROCEDURE — 84132 ASSAY OF SERUM POTASSIUM: CPT

## 2025-04-03 PROCEDURE — 86923 COMPATIBILITY TEST ELECTRIC: CPT

## 2025-04-03 PROCEDURE — 85018 HEMOGLOBIN: CPT

## 2025-04-03 PROCEDURE — 85027 COMPLETE CBC AUTOMATED: CPT

## 2025-04-03 PROCEDURE — C1887: CPT

## 2025-04-03 PROCEDURE — C1769: CPT

## 2025-04-03 PROCEDURE — 85014 HEMATOCRIT: CPT

## 2025-04-03 PROCEDURE — 85730 THROMBOPLASTIN TIME PARTIAL: CPT

## 2025-04-03 PROCEDURE — 84295 ASSAY OF SERUM SODIUM: CPT

## 2025-04-03 PROCEDURE — 82435 ASSAY OF BLOOD CHLORIDE: CPT

## 2025-04-03 PROCEDURE — 74178 CT ABD&PLV WO CNTR FLWD CNTR: CPT | Mod: MC

## 2025-04-03 PROCEDURE — 86900 BLOOD TYPING SEROLOGIC ABO: CPT

## 2025-04-03 PROCEDURE — 36430 TRANSFUSION BLD/BLD COMPNT: CPT

## 2025-04-03 PROCEDURE — 84100 ASSAY OF PHOSPHORUS: CPT

## 2025-04-03 PROCEDURE — 86901 BLOOD TYPING SEROLOGIC RH(D): CPT

## 2025-04-03 PROCEDURE — 82803 BLOOD GASES ANY COMBINATION: CPT

## 2025-04-03 PROCEDURE — C1884: CPT

## 2025-04-03 PROCEDURE — 80048 BASIC METABOLIC PNL TOTAL CA: CPT

## 2025-04-03 PROCEDURE — 93355 ECHO TRANSESOPHAGEAL (TEE): CPT

## 2025-04-03 PROCEDURE — 86850 RBC ANTIBODY SCREEN: CPT

## 2025-04-03 PROCEDURE — 71045 X-RAY EXAM CHEST 1 VIEW: CPT

## 2025-04-03 PROCEDURE — 36415 COLL VENOUS BLD VENIPUNCTURE: CPT

## 2025-04-03 PROCEDURE — 74018 RADEX ABDOMEN 1 VIEW: CPT

## 2025-04-03 PROCEDURE — 85384 FIBRINOGEN ACTIVITY: CPT

## 2025-04-03 PROCEDURE — 85610 PROTHROMBIN TIME: CPT

## 2025-04-03 RX ADMIN — CARVEDILOL 3.12 MILLIGRAM(S): 3.12 TABLET, FILM COATED ORAL at 05:25

## 2025-04-03 RX ADMIN — Medication 81 MILLIGRAM(S): at 12:08

## 2025-04-03 RX ADMIN — Medication 975 MILLIGRAM(S): at 12:08

## 2025-04-03 RX ADMIN — Medication 975 MILLIGRAM(S): at 00:07

## 2025-04-03 RX ADMIN — Medication 1 TABLET(S): at 05:25

## 2025-04-03 RX ADMIN — Medication 88 MICROGRAM(S): at 05:25

## 2025-04-03 RX ADMIN — ISOSORBIDE MONONITRATE 30 MILLIGRAM(S): 60 TABLET, EXTENDED RELEASE ORAL at 12:08

## 2025-04-03 RX ADMIN — Medication 975 MILLIGRAM(S): at 05:25

## 2025-04-03 RX ADMIN — Medication 100 MILLIGRAM(S): at 12:07

## 2025-04-03 RX ADMIN — Medication 3 MILLILITER(S): at 05:23

## 2025-04-03 RX ADMIN — IRON SUCROSE 110 MILLIGRAM(S): 20 INJECTION, SOLUTION INTRAVENOUS at 05:29

## 2025-04-03 RX ADMIN — Medication 1 APPLICATION(S): at 05:25

## 2025-04-03 RX ADMIN — Medication 3 MILLILITER(S): at 15:58

## 2025-04-03 RX ADMIN — Medication 975 MILLIGRAM(S): at 05:29

## 2025-04-03 RX ADMIN — Medication 975 MILLIGRAM(S): at 00:08

## 2025-04-03 RX ADMIN — CLOPIDOGREL BISULFATE 75 MILLIGRAM(S): 75 TABLET, FILM COATED ORAL at 12:07

## 2025-04-03 RX ADMIN — Medication 40 MILLIGRAM(S): at 05:25

## 2025-04-03 RX ADMIN — INSULIN LISPRO 4: 100 INJECTION, SOLUTION INTRAVENOUS; SUBCUTANEOUS at 12:12

## 2025-04-03 RX ADMIN — POLYETHYLENE GLYCOL 3350 17 GRAM(S): 17 POWDER, FOR SOLUTION ORAL at 12:08

## 2025-04-03 NOTE — PROGRESS NOTE ADULT - ASSESSMENT
79F, PMH Chronic diastolic heart failure NYHA Class II, mild MR/ mild TR, HTN, diabetes, hypothyroidism,  CKD, NSTEMI ** multiple most recent 1/20/24, CAD with stenting, CABG x 2, bilateral carotid stenosis (50-69%), hiatal hernia, GERD, and aortic stenosis BASELINE EKG sinus bradyno blocks, s/p general anesthesia TF TAVR via RCFA with basicila RCC on 3/28 with Dr. Hansen. Post op Left groin rebleeding when femstop removed after 4 hours; underwent CTA, which revealed L femoral pseudoaneurysm, RTOR with vascular and underwent Left femoral cutdown, evacuation of hematoma, ligation of PSA sac, repair of arteriotomy after proximal control, washout, groin closure in layers  with placement of provena dressing 3/29, hospital course notable for acute blood loss anemia requiring PRBC.

## 2025-04-03 NOTE — PROGRESS NOTE ADULT - SUBJECTIVE AND OBJECTIVE BOX
Patient seen and examined at bedside. No acute events overnight, downgraded to floor, L groin pain improving    Vitals:  Vital Signs Last 24 Hrs  T(C): 36.5 (03 Apr 2025 05:21), Max: 36.8 (02 Apr 2025 08:00)  T(F): 97.7 (03 Apr 2025 05:21), Max: 98.3 (02 Apr 2025 08:00)  HR: 74 (03 Apr 2025 05:21) (64 - 77)  BP: 162/74 (03 Apr 2025 05:21) (136/44 - 177/50)  BP(mean): 84 (02 Apr 2025 17:00) (68 - 109)  RR: 19 (03 Apr 2025 05:21) (10 - 21)  SpO2: 98% (03 Apr 2025 05:21) (94% - 99%)    Parameters below as of 03 Apr 2025 00:15  Patient On (Oxygen Delivery Method): room air        Labs:  04-03    137  |  102  |  36.1[H]  ----------------------------<  104[H]  4.3   |  23.0  |  1.55[H]    Ca    8.8      03 Apr 2025 06:40  Mg     2.5     04-03                              9.0    6.99  )-----------( 132      ( 03 Apr 2025 06:40 )             26.9       Exam:  Gen: pt lying in bed, alert, in NAD  Resp: unlabored  CVS: RRR  Abd: soft, NT, ND, L groin ecchymosis stable, extending to suprapubic region and to lateral thigh/hip, staples in place, skin tear from prior femstop at apex of incision, otherwise incision c/d/i, ANTHONY drain serosanguineous more on sanguineous side  Ext: moving all extremities spontaneously, sensation intact, palpable pedal pulses

## 2025-04-03 NOTE — PROGRESS NOTE ADULT - PROVIDER SPECIALTY LIST ADULT
Critical Care
Nephrology
Vascular Surgery
Critical Care
Nephrology
Vascular Surgery
CT Surgery
Nephrology
Vascular Surgery
CT Surgery

## 2025-04-03 NOTE — PROGRESS NOTE ADULT - ASSESSMENT
79 YOF PMH HTN, DM, CAD, AS, CKD 3, status post TAVR, ligation sac and repair of arteriotomy.  Nephrology consulted for acute kidney injury    Acute kidney injury on chronic kidney disease stage III:  ·	Baseline serum creatinine 1.4-1.6 mg/deciliter  ·	Pt now with Catracho with Scr ~ 2.4 mg/deciliter  ·	UA bland  ·	CATRACHO likely hemodynamically mediated, might have a component of tubular injury from contrast.  ·	Scr now improving to 1.5  ·	Bp stable- Continue diuretics      Anemia- in setting of blood los  on IV iron  sp Procrit 10,000 IU SC x1 on 04/02    AS sp TAVR- mgt as per CTS    nephrology follow up outpt to establish care in CKD clinic

## 2025-04-03 NOTE — PROGRESS NOTE ADULT - PROBLEM SELECTOR PLAN 1
Now s/p GA TF TAVR via RCFA with basicila by Dr. Hansen.  Post op Left groin rebleeding when femstop removed after 4 hours, resolved with manual pressure. Bilateral groin access sites soft and non-tender.     TTE: A 23mm Medtronic CoreValve Evolut TAVR is visualized in the aortic position. AT= 70.42msec. Dimensionless Index= .51. No evidence of paravalvular regurgitation. No evidence of intravalvular regurgitation.    Lipitor for chronic graft patency prophylaxis  Plavix - will sart in AM  Aspirin for acute graft closure prophylaxis  Encourage PO intake  Encourage OOB to chair and ambulation with PT  Encourage deep breathing exercised and coughing  Chest PT and IS use with bedside nurse    Plan to be discussed / reviewed with CTICU team during AM rounds.
Now s/p GA TF TAVR via RCFA with basicila by Dr. Hansen.  Post op Left groin rebleeding when femstop removed after 4 hours, resolved with manual pressure. Bilateral groin access sites soft and non-tender.     TTE: A 23mm Medtronic CoreValve Evolut TAVR is visualized in the aortic position. AT= 70.42msec. Dimensionless Index= .51. No evidence of paravalvular regurgitation. No evidence of intravalvular regurgitation.    Lipitor for chronic graft patency prophylaxis  Plavix   Aspirin for acute graft closure prophylaxis  Encourage PO intake  Encourage OOB to chair and ambulation with PT  Encourage deep breathing exercised and coughing  Chest PT and IS use with bedside nurse    Plan to be discussed / reviewed with CTICU team during AM rounds.
Now s/p GA TF TAVR via RCFA with basicila by Dr. Hansen.  Post op Left groin rebleeding when femstop removed after 4 hours, resolved with manual pressure. Bilateral groin access sites soft and non-tender.     TTE: A 23mm Medtronic CoreValve Evolut TAVR is visualized in the aortic position. AT= 70.42msec. Dimensionless Index= .51. No evidence of paravalvular regurgitation. No evidence of intravalvular regurgitation.    Lipitor for chronic graft patency prophylaxis  Plavix   Aspirin for acute graft closure prophylaxis  Encourage PO intake  Encourage OOB to chair and ambulation with PT  Encourage deep breathing exercised and coughing  Chest PT and IS use with bedside nurse    Plan to be discussed / reviewed with CTICU team during AM rounds.
Now s/p TF TAVR via RCFA with basicila of RCC by Dr. Hansen.  Post op Left groin rebleeding when femstop removed after 4 hours, resolved with manual pressure. Bilateral groin access sites soft and non-tender.     TTE: A 23mm Medtronic CoreValve Evolut TAVR is visualized in the aortic position. AT= 70.42msec. Dimensionless Index= .51. No evidence of paravalvular regurgitation. No evidence of intravalvular regurgitation.    cont asa/plavix  Encourage PO intake  Encourage OOB to chair and ambulation with PT  Encourage deep breathing exercised and coughing  Chest PT and IS use with bedside nurse  home likely today  Plan to be discussed / reviewed with CT surgery attending in AM rounds

## 2025-04-03 NOTE — PROGRESS NOTE ADULT - ASSESSMENT
78 yo F s/p TAVR 3/28 and Left femoral cutdown, evacuation of hematoma, ligation of PSA sac, repair of arteriotomy on 3/29. Pt recovering appropriately post-op.     Plan  - Prevena removed today  - monitor ANTHONY output   - Frequent stripping of ANTHONY drain to prevent clogging  - Staples will be removed two weeks post op  - DVT ppx: SCDs, ASA/plavix   - ok for OOB  - Local wound care to groin, gauze/island dressing daily, gentle care to skin tear  - Pain control prn   - continue care per primary team  - discussed with attending surgeon 78 yo F s/p TAVR 3/28 and Left femoral cutdown, evacuation of hematoma, ligation of PSA sac, repair of arteriotomy on 3/29. Pt recovering appropriately post-op.     Plan  - Prevena removed today  - monitor ANTHONY output   - Frequent stripping of ANTHONY drain to prevent clogging  - Staples will be removed two weeks post op  - DVT ppx: SCDs, ASA/plavix   - ok for OOB  - Local wound care to groin, xeroform over skin tear then gauze/island dressing daily, gentle care to skin tear  - Pain control prn   - continue care per primary team  - discussed with attending surgeon

## 2025-04-03 NOTE — PROGRESS NOTE ADULT - SUBJECTIVE AND OBJECTIVE BOX
OD 7 s/p TF-TAVR via [facility] with basilica R coronary cusp    Subjective: c/o pain left groin denies CP, palpitations, SOB, cough, fever, chills, itchiness/rash, diaphoresis, vision changes, HA, dizziness/lightheadedness, numbness/tingling, abd pain, N/V     T(C): 36.6 (04-03-25 @ 00:15), Max: 36.8 (04-02-25 @ 05:00)  HR: 71 (04-03-25 @ 00:15) (64 - 77)  BP: 138/62 (04-03-25 @ 00:15) (124/54 - 177/50)  RR: 18 (04-03-25 @ 00:15) (10 - 21)  SpO2: 94% (04-03-25 @ 00:15) (94% - 99%)    04-02    136  |  100  |  44.3[H]  ----------------------------<  111[H]  4.0   |  24.0  |  1.66[H]    Ca    9.2      02 Apr 2025 04:00  Mg     2.5     04-02                                 9.1    7.94  )-----------( 114      ( 02 Apr 2025 04:00 )             27.6                    CAPILLARY BLOOD GLUCOSE  POCT Blood Glucose.: 115 mg/dL (02 Apr 2025 22:13)  POCT Blood Glucose.: 122 mg/dL (02 Apr 2025 17:01)  POCT Blood Glucose.: 150 mg/dL (02 Apr 2025 12:11)  POCT Blood Glucose.: 131 mg/dL (02 Apr 2025 07:58)    I&O's Detail    01 Apr 2025 07:01  -  02 Apr 2025 07:00  --------------------------------------------------------  IN:    IV PiggyBack: 100 mL    Oral Fluid: 1160 mL  Total IN: 1260 mL    OUT:    Bulb (mL): 10 mL    Indwelling Catheter - Urethral (mL): 750 mL    Voided (mL): 2100 mL  Total OUT: 2860 mL  Total NET: -1600 mL    02 Apr 2025 07:01  -  03 Apr 2025 01:23  --------------------------------------------------------  IN:    Oral Fluid: 1210 mL  Total IN: 1210 mL    OUT:    Bulb (mL): 5 mL    Voided (mL): 1700 mL  Total OUT: 1705 mL    Total NET: -495 mL    Drug Dosing Weight  Height (cm): 147.3 (28 Mar 2025 06:17)  Weight (kg): 58 (28 Mar 2025 06:17)  BMI (kg/m2): 26.7 (28 Mar 2025 06:17)  BSA (m2): 1.51 (28 Mar 2025 06:17)    MEDICATIONS  (STANDING):  acetaminophen     Tablet .. 975 milliGRAM(s) Oral every 6 hours  allopurinol 100 milliGRAM(s) Oral daily  aspirin  chewable 81 milliGRAM(s) Oral daily  atorvastatin 80 milliGRAM(s) Oral at bedtime  carvedilol 3.125 milliGRAM(s) Oral every 12 hours  chlorhexidine 2% Cloths 1 Application(s) Topical <User Schedule>  clopidogrel Tablet 75 milliGRAM(s) Oral daily  epoetin latisha-epbx (RETACRIT) Injectable 65058 Unit(s) SubCutaneous once  ezetimibe 10 milliGRAM(s) Oral at bedtime  insulin lispro (ADMELOG) corrective regimen sliding scale   SubCutaneous Before meals and at bedtime  iron sucrose IVPB 200 milliGRAM(s) IV Intermittent every 24 hours  isosorbide   mononitrate ER Tablet (IMDUR) 30 milliGRAM(s) Oral daily  levothyroxine 88 MICROGram(s) Oral daily  lidocaine   4% Patch 1 Patch Transdermal every 24 hours  melatonin 5 milliGRAM(s) Oral at bedtime  pantoprazole    Tablet 40 milliGRAM(s) Oral before breakfast  polyethylene glycol 3350 17 Gram(s) Oral daily  senna 2 Tablet(s) Oral at bedtime  sodium chloride 0.9% lock flush 3 milliLiter(s) IV Push every 8 hours  triamterene 37.5 mG/hydrochlorothiazide 25 mG Tablet 1 Tablet(s) Oral daily    MEDICATIONS  (PRN):  acetaminophen     Tablet .. 975 milliGRAM(s) Oral every 6 hours PRN Temp greater or equal to 38C (100.4F), Moderate Pain (4 - 6)  benzocaine/menthol Lozenge 1 Lozenge Oral four times a day PRN Sore Throat  oxyCODONE    IR 2.5 milliGRAM(s) Oral every 6 hours PRN Moderate Pain (4 - 6)  oxyCODONE    IR 5 milliGRAM(s) Oral every 6 hours PRN Severe Pain (7 - 10)    Physical Exam  Gen: NAD  Neuro: A&Ox3 non focal speech clear and intac  Pulm: decreased BS b/l no wheezing  CV: S1S2 RRR  Abd: +BS soft NT ND  Extrem/MS: mild b/l LE edema, no cyanosis, WWP  Incision(s): R groing soft, L groin vac intact, ANTHONY to bulb sxn, ecchymotic

## 2025-04-03 NOTE — PROGRESS NOTE ADULT - PROBLEM SELECTOR PLAN 5
Lovenox and SCDs for DVT prophylaxis  Protonix for GI prophylaxis  continue with bowel regimen
Lovenox and SCDs for DVT prophylaxis  Protonix for GI prophylaxis  continue with bowel regimen
SCDs for DVT prophylaxis  Protonix for GI prophylaxis

## 2025-04-03 NOTE — PROGRESS NOTE ADULT - ATTENDING COMMENTS
chet stable, ecchymosis expected  palpable pedal pulse  ANTHONY draining serosang fluid  continue proveena and ANTHONY drain
plan for dressing removal tomorrow , continue drain care
Seen at the bedside patient was seen at the bedside.  Groin is soft.  Drain was removed since he had minimal drainage.  There is skin abrasion on the top of the incision from the femorofemoral.  Instructions were given to apply Xeroform to the area.  Okay from vascular standpoint for the patient be discharged.  Follow-up with Dr. Magana in the office for staple removal in 2 weeks.

## 2025-04-03 NOTE — PROGRESS NOTE ADULT - PROBLEM SELECTOR PLAN 2
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    Vascular surgery performed Left femoral cutdown, evacuation of hematoma, ligation of PSA sac, repair of arteriotomy on 3/29    Previna down POD5    monitor ANTHONY output, leave drain in place until output is <15cc/24hr.  Can be discharged with drain in place. 160 out over last 24 hours     Frequent stripping of ANTHONY drain to prevent clogging    Staples will be removed two weeks post op
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    Vascular surgery performed Left femoral cutdown, evacuation of hematoma, ligation of PSA sac, repair of arteriotomy on 3/29    Prevena down POD5    monitor ANTHONY output, leave drain in place until output is <15cc/24hr.  Can be discharged with drain in place. 160 out over last 24 hours     Frequent stripping of ANTHONY drain to prevent clogging    Staples will be removed two weeks post op
Will restart home meds in AM.
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    Vascular surgery performed Left femoral cutdown, evacuation of hematoma, ligation of PSA sac, repair of arteriotomy on 3/29    Prevena down POD5    monitor ANTHONY output, leave drain in place until output is <15cc/24hr.  Can be discharged with drain in place    Frequent stripping of ANTHONY drain to prevent clogging    Staples will be removed two weeks post op

## 2025-04-03 NOTE — PROGRESS NOTE ADULT - PROBLEM SELECTOR PLAN 4
on SSI ACHS
on SSI ACHS
Lovenox and SCDs for DVT prophylaxis  Protonix for GI prophylaxis  continue with bowel regimen
on SSI ACHS

## 2025-04-03 NOTE — PROGRESS NOTE ADULT - PROBLEM SELECTOR PROBLEM 2
Pseudoaneurysm of left femoral artery
Pseudoaneurysm of left femoral artery
Hypertension
Pseudoaneurysm of left femoral artery

## 2025-04-03 NOTE — PROGRESS NOTE ADULT - SUBJECTIVE AND OBJECTIVE BOX
[facility] DIVISION OF KIDNEY DISEASES AND HYPERTENSION -- FOLLOW UP NOTE  --------------------------------------------------------------------------------  Chief Complaint:  Catracho on CKD  24 hour events/subjective:  Pt downgraded from CTICU  feels well      PAST HISTORY  --------------------------------------------------------------------------------  No significant changes to PMH, PSH, FHx, SHx, unless otherwise noted    ALLERGIES & MEDICATIONS  --------------------------------------------------------------------------------  Allergies    No Known Allergies    Standing Inpatient Medications  acetaminophen     Tablet .. 975 milliGRAM(s) Oral every 6 hours  allopurinol 100 milliGRAM(s) Oral daily  aspirin  chewable 81 milliGRAM(s) Oral daily  atorvastatin 80 milliGRAM(s) Oral at bedtime  carvedilol 3.125 milliGRAM(s) Oral every 12 hours  chlorhexidine 2% Cloths 1 Application(s) Topical <User Schedule>  clopidogrel Tablet 75 milliGRAM(s) Oral daily  epoetin latisha-epbx (RETACRIT) Injectable 47980 Unit(s) SubCutaneous once  ezetimibe 10 milliGRAM(s) Oral at bedtime  insulin lispro (ADMELOG) corrective regimen sliding scale   SubCutaneous Before meals and at bedtime  iron sucrose IVPB 200 milliGRAM(s) IV Intermittent every 24 hours  isosorbide   mononitrate ER Tablet (IMDUR) 30 milliGRAM(s) Oral daily  levothyroxine 88 MICROGram(s) Oral daily  lidocaine   4% Patch 1 Patch Transdermal every 24 hours  melatonin 5 milliGRAM(s) Oral at bedtime  pantoprazole    Tablet 40 milliGRAM(s) Oral before breakfast  polyethylene glycol 3350 17 Gram(s) Oral daily  senna 2 Tablet(s) Oral at bedtime  sodium chloride 0.9% lock flush 3 milliLiter(s) IV Push every 8 hours  triamterene 37.5 mG/hydrochlorothiazide 25 mG Tablet 1 Tablet(s) Oral daily    PRN Inpatient Medications  acetaminophen     Tablet .. 975 milliGRAM(s) Oral every 6 hours PRN  benzocaine/menthol Lozenge 1 Lozenge Oral four times a day PRN  oxyCODONE    IR 2.5 milliGRAM(s) Oral every 6 hours PRN  oxyCODONE    IR 5 milliGRAM(s) Oral every 6 hours PRN      REVIEW OF SYSTEMS  --------------------------------------------------------------------------------  Gen: No weight changes, fatigue, fevers/chills, weakness  Skin: No rashes  Head/Eyes/Ears/Mouth: No headache; Normal hearing; Normal vision w/o blurriness; No sinus pain/discomfort, sore throat  Respiratory: No dyspnea, cough, wheezing, hemoptysis  CV: No chest pain, PND, orthopnea  GI: No abdominal pain, diarrhea, constipation, nausea, vomiting, melena, hematochezia  : No increased frequency, dysuria, hematuria, nocturia  MSK: No joint pain/swelling; no back pain; no edema  Neuro: No dizziness/lightheadedness, weakness, seizures, numbness, tingling  Heme: No easy bruising or bleeding  Endo: No heat/cold intolerance  Psych: No significant nervousness, anxiety, stress, depression    All other systems were reviewed and are negative, except as noted.    VITALS/PHYSICAL EXAM  --------------------------------------------------------------------------------  T(C): 36.8 (04-03-25 @ 12:06), Max: 36.8 (04-03-25 @ 08:01)  HR: 72 (04-03-25 @ 12:06) (65 - 74)  BP: 147/55 (04-03-25 @ 12:06) (136/63 - 177/50)  RR: 18 (04-03-25 @ 08:01) (14 - 19)  SpO2: 97% (04-03-25 @ 08:01) (94% - 98%)  Wt(kg): --        04-02-25 @ 07:01  -  04-03-25 @ 07:00  --------------------------------------------------------  IN: 1210 mL / OUT: 1705 mL / NET: -495 mL    04-03-25 @ 07:01  -  04-03-25 @ 13:09  --------------------------------------------------------  IN: 240 mL / OUT: 0 mL / NET: 240 mL      Physical Exam:  	Gen: NAD, well-appearing  	HEENT:  supple neck, clear oropharynx  	Pulm: CTA B/L  	CV: RRR, S1S2; no rub  	Back: No spinal or CVA tenderness  	Abd: +BS, soft, nontender/nondistended  	: No suprapubic tenderness  	UE: Warm, no edema  	LE: Warm,  no edema  	Neuro: No focal deficit  	Psych: Normal affect and mood  	Skin: Warm    LABS/STUDIES  --------------------------------------------------------------------------------              9.0    6.99  >-----------<  132      [04-03-25 @ 06:40]              26.9     137  |  102  |  36.1  ----------------------------<  104      [04-03-25 @ 06:40]  4.3   |  23.0  |  1.55        Ca     8.8     [04-03-25 @ 06:40]      Mg     2.5     [04-03-25 @ 06:40]      Creatinine Trend:  SCr 1.55 [04-03 @ 06:40]  SCr 1.66 [04-02 @ 04:00]  SCr 1.97 [04-01 @ 02:10]  SCr 2.35 [03-31 @ 14:10]  SCr 2.42 [03-31 @ 03:20]    Urinalysis - [04-03-25 @ 06:40]      Color  / Appearance  / SG  / pH       Gluc 104 / Ketone   / Bili  / Urobili        Blood  / Protein  / Leuk Est  / Nitrite       RBC  / WBC  / Hyaline  / Gran  / Sq Epi  / Non Sq Epi  / Bacteria       TSH 0.64      [03-26-25 @ 08:55]

## 2025-04-04 ENCOUNTER — APPOINTMENT (OUTPATIENT)
Dept: CARE COORDINATION | Facility: HOME HEALTH | Age: 80
End: 2025-04-04

## 2025-04-04 VITALS
HEIGHT: 58 IN | DIASTOLIC BLOOD PRESSURE: 70 MMHG | SYSTOLIC BLOOD PRESSURE: 149 MMHG | OXYGEN SATURATION: 99 % | RESPIRATION RATE: 16 BRPM | HEART RATE: 70 BPM

## 2025-04-08 PROBLEM — Z95.2 S/P TAVR (TRANSCATHETER AORTIC VALVE REPLACEMENT): Status: ACTIVE | Noted: 2025-04-08

## 2025-04-09 ENCOUNTER — APPOINTMENT (OUTPATIENT)
Dept: CARDIOTHORACIC SURGERY | Facility: CLINIC | Age: 80
End: 2025-04-09
Payer: MEDICARE

## 2025-04-09 VITALS
HEART RATE: 60 BPM | SYSTOLIC BLOOD PRESSURE: 152 MMHG | HEIGHT: 55 IN | BODY MASS INDEX: 33.56 KG/M2 | OXYGEN SATURATION: 98 % | RESPIRATION RATE: 18 BRPM | DIASTOLIC BLOOD PRESSURE: 68 MMHG | WEIGHT: 145 LBS

## 2025-04-09 DIAGNOSIS — Z95.2 PRESENCE OF PROSTHETIC HEART VALVE: ICD-10-CM

## 2025-04-09 DIAGNOSIS — I35.0 NONRHEUMATIC AORTIC (VALVE) STENOSIS: ICD-10-CM

## 2025-04-09 PROCEDURE — 99213 OFFICE O/P EST LOW 20 MIN: CPT

## 2025-04-18 ENCOUNTER — APPOINTMENT (OUTPATIENT)
Dept: VASCULAR SURGERY | Facility: CLINIC | Age: 80
End: 2025-04-18
Payer: MEDICARE

## 2025-04-18 VITALS
SYSTOLIC BLOOD PRESSURE: 134 MMHG | WEIGHT: 130 LBS | HEART RATE: 74 BPM | OXYGEN SATURATION: 96 % | HEIGHT: 58 IN | DIASTOLIC BLOOD PRESSURE: 63 MMHG | BODY MASS INDEX: 27.29 KG/M2

## 2025-04-18 DIAGNOSIS — S75.009S: ICD-10-CM

## 2025-04-18 PROCEDURE — 99024 POSTOP FOLLOW-UP VISIT: CPT

## 2025-04-30 ENCOUNTER — APPOINTMENT (OUTPATIENT)
Dept: VASCULAR SURGERY | Facility: CLINIC | Age: 80
End: 2025-04-30
Payer: MEDICARE

## 2025-04-30 VITALS
OXYGEN SATURATION: 96 % | SYSTOLIC BLOOD PRESSURE: 151 MMHG | HEIGHT: 56 IN | WEIGHT: 130 LBS | DIASTOLIC BLOOD PRESSURE: 68 MMHG | BODY MASS INDEX: 29.25 KG/M2 | HEART RATE: 65 BPM | RESPIRATION RATE: 16 BRPM | TEMPERATURE: 97.6 F

## 2025-04-30 DIAGNOSIS — S75.009S: ICD-10-CM

## 2025-04-30 PROCEDURE — 99024 POSTOP FOLLOW-UP VISIT: CPT

## 2025-05-06 NOTE — ED ADULT NURSE NOTE - IS THE PATIENT ABLE TO BE SCREENED?
-Rifampin 600 mg by mouth daily for 4 months  -Labs every 2 weeks  -Follow up in 2 months   -Notify Dr. Colmenares if side effects   Yes

## 2025-05-15 ENCOUNTER — APPOINTMENT (OUTPATIENT)
Dept: CARDIOLOGY | Facility: CLINIC | Age: 80
End: 2025-05-15
Payer: MEDICARE

## 2025-05-15 PROCEDURE — 93306 TTE W/DOPPLER COMPLETE: CPT

## 2025-06-17 ENCOUNTER — APPOINTMENT (OUTPATIENT)
Dept: CARDIOLOGY | Facility: CLINIC | Age: 80
End: 2025-06-17
Payer: MEDICARE

## 2025-06-17 VITALS
SYSTOLIC BLOOD PRESSURE: 152 MMHG | HEART RATE: 62 BPM | HEIGHT: 56 IN | WEIGHT: 130 LBS | DIASTOLIC BLOOD PRESSURE: 52 MMHG | OXYGEN SATURATION: 97 % | BODY MASS INDEX: 29.25 KG/M2

## 2025-06-17 PROCEDURE — 93000 ELECTROCARDIOGRAM COMPLETE: CPT

## 2025-06-17 PROCEDURE — 99214 OFFICE O/P EST MOD 30 MIN: CPT

## 2025-09-16 ENCOUNTER — TRANSCRIPTION ENCOUNTER (OUTPATIENT)
Age: 80
End: 2025-09-16

## (undated) DEVICE — SUT VICRYL 2-0 27" CT-1 UNDYED

## (undated) DEVICE — POSITIONER FOAM EGG CRATE ULNAR 2PCS (PINK)

## (undated) DEVICE — LOADING SYSTEM EVOLUT FX 23-29MM

## (undated) DEVICE — WARMING BLANKET UPPER ADULT

## (undated) DEVICE — WARMING BLANKET FULL UNDERBODY

## (undated) DEVICE — DRAPE 1/2 SHEET 40X57"

## (undated) DEVICE — PACK MAJOR ABDOMINAL WITH LAP

## (undated) DEVICE — SUT SILK 0 30" SH

## (undated) DEVICE — SOL IRR POUR NS 0.9% 1000ML

## (undated) DEVICE — SYR LUER LOK 20CC

## (undated) DEVICE — BLADE SURGICAL #10 STAINLESS

## (undated) DEVICE — STAPLER SKIN PROXIMATE

## (undated) DEVICE — DRAPE WARMING SOLUTION 66 X 44"

## (undated) DEVICE — DRSG MASTISOL

## (undated) DEVICE — VISITEC 4X4

## (undated) DEVICE — RETAINER VICERA FISH LG

## (undated) DEVICE — ELCTR MULTIFUNCTION DEFIBRILLATION ELECTRODE EDGE SYSTEM ADULT

## (undated) DEVICE — PREP SCRUB BRUSH W CHG 4%

## (undated) DEVICE — SOL INJ NS 0.9% 1000ML

## (undated) DEVICE — POOLE SUCTION TIP

## (undated) DEVICE — FOLEY TRAY 16FR 5CC LF UMETER CLOSED

## (undated) DEVICE — DRAPE CV 106" X 135"

## (undated) DEVICE — SUT MONOCRYL 4-0 27" PS-2 UNDYED

## (undated) DEVICE — DRSG MEPILEX 10 X 30CM (4 X 12") WHITE

## (undated) DEVICE — WOUND IRR IRRISEPT W 0.5 CHG

## (undated) DEVICE — DRAPE TOWEL BLUE STICKY

## (undated) DEVICE — ELCTR ROCKER SWITCH PENCIL BLUE 10FT

## (undated) DEVICE — PACK BASIC GOWN

## (undated) DEVICE — DRSG PREVENA PEEL & PLACE KIT 13CM

## (undated) DEVICE — DRAPE C ARM UNIVERSAL

## (undated) DEVICE — VENODYNE/SCD SLEEVE CALF MEDIUM

## (undated) DEVICE — BLADE SURGICAL #15 CARBON

## (undated) DEVICE — DRAPE DOME BAG 22"

## (undated) DEVICE — DRSG TEGADERM 4 X 4.75"

## (undated) DEVICE — PACK MINOR WITH LAP

## (undated) DEVICE — SOL IRR POUR H2O 1000ML

## (undated) DEVICE — LIGASURE IMPACT

## (undated) DEVICE — GOWN TRIMAX LG

## (undated) DEVICE — DRAPE 3/4 SHEET 52X76"

## (undated) DEVICE — ELCTR BOVIE BLADE 3/4" EXTENDED LENGTH 6"

## (undated) DEVICE — GLV 7.5 PROTEXIS (WHITE)

## (undated) DEVICE — PREP CHLORAPREP HI-LITE ORANGE 26ML

## (undated) DEVICE — SUT PDS II 1 48" TP-1

## (undated) DEVICE — DRAPE TOWEL BLUE 17" X 24"

## (undated) DEVICE — ELCTR GROUNDING PAD ADULT COVIDIEN

## (undated) DEVICE — Device